# Patient Record
Sex: MALE | Race: WHITE
[De-identification: names, ages, dates, MRNs, and addresses within clinical notes are randomized per-mention and may not be internally consistent; named-entity substitution may affect disease eponyms.]

---

## 2018-03-07 ENCOUNTER — HOSPITAL ENCOUNTER (OUTPATIENT)
Dept: HOSPITAL 17 - NEPE | Age: 58
Setting detail: OBSERVATION
LOS: 1 days | Discharge: HOME | End: 2018-03-08
Attending: INTERNAL MEDICINE | Admitting: INTERNAL MEDICINE
Payer: SELF-PAY

## 2018-03-07 VITALS
SYSTOLIC BLOOD PRESSURE: 110 MMHG | RESPIRATION RATE: 16 BRPM | DIASTOLIC BLOOD PRESSURE: 67 MMHG | HEART RATE: 69 BPM | OXYGEN SATURATION: 97 %

## 2018-03-07 VITALS
DIASTOLIC BLOOD PRESSURE: 70 MMHG | RESPIRATION RATE: 16 BRPM | OXYGEN SATURATION: 98 % | TEMPERATURE: 97.5 F | HEART RATE: 62 BPM | SYSTOLIC BLOOD PRESSURE: 120 MMHG

## 2018-03-07 VITALS
DIASTOLIC BLOOD PRESSURE: 64 MMHG | HEART RATE: 72 BPM | RESPIRATION RATE: 22 BRPM | TEMPERATURE: 98.3 F | OXYGEN SATURATION: 97 % | SYSTOLIC BLOOD PRESSURE: 130 MMHG

## 2018-03-07 DIAGNOSIS — Z79.84: ICD-10-CM

## 2018-03-07 DIAGNOSIS — I50.9: ICD-10-CM

## 2018-03-07 DIAGNOSIS — I08.0: ICD-10-CM

## 2018-03-07 DIAGNOSIS — I11.0: ICD-10-CM

## 2018-03-07 DIAGNOSIS — R06.00: ICD-10-CM

## 2018-03-07 DIAGNOSIS — R00.1: ICD-10-CM

## 2018-03-07 DIAGNOSIS — R07.89: Primary | ICD-10-CM

## 2018-03-07 DIAGNOSIS — I25.10: ICD-10-CM

## 2018-03-07 DIAGNOSIS — Z95.1: ICD-10-CM

## 2018-03-07 DIAGNOSIS — Z87.891: ICD-10-CM

## 2018-03-07 DIAGNOSIS — E78.00: ICD-10-CM

## 2018-03-07 DIAGNOSIS — I25.2: ICD-10-CM

## 2018-03-07 DIAGNOSIS — E11.9: ICD-10-CM

## 2018-03-07 DIAGNOSIS — Z79.899: ICD-10-CM

## 2018-03-07 DIAGNOSIS — R94.31: ICD-10-CM

## 2018-03-07 LAB
ALBUMIN SERPL-MCNC: 3.4 GM/DL (ref 3.4–5)
ALP SERPL-CCNC: 146 U/L (ref 45–117)
ALT SERPL-CCNC: 45 U/L (ref 12–78)
AST SERPL-CCNC: 24 U/L (ref 15–37)
BASOPHILS # BLD AUTO: 0.1 TH/MM3 (ref 0–0.2)
BASOPHILS NFR BLD: 1.3 % (ref 0–2)
BILIRUB SERPL-MCNC: 0.4 MG/DL (ref 0.2–1)
BUN SERPL-MCNC: 19 MG/DL (ref 7–18)
CALCIUM SERPL-MCNC: 8.9 MG/DL (ref 8.5–10.1)
CHLORIDE SERPL-SCNC: 106 MEQ/L (ref 98–107)
CREAT SERPL-MCNC: 1.03 MG/DL (ref 0.6–1.3)
EOSINOPHIL # BLD: 0.1 TH/MM3 (ref 0–0.4)
EOSINOPHIL NFR BLD: 2.2 % (ref 0–4)
ERYTHROCYTE [DISTWIDTH] IN BLOOD BY AUTOMATED COUNT: 14.6 % (ref 11.6–17.2)
GFR SERPLBLD BASED ON 1.73 SQ M-ARVRAT: 74 ML/MIN (ref 89–?)
GLUCOSE SERPL-MCNC: 275 MG/DL (ref 74–106)
HCO3 BLD-SCNC: 26.5 MEQ/L (ref 21–32)
HCT VFR BLD CALC: 39.6 % (ref 39–51)
HGB BLD-MCNC: 13.1 GM/DL (ref 13–17)
INR PPP: 1.1 RATIO
LYMPHOCYTES # BLD AUTO: 1.9 TH/MM3 (ref 1–4.8)
LYMPHOCYTES NFR BLD AUTO: 36.1 % (ref 9–44)
MAGNESIUM SERPL-MCNC: 1.7 MG/DL (ref 1.5–2.5)
MCH RBC QN AUTO: 26.2 PG (ref 27–34)
MCHC RBC AUTO-ENTMCNC: 33 % (ref 32–36)
MCV RBC AUTO: 79.3 FL (ref 80–100)
MONOCYTE #: 0.7 TH/MM3 (ref 0–0.9)
MONOCYTES NFR BLD: 12.7 % (ref 0–8)
NEUTROPHILS # BLD AUTO: 2.6 TH/MM3 (ref 1.8–7.7)
NEUTROPHILS NFR BLD AUTO: 47.7 % (ref 16–70)
PLATELET # BLD: 178 TH/MM3 (ref 150–450)
PMV BLD AUTO: 8.5 FL (ref 7–11)
PROT SERPL-MCNC: 6.5 GM/DL (ref 6.4–8.2)
PROTHROMBIN TIME: 11.6 SEC (ref 9.8–11.6)
RBC # BLD AUTO: 4.99 MIL/MM3 (ref 4.5–5.9)
SODIUM SERPL-SCNC: 140 MEQ/L (ref 136–145)
TROPONIN I SERPL-MCNC: (no result) NG/ML (ref 0.02–0.05)
TROPONIN I SERPL-MCNC: (no result) NG/ML (ref 0.02–0.05)
WBC # BLD AUTO: 5.4 TH/MM3 (ref 4–11)

## 2018-03-07 PROCEDURE — 99285 EMERGENCY DEPT VISIT HI MDM: CPT

## 2018-03-07 PROCEDURE — 96374 THER/PROPH/DIAG INJ IV PUSH: CPT

## 2018-03-07 PROCEDURE — 85610 PROTHROMBIN TIME: CPT

## 2018-03-07 PROCEDURE — 85025 COMPLETE CBC W/AUTO DIFF WBC: CPT

## 2018-03-07 PROCEDURE — G0378 HOSPITAL OBSERVATION PER HR: HCPCS

## 2018-03-07 PROCEDURE — 84484 ASSAY OF TROPONIN QUANT: CPT

## 2018-03-07 PROCEDURE — 82550 ASSAY OF CK (CPK): CPT

## 2018-03-07 PROCEDURE — 71045 X-RAY EXAM CHEST 1 VIEW: CPT

## 2018-03-07 PROCEDURE — 83880 ASSAY OF NATRIURETIC PEPTIDE: CPT

## 2018-03-07 PROCEDURE — 80053 COMPREHEN METABOLIC PANEL: CPT

## 2018-03-07 PROCEDURE — 85730 THROMBOPLASTIN TIME PARTIAL: CPT

## 2018-03-07 PROCEDURE — 83735 ASSAY OF MAGNESIUM: CPT

## 2018-03-07 PROCEDURE — 82948 REAGENT STRIP/BLOOD GLUCOSE: CPT

## 2018-03-07 PROCEDURE — 93005 ELECTROCARDIOGRAM TRACING: CPT

## 2018-03-07 RX ADMIN — Medication SCH ML: at 21:00

## 2018-03-07 NOTE — RADRPT
EXAM DATE/TIME:  03/07/2018 18:05 

 

[facility] COMPARISON:     

CHEST SINGLE AP, February 24, 2013, 23:43.

 

                     

INDICATIONS :     

Chest pain.

                     

 

MEDICAL HISTORY :     

None.          

 

SURGICAL HISTORY :     

CABG.   

 

ENCOUNTER:     

Initial                                        

 

ACUITY:     

1 day      

 

PAIN SCORE:     

4/10

 

LOCATION:     

Bilateral chest 

 

FINDINGS:     

Mild patient rotation towards the right.  A single view of the chest demonstrates the lungs to be sym
metrically aerated without evidence of mass, infiltrate or effusion.  The cardiomediastinal contours 
are unremarkable.  Osseous structures are intact.  Median sternotomy with intact sternal wire sutures
.

 

CONCLUSION:     The lungs are clear.

 

 

 

 Dedrick Briggs MD on March 07, 2018 at 18:33           

Board Certified Radiologist.

 This report was verified electronically.

## 2018-03-07 NOTE — PD
HPI


Chief Complaint:  Chest Pain


Time Seen by Provider:  17:18


Travel History


International Travel<30 days:  No


Contact w/Intl Traveler<30days:  No


Traveled to known affect area:  No





History of Present Illness


HPI


Patient is a 57-year-old male presents emergency department for evaluation of 

shortness of breath progressive over the past few weeks.  Patient has a history 

of CABG, last heart attack was a year ago, he had an angiogram which showed his 

ejection fraction was about 40% at that time according to him.  The patient 

states that he had just established with Dr. Purvis and recently had a 

nuclear stress test, he has a results with him which are difficult to interpret 

by me because her hand written but appears the patient has global hypokinesis, 

severe aortic insufficiency and mitral insufficiency with an EF of about 19%.  

Apparently he has a fixed defect as well.  Patient was told by Dr. Purvis to 

come to the emergency department for further evaluation but he states he 

thought he could do it at home but he became somewhat worried when they were 

thinking about his recommendations from Dr. Purvis and decided to come in 

today.  He states his shortness of breath can be severe and onsets only after a 

few steps.  He does not wear any home oxygen, quit smoking 3 years ago.  He 

denies any chest pain abdominal pain extremity pain to me.





PFSH


Past Medical History


Cancer:  No


Cardiac Catheterization:  Yes (03/2013)


Cardiovascular Problems:  Yes


High Cholesterol:  Yes


Chest Pain:  Yes


Coronary Artery Disease:  Yes


Diabetes:  Yes


Patient Takes Glucophage:  Yes


Diminished Hearing:  No


Endocrine:  Yes


Gastrointestinal Disorders:  Yes


Genitourinary:  No


Hypertension:  Yes


Immune Disorder:  No


Musculoskeletal:  Yes


Neurologic:  No


Psychiatric:  No


Reproductive:  No


Respiratory:  Yes


Immunizations Current:  Yes


Myocardial Infarction:  Yes (3/2013)


Ulcer:  Yes


Tetanus Vaccination:  > 5 Years


Influenza Vaccination:  No


Pregnant?:  Not Pregnant





Past Surgical History


Appendectomy:  Yes


Cardiac Surgery:  Yes (CABG X3)


Other Surgery:  Yes (CABG, APPENDECTOMY, SKIN GRAFTS TO RIGHT FOOT AS A CHILD, 

BACK SURGERY X 3)





Social History


Alcohol Use:  Yes (SOCIALLY)


Tobacco Use:  No


Substance Use:  No





Allergies-Medications


(Allergen,Severity, Reaction):  


Coded Allergies:  


     iodine (Unverified  Allergy, Severe, Swelling, 3/7/18)


     potassium iodide (Unverified  Allergy, Severe, Swelling, 3/7/18)


     povidone-iodine (Unverified  Allergy, Severe, Swelling, 3/7/18)


     shellfish derived (Unverified  Allergy, Severe, 3/7/18)


     sodium iodide (Unverified  Allergy, Severe, Swelling, 3/7/18)


     sodium iodide (Unverified  Allergy, Severe, Swelling, 3/7/18)


Reported Meds & Prescriptions





Reported Meds & Active Scripts


Active


Reported


Glucophage (Metformin HCl) 850 Mg Tab 850 Mg PO TIDPC


Enalapril (Enalapril Maleate) 2.5 Mg Tab 2.5 Mg PO DAILY


Pravastatin 40 Mg Tab 40 Mg PO DAILY


Glyburide 2.5 Mg Tab 2.5 Mg PO BID


     Take with meals at the same time each day


Clopidogrel (Clopidogrel Bisulfate) 75 Mg Tab 75 Mg PO DAILY








Review of Systems


Except as stated in HPI:  all other systems reviewed are Neg





Physical Exam


Narrative


GENERAL: Well-developed, well-nourished, no obvious distress peer


SKIN: Focused skin assessment warm/dry.


HEAD: Atraumatic. Normocephalic. 


EYES: Pupils equal and round. No scleral icterus. No injection or drainage. 


ENT: No nasal bleeding or discharge.  Mucous membranes pink and moist.


NECK: Trachea midline. No JVD. 


CARDIOVASCULAR: Regular rate and rhythm.  No murmur appreciated.  2+ bilateral 

equal pulses in all 4 extremities.


RESPIRATORY: No accessory muscle use. Clear to auscultation. Breath sounds 

equal bilaterally.  Patient speaks in full sentences but then has to gasp 

between them.  He has good air entry and is clear to auscultation bilaterally.


GASTROINTESTINAL: Abdomen soft, non-tender, nondistended. Hepatic and splenic 

margins not palpable. 


MUSCULOSKELETAL: No obvious deformities. No clubbing.  No cyanosis.  There is 

pitting edema bilateral lower extremities from the mid tibia distally.


NEUROLOGICAL: Awake and alert. No obvious cranial nerve deficits.  Motor 

grossly within normal limits. Normal speech.


PSYCHIATRIC: Appropriate mood and affect; insight and judgment normal.





Data


Data


Last Documented VS





Orders





 Orders


Electrocardiogram (3/7/18 17:29)


B-Type Natriuretic Peptide (3/7/18 17:29)


Ckmb (Isoenzyme) Profile (3/7/18 17:29)


Complete Blood Count With Diff (3/7/18 17:29)


Comprehensive Metabolic Panel (3/7/18 17:29)


Magnesium (Mg) (3/7/18 17:29)


Prothrombin Time / Inr (Pt) (3/7/18 17:29)


Act Partial Throm Time (Ptt) (3/7/18 17:29)


Troponin I (3/7/18 17:29)


Chest, Single Ap (3/7/18 17:29)


Ecg Monitoring (3/7/18 17:29)


Iv Access Insert/Monitor (3/7/18 17:29)


Oximetry (3/7/18 17:29)


Oxygen Administration (3/7/18 17:29)


Sodium Chloride 0.9% Flush (Ns Flush) (3/7/18 17:30)


Admit Order (Ed Use Only) (3/7/18 20:57)


Place In Observation (3/7/18 20:59)


Activity Bed Rest With Brp (3/7/18 20:59)


Vital Signs (Adult) Q4H (3/7/18 20:59)


Cardiac Rhythm .As Directed (3/7/18 20:59)


Notify Dr: Other .PRN (3/7/18 20:59)


Notify DrVj Parameters (3/7/18 20:59)


Resp Oxygen Nasal Cannula (3/7/18 )


Ckmb (Isoenzyme) Profile (3/7/18 20:59)


Ckmb (Isoenzyme) Profile (3/7/18 23:59)


Troponin I (3/7/18 20:59)


Troponin I (3/7/18 23:59)


Electrocardiogram (3/7/18 20:59)


Electrocardiogram (3/7/18 23:59)


^ Obtain (3/7/18 20:59)


Sodium Chloride 0.9% Flush (Ns Flush) (3/7/18 21:00)


Sodium Chloride 0.9% Flush (Ns Flush) (3/7/18 21:00)


Cardiac Monitor / Telemetry AIDAN.Q8H (3/7/18 20:59)





Labs





Laboratory Tests








Test


  3/7/18


17:44


 


White Blood Count 5.4 TH/MM3 


 


Red Blood Count 4.99 MIL/MM3 


 


Hemoglobin 13.1 GM/DL 


 


Hematocrit 39.6 % 


 


Mean Corpuscular Volume 79.3 FL 


 


Mean Corpuscular Hemoglobin 26.2 PG 


 


Mean Corpuscular Hemoglobin


Concent 33.0 % 


 


 


Red Cell Distribution Width 14.6 % 


 


Platelet Count 178 TH/MM3 


 


Mean Platelet Volume 8.5 FL 


 


Neutrophils (%) (Auto) 47.7 % 


 


Lymphocytes (%) (Auto) 36.1 % 


 


Monocytes (%) (Auto) 12.7 % 


 


Eosinophils (%) (Auto) 2.2 % 


 


Basophils (%) (Auto) 1.3 % 


 


Neutrophils # (Auto) 2.6 TH/MM3 


 


Lymphocytes # (Auto) 1.9 TH/MM3 


 


Monocytes # (Auto) 0.7 TH/MM3 


 


Eosinophils # (Auto) 0.1 TH/MM3 


 


Basophils # (Auto) 0.1 TH/MM3 


 


CBC Comment DIFF FINAL 


 


Differential Comment  


 


Prothrombin Time 11.6 SEC 


 


Prothromb Time International


Ratio 1.1 RATIO 


 


 


Activated Partial


Thromboplast Time 22.6 SEC 


 


 


Blood Urea Nitrogen 19 MG/DL 


 


Creatinine 1.03 MG/DL 


 


Random Glucose 275 MG/DL 


 


Total Protein 6.5 GM/DL 


 


Albumin 3.4 GM/DL 


 


Calcium Level 8.9 MG/DL 


 


Magnesium Level 1.7 MG/DL 


 


Alkaline Phosphatase 146 U/L 


 


Aspartate Amino Transf


(AST/SGOT) 24 U/L 


 


 


Alanine Aminotransferase


(ALT/SGPT) 45 U/L 


 


 


Total Bilirubin 0.4 MG/DL 


 


Sodium Level 140 MEQ/L 


 


Potassium Level 4.3 MEQ/L 


 


Chloride Level 106 MEQ/L 


 


Carbon Dioxide Level 26.5 MEQ/L 


 


Anion Gap 8 MEQ/L 


 


Estimat Glomerular Filtration


Rate 74 ML/MIN 


 


 


Total Creatine Kinase 96 U/L 


 


Troponin I


  LESS THAN 0.02


NG/ML


 


B-Type Natriuretic Peptide 472 PG/ML 











MDM


Medical Decision Making


Medical Screen Exam Complete:  Yes


Emergency Medical Condition:  Yes


Differential Diagnosis


ACS, CAD, MI.


Narrative Course


Patient initial workup negative, he does have a stress test report with him 

which is hand written and appears to show an EF of 19%.  If attempted to page 

his cardiologist has not yet returned my page, I have asked Dr. Ford at 

shift change to speak with the cardiologist for additional direction on 

inpatient management, otherwise the patient can be admitted to medicine can 

follow these recommendations.


Scripts


Carvedilol (Carvedilol) 6.25 Mg Tab


6.25 MG PO BID, #60 TAB 0 Refills


   Prov: Frankwillaky,Samantha RONDA         3/8/18











Mayank Howell MD Mar 7, 2018 17:33

## 2018-03-08 VITALS
SYSTOLIC BLOOD PRESSURE: 112 MMHG | OXYGEN SATURATION: 99 % | DIASTOLIC BLOOD PRESSURE: 55 MMHG | HEART RATE: 64 BPM | TEMPERATURE: 97.3 F | RESPIRATION RATE: 22 BRPM

## 2018-03-08 VITALS — HEART RATE: 71 BPM

## 2018-03-08 VITALS
OXYGEN SATURATION: 98 % | SYSTOLIC BLOOD PRESSURE: 124 MMHG | RESPIRATION RATE: 18 BRPM | HEART RATE: 61 BPM | DIASTOLIC BLOOD PRESSURE: 71 MMHG

## 2018-03-08 VITALS
OXYGEN SATURATION: 98 % | SYSTOLIC BLOOD PRESSURE: 119 MMHG | DIASTOLIC BLOOD PRESSURE: 67 MMHG | RESPIRATION RATE: 16 BRPM | TEMPERATURE: 97.5 F | HEART RATE: 69 BPM

## 2018-03-08 VITALS
OXYGEN SATURATION: 98 % | HEART RATE: 73 BPM | RESPIRATION RATE: 18 BRPM | DIASTOLIC BLOOD PRESSURE: 57 MMHG | TEMPERATURE: 97.6 F | SYSTOLIC BLOOD PRESSURE: 115 MMHG

## 2018-03-08 VITALS
TEMPERATURE: 97.7 F | DIASTOLIC BLOOD PRESSURE: 63 MMHG | OXYGEN SATURATION: 98 % | HEART RATE: 55 BPM | SYSTOLIC BLOOD PRESSURE: 116 MMHG | RESPIRATION RATE: 20 BRPM

## 2018-03-08 VITALS — HEART RATE: 68 BPM

## 2018-03-08 LAB — TROPONIN I SERPL-MCNC: (no result) NG/ML (ref 0.02–0.05)

## 2018-03-08 RX ADMIN — Medication SCH ML: at 10:43

## 2018-03-08 NOTE — HHI.DCPOC
Discharge Care Plan


Diagnosis:  


(1) Decreased cardiac ejection fraction


(2) Atypical chest pain


(3) Sleeps in sitting position due to orthopnea


Goals to Promote Your Health


* To prevent worsening of your condition and complications


* To maintain your health at the optimal level


Directions to Meet Your Goals


*** Take your medications as prescribed


*** Follow your dietary instruction


*** Follow activity as directed








*** Keep your appointments as scheduled


*** Take your immunizations and boosters as scheduled


*** If your symptoms worsen call your PCP, if no PCP go to Urgent Care Center 

or Emergency Room***


*** Smoking is Dangerous to Your Health. Avoid second hand smoke***


***Call the 24-hour hour crisis hotline for domestic abuse at [phone]***











Samantha Morgan Mar 8, 2018 13:05

## 2018-03-08 NOTE — HHI.HP
[facility]


Primary Care Physician


Dhaval Malloy DO


Chief Complaint


Dyspnea


History of Present Illness


57 year old male with history of CABG, CHF, HTN, HLD, Type II DM, and former 

smoker presents to ER for further evaluation of dyspnea. Onset 3 weeks ago with 

accompanying bilateral lower extremity edema. Edema and dyspnea improved after 

one week "on it's own" before returning.  Reported orthopnea during varies 

times during the day and exertional dsypnea, therefore he contacted his 

cardiologist Dr. Purvis. Reports multiple testing completed during this time, 

including echocardiogram and nuclear stress testing early this week. Dr. Purvis reviewed results of testing with him on Tuesday and MD recommended him 

coming to ER due to decreased EF of 19%, stating EF 1.5 year ago was 40%. 

Waited to come to ER due to errands wanting to complete as he was certain he 

would be admitted to hospital. No current chest pain or discomfort. Yesterday 

during an episode of dyspnea had central chest pressure briefly.





Review of Systems


General: No fatigue,weakness, fever, chills, recent illness, or change in 

appetite. 


HEENT: No HA, no vision changes, no nasal congestion or drainage


CV: As stated above.  No current chest pain or pressure.  No palpitations, 

intermittent leg pain, dizziness


RESP: Exertional dyspnea, orthopnea, COPD, and former smoker. Does not require 

home oxygen. No cough, wheeze, hemoptysis. 


GI: No nausea, vomiting, bowel changes.  No unintentional weight gain or weight 

loss


: No dysuria, urgency, frequency


EXT: No lower leg edema, no paraesthesias


MS: No discomfort or change in ROM


NEURO: No change in memory, dizziness, difficulty with balance, LOC, motor/

sensory deficits


PSYCH: No anxiety, depression


SKIN: No rashes, no concerning lesions





Past Family Social History


Allergies:  


Coded Allergies:  


     iodine (Unverified  Allergy, Severe, Swelling, 3/7/18)


     potassium iodide (Unverified  Allergy, Severe, Swelling, 3/7/18)


     povidone-iodine (Unverified  Allergy, Severe, Swelling, 3/7/18)


     shellfish derived (Unverified  Allergy, Severe, 3/7/18)


     sodium iodide (Unverified  Allergy, Severe, Swelling, 3/7/18)


     sodium iodide (Unverified  Allergy, Severe, Swelling, 3/7/18)


Past Medical History


Coronary artery disease, MI (15 year ago), hypertension, hyperlipidemia, type 2 

diabetes, former smoker, congestive heart failure, COPD


Past Surgical History


CABG, appendectomy, lumbar surgeries 3


Reported Medications





Reported Meds & Active Scripts


Active


Reported


Glucophage (Metformin HCl) 850 Mg Tab 850 Mg PO TIDPC


Enalapril (Enalapril Maleate) 2.5 Mg Tab 2.5 Mg PO DAILY


Pravastatin 40 Mg Tab 40 Mg PO DAILY


Carvedilol 3.125 Mg Tab 3.125 Mg PO BID


Glyburide 2.5 Mg Tab 2.5 Mg PO BID


     Take with meals at the same time each day


Clopidogrel (Clopidogrel Bisulfate) 75 Mg Tab 75 Mg PO DAILY


Active Ordered Medications





Current Medications








 Medications


  (Trade)  Dose


 Ordered  Sig/Ilia


 Route  Start Time


 Stop Time Status Last Admin


 


  (NS Flush)  2 ml  UNSCH  PRN


 IVF  3/7/18 17:30


     


 


 


  (NS Flush)  2 ml  UNSCH  PRN


 IV FLUSH  3/7/18 21:00


     


 


 


  (NS Flush)  2 ml  BID


 IV FLUSH  3/7/18 21:00


     


 


 


  (Tylenol)  500 mg  Q4H  PRN


 PO  3/8/18 08:00


     


 


 


  (Zofran Inj)  4 mg  Q6H  PRN


 IV PUSH  3/8/18 08:00


     


 


 


  (Nitrostat Sl)  0.4 mg  Q5M  PRN


 SL  3/8/18 08:00


     


 


 


  (Aspirin)  325 mg  DAILY


 PO  3/8/18 09:00


     


 








Social History


Known coronary artery disease, hypertension, hyperlipidemia, and type 2 

diabetes.


Former smoker quitting 3 years ago. 





Past cardiac testing


3/5/18- Chemical stress test completed Dr. Purvis's office, reports EF 19% 

and directed to ER.


13 cardiac catheterization (Dr. Liane Kerr) 1.  Critical three-vessel 

coronary artery disease.  2.  Patent LIMA to LAD.  3. Ischemic cardiomyopathy 

EF 35-40%


CABG





Physical Exam


Vital Signs





Vital Signs








  Date Time  Temp Pulse Resp B/P (MAP) Pulse Ox O2 Delivery O2 Flow Rate FiO2


 


3/8/18 04:53 97.5 69 16 119/67 (84) 98   


 


3/8/18 01:21      Nasal Cannula 2.00 


 


3/8/18 01:05 97.6 73 18 115/57 (76) 98   


 


3/8/18 00:19  71      


 


3/7/18 22:42 97.5 62 16 120/70 (87) 98   


 


3/7/18 22:02        


 


3/7/18 20:19  69 16 110/67 (81) 97 Nasal Cannula 2.00 


 


3/7/18 18:15     100 Nasal Cannula 2.00 


 


3/7/18 17:03  62 22  97 Nasal Cannula 2.00 


 


3/7/18 16:45 98.3 72 22 130/64 (86) 97   








Physical Exam


GENERAL: Alert WN, WD, NAD, pleasant, mildly dyspneic with conversation, 

 male who appears older than stated age


HEAD: NC, AT


EYES: Sclera clear, conjunctiva without injection


ENT: Mucous membranes pink and moist


NECK: Supple, no masses, trachea midline


CV: RRR, without murmur, rub, gallop, no JVD, S1-S2 no S3-S4. 


RESP: Diminished lungs throughout bilateral, no crackles, wheeze, rhonchi, 

symmetrical chest rise, nonlabored, mildly dyspneic with conversion however 

able to speak in full sentences.


ABD: Soft, NT, ND, no masses, positive bowel tones


EXT: Pulses +24


MS: Normal tone 4 extremities, no obvious deformities, full range of motion


NEURO: CN II through CN XII grossly intact, motor strength 5/5


PSYCH: A+O 3, pleasant affect, appropriate speech, mood, insight and judgment


SKIN: Normal turgor, normal texture, no lesions, no rashes, even hair 

distribution, multiple tattoos


Laboratory





Laboratory Tests








Test


  3/7/18


17:44 3/7/18


21:20 3/8/18


01:15


 


White Blood Count 5.4   


 


Red Blood Count 4.99   


 


Hemoglobin 13.1   


 


Hematocrit 39.6   


 


Mean Corpuscular Volume 79.3   


 


Mean Corpuscular Hemoglobin 26.2   


 


Mean Corpuscular Hemoglobin


Concent 33.0 


  


  


 


 


Red Cell Distribution Width 14.6   


 


Platelet Count 178   


 


Mean Platelet Volume 8.5   


 


Neutrophils (%) (Auto) 47.7   


 


Lymphocytes (%) (Auto) 36.1   


 


Monocytes (%) (Auto) 12.7   


 


Eosinophils (%) (Auto) 2.2   


 


Basophils (%) (Auto) 1.3   


 


Neutrophils # (Auto) 2.6   


 


Lymphocytes # (Auto) 1.9   


 


Monocytes # (Auto) 0.7   


 


Eosinophils # (Auto) 0.1   


 


Basophils # (Auto) 0.1   


 


CBC Comment DIFF FINAL   


 


Differential Comment    


 


Prothrombin Time 11.6   


 


Prothromb Time International


Ratio 1.1 


  


  


 


 


Activated Partial


Thromboplast Time 22.6 


  


  


 


 


Blood Urea Nitrogen 19   


 


Creatinine 1.03   


 


Random Glucose 275   


 


Total Protein 6.5   


 


Albumin 3.4   


 


Calcium Level 8.9   


 


Magnesium Level 1.7   


 


Alkaline Phosphatase 146   


 


Aspartate Amino Transf


(AST/SGOT) 24 


  


  


 


 


Alanine Aminotransferase


(ALT/SGPT) 45 


  


  


 


 


Total Bilirubin 0.4   


 


Sodium Level 140   


 


Potassium Level 4.3   


 


Chloride Level 106   


 


Carbon Dioxide Level 26.5   


 


Anion Gap 8   


 


Estimat Glomerular Filtration


Rate 74 


  


  


 


 


Total Creatine Kinase 96  73  59 


 


Troponin I LESS THAN 0.02  LESS THAN 0.02  LESS THAN 0.02 


 


B-Type Natriuretic Peptide 472   








Result Diagram:  


3/7/18 1744                                                                    

            3/7/18 1744





Imaging





Last 48 hours Impressions








Chest X-Ray 3/7/18 1729 Signed





Impressions: 





 Service Date/Time:  2018 18:05 - CONCLUSION: The lungs are 





 clear.     Dedrick Briggs MD 








Course


EKG


NSB, nonspecific t waves changes V4-V6





Caprini VTE Risk Assessment


Caprini VTE Risk Assessment:  No/Low Risk (score <= 1)


Caprini Risk Assessment Model











 Point Value = 1          Point Value = 2  Point Value = 3  Point Value = 5


 


Age 41-60


Minor surgery


BMI > 25 kg/m2


Swollen legs


Varicose veins


Pregnancy or postpartum


History of unexplained or recurrent


   spontaneous 


Oral contraceptives or hormone


   replacement


Sepsis (< 1 month)


Serious lung disease, including


   pneumonia (< 1 month)


Abnormal pulmonary function


Acute myocardial infarction


Congestive heart failure (< 1 month)


History of inflammatory bowel disease


Medical patient at bed rest Age 61-74


Arthroscopic surgery


Major open surgery (> 45 min)


Laparoscopic surgery (> 45 min)


Malignancy


Confined to bed (> 72 hours)


Immobilizing plaster cast


Central venous access Age >= 75


History of VTE


Family history of VTE


Factor V Leiden


Prothrombin 90883C


Lupus anticoagulant


Anticardiolipin antibodies


Elevated serum homocysteine


Heparin-induced thrombocytopenia


Other congenital or acquired


   thrombophilia Stroke (< 1 month)


Elective arthroplasty


Hip, pelvis, or leg fracture


Acute spinal cord injury (< 1 month)








Prophylaxis Regimen











   Total Risk


Factor Score Risk Level Prophylaxis Regimen


 


0-1      Low Early ambulation


 


2 Moderate Order ONE of the following:


*Sequential Compression Device (SCD)


*Heparin 5000 units SQ BID


 


3-4 Higher Order ONE of the following medications:


*Heparin 5000 units SQ TID


*Enoxaparin/Lovenox 40 mg SQ daily (WT < 150 kg, CrCl > 30 mL/min)


*Enoxaparin/Lovenox 30 mg SQ daily (WT < 150 kg, CrCl > 10-29 mL/min)


*Enoxaparin/Lovenox 30 mg SQ BID (WT < 150 kg, CrCl > 30 mL/min)


AND/OR


*Sequential Compression Device (SCD)


 


5 or more Highest Order ONE of the following medications:


*Heparin 5000 units SQ TID (Preferred with Epidurals)


*Enoxaparin/Lovenox 40 mg SQ daily (WT < 150 kg, CrCl > 30 mL/min)


*Enoxaparin/Lovenox 30 mg SQ daily (WT < 150 kg, CrCl > 10-29 mL/min)


*Enoxaparin/Lovenox 30 mg SQ BID (WT < 150 kg, CrCl > 30 mL/min)


AND


*Sequential Compression Device (SCD)











Assessment and Plan


Assessment and Plan


#1 Atypical chest pain-admitted to chest pain center. Ruled out with 3 sets of 

EKGs, cardiac enzymes, and monitored on telemetry overnight. Reports recent 

cardiac stress test early this week with EF 19%. His cardiologist recommended 

him coming to ER on Tuesday, unfortunately was unable to come to ER until last 

evening. Call will be placed to Dr. Purvis office, however patient reports 

Dr. Purvis is now out of town. If Dr. Purvis is out of town, will contact on

-call physician for Dr. Purvis to discuss plan of care. Patient is agreeable 

to plan of care.


#2 Dyspnea-continue o2 supplementation to maintain spo2 greater than 92%, no 

acute finding of congestive heart failure although due to reported orthopnea 

will administer x1 dose of Lasix 20 mg IV 


#3 History of CAD-continue Plavix, increase Coreg to 6.25 BID, continue 

enalapril


 





1130 Spoke with Dr. Amin, covering for Dr. Purvis.











Samantha Morgan Mar 8, 2018 08:07

## 2018-03-09 NOTE — EKG
Date Performed: 03/07/2018       Time Performed: 21:23:09

 

PTAGE:      57 years

 

EKG:      Sinus rhythm 

 

 POSSIBLE LEFT ATRIAL ENLARGEMENT POSSIBLE LEFT VENTRICULAR HYPERTROPHY ST DEVIATION AND MODERATE T-W
AVE ABNORMALITY, CONSIDER LATERAL ISCHEMIA ABNORMAL ECG WARNING: DATA QUALITY MAY AFFECT INTERPRETATI
ON 

 

 PREVIOUS TRACING            : 03/07/2018 17.03 Since previous tracing, no significant change noted

 

DOCTOR:   Nazario Mcguire  Interpretating Date/Time  03/09/2018 08:46:02

## 2018-03-09 NOTE — EKG
Date Performed: 03/07/2018       Time Performed: 17:03:14

 

PTAGE:      57 years

 

EKG:      Sinus rhythm 

 

 POSSIBLE LEFT ATRIAL ENLARGEMENT POSSIBLE LEFT VENTRICULAR HYPERTROPHY ST DEVIATION AND MODERATE T-W
AVE ABNORMALITY, CONSIDER ANTEROLATERAL ISCHEMIA ABNORMAL ECG INTERPRETATION BASED ON A DEFAULT AGE O
F 38 YEARS 

 

 PREVIOUS TRACING            : 02/25/2013 14.28 Since previous tracing, no significant change noted

 

DOCTOR:   Nazario Mcguire  Interpretating Date/Time  03/09/2018 08:48:38

## 2018-03-09 NOTE — EKG
Date Performed: 03/07/2018       Time Performed: 22:52:15

 

PTAGE:      57 years

 

EKG:      SINUS BRADYCARDIA POSSIBLE LEFT ATRIAL ENLARGEMENT POSSIBLE LEFT VENTRICULAR HYPERTROPHY NO
NSPECIFIC T-WAVE ABNORMALITY ABNORMAL ECG

 

PREVIOUS TRACING       : 03/07/2018 21.23 Since previous tracing, no significant change noted

 

DOCTOR:   Nazario Mcguire  Interpretating Date/Time  03/09/2018 08:45:24

## 2018-03-09 NOTE — EKG
Date Performed: 03/08/2018       Time Performed: 01:23:56

 

PTAGE:      57 years

 

EKG:      Sinus rhythm 

 

 POSSIBLE LEFT ATRIAL ENLARGEMENT POSSIBLE LEFT VENTRICULAR HYPERTROPHY NONSPECIFIC T-WAVE ABNORMALIT
Y ABNORMAL ECG

 

PREVIOUS TRACING       : 03/07/2018 22.52 Since previous tracing, no significant change noted

 

DOCTOR:   Nazario Mcguire  Interpretating Date/Time  03/09/2018 08:45:04

## 2018-03-21 ENCOUNTER — HOSPITAL ENCOUNTER (INPATIENT)
Dept: HOSPITAL 17 - NEDAMB | Age: 58
LOS: 9 days | Discharge: HOME | DRG: 225 | End: 2018-03-30
Attending: HOSPITALIST | Admitting: HOSPITALIST
Payer: COMMERCIAL

## 2018-03-21 VITALS
RESPIRATION RATE: 22 BRPM | DIASTOLIC BLOOD PRESSURE: 63 MMHG | SYSTOLIC BLOOD PRESSURE: 116 MMHG | HEART RATE: 58 BPM | OXYGEN SATURATION: 100 %

## 2018-03-21 VITALS
SYSTOLIC BLOOD PRESSURE: 124 MMHG | TEMPERATURE: 98 F | RESPIRATION RATE: 22 BRPM | DIASTOLIC BLOOD PRESSURE: 62 MMHG | OXYGEN SATURATION: 99 % | HEART RATE: 62 BPM

## 2018-03-21 VITALS — RESPIRATION RATE: 16 BRPM | OXYGEN SATURATION: 100 %

## 2018-03-21 VITALS
DIASTOLIC BLOOD PRESSURE: 78 MMHG | OXYGEN SATURATION: 99 % | RESPIRATION RATE: 18 BRPM | SYSTOLIC BLOOD PRESSURE: 131 MMHG | HEART RATE: 59 BPM

## 2018-03-21 VITALS — WEIGHT: 187.61 LBS | BODY MASS INDEX: 26.27 KG/M2 | HEIGHT: 71 IN

## 2018-03-21 DIAGNOSIS — E78.5: ICD-10-CM

## 2018-03-21 DIAGNOSIS — I11.0: ICD-10-CM

## 2018-03-21 DIAGNOSIS — Z79.82: ICD-10-CM

## 2018-03-21 DIAGNOSIS — I34.0: ICD-10-CM

## 2018-03-21 DIAGNOSIS — Z91.041: ICD-10-CM

## 2018-03-21 DIAGNOSIS — I25.5: ICD-10-CM

## 2018-03-21 DIAGNOSIS — R11.0: ICD-10-CM

## 2018-03-21 DIAGNOSIS — E66.9: ICD-10-CM

## 2018-03-21 DIAGNOSIS — Z79.84: ICD-10-CM

## 2018-03-21 DIAGNOSIS — M54.9: ICD-10-CM

## 2018-03-21 DIAGNOSIS — I27.20: ICD-10-CM

## 2018-03-21 DIAGNOSIS — J44.9: ICD-10-CM

## 2018-03-21 DIAGNOSIS — Z95.1: ICD-10-CM

## 2018-03-21 DIAGNOSIS — I25.110: Primary | ICD-10-CM

## 2018-03-21 DIAGNOSIS — I25.2: ICD-10-CM

## 2018-03-21 DIAGNOSIS — I50.9: ICD-10-CM

## 2018-03-21 DIAGNOSIS — Z87.891: ICD-10-CM

## 2018-03-21 DIAGNOSIS — Z79.02: ICD-10-CM

## 2018-03-21 DIAGNOSIS — E11.65: ICD-10-CM

## 2018-03-21 DIAGNOSIS — K21.9: ICD-10-CM

## 2018-03-21 DIAGNOSIS — G89.29: ICD-10-CM

## 2018-03-21 LAB
ALBUMIN SERPL-MCNC: 3.7 GM/DL (ref 3.4–5)
ALP SERPL-CCNC: 132 U/L (ref 45–117)
ALT SERPL-CCNC: 43 U/L (ref 12–78)
AST SERPL-CCNC: 26 U/L (ref 15–37)
BASOPHILS # BLD AUTO: 0.1 TH/MM3 (ref 0–0.2)
BASOPHILS NFR BLD: 1.1 % (ref 0–2)
BILIRUB SERPL-MCNC: 0.6 MG/DL (ref 0.2–1)
BUN SERPL-MCNC: 14 MG/DL (ref 7–18)
CALCIUM SERPL-MCNC: 9.2 MG/DL (ref 8.5–10.1)
CHLORIDE SERPL-SCNC: 106 MEQ/L (ref 98–107)
CREAT SERPL-MCNC: 1.07 MG/DL (ref 0.6–1.3)
EOSINOPHIL # BLD: 0.1 TH/MM3 (ref 0–0.4)
EOSINOPHIL NFR BLD: 2.4 % (ref 0–4)
ERYTHROCYTE [DISTWIDTH] IN BLOOD BY AUTOMATED COUNT: 15 % (ref 11.6–17.2)
ERYTHROCYTE [DISTWIDTH] IN BLOOD BY AUTOMATED COUNT: 15.3 % (ref 11.6–17.2)
GFR SERPLBLD BASED ON 1.73 SQ M-ARVRAT: 71 ML/MIN (ref 89–?)
GLUCOSE SERPL-MCNC: 248 MG/DL (ref 74–106)
HCO3 BLD-SCNC: 24.7 MEQ/L (ref 21–32)
HCT VFR BLD CALC: 41.1 % (ref 39–51)
HCT VFR BLD CALC: 42.3 % (ref 39–51)
HGB BLD-MCNC: 13.8 GM/DL (ref 13–17)
HGB BLD-MCNC: 14 GM/DL (ref 13–17)
INR PPP: 1.1 RATIO
INR PPP: 1.1 RATIO
LYMPHOCYTES # BLD AUTO: 2 TH/MM3 (ref 1–4.8)
LYMPHOCYTES NFR BLD AUTO: 35.6 % (ref 9–44)
MAGNESIUM SERPL-MCNC: 1.9 MG/DL (ref 1.5–2.5)
MCH RBC QN AUTO: 26.3 PG (ref 27–34)
MCH RBC QN AUTO: 26.4 PG (ref 27–34)
MCHC RBC AUTO-ENTMCNC: 33 % (ref 32–36)
MCHC RBC AUTO-ENTMCNC: 33.5 % (ref 32–36)
MCV RBC AUTO: 78.9 FL (ref 80–100)
MCV RBC AUTO: 79.5 FL (ref 80–100)
MONOCYTE #: 0.6 TH/MM3 (ref 0–0.9)
MONOCYTES NFR BLD: 11.1 % (ref 0–8)
NEUTROPHILS # BLD AUTO: 2.8 TH/MM3 (ref 1.8–7.7)
NEUTROPHILS NFR BLD AUTO: 49.8 % (ref 16–70)
PLATELET # BLD: 170 TH/MM3 (ref 150–450)
PLATELET # BLD: 172 TH/MM3 (ref 150–450)
PMV BLD AUTO: 8.5 FL (ref 7–11)
PMV BLD AUTO: 8.5 FL (ref 7–11)
PROT SERPL-MCNC: 7.4 GM/DL (ref 6.4–8.2)
PROTHROMBIN TIME: 10.9 SEC (ref 9.8–11.6)
PROTHROMBIN TIME: 11.1 SEC (ref 9.8–11.6)
RBC # BLD AUTO: 5.21 MIL/MM3 (ref 4.5–5.9)
RBC # BLD AUTO: 5.32 MIL/MM3 (ref 4.5–5.9)
SODIUM SERPL-SCNC: 140 MEQ/L (ref 136–145)
TROPONIN I SERPL-MCNC: (no result) NG/ML (ref 0.02–0.05)
WBC # BLD AUTO: 5.5 TH/MM3 (ref 4–11)
WBC # BLD AUTO: 5.6 TH/MM3 (ref 4–11)

## 2018-03-21 PROCEDURE — 83520 IMMUNOASSAY QUANT NOS NONAB: CPT

## 2018-03-21 PROCEDURE — C1722 AICD, SINGLE CHAMBER: HCPCS

## 2018-03-21 PROCEDURE — C1777 LEAD, AICD, ENDO SINGLE COIL: HCPCS

## 2018-03-21 PROCEDURE — 85002 BLEEDING TIME TEST: CPT

## 2018-03-21 PROCEDURE — 33249 INSJ/RPLCMT DEFIB W/LEAD(S): CPT

## 2018-03-21 PROCEDURE — C1769 GUIDE WIRE: HCPCS

## 2018-03-21 PROCEDURE — 84484 ASSAY OF TROPONIN QUANT: CPT

## 2018-03-21 PROCEDURE — 93460 R&L HRT ART/VENTRICLE ANGIO: CPT

## 2018-03-21 PROCEDURE — 80053 COMPREHEN METABOLIC PANEL: CPT

## 2018-03-21 PROCEDURE — 84439 ASSAY OF FREE THYROXINE: CPT

## 2018-03-21 PROCEDURE — 71045 X-RAY EXAM CHEST 1 VIEW: CPT

## 2018-03-21 PROCEDURE — 83735 ASSAY OF MAGNESIUM: CPT

## 2018-03-21 PROCEDURE — 82948 REAGENT STRIP/BLOOD GLUCOSE: CPT

## 2018-03-21 PROCEDURE — 82810 BLOOD GASES O2 SAT ONLY: CPT

## 2018-03-21 PROCEDURE — 84100 ASSAY OF PHOSPHORUS: CPT

## 2018-03-21 PROCEDURE — 93306 TTE W/DOPPLER COMPLETE: CPT

## 2018-03-21 PROCEDURE — 82552 ASSAY OF CPK IN BLOOD: CPT

## 2018-03-21 PROCEDURE — 80048 BASIC METABOLIC PNL TOTAL CA: CPT

## 2018-03-21 PROCEDURE — 85027 COMPLETE CBC AUTOMATED: CPT

## 2018-03-21 PROCEDURE — 83880 ASSAY OF NATRIURETIC PEPTIDE: CPT

## 2018-03-21 PROCEDURE — 93005 ELECTROCARDIOGRAM TRACING: CPT

## 2018-03-21 PROCEDURE — 84443 ASSAY THYROID STIM HORMONE: CPT

## 2018-03-21 PROCEDURE — 85610 PROTHROMBIN TIME: CPT

## 2018-03-21 PROCEDURE — C1893 INTRO/SHEATH, FIXED,NON-PEEL: HCPCS

## 2018-03-21 PROCEDURE — 85730 THROMBOPLASTIN TIME PARTIAL: CPT

## 2018-03-21 PROCEDURE — 82550 ASSAY OF CK (CPK): CPT

## 2018-03-21 PROCEDURE — 94060 EVALUATION OF WHEEZING: CPT

## 2018-03-21 PROCEDURE — 71046 X-RAY EXAM CHEST 2 VIEWS: CPT

## 2018-03-21 PROCEDURE — 83036 HEMOGLOBIN GLYCOSYLATED A1C: CPT

## 2018-03-21 PROCEDURE — 80061 LIPID PANEL: CPT

## 2018-03-21 PROCEDURE — 84181 WESTERN BLOT TEST: CPT

## 2018-03-21 PROCEDURE — 83690 ASSAY OF LIPASE: CPT

## 2018-03-21 PROCEDURE — 85025 COMPLETE CBC W/AUTO DIFF WBC: CPT

## 2018-03-21 RX ADMIN — INSULIN ASPART SCH: 100 INJECTION, SOLUTION INTRAVENOUS; SUBCUTANEOUS at 16:55

## 2018-03-21 RX ADMIN — Medication SCH ML: at 21:21

## 2018-03-21 RX ADMIN — FAMOTIDINE SCH MG: 20 TABLET, FILM COATED ORAL at 21:29

## 2018-03-21 RX ADMIN — CARVEDILOL SCH MG: 6.25 TABLET, FILM COATED ORAL at 21:29

## 2018-03-21 RX ADMIN — INSULIN ASPART SCH: 100 INJECTION, SOLUTION INTRAVENOUS; SUBCUTANEOUS at 21:34

## 2018-03-21 RX ADMIN — STANDARDIZED SENNA CONCENTRATE AND DOCUSATE SODIUM SCH TAB: 8.6; 5 TABLET, FILM COATED ORAL at 21:29

## 2018-03-21 NOTE — HHI.HP
__________________________________________________





[facility]


Service


[facility]


Primary Care Physician


Dhaval Malloy DO


Admission Diagnosis





unstable angina, EF of 19%, chest pain


Diagnoses:  


(1) Obese


Diagnosis:  Secondary





(2) Diabetes mellitus


Diagnosis:  Secondary





(3) Hypertension


Diagnosis:  Secondary





(4) Hyperlipidemia


Diagnosis:  Secondary





(5) CAD (coronary artery disease)


Diagnosis:  Principal





(6) Hx of CABG


Diagnosis:  Principal





(7) Unstable angina pectoris


Diagnosis:  Principal





(8) Decreased cardiac ejection fraction


Diagnosis:  Secondary





Chief Complaint:  


Chest pain


Travel History


International Travel<30 Days:  No


Contact w/Intl Traveler <30 Da:  No


Traveled to Known Affected Are:  No


History of Present Illness


Patient is a 57-year-old  male presenting to the emergency department 

for evaluation of left-sided chest pain worse when trying to raise his left 

arm.  As well as shortness of breath.  Patient has had this on and off for the 

past 2-3 weeks.  Patient was followed with Dr. Purvis of cardiology that test 

that showed a significant ejection fraction reduction from 40% down to 19%.  

Dr. Purvis wants the patient to be placed on heparin and to be followed for 

cardiac catheterization tomorrow.  Symptoms have not improved been feeling very 

weak and tired as well as short of breath with any movement has no numbness and 

tingling in left arm is improved but still some chest discomfort and pressure-

like something sitting on his chest chest pain is 6 out of 10 taken aspirin 

today has history of high blood pressure and cholesterol does take Plavix


Will be placed in observation will put on heparin drip and Dr. Purvis will 

take for cardiac catheterization tomorrow





Review of Systems


Constitutional:  COMPLAINS OF: Fatigue, DENIES: Diaphoretic episodes, Fever, 

Weight gain, Weight loss, Chills, Dizziness, Change in appetite, Night Sweats


Endocrine:  DENIES: Heat/cold intolerance, Polydipsia, Polyuria, Polyphagia


Eyes:  DENIES: Blurred vision, Diplopia, Eye inflammation, Eye pain, Vision loss

, Photosensitivity, Double Vision


Ears, nose, mouth, throat:  DENIES: Tinnitus, Hearing loss, Vertigo, Nasal 

discharge, Oral lesions, Throat pain, Hoarseness, Ear Pain, Running Nose, 

Epistaxis, Sinus Pain, Toothache, Odynophagia


Respiratory:  COMPLAINS OF: Shortness of breath, DENIES: Apneas, Cough, Snoring

, Wheezing, Hemoptysis, Sputum production


Cardiovascular:  COMPLAINS OF: Chest pain, Dyspnea on Exertion, DENIES: 

Palpitations, Syncope, PND, Lower Extremity Edema, Orthopnea, Claudication


Gastrointestinal:  DENIES: Abdominal pain, Black stools, Bloody stools, 

Constipation, Diarrhea, Nausea, Vomiting, Difficulty Swallowing, Anorexia


Musculoskeletal:  COMPLAINS OF: Back pain, DENIES: Joint pain, Muscle aches, 

Stiffness, Joint Swelling, Neck pain


Integumentary:  DENIES: Abnormal pigmentation, Nail changes, Pruritus, Rash


Hematologic/lymphatic:  DENIES: Bruising, Lymphadenopathy


Immunologic/allergic:  DENIES: Eczema, Urticaria


Neurologic:  DENIES: Abnormal gait, Headache, Localized weakness, Paresthesias, 

Seizures, Speech Problems, Tremor, Poor Balance


Psychiatric:  DENIES: Anxiety, Confusion, Mood changes, Depression, 

Hallucinations, Agitation, Suicidal Ideation, Homicidal Ideation, Delusions


Except as stated in HPI:  all other systems reviewed are Neg





Past Family Social History


Past Medical History


Chest pain


History of MI


History of coronary artery bypass graft


Hypertension


Hyperlipidemia


Obesity


COPD tobacco abuse


Diabetes mellitus


Known coronary artery disease


Chronic back pain with history of lumbar surgeries


History of ulcers


GERD


Past Surgical History


Coronary artery bypass graft 3


Appendectomy .


skin grafts to right foot as a child


Multiple surgeries on right foot


Back surgeries 3 in the lumbar region


Reported Medications





Reported Meds & Active Scripts


Active


Carvedilol 6.25 Mg Tab 6.25 Mg PO BID


Reported


Glucophage (Metformin HCl) 850 Mg Tab 850 Mg PO TIDPC


Enalapril (Enalapril Maleate) 2.5 Mg Tab 2.5 Mg PO DAILY


Pravastatin 40 Mg Tab 40 Mg PO DAILY


Glyburide 2.5 Mg Tab 2.5 Mg PO BID


     Take with meals at the same time each day


Clopidogrel (Clopidogrel Bisulfate) 75 Mg Tab 75 Mg PO DAILY


Allergies:  


Coded Allergies:  


     iodine (Unverified  Allergy, Severe, Swelling, 3/21/18)


     potassium iodide (Unverified  Allergy, Severe, Swelling, 3/21/18)


     povidone-iodine (Unverified  Allergy, Severe, Swelling, 3/21/18)


     shellfish derived (Unverified  Allergy, Severe, 3/21/18)


     sodium iodide (Unverified  Allergy, Severe, Swelling, 3/21/18)


     sodium iodide (Unverified  Allergy, Severe, Swelling, 3/21/18)


Active Ordered Medications





Current Medications


Aspirin (Aspirin) 325 mg ONCE  ONCE PO  Last administered on 3/21/18at 15:00;  

Start 3/21/18 at 14:30;  Stop 3/21/18 at 14:37;  Status DC


Nitroglycerin (Nitroglycerin 2% Oint) 1 inch ONCE  ONCE TOPICAL  Last 

administered on 3/21/18at 15:00;  Start 3/21/18 at 14:30;  Stop 3/21/18 at 14:37

;  Status DC


Heparin Sodium/ Dextrose 250 ml @ 0 mls/hr TITRATE  PRN IV Coagulation 

Management;  Start 3/21/18 at 15:45;  Status UNV


Insulin Aspart (NovoLOG SUPPLEMENTAL SCALE) 1 ACHS SLIDING  SCALE SQ ;  Start 3/

21/18 at 17:00


Dextrose (D50w (Vial) Inj) 50 ml UNSCH  PRN IV PUSH HYPOGLYCEMIA-SEE COMMENTS;  

Start 3/21/18 at 16:00


Glucagon (Glucagon Inj) 1 mg UNSCH  PRN OTHER HYPOGLYCEMIA-SEE COMMENTS;  Start 

3/21/18 at 16:00


Miscellaneous Information PHARMACY NEEDS HT/ WT... Q15M .XX ;  Start 3/21/18 at 

15:45


Carvedilol (Coreg) 6.25 mg BID PO ;  Start 3/21/18 at 21:00


Clopidogrel Bisulfate (Plavix) 75 mg DAILY PO ;  Start 3/22/18 at 09:00


Enalapril Maleate (Vasotec) 2.5 mg DAILY PO ;  Start 3/22/18 at 09:00


Pravastatin Sodium (Pravachol) 40 mg DAILY PO ;  Start 3/22/18 at 09:00


Family History


History of family myocardial infarction


Hypertension


Social History


History of tobacco abuse quit 3 years ago


Occasional alcoholic beverage


Denies any illicits


Drives a truck





Physical Exam


Vital Signs





Vital Signs








  Date Time  Temp Pulse Resp B/P (MAP) Pulse Ox O2 Delivery O2 Flow Rate FiO2


 


3/21/18 14:16   16  100 Room Air  


 


3/21/18 12:22 98.0 62 22 124/62 (82) 99   








Physical Exam


GENERAL: This is a well-nourished, well-developed patient, in mild to moderate 

distress.


SKIN: No rashes, ecchymoses or lesions. Cool and dry.


HEAD: Atraumatic. Normocephalic. No temporal or scalp tenderness.


EYES: Pupils equal round and reactive. Extraocular motions intact. No scleral 

icterus. No injection or drainage. 


ENT: Nose without bleeding, purulent drainage or septal hematoma. Throat 

without erythema, tonsillar hypertrophy or exudate. Uvula midline. Airway 

patent.


NECK: Trachea midline. No JVD or lymphadenopathy. Supple, nontender, no 

meningeal signs.


CARDIOVASCULAR: Regular rate and rhythm without murmurs, gallops, or rubs.  S1-

S2 no S3 or S4


RESPIRATORY: Clear to auscultation. Breath sounds equal bilaterally. No wheezes

, rales, or rhonchi.  


GASTROINTESTINAL: Abdomen soft, non-tender, nondistended. No hepato-splenomegaly

, or palpable masses. No guarding.  Obese


MUSCULOSKELETAL: Extremities without clubbing, cyanosis, or edema. No joint 

tenderness, effusion, or edema noted. No calf tenderness. Negative Homans sign 

bilaterally.


NEUROLOGICAL: Awake and alert. Cranial nerves II through XII intact.  Motor and 

sensory grossly within normal limits. Five out of 5 muscle strength in all 

muscle groups.  Normal speech.


Insight and judgment is good


Mood behaviors appropriate


Laboratory





Laboratory Tests








Test


  3/21/18


12:37


 


White Blood Count 5.6 


 


Red Blood Count 5.32 


 


Hemoglobin 14.0 


 


Hematocrit 42.3 


 


Mean Corpuscular Volume 79.5 


 


Mean Corpuscular Hemoglobin 26.3 


 


Mean Corpuscular Hemoglobin


Concent 33.0 


 


 


Red Cell Distribution Width 15.0 


 


Platelet Count 172 


 


Mean Platelet Volume 8.5 


 


Neutrophils (%) (Auto) 49.8 


 


Lymphocytes (%) (Auto) 35.6 


 


Monocytes (%) (Auto) 11.1 


 


Eosinophils (%) (Auto) 2.4 


 


Basophils (%) (Auto) 1.1 


 


Neutrophils # (Auto) 2.8 


 


Lymphocytes # (Auto) 2.0 


 


Monocytes # (Auto) 0.6 


 


Eosinophils # (Auto) 0.1 


 


Basophils # (Auto) 0.1 


 


CBC Comment DIFF FINAL 


 


Differential Comment  


 


Prothrombin Time 10.9 


 


Prothromb Time International


Ratio 1.1 


 


 


Activated Partial


Thromboplast Time 23.1 


 


 


Blood Urea Nitrogen 14 


 


Creatinine 1.07 


 


Random Glucose 248 


 


Total Protein 7.4 


 


Albumin 3.7 


 


Calcium Level 9.2 


 


Magnesium Level 1.9 


 


Alkaline Phosphatase 132 


 


Aspartate Amino Transf


(AST/SGOT) 26 


 


 


Alanine Aminotransferase


(ALT/SGPT) 43 


 


 


Total Bilirubin 0.6 


 


Sodium Level 140 


 


Potassium Level 4.3 


 


Chloride Level 106 


 


Carbon Dioxide Level 24.7 


 


Anion Gap 9 


 


Estimat Glomerular Filtration


Rate 71 


 


 


Total Creatine Kinase 128 


 


Creatine Kinase MB 3.3 


 


Troponin I LESS THAN 0.02 


 


Lipase 189 








Result Diagram:  


3/21/18 1237                                                                   

             3/21/18 1237





Imaging





Last Impressions








Chest X-Ray 3/21/18 1225 Signed





Impressions: 





 Service Date/Time:  2018 13:08 - CONCLUSION:  No acute 





 disease.  No significant change has occurred.       Roy J. Siragusa, MD Caprini VTE Risk Assessment


[place]ha VTE Risk Assessment:  Mod/High Risk (score >= 2)


Caprini Risk Assessment Model











 Point Value = 1          Point Value = 2  Point Value = 3  Point Value = 5


 


Age 41-60


Minor surgery


BMI > 25 kg/m2


Swollen legs


Varicose veins


Pregnancy or postpartum


History of unexplained or recurrent


   spontaneous 


Oral contraceptives or hormone


   replacement


Sepsis (< 1 month)


Serious lung disease, including


   pneumonia (< 1 month)


Abnormal pulmonary function


Acute myocardial infarction


Congestive heart failure (< 1 month)


History of inflammatory bowel disease


Medical patient at bed rest Age 61-74


Arthroscopic surgery


Major open surgery (> 45 min)


Laparoscopic surgery (> 45 min)


Malignancy


Confined to bed (> 72 hours)


Immobilizing plaster cast


Central venous access Age >= 75


History of VTE


Family history of VTE


Factor V Leiden


Prothrombin [id number]


Lupus anticoagulant


Anticardiolipin antibodies


Elevated serum homocysteine


Heparin-induced thrombocytopenia


Other congenital or acquired


   thrombophilia Stroke (< 1 month)


Elective arthroplasty


Hip, pelvis, or leg fracture


Acute spinal cord injury (< 1 month)








Prophylaxis Regimen











   Total Risk


Factor Score Risk Level Prophylaxis Regimen


 


0-1      Low Early ambulation


 


2 Moderate Order ONE of the following:


*Sequential Compression Device (SCD)


*Heparin 5000 units SQ BID


 


3-4 Higher Order ONE of the following medications:


*Heparin 5000 units SQ TID


*Enoxaparin/Lovenox 40 mg SQ daily (WT < 150 kg, CrCl > 30 mL/min)


*Enoxaparin/Lovenox 30 mg SQ daily (WT < 150 kg, CrCl > 10-29 mL/min)


*Enoxaparin/Lovenox 30 mg SQ BID (WT < 150 kg, CrCl > 30 mL/min)


AND/OR


*Sequential Compression Device (SCD)


 


5 or more Highest Order ONE of the following medications:


*Heparin 5000 units SQ TID (Preferred with Epidurals)


*Enoxaparin/Lovenox 40 mg SQ daily (WT < 150 kg, CrCl > 30 mL/min)


*Enoxaparin/Lovenox 30 mg SQ daily (WT < 150 kg, CrCl > 10-29 mL/min)


*Enoxaparin/Lovenox 30 mg SQ BID (WT < 150 kg, CrCl > 30 mL/min)


AND


*Sequential Compression Device (SCD)











Assessment and Plan


Problem List:  


(1) Hx of CABG


ICD Code:  Z95.1 - Presence of aortocoronary bypass graft


(2) Decreased cardiac ejection fraction


ICD Code:  R93.1 - Abnormal findings on diagnostic imaging of heart and 

coronary circulation


(3) Hypertension


ICD Code:  I10 - Essential (primary) hypertension


(4) Obese


ICD Code:  E66.9 - Obesity, unspecified


(5) Hyperlipidemia


ICD Code:  E78.5 - Hyperlipidemia, unspecified


(6) CAD (coronary artery disease)


ICD Code:  I25.10 - Atherosclerotic heart disease of native coronary artery 

without angina pectoris


(7) Diabetes mellitus


ICD Code:  E11.9 - Type 2 diabetes mellitus without complications


(8) Unstable angina pectoris


ICD Code:  I20.0 - Unstable angina


Status:  Acute


Assessment and Plan


Unstable angina versus atypical chest pain will place on heparin drip and 

consult Dr. Purvis of cardiology morphine and nitroglycerin as needed as well 

as Percocet and Tylenol





Decreased cardiac ejection fracture to undergo cardiac catheterization tomorrow 

with Dr. Purvis continue on heparin drip





Diabetes mellitus continue on sliding scale coverage with Accu-Cheks before 

meals and at bedtime hold his Glucophage and metformin in light of cardiac 

catheterization tomorrow


Keep n.p.o. after midnight





Hypertension resume home medications





Allergy to iodine





Coronary artery disease with history of CABG continue on heparin drip and 

consult Dr. Purvis





COPD tobacco oxygen as needed





GERD continue on Pepcid





Chronic back pain continue on pain meds as needed





DVT and GI prophylaxis





Await cardiac catheterization results


Code Status


Full code


Discussed Condition With


Emergency room and RN and patient


Scheduled to undergo cardiac catheterization with Dr. Purvis tomorrow 

continue on heparin drip











Reece Yousif DO Mar 21, 2018 16:09

## 2018-03-21 NOTE — PD
HPI


Chief Complaint:  Chest Pain


Time Seen by Provider:  13:52


Travel History


International Travel<30 days:  No


Contact w/Intl Traveler<30days:  No


Traveled to known affect area:  No





History of Present Illness


HPI


57-year-old male that presents to the ED for evaluation of left-sided chest 

pain to raise to the left arm.  As well as shortness of breath.  Patient has 

had this on and off for the past 2-3 weeks.  Per patient he is actually follow 

with Dr. Purvis who did multiple tests including a stress test that shows 

significant EF reduction per our records to 19% from 40% last year per patient 

and records from patient's evaluation 2 weeks ago.  Patient was actually seen 

here 2 weeks ago for same. Dr. Purvis has been on medication in his thinking 

about doing a heart Per family on the patient to see what else is going on.  It 

appears the patient from what I can tell might have a valve issue.  Patient 

himself states that the symptoms have not improved and per patient's his 

medications were increased but he still feels very weak and tired as well as 

very short of breath with any movement.  Per patient the numbness and tingling 

to the left arm have improved since being here but he continues to have the 

chest discomfort and pressure like something sitting on his chest.  Per family 

and patient Dr. Purvis his back and apparently they were told by Dr. Purvis 

that when patient gets here to the ER that we contact him.  Patient does have 

allergies to iodine.  Per patient his discomfort 6 out of 10.  He has taken 

aspirin today.  Has a history of high blood pressure and high cholesterol as 

well.  He does take Plavix.





PFSH


Past Medical History


Heart Rhythm Problems:  Yes


Cancer:  No


Cardiac Catheterization:  Yes


Cardiovascular Problems:  Yes


High Cholesterol:  Yes


Chest Pain:  Yes


Congestive Heart Failure:  Yes


Coronary Artery Disease:  Yes


Diabetes:  Yes


Patient Takes Glucophage:  No


Diminished Hearing:  No


Endocrine:  Yes


Gastrointestinal Disorders:  Yes


Genitourinary:  No


Hypertension:  Yes


Immune Disorder:  No


Musculoskeletal:  Yes


Neurologic:  No


Psychiatric:  No


Reproductive:  No


Respiratory:  Yes


Immunizations Current:  Yes


Myocardial Infarction:  Yes (3/2013)


Ulcer:  Yes


Tetanus Vaccination:  Unknown





Past Surgical History


Appendectomy:  Yes


Cardiac Surgery:  Yes (CABG X3)


Coronary Artery Bypass Graft:  Yes


Other Surgery:  Yes (CABG, APPENDECTOMY, SKIN GRAFTS TO RIGHT FOOT AS A CHILD, 

BACK SURGERY X 3)





Family History


Family Myocardial Infarction:  Yes





Social History


Alcohol Use:  Yes (SOCIALLY)


Tobacco Use:  No


Substance Use:  No





Allergies-Medications


(Allergen,Severity, Reaction):  


Coded Allergies:  


     iodine (Unverified  Allergy, Severe, Swelling, 3/21/18)


     potassium iodide (Unverified  Allergy, Severe, Swelling, 3/21/18)


     povidone-iodine (Unverified  Allergy, Severe, Swelling, 3/21/18)


     shellfish derived (Unverified  Allergy, Severe, 3/21/18)


     sodium iodide (Unverified  Allergy, Severe, Swelling, 3/21/18)


     sodium iodide (Unverified  Allergy, Severe, Swelling, 3/21/18)


Reported Meds & Prescriptions





Reported Meds & Active Scripts


Active


Carvedilol 6.25 Mg Tab 6.25 Mg PO BID


Reported


Glucophage (Metformin HCl) 850 Mg Tab 850 Mg PO TIDPC


Enalapril (Enalapril Maleate) 2.5 Mg Tab 2.5 Mg PO DAILY


Pravastatin 40 Mg Tab 40 Mg PO DAILY


Glyburide 2.5 Mg Tab 2.5 Mg PO BID


     Take with meals at the same time each day


Clopidogrel (Clopidogrel Bisulfate) 75 Mg Tab 75 Mg PO DAILY








Review of Systems


Except as stated in HPI:  all other systems reviewed are Neg





Physical Exam


Narrative


GENERAL: 


SKIN: Warm and dry.


HEAD: Atraumatic. Normocephalic. 


EYES: Pupils equal and round. No scleral icterus. No injection or drainage. 


ENT: No nasal bleeding or discharge.  Mucous membranes pink and moist.  Tongue 

is midline.  No uvula deviation.


NECK: Trachea midline. No JVD. 


CARDIOVASCULAR: Regular rate and rhythm.  Murmur heard, S3, S4.


RESPIRATORY: No accessory muscle use. Clear to auscultation. Breath sounds 

equal bilaterally. 


GASTROINTESTINAL: Abdomen soft, non-tender, nondistended. Hepatic and splenic 

margins not palpable. 


MUSCULOSKELETAL: Extremities without clubbing, cyanosis, or edema. No obvious 

deformities.  Full range of motion of the upper and lower extremities 

bilaterally.  2+ pulses bilaterally.


NEUROLOGICAL: Awake and alert. No obvious cranial nerve deficits.  Motor 

grossly within normal limits. Five out of 5 muscle strength in the arms and 

legs.  Normal speech.


PSYCHIATRIC: Appropriate mood and affect; insight and judgment normal.





Data


Data


Last Documented VS





Vital Signs








  Date Time  Temp Pulse Resp B/P (MAP) Pulse Ox O2 Delivery O2 Flow Rate FiO2


 


3/21/18 14:16   16  100 Room Air  


 


3/21/18 12:22 98.0 62  124/62 (82)    








Orders





 Orders


Electrocardiogram (3/21/18 12:25)


B-Type Natriuretic Peptide (3/21/18 12:25)


Ckmb (Isoenzyme) Profile (3/21/18 12:25)


Complete Blood Count With Diff (3/21/18 12:25)


Comprehensive Metabolic Panel (3/21/18 12:25)


Magnesium (Mg) (3/21/18 12:25)


Prothrombin Time / Inr (Pt) (3/21/18 12:25)


Act Partial Throm Time (Ptt) (3/21/18 12:25)


Troponin I (3/21/18 12:25)


Lipase (3/21/18 12:25)


Chest, Pa & Lat (3/21/18 12:25)


CKMB (3/21/18 12:37)


CKMB% (3/21/18 12:37)


Iv Access Insert/Monitor (3/21/18 14:00)


Oximetry (3/21/18 14:00)


Ecg Monitoring (3/21/18 14:00)


Aspirin (Aspirin) (3/21/18 14:30)


Nitroglycerin 2% Oint (Nitroglycerin 2% (3/21/18 14:30)


Admit Order (Ed Use Only) (3/21/18 15:40)


Act Partial Throm Time (Ptt) (3/21/18 15:40)


Prothrombin Time / Inr (Pt) (3/21/18 15:40)


Cbc No Diff, Includes Plts (3/21/18 15:40)


Cbc No Diff, Includes Plts (3/24/18 06:00)


Act Partial Throm Time (Ptt) (3/21/18 22:40)


Consult Cardiology (3/21/18 )





Labs





Laboratory Tests








Test


  3/21/18


12:37


 


White Blood Count 5.6 TH/MM3 


 


Red Blood Count 5.32 MIL/MM3 


 


Hemoglobin 14.0 GM/DL 


 


Hematocrit 42.3 % 


 


Mean Corpuscular Volume 79.5 FL 


 


Mean Corpuscular Hemoglobin 26.3 PG 


 


Mean Corpuscular Hemoglobin


Concent 33.0 % 


 


 


Red Cell Distribution Width 15.0 % 


 


Platelet Count 172 TH/MM3 


 


Mean Platelet Volume 8.5 FL 


 


Neutrophils (%) (Auto) 49.8 % 


 


Lymphocytes (%) (Auto) 35.6 % 


 


Monocytes (%) (Auto) 11.1 % 


 


Eosinophils (%) (Auto) 2.4 % 


 


Basophils (%) (Auto) 1.1 % 


 


Neutrophils # (Auto) 2.8 TH/MM3 


 


Lymphocytes # (Auto) 2.0 TH/MM3 


 


Monocytes # (Auto) 0.6 TH/MM3 


 


Eosinophils # (Auto) 0.1 TH/MM3 


 


Basophils # (Auto) 0.1 TH/MM3 


 


CBC Comment DIFF FINAL 


 


Differential Comment  


 


Prothrombin Time 10.9 SEC 


 


Prothromb Time International


Ratio 1.1 RATIO 


 


 


Activated Partial


Thromboplast Time 23.1 SEC 


 


 


Blood Urea Nitrogen 14 MG/DL 


 


Creatinine 1.07 MG/DL 


 


Random Glucose 248 MG/DL 


 


Total Protein 7.4 GM/DL 


 


Albumin 3.7 GM/DL 


 


Calcium Level 9.2 MG/DL 


 


Magnesium Level 1.9 MG/DL 


 


Alkaline Phosphatase 132 U/L 


 


Aspartate Amino Transf


(AST/SGOT) 26 U/L 


 


 


Alanine Aminotransferase


(ALT/SGPT) 43 U/L 


 


 


Total Bilirubin 0.6 MG/DL 


 


Sodium Level 140 MEQ/L 


 


Potassium Level 4.3 MEQ/L 


 


Chloride Level 106 MEQ/L 


 


Carbon Dioxide Level 24.7 MEQ/L 


 


Anion Gap 9 MEQ/L 


 


Estimat Glomerular Filtration


Rate 71 ML/MIN 


 


 


Total Creatine Kinase 128 U/L 


 


Creatine Kinase MB 3.3 NG/ML 


 


Troponin I


  LESS THAN 0.02


NG/ML


 


Lipase 189 U/L 











MDM


Medical Decision Making


Medical Screen Exam Complete:  Yes


Emergency Medical Condition:  Yes


Medical Record Reviewed:  Yes


Interpretation(s)


CBC & BMP Diagram


3/21/18 12:37








Total Protein 7.4, Albumin 3.7, Calcium Level 9.2, Magnesium Level 1.9, 

Alkaline Phosphatase 132 H, Aspartate Amino Transf (AST/SGOT) 26, Alanine 

Aminotransferase (ALT/SGPT) 43, Total Bilirubin 0.6





troponin and CKMB negative


EKG shows sinus rhythm with no sign of acute ischemia read negative by me and 

attending.





Last Impressions








Chest X-Ray 3/21/18 1225 Signed





Impressions: 





 Service Date/Time:  Wednesday, March 21, 2018 13:08 - CONCLUSION:  No acute 





 disease.  No significant change has occurred.       Manohar Nazario MD 








Differential Diagnosis


angina versus unstable angina versus atypical chest pain versus ejection 

fracture issue versus ACS versus CHF versus dyspnea


Narrative Course


57-year-old male that presents to the ED for evaluation of chest pain.  Patient 

was properly examined and was found to have signs and symptoms concerning for 

cardiac in nature.  I did review patient's medical records.  He was here about 

a week ago for similar.  The time Dr. Purvis had been on occasion and there 

was discussion whether patient may need a heart.  At the time he was thought 

the patient could follow-up outpatient.  Patient still has a lot of symptoms 

and during my evaluation patient having a lot of difficulty just speaking with 

me without feeling short of breath.  He states complaints of the chest 

discomfort.  Patient was given Nitropaste, started on aspirin.  Labs and 

imaging were done in triage and were essentially unremarkable.  No sign of ST 

elevation or sign of ischemia.  BNP was ordered by me as he was not ordered in 

triage.  A call has been placed to Dr. Purvis to see what he would recommend 

which I suspect will likely be heart cath and admission.  I was able to get in 

contact with Dr. Purvis who agrees the patient is to be admitted.  He wants 

the patient to be nothing by mouth after midnight, admit to medicine, start 

heparin and heart Tomorrow.  Patient was told this and agrees with plan.  Case 

was discussed with Dr. Yousif who agrees to admission





Diagnosis





 Primary Impression:  


 Unstable angina pectoris





Admitting Information


Admitting Physician Requests:  Blayne House Mar 21, 2018 15:02

## 2018-03-21 NOTE — EKG
Date Performed: 03/21/2018       Time Performed: 16:51:10

 

PTAGE:      57 years

 

EKG:      SINUS BRADYCARDIA WITH OCCASIONAL SUPRAVENTRICULAR PREMATURE COMPLEXES POSSIBLE LEFT ATRIAL
 ENLARGEMENT Nonspecific T wave changes ABNORMAL ECG No significant change from prior electrocardiogr
am. 

 

 PREVIOUS TRACING            : 03/08/2018 01.23

 

DOCTOR:   Padilla Salinas  Interpretating Date/Time  03/21/2018 21:46:21

## 2018-03-21 NOTE — RADRPT
EXAM DATE/TIME:  03/21/2018 13:08 

 

[facility] COMPARISON:     

CHEST SINGLE AP, March 07, 2018, 18:05.

 

                     

INDICATIONS :     

Chest pains with pressure mid sternal radiating into back.

                     

 

MEDICAL HISTORY :     

Myocardial infarction.          

 

SURGICAL HISTORY :     

CABG.   

 

ENCOUNTER:     

Initial                                        

 

ACUITY:     

4 - 6 days      

 

PAIN SCORE:     

9/10

 

LOCATION:     

Bilateral chest 

 

FINDINGS:     

PA and lateral views of the chest demonstrate the lungs to be symmetrically aerated without evidence 
of mass, infiltrate or effusion.  The cardiomediastinal contours are stable. There is evidence of pre
vious cardiothoracic surgery..  Osseous structures are intact. No significant changes compared to the
 prior study.

 

CONCLUSION:     

No acute disease.  No significant change has occurred.  

 

 

 

 Manohar Nazario MD on March 21, 2018 at 13:13           

Board Certified Radiologist.

 This report was verified electronically.

## 2018-03-21 NOTE — EKG
Date Performed: 03/21/2018       Time Performed: 12:29:25

 

PTAGE:      57 years

 

EKG:      Sinus rhythm 

 

 WITH OCCASIONAL SUPRAVENTRICULAR PREMATURE COMPLEXES POSSIBLE LEFT ATRIAL ENLARGEMENT Nonspecific T 
wave changes Compared to prior electrocardiogram, PACs are now present.

 

DOCTOR:   Padilla Salinas  Interpretating Date/Time  03/21/2018 19:29:53

## 2018-03-22 VITALS
HEART RATE: 60 BPM | TEMPERATURE: 97 F | DIASTOLIC BLOOD PRESSURE: 88 MMHG | OXYGEN SATURATION: 99 % | RESPIRATION RATE: 20 BRPM | SYSTOLIC BLOOD PRESSURE: 125 MMHG

## 2018-03-22 VITALS
SYSTOLIC BLOOD PRESSURE: 121 MMHG | DIASTOLIC BLOOD PRESSURE: 59 MMHG | OXYGEN SATURATION: 97 % | TEMPERATURE: 98.3 F | HEART RATE: 63 BPM | RESPIRATION RATE: 18 BRPM

## 2018-03-22 VITALS
RESPIRATION RATE: 20 BRPM | DIASTOLIC BLOOD PRESSURE: 66 MMHG | HEART RATE: 67 BPM | SYSTOLIC BLOOD PRESSURE: 115 MMHG | TEMPERATURE: 97.6 F | OXYGEN SATURATION: 98 %

## 2018-03-22 VITALS — OXYGEN SATURATION: 99 %

## 2018-03-22 VITALS
OXYGEN SATURATION: 98 % | RESPIRATION RATE: 16 BRPM | DIASTOLIC BLOOD PRESSURE: 68 MMHG | SYSTOLIC BLOOD PRESSURE: 115 MMHG | HEART RATE: 64 BPM

## 2018-03-22 VITALS — HEART RATE: 60 BPM

## 2018-03-22 VITALS
OXYGEN SATURATION: 99 % | SYSTOLIC BLOOD PRESSURE: 131 MMHG | RESPIRATION RATE: 20 BRPM | DIASTOLIC BLOOD PRESSURE: 86 MMHG | HEART RATE: 68 BPM | TEMPERATURE: 97.6 F

## 2018-03-22 VITALS
HEART RATE: 64 BPM | SYSTOLIC BLOOD PRESSURE: 103 MMHG | DIASTOLIC BLOOD PRESSURE: 54 MMHG | RESPIRATION RATE: 16 BRPM | OXYGEN SATURATION: 99 %

## 2018-03-22 VITALS — HEART RATE: 70 BPM

## 2018-03-22 VITALS — HEART RATE: 72 BPM

## 2018-03-22 VITALS — HEART RATE: 66 BPM

## 2018-03-22 VITALS — HEART RATE: 62 BPM

## 2018-03-22 VITALS — HEART RATE: 64 BPM

## 2018-03-22 LAB
ALBUMIN SERPL-MCNC: 3.3 GM/DL (ref 3.4–5)
ALP SERPL-CCNC: 104 U/L (ref 45–117)
ALT SERPL-CCNC: 40 U/L (ref 12–78)
AST SERPL-CCNC: 26 U/L (ref 15–37)
BASOPHILS # BLD AUTO: 0.1 TH/MM3 (ref 0–0.2)
BASOPHILS NFR BLD: 1.4 % (ref 0–2)
BILIRUB SERPL-MCNC: 0.4 MG/DL (ref 0.2–1)
BUN SERPL-MCNC: 15 MG/DL (ref 7–18)
CALCIUM SERPL-MCNC: 8.5 MG/DL (ref 8.5–10.1)
CHLORIDE SERPL-SCNC: 110 MEQ/L (ref 98–107)
CHOLEST SERPL-MCNC: 136 MG/DL (ref 120–200)
CHOLESTEROL/ HDL RATIO: 4.02 RATIO
CREAT SERPL-MCNC: 0.89 MG/DL (ref 0.6–1.3)
EOSINOPHIL # BLD: 0.1 TH/MM3 (ref 0–0.4)
EOSINOPHIL NFR BLD: 2.4 % (ref 0–4)
ERYTHROCYTE [DISTWIDTH] IN BLOOD BY AUTOMATED COUNT: 14.9 % (ref 11.6–17.2)
GFR SERPLBLD BASED ON 1.73 SQ M-ARVRAT: 87 ML/MIN (ref 89–?)
GLUCOSE SERPL-MCNC: 150 MG/DL (ref 74–106)
HBA1C MFR BLD: 9.8 % (ref 4.3–6)
HCO3 BLD-SCNC: 26.3 MEQ/L (ref 21–32)
HCT VFR BLD CALC: 39.3 % (ref 39–51)
HDLC SERPL-MCNC: 33.8 MG/DL (ref 40–60)
HGB BLD-MCNC: 13.2 GM/DL (ref 13–17)
LDLC SERPL-MCNC: 77 MG/DL (ref 0–99)
LYMPHOCYTES # BLD AUTO: 2.3 TH/MM3 (ref 1–4.8)
LYMPHOCYTES NFR BLD AUTO: 38.9 % (ref 9–44)
MAGNESIUM SERPL-MCNC: 1.8 MG/DL (ref 1.5–2.5)
MCH RBC QN AUTO: 26.5 PG (ref 27–34)
MCHC RBC AUTO-ENTMCNC: 33.6 % (ref 32–36)
MCV RBC AUTO: 78.8 FL (ref 80–100)
MONOCYTE #: 0.7 TH/MM3 (ref 0–0.9)
MONOCYTES NFR BLD: 11.2 % (ref 0–8)
NEUTROPHILS # BLD AUTO: 2.7 TH/MM3 (ref 1.8–7.7)
NEUTROPHILS NFR BLD AUTO: 46.1 % (ref 16–70)
PHOSPHATE SERPL-MCNC: 3.8 MG/DL (ref 2.5–4.9)
PLATELET # BLD: 153 TH/MM3 (ref 150–450)
PMV BLD AUTO: 8.4 FL (ref 7–11)
PROT SERPL-MCNC: 6.5 GM/DL (ref 6.4–8.2)
RBC # BLD AUTO: 4.99 MIL/MM3 (ref 4.5–5.9)
SODIUM SERPL-SCNC: 144 MEQ/L (ref 136–145)
T4 FREE SERPL-MCNC: 1.04 NG/DL (ref 0.76–1.46)
TRIGL SERPL-MCNC: 128 MG/DL (ref 42–150)
TROPONIN I SERPL-MCNC: (no result) NG/ML (ref 0.02–0.05)
WBC # BLD AUTO: 5.9 TH/MM3 (ref 4–11)

## 2018-03-22 PROCEDURE — 4A023N8 MEASUREMENT OF CARDIAC SAMPLING AND PRESSURE, BILATERAL, PERCUTANEOUS APPROACH: ICD-10-PCS | Performed by: INTERNAL MEDICINE

## 2018-03-22 PROCEDURE — B2151ZZ FLUOROSCOPY OF LEFT HEART USING LOW OSMOLAR CONTRAST: ICD-10-PCS | Performed by: INTERNAL MEDICINE

## 2018-03-22 PROCEDURE — B2181ZZ FLUOROSCOPY OF LEFT INTERNAL MAMMARY BYPASS GRAFT USING LOW OSMOLAR CONTRAST: ICD-10-PCS | Performed by: INTERNAL MEDICINE

## 2018-03-22 PROCEDURE — B2111ZZ FLUOROSCOPY OF MULTIPLE CORONARY ARTERIES USING LOW OSMOLAR CONTRAST: ICD-10-PCS | Performed by: INTERNAL MEDICINE

## 2018-03-22 RX ADMIN — ASPIRIN SCH MG: 325 TABLET, DELAYED RELEASE ORAL at 09:00

## 2018-03-22 RX ADMIN — FAMOTIDINE SCH MG: 20 TABLET, FILM COATED ORAL at 09:34

## 2018-03-22 RX ADMIN — NITROGLYCERIN PRN MG: 0.4 TABLET SUBLINGUAL at 20:29

## 2018-03-22 RX ADMIN — PHENYTOIN SODIUM SCH MLS/HR: 50 INJECTION INTRAMUSCULAR; INTRAVENOUS at 13:10

## 2018-03-22 RX ADMIN — ASPIRIN SCH MG: 325 TABLET, DELAYED RELEASE ORAL at 10:08

## 2018-03-22 RX ADMIN — INSULIN ASPART SCH: 100 INJECTION, SOLUTION INTRAVENOUS; SUBCUTANEOUS at 17:00

## 2018-03-22 RX ADMIN — CARVEDILOL SCH MG: 6.25 TABLET, FILM COATED ORAL at 09:34

## 2018-03-22 RX ADMIN — HEPARIN SODIUM PRN MLS/HR: 10000 INJECTION, SOLUTION INTRAVENOUS at 20:48

## 2018-03-22 RX ADMIN — STANDARDIZED SENNA CONCENTRATE AND DOCUSATE SODIUM SCH TAB: 8.6; 5 TABLET, FILM COATED ORAL at 09:00

## 2018-03-22 RX ADMIN — CLOPIDOGREL BISULFATE SCH MG: 75 TABLET, FILM COATED ORAL at 10:09

## 2018-03-22 RX ADMIN — INSULIN ASPART SCH: 100 INJECTION, SOLUTION INTRAVENOUS; SUBCUTANEOUS at 08:00

## 2018-03-22 RX ADMIN — INSULIN ASPART SCH: 100 INJECTION, SOLUTION INTRAVENOUS; SUBCUTANEOUS at 20:31

## 2018-03-22 RX ADMIN — INSULIN ASPART SCH: 100 INJECTION, SOLUTION INTRAVENOUS; SUBCUTANEOUS at 12:00

## 2018-03-22 RX ADMIN — FAMOTIDINE SCH MG: 20 TABLET, FILM COATED ORAL at 20:31

## 2018-03-22 RX ADMIN — ENALAPRIL MALEATE SCH MG: 2.5 TABLET ORAL at 09:00

## 2018-03-22 RX ADMIN — CARVEDILOL SCH MG: 6.25 TABLET, FILM COATED ORAL at 20:44

## 2018-03-22 RX ADMIN — CLOPIDOGREL BISULFATE SCH MG: 75 TABLET, FILM COATED ORAL at 09:00

## 2018-03-22 RX ADMIN — STANDARDIZED SENNA CONCENTRATE AND DOCUSATE SODIUM SCH TAB: 8.6; 5 TABLET, FILM COATED ORAL at 20:31

## 2018-03-22 RX ADMIN — Medication SCH ML: at 09:00

## 2018-03-22 RX ADMIN — NITROGLYCERIN PRN MG: 0.4 TABLET SUBLINGUAL at 20:35

## 2018-03-22 RX ADMIN — Medication SCH ML: at 20:31

## 2018-03-22 NOTE — MA
cc:

Abdiel Purvis MD

****

 

 

DATE:

03/22/2018

 

PROCEDURE PERFORMED:

Right heart catheterization, left heart catheterization, left 

ventriculography, coronary angiography, LIMA angiography.

 

INDICATIONS:

Coronary artery disease, status post CABG, unstable angina, [place] 

Cardiovascular Society Class IV angina, cardiomyopathy, congestive 

heart failure, New York Heart Association Class IV congestive heart 

failure, severe MR and diabetes.

 

PROCEDURE:

The patient was brought to the cardiac catheterization laboratory, 

prepped and draped in the usual sterile fashion.  A 10 mL of 1% 

lidocaine was used to locally anesthetize the right common femoral 

artery.  4-[place] sheath was placed in the right common femoral 

artery, a 5-[place] sheath placed in the right common femoral vein.  

Right heart catheterization was performed first with the following 

findings:

 

I was not able to wedge the balloon-tipped catheter into the wedge 

position with a fully inflated balloon and a partially inflated 

balloon.

PA pressure was 60/4-26 which was actually probably the distal RV 

outflow tract.

The RV pressure was 59/7-13.

RA pressure 15/12-10.

On room air the FA sat 97.6%, PA sat 62.7%, RA sat 65.7%.

Cardiac output by Javier is 4.3 liters per minute.

Cardiac index by Javier is 2.0 liters per meter2 per minute.

SVR 1618.6 Dynes.

 

Left heart catheterization was then performed with 4-[place] JR4 and 

JL4 catheters with the following findings:

 

The LV pressure is 132/16-22.

Ejection fraction 25 percent.

 

CORONARY ARTERIES:

Right coronary artery is dominant, has a proximal 60 percent stenosis.

 It is occluded in the midsegment.

 

The LIMA to LAD is widely patent.

 

The LAD approaches the apex, appears to be a relatively small 

reference vessel diameter, probably 1 mm in diameter.  There is 

sequential 70-80% stenoses just beyond the graft insertion site and 

then in the mid to distal LAD and in the distal LAD.

 

The left main coronary artery has an ostial 50-60% stenosis.

 

LAD is occluded at the first diagonal artery.  The first diagonal is 

subtotally occluded in the ostial proximal segment with NATALIYA 2-3 flow 

into the vessel.  This vessel reference vessel diameter is probably 1 

mm in diameter.  In the proximal midsegment there is a 95 percent 

stenosis.  The vessel then bifurcates.  The more medial branch is 

subtotally occluded and it is a 0.5 mm reference vessel diameter:  The

more lateral of the branches is again a small 0.25-0.5 mm vessel, 

again subtotally occluded in the midsegment.

 

Left circumflex vessel was subtotally occluded after the second obtuse

marginal vessel.  There is sequential subtotal occlusions in the mid 

to distal AV groove, left circumflex supplying a very small 0.5 mm 

distal posterolateral artery.  First obtuse marginal vessel again is a

small vessel lumen reference vessel lumen diameter 1.0 mm, at least 

moderate disease in the proximal midsegment up to 50-60% 

angiographically.  Second obtuse marginal vessel is a 1.5-2 mm 

reference vessel diameter, which appears to be occluded in the 

midsegment.  Also note, there are left to right collaterals to a very 

small mid to distal right PDA which was estimated at 0.5 mm in 

diameter at most and it appears to be a relatively short segment of 

the PDA, maybe 25-30 mm with the point of occlusion in the more 

midsegment of the PDA.

 

CONCLUSION:

1.  Severe three-vessel coronary artery disease in a right dominant 

system.

2.  Widely patent left internal mammary artery to the left anterior 

descending.

3.  Cardiomyopathy of 25% to 30%.

4.  Moderate to severely elevated right heart pressures as detailed 

above.

5.  LVEDP equal to 22.

 

DISCUSSION:

We will get a CT surgery consult to determine risks and benefits of 

considering re-do bypass.  Also, I do think intervention of the LAD 

would be very high risk given the small reference vessel diameter and 

the diffuseness of the disease and given the length of the lesions and

the small diameter of the stents that would be placed, I would expect 

this to be very high risk for stent thrombosis which would be very 

high risk for sudden death or mortality for the patient.  We will also

get a case management consult to determine the patient's eligibility 

for heart transplant.

 

In the meantime, we will continue optimal medical therapy with aspirin

and Plavix.  We will restart his heparin drip without a bolus 3 hours 

after the sheath is out.  Continue his statin.  The patient was given 

40 of IV Lasix in the cath lab.  We will followup daily BMP and BNP.

 

 

__________________________________

MD JAZMÍN Rdz/MK

D: 03/22/2018, 02:11 PM

T: 03/22/2018, 03:08 PM

Visit #: [id number]

Job #: [id number]

## 2018-03-22 NOTE — HHI.PR
Subjective


Remarks


Patient is a 57-year-old  male presenting to the emergency department 

for evaluation of left-sided chest pain worse when trying to raise his left 

arm.  As well as shortness of breath.  Patient has had this on and off for the 

past 2-3 weeks.  Patient was followed with Dr. Purvis of cardiology that test 

that showed a significant ejection fraction reduction from 40% down to 19%.  

Dr. Purvis wants the patient to be placed on heparin and to be followed for 

cardiac catheterization tomorrow.  Symptoms have not improved been feeling very 

weak and tired as well as short of breath with any movement has no numbness and 

tingling in left arm is improved but still some chest discomfort and pressure-

like something sitting on his chest chest pain is 6 out of 10 taken aspirin 

today has history of high blood pressure and cholesterol does take Plavix


Will be placed in observation will put on heparin drip and Dr. Purvis will 

take for cardiac catheterization tomorrow





3-22 patient to go for cardiac catheterization later today


Patient states pain is 3-4 out of 10 currently


States oxygen helps his chest pain


Discussed with patient and RN


A.m. labs





Objective


Vitals





Vital Signs








  Date Time  Temp Pulse Resp B/P (MAP) Pulse Ox O2 Delivery O2 Flow Rate FiO2


 


3/22/18 04:56  64 16 103/54 (70) 99 Nasal Cannula 2.00 


 


3/22/18 00:51  64 16 115/68 (84) 98 Nasal Cannula 2.00 


 


3/21/18 21:15        21


 


3/21/18 19:37  59 18 131/78 (95) 99 Nasal Cannula 2.00 


 


3/21/18 16:21  58 22 116/63 (80) 100 Room Air  


 


3/21/18 14:16   16  100 Room Air  


 


3/21/18 12:22 98.0 62 22 124/62 (82) 99   








Result Diagram:  


3/22/18 0250                                                                   

             3/22/18 0250





Other Results





 Laboratory Tests








Test


  3/21/18


12:37 3/21/18


16:25 3/21/18


21:30 3/21/18


23:00


 


White Blood Count 5.6 TH/MM3  5.5 TH/MM3   


 


Red Blood Count 5.32 MIL/MM3  5.21 MIL/MM3   


 


Hemoglobin 14.0 GM/DL  13.8 GM/DL   


 


Hematocrit 42.3 %  41.1 %   


 


Mean Corpuscular Volume 79.5 FL  78.9 FL   


 


Mean Corpuscular Hemoglobin 26.3 PG  26.4 PG   


 


Mean Corpuscular Hemoglobin


Concent 33.0 % 


  33.5 % 


  


  


 


 


Red Cell Distribution Width 15.0 %  15.3 %   


 


Platelet Count 172 TH/MM3  170 TH/MM3   


 


Mean Platelet Volume 8.5 FL  8.5 FL   


 


Neutrophils (%) (Auto) 49.8 %    


 


Lymphocytes (%) (Auto) 35.6 %    


 


Monocytes (%) (Auto) 11.1 %    


 


Eosinophils (%) (Auto) 2.4 %    


 


Basophils (%) (Auto) 1.1 %    


 


Neutrophils # (Auto) 2.8 TH/MM3    


 


Lymphocytes # (Auto) 2.0 TH/MM3    


 


Monocytes # (Auto) 0.6 TH/MM3    


 


Eosinophils # (Auto) 0.1 TH/MM3    


 


Basophils # (Auto) 0.1 TH/MM3    


 


CBC Comment DIFF FINAL    


 


Differential Comment     


 


Prothrombin Time 10.9 SEC  11.1 SEC   


 


Prothromb Time International


Ratio 1.1 RATIO 


  1.1 RATIO 


  


  


 


 


Activated Partial


Thromboplast Time 23.1 SEC 


  20.3 SEC 


  


  31.4 SEC 


 


 


Blood Urea Nitrogen 14 MG/DL    


 


Creatinine 1.07 MG/DL    


 


Random Glucose 248 MG/DL    


 


Total Protein 7.4 GM/DL    


 


Albumin 3.7 GM/DL    


 


Calcium Level 9.2 MG/DL    


 


Magnesium Level 1.9 MG/DL    


 


Alkaline Phosphatase 132 U/L    


 


Aspartate Amino Transf


(AST/SGOT) 26 U/L 


  


  


  


 


 


Alanine Aminotransferase


(ALT/SGPT) 43 U/L 


  


  


  


 


 


Total Bilirubin 0.6 MG/DL    


 


Sodium Level 140 MEQ/L    


 


Potassium Level 4.3 MEQ/L    


 


Chloride Level 106 MEQ/L    


 


Carbon Dioxide Level 24.7 MEQ/L    


 


Anion Gap 9 MEQ/L    


 


Estimat Glomerular Filtration


Rate 71 ML/MIN 


  


  


  


 


 


Total Creatine Kinase 128 U/L  121 U/L  85 U/L  


 


Creatine Kinase MB 3.3 NG/ML  2.7 NG/ML   


 


Troponin I


  LESS THAN 0.02


NG/ML LESS THAN 0.02


NG/ML LESS THAN 0.02


NG/ML 


 


 


Lipase 189 U/L    


 


B-Type Natriuretic Peptide  336 PG/ML   


 


Test


  3/22/18


02:50 3/22/18


06:45 


  


 


 


White Blood Count 5.9 TH/MM3    


 


Red Blood Count 4.99 MIL/MM3    


 


Hemoglobin 13.2 GM/DL    


 


Hematocrit 39.3 %    


 


Mean Corpuscular Volume 78.8 FL    


 


Mean Corpuscular Hemoglobin 26.5 PG    


 


Mean Corpuscular Hemoglobin


Concent 33.6 % 


  


  


  


 


 


Red Cell Distribution Width 14.9 %    


 


Platelet Count 153 TH/MM3    


 


Mean Platelet Volume 8.4 FL    


 


Neutrophils (%) (Auto) 46.1 %    


 


Lymphocytes (%) (Auto) 38.9 %    


 


Monocytes (%) (Auto) 11.2 %    


 


Eosinophils (%) (Auto) 2.4 %    


 


Basophils (%) (Auto) 1.4 %    


 


Neutrophils # (Auto) 2.7 TH/MM3    


 


Lymphocytes # (Auto) 2.3 TH/MM3    


 


Monocytes # (Auto) 0.7 TH/MM3    


 


Eosinophils # (Auto) 0.1 TH/MM3    


 


Basophils # (Auto) 0.1 TH/MM3    


 


CBC Comment DIFF FINAL    


 


Differential Comment     


 


Activated Partial


Thromboplast Time 31.0 SEC 


  32.5 SEC 


  


  


 


 


Blood Urea Nitrogen 15 MG/DL    


 


Creatinine 0.89 MG/DL    


 


Random Glucose 150 MG/DL    


 


Total Protein 6.5 GM/DL    


 


Albumin 3.3 GM/DL    


 


Calcium Level 8.5 MG/DL    


 


Phosphorus Level 3.8 MG/DL    


 


Magnesium Level 1.8 MG/DL    


 


Alkaline Phosphatase 104 U/L    


 


Aspartate Amino Transf


(AST/SGOT) 26 U/L 


  


  


  


 


 


Alanine Aminotransferase


(ALT/SGPT) 40 U/L 


  


  


  


 


 


Total Bilirubin 0.4 MG/DL    


 


Sodium Level 144 MEQ/L    


 


Potassium Level 3.8 MEQ/L    


 


Chloride Level 110 MEQ/L    


 


Carbon Dioxide Level 26.3 MEQ/L    


 


Anion Gap 8 MEQ/L    


 


Estimat Glomerular Filtration


Rate 87 ML/MIN 


  


  


  


 


 


Total Creatine Kinase 71 U/L    


 


Troponin I


  LESS THAN 0.02


NG/ML 


  


  


 


 


Triglycerides Level 128 MG/DL    


 


Cholesterol Level 136 MG/DL    


 


LDL Cholesterol 77 MG/DL    


 


HDL Cholesterol 33.8 MG/DL    


 


Cholesterol/HDL Ratio 4.02 RATIO    


 


Free Thyroxine 1.04 NG/DL    


 


Thyroid Stimulating Hormone


3rd Gen 1.360 uIU/ML 


  


  


  


 








Imaging





Last Impressions








Chest X-Ray 3/21/18 1225 Signed





Impressions: 





 Service Date/Time:  Wednesday, March 21, 2018 13:08 - CONCLUSION:  No acute 





 disease.  No significant change has occurred.       Manohar Nazario MD 








Objective Remarks


GENERAL: This is a well-nourished, well-developed patient, in mild to moderate 

distress.


SKIN: No rashes, ecchymoses or lesions. Cool and dry.


HEAD: Atraumatic. Normocephalic. No temporal or scalp tenderness.


EYES: Pupils equal round and reactive. Extraocular motions intact. No scleral 

icterus. No injection or drainage. 


ENT: Nose without bleeding, purulent drainage or septal hematoma. Throat 

without erythema, tonsillar hypertrophy or exudate. Uvula midline. Airway 

patent.


NECK: Trachea midline. No JVD or lymphadenopathy. Supple, nontender, no 

meningeal signs.


CARDIOVASCULAR: Regular rate and rhythm without murmurs, gallops, or rubs.  S1-

S2 no S3 or S4


RESPIRATORY: Clear to auscultation. Breath sounds equal bilaterally. No wheezes

, rales, or rhonchi.  


GASTROINTESTINAL: Abdomen soft, non-tender, nondistended. No hepato-splenomegaly

, or palpable masses. No guarding.  Obese


MUSCULOSKELETAL: Extremities without clubbing, cyanosis, or edema. No joint 

tenderness, effusion, or edema noted. No calf tenderness. Negative Homans sign 

bilaterally.


NEUROLOGICAL: Awake and alert. Cranial nerves II through XII intact.  Motor and 

sensory grossly within normal limits. Five out of 5 muscle strength in all 

muscle groups.  Normal speech.


Insight and judgment is good


Mood behaviors appropriate


Medications and IVs





Current Medications


Aspirin (Aspirin) 325 mg ONCE  ONCE PO  Last administered on 3/21/18at 15:00;  

Start 3/21/18 at 14:30;  Stop 3/21/18 at 14:37;  Status DC


Nitroglycerin (Nitroglycerin 2% Oint) 1 inch ONCE  ONCE TOPICAL  Last 

administered on 3/21/18at 15:00;  Start 3/21/18 at 14:30;  Stop 3/21/18 at 14:37

;  Status DC


Heparin Sodium/ Dextrose 250 ml @ 0 mls/hr TITRATE  PRN IV Coagulation 

Management;  Start 3/21/18 at 15:45;  Status UNV


Insulin Aspart (NovoLOG SUPPLEMENTAL SCALE) 1 ACHS SLIDING  SCALE SQ ;  Start 3/

21/18 at 17:00


Dextrose (D50w (Vial) Inj) 50 ml UNSCH  PRN IV PUSH HYPOGLYCEMIA-SEE COMMENTS;  

Start 3/21/18 at 16:00


Glucagon (Glucagon Inj) 1 mg UNSCH  PRN OTHER HYPOGLYCEMIA-SEE COMMENTS;  Start 

3/21/18 at 16:00


Miscellaneous Information PHARMACY NEEDS HT/ WT... Q15M .XX ;  Start 3/21/18 at 

15:45;  Stop 3/21/18 at 16:36;  Status DC


Carvedilol (Coreg) 6.25 mg BID PO  Last administered on 3/22/18at 09:34;  Start 

3/21/18 at 21:00


Clopidogrel Bisulfate (Plavix) 75 mg DAILY PO  Last administered on 3/22/18at 10

:09;  Start 3/22/18 at 09:00


Enalapril Maleate (Vasotec) 2.5 mg DAILY PO ;  Start 3/22/18 at 09:00


Pravastatin Sodium (Pravachol) 40 mg DAILY PO  Last administered on 3/22/18at 09

:34;  Start 3/22/18 at 09:00


Sodium Chloride (NS Flush) 2 ml BID IV FLUSH ;  Start 3/21/18 at 21:00;  Stop 3/

21/18 at 21:00;  Status DC


Sodium Chloride (NS Flush) 2 ml UNSCH  PRN IV FLUSH FLUSH AFTER USING IV ACCESS

;  Start 3/21/18 at 16:00;  Stop 3/21/18 at 16:35;  Status DC


Aspirin (Ecotrin Ec) 325 mg DAILY PO  Last administered on 3/22/18at 10:08;  

Start 3/22/18 at 09:00


Nitroglycerin (Nitrostat Sl) 0.4 mg Q5M  PRN SL CHEST PAIN;  Start 3/21/18 at 16

:00


Acetaminophen (Tylenol) 650 mg Q6H  PRN PO HEADACHE OR TEMP > 101 F;  Start 3/21

/18 at 16:00


Docusate Sodium (Colace) 100 mg BID  PRN PO CONSTIPATION;  Start 3/21/18 at 16:

00;  Status UNV


Alprazolam (Xanax) 0.25 mg Q8H  PRN PO ANXIETY;  Start 3/21/18 at 16:00


Ondansetron HCl (Zofran Inj) 4 mg Q6H  PRN IV PUSH NAUSEA OR VOMITING;  Start 3/

21/18 at 16:00


Heparin Sodium/ Dextrose 250 ml @ 0 mls/hr TITRATE  PRN IV Coagulation 

Management;  Start 3/21/18 at 16:00;  Stop 3/21/18 at 16:18;  Status DC


Famotidine (Pepcid) 20 mg BID PO  Last administered on 3/22/18at 09:34;  Start 3

/21/18 at 21:00


Sodium Chloride (NS Flush) 2 ml UNSCH  PRN IV FLUSH FLUSH AFTER USING IV ACCESS

;  Start 3/21/18 at 16:00


Sodium Chloride (NS Flush) 2 ml BID IV FLUSH ;  Start 3/21/18 at 21:00


Acetaminophen (Tylenol) 650 mg Q4H  PRN PO TEMP > 100.4;  Start 3/21/18 at 16:00


Metoclopramide HCl (Reglan Inj) 5 mg Q6H  PRN IV PUSH NAUSEA OR VOMITING;  

Start 3/21/18 at 16:00


Temazepam (Restoril) 15 mg HS  PRN PO INSOMNIA;  Start 3/21/18 at 16:00


Acetaminophen (Tylenol) 650 mg Q6H  PRN PO PAIN SCALE 1 TO 2;  Start 3/21/18 at 

16:00


Oxycodone/ Acetaminophen (Percocet  5-325 Mg) 1 tab Q6H  PRN PO PAIN SCALE 3 TO 

5;  Start 3/21/18 at 16:00


Oxycodone/ Acetaminophen (Percocet  Mg) 1 tab Q6H  PRN PO PAIN SCALE 6 TO 

10;  Start 3/21/18 at 16:00


Morphine Sulfate (Morphine Inj) 2 mg Q3H  PRN IV PUSH Pain 3-5; if unable to 

take PO;  Start 3/21/18 at 16:00


Morphine Sulfate (Morphine Inj) 4 mg Q3H  PRN IV PUSH Pain 6-10;if unable to 

take PO;  Start 3/21/18 at 16:30


Morphine Sulfate (Morphine Inj) 4 mg Q3H  PRN IV PUSH BREAKTHROUGH PAIN;  Start 

3/21/18 at 16:30


Naloxone HCl (Narcan Inj) 0.4 mg UNSCH  PRN IV PUSH SEE LABEL COMMENTS;  Start 3

/21/18 at 16:00


Senna/Docusate Sodium (Ursula-Colace) 1 tab BID PO  Last administered on 3/21/

18at 21:29;  Start 3/21/18 at 21:00


Magnesium Hydroxide (Milk Of Magnesia Liq) 30 ml Q12H  PRN PO Mild constipation

;  Start 3/21/18 at 16:00


Sennosides (Senokot) 17.2 mg Q12H  PRN PO Moderate constipation;  Start 3/21/18 

at 16:00


Bisacodyl (Dulcolax Supp) 10 mg DAILY  PRN RECTAL SEVERE CONSITIPATION;  Start 3

/21/18 at 16:00


Lactulose (Lactulose Liq) 30 ml DAILY  PRN PO SEVERE CONSITIPATION;  Start 3/21/

18 at 16:00


Heparin Sodium/ Dextrose 250 ml @  10 mls/hr TITRATE  PRN IV Coagulation 

Management Last administered on 3/21/18at 16:54;  Start 3/21/18 at 16:30





A/P


Problem List:  


(1) Hx of CABG


ICD Code:  Z95.1 - Presence of aortocoronary bypass graft


(2) Decreased cardiac ejection fraction


ICD Code:  R93.1 - Abnormal findings on diagnostic imaging of heart and 

coronary circulation


(3) Hypertension


ICD Code:  I10 - Essential (primary) hypertension


(4) Obese


ICD Code:  E66.9 - Obesity, unspecified


(5) Hyperlipidemia


ICD Code:  E78.5 - Hyperlipidemia, unspecified


(6) CAD (coronary artery disease)


ICD Code:  I25.10 - Atherosclerotic heart disease of native coronary artery 

without angina pectoris


(7) Diabetes mellitus


ICD Code:  E11.9 - Type 2 diabetes mellitus without complications


(8) Unstable angina pectoris


ICD Code:  I20.0 - Unstable angina


Status:  Acute


Assessment and Plan


Unstable angina versus atypical chest pain will place on heparin drip and 

consult Dr. Purvis of cardiology --morphine and nitroglycerin as needed as 

well as Percocet and Tylenol





Decreased cardiac ejection fracture to undergo cardiac catheterization tomorrow 

with Dr. Purvis continue on heparin drip





Diabetes mellitus continue on sliding scale coverage with Accu-Cheks before 

meals and at bedtime hold his Glucophage and metformin in light of cardiac 

catheterization tomorrow


Keep n.p.o. after midnight





Hypertension resume home medications





Allergy to iodine





Coronary artery disease with history of CABG continue on heparin drip and 

consult Dr. Purvis





COPD tobacco oxygen as needed





GERD continue on Pepcid





Chronic back pain continue on pain meds as needed





DVT and GI prophylaxis





Await cardiac catheterization results


Discharge Planning


Pending cardiac clearance











Reece Yousif DO Mar 22, 2018 10:36

## 2018-03-22 NOTE — MB
cc:

Abdiel Purvis MD

****

 

 

DATE:

03/22/2018

 

HISTORY OF PRESENT ILLNESS:

Eddie is a very pleasant 57-year-old gentleman with history of 

cardiomyopathy, gated SPECT ejection fraction 19%, myocardial 

perfusion study in my office last month, severe MR, [place] Heart 

Association class IV, congestive heart failure, status post CABG, 

presents to the ER with severe shortness of breath at rest, admitted 

for further evaluation and management.  Otherwise, denies any fevers, 

chills, cough, GI or  bleeding, PND, orthopnea, syncope or 

dizziness.

 

PAST MEDICAL HISTORY:

As per history of present illness. The patient was also noted to have 

left-sided chest pain radiating to the left upper extremity in the 

emergency room, intermittently for 2 to 3 weeks prior to presentation 

to the ER, the patient's pain is noted to be 6/10.

 

Includes arrhythmia, CHF, diabetes, hypertension, myocardial 

infarction, March 2013, appendectomy, skin grafts right foot as a 

child, back surgery x 3.

 

SOCIAL HISTORY:

Drinks alcohol socially.  Denies tobacco use.

 

ALLERGIES:

IODINE, POTASSIUM IODIDE, POVIDONE IODINE, SHELLFISH DERIVED, SODIUM 

IODIDE.

 

MEDICATIONS:

Prior to admission: Carvedilol 6.25 b.i.d., Glucophage 850 three times

a day, enalapril 2.5 daily, pravastatin 40 daily, glyburide 2.5 

b.i.d., clopidogrel 75 daily.

 

Medications in the hospital: IV heparin, Clopidogrel 75 mg daily, 

Vasotec 2.5 daily, Pravachol 40 daily, aspirin 325 daily, Coreg 6.25 

b.i.d., Pepcid 20 b.i.d., sliding scale insulin, heparin drip per 

protocol.

 

PHYSICAL EXAMINATION:

VITAL SIGNS:  Blood pressure 103/54, pulse 64, respiratory rate 16, 

sat 99% on 2 liters nasal cannula, temperature 98.0.

GENERAL:  He is alert and oriented x 3, in no acute distress.

NECK:  Supple.  No JVD.  No bruit.

CARDIOVASCULAR:  S1, S2.  No murmurs, rubs or gallops.

LUNGS:  Clear to auscultation bilaterally.

ABDOMEN:  Soft, nontender, nondistended with positive bowel sounds.

EXTREMITIES:  No lower extremity edema.

 

IMAGING STUDIES:

Chest x-ray, no acute disease, no significant changes occurred.

 

EKG:  Normal sinus rhythm at 61 beats per minute, nonspecific ST-T 

wave changes.  Repeat EKG:  Sinus bradycardia at 54 beats per minute. 

There is deep asymmetric T-wave inversion in lead V2, negative 

amplitude 2-3 mm, also a biphasic T-wave V3, V4.

 

LABORATORY DATA:

White count 5.9, hemoglobin 13.2, hematocrit 39.3, platelet count 153.

 Sodium 144, potassium 3.8, chloride 110, bicarbonate 26.3, BUN 15, 

creatinine 0.89.  LFTs normal.  Troponin less than 0.02 x 4.  BNP is 

336.  LDL is 77.  INR is 1.1, PTT at 12:46 today is 33.1.

 

DIAGNOSES:

 

1.   Unstable angina.

2.   [place] [employer] Society class IV angina.

3.   Congestive heart failure.

4.   Decompensated congestive heart failure.

5.   Cardiomyopathy.

6.   Severe mitral regurgitation.

7.   New York Heart Association class IV congestive heart failure.

8.   Diabetes mellitus.

9.   Chronic obstructive pulmonary disease.

10.  Hyperglycemia.

 

DISCUSSION:

At this point in time, I think right and left heart catheterization 

are medically necessary.  Further recommendations based on details of 

coronary anatomy, LV function and right heart pressures.  He will need

to not be given metformin or Glucophage for 48 hours post-procedure, 

which is not being given on his in hospital medication list.

 

 

__________________________________

Abdiel Purvis MD

 

 

AWC/TL

D: 03/22/2018, 01:38 PM

T: 03/22/2018, 02:25 PM

Visit #: [id number]

Job #: [id number]

## 2018-03-23 VITALS
TEMPERATURE: 97.9 F | OXYGEN SATURATION: 97 % | SYSTOLIC BLOOD PRESSURE: 111 MMHG | RESPIRATION RATE: 20 BRPM | DIASTOLIC BLOOD PRESSURE: 74 MMHG | HEART RATE: 60 BPM

## 2018-03-23 VITALS
RESPIRATION RATE: 18 BRPM | DIASTOLIC BLOOD PRESSURE: 55 MMHG | SYSTOLIC BLOOD PRESSURE: 123 MMHG | OXYGEN SATURATION: 98 % | HEART RATE: 52 BPM | TEMPERATURE: 97.9 F

## 2018-03-23 VITALS — HEART RATE: 56 BPM

## 2018-03-23 VITALS
HEART RATE: 59 BPM | RESPIRATION RATE: 18 BRPM | SYSTOLIC BLOOD PRESSURE: 105 MMHG | DIASTOLIC BLOOD PRESSURE: 60 MMHG | OXYGEN SATURATION: 100 % | TEMPERATURE: 97.8 F

## 2018-03-23 VITALS
SYSTOLIC BLOOD PRESSURE: 111 MMHG | OXYGEN SATURATION: 99 % | HEART RATE: 56 BPM | TEMPERATURE: 97.9 F | DIASTOLIC BLOOD PRESSURE: 53 MMHG | RESPIRATION RATE: 18 BRPM

## 2018-03-23 VITALS
TEMPERATURE: 98 F | DIASTOLIC BLOOD PRESSURE: 65 MMHG | RESPIRATION RATE: 18 BRPM | SYSTOLIC BLOOD PRESSURE: 111 MMHG | HEART RATE: 59 BPM | OXYGEN SATURATION: 99 %

## 2018-03-23 VITALS
DIASTOLIC BLOOD PRESSURE: 71 MMHG | RESPIRATION RATE: 18 BRPM | TEMPERATURE: 98 F | HEART RATE: 66 BPM | OXYGEN SATURATION: 99 % | SYSTOLIC BLOOD PRESSURE: 121 MMHG

## 2018-03-23 VITALS — OXYGEN SATURATION: 96 %

## 2018-03-23 VITALS — HEART RATE: 66 BPM

## 2018-03-23 VITALS — HEART RATE: 59 BPM

## 2018-03-23 VITALS — HEART RATE: 51 BPM

## 2018-03-23 LAB
ALBUMIN SERPL-MCNC: 3.3 GM/DL (ref 3.4–5)
ALP SERPL-CCNC: 89 U/L (ref 45–117)
ALT SERPL-CCNC: 38 U/L (ref 12–78)
AST SERPL-CCNC: 17 U/L (ref 15–37)
BASOPHILS # BLD AUTO: 0 TH/MM3 (ref 0–0.2)
BASOPHILS NFR BLD: 0.2 % (ref 0–2)
BILIRUB SERPL-MCNC: 0.5 MG/DL (ref 0.2–1)
BUN SERPL-MCNC: 17 MG/DL (ref 7–18)
CALCIUM SERPL-MCNC: 8.6 MG/DL (ref 8.5–10.1)
CHLORIDE SERPL-SCNC: 105 MEQ/L (ref 98–107)
CREAT SERPL-MCNC: 0.95 MG/DL (ref 0.6–1.3)
EOSINOPHIL # BLD: 0 TH/MM3 (ref 0–0.4)
EOSINOPHIL NFR BLD: 0 % (ref 0–4)
ERYTHROCYTE [DISTWIDTH] IN BLOOD BY AUTOMATED COUNT: 14.8 % (ref 11.6–17.2)
GFR SERPLBLD BASED ON 1.73 SQ M-ARVRAT: 81 ML/MIN (ref 89–?)
GLUCOSE SERPL-MCNC: 205 MG/DL (ref 74–106)
HCO3 BLD-SCNC: 24.6 MEQ/L (ref 21–32)
HCT VFR BLD CALC: 41.7 % (ref 39–51)
HGB BLD-MCNC: 13.9 GM/DL (ref 13–17)
LYMPHOCYTES # BLD AUTO: 1.1 TH/MM3 (ref 1–4.8)
LYMPHOCYTES NFR BLD AUTO: 15.5 % (ref 9–44)
MAGNESIUM SERPL-MCNC: 1.9 MG/DL (ref 1.5–2.5)
MCH RBC QN AUTO: 26.2 PG (ref 27–34)
MCHC RBC AUTO-ENTMCNC: 33.4 % (ref 32–36)
MCV RBC AUTO: 78.5 FL (ref 80–100)
MONOCYTE #: 0.5 TH/MM3 (ref 0–0.9)
MONOCYTES NFR BLD: 7.3 % (ref 0–8)
NEUTROPHILS # BLD AUTO: 5.6 TH/MM3 (ref 1.8–7.7)
NEUTROPHILS NFR BLD AUTO: 77 % (ref 16–70)
PHOSPHATE SERPL-MCNC: 4.1 MG/DL (ref 2.5–4.9)
PLATELET # BLD: 172 TH/MM3 (ref 150–450)
PMV BLD AUTO: 8.9 FL (ref 7–11)
PROT SERPL-MCNC: 7.1 GM/DL (ref 6.4–8.2)
RBC # BLD AUTO: 5.31 MIL/MM3 (ref 4.5–5.9)
SODIUM SERPL-SCNC: 140 MEQ/L (ref 136–145)
WBC # BLD AUTO: 7.3 TH/MM3 (ref 4–11)

## 2018-03-23 RX ADMIN — FAMOTIDINE SCH MG: 20 TABLET, FILM COATED ORAL at 20:15

## 2018-03-23 RX ADMIN — PHENYTOIN SODIUM SCH MLS/HR: 50 INJECTION INTRAMUSCULAR; INTRAVENOUS at 13:10

## 2018-03-23 RX ADMIN — ASPIRIN SCH MG: 325 TABLET, DELAYED RELEASE ORAL at 08:45

## 2018-03-23 RX ADMIN — HEPARIN SODIUM PRN MLS/HR: 10000 INJECTION, SOLUTION INTRAVENOUS at 16:38

## 2018-03-23 RX ADMIN — INSULIN ASPART SCH: 100 INJECTION, SOLUTION INTRAVENOUS; SUBCUTANEOUS at 12:41

## 2018-03-23 RX ADMIN — INSULIN ASPART SCH: 100 INJECTION, SOLUTION INTRAVENOUS; SUBCUTANEOUS at 16:49

## 2018-03-23 RX ADMIN — FAMOTIDINE SCH MG: 20 TABLET, FILM COATED ORAL at 08:45

## 2018-03-23 RX ADMIN — FUROSEMIDE SCH MG: 10 INJECTION, SOLUTION INTRAMUSCULAR; INTRAVENOUS at 16:46

## 2018-03-23 RX ADMIN — ENALAPRIL MALEATE SCH MG: 2.5 TABLET ORAL at 08:45

## 2018-03-23 RX ADMIN — CLOPIDOGREL BISULFATE SCH MG: 75 TABLET, FILM COATED ORAL at 08:45

## 2018-03-23 RX ADMIN — CARVEDILOL SCH MG: 6.25 TABLET, FILM COATED ORAL at 08:45

## 2018-03-23 RX ADMIN — STANDARDIZED SENNA CONCENTRATE AND DOCUSATE SODIUM SCH TAB: 8.6; 5 TABLET, FILM COATED ORAL at 20:15

## 2018-03-23 RX ADMIN — CARVEDILOL SCH MG: 6.25 TABLET, FILM COATED ORAL at 20:15

## 2018-03-23 RX ADMIN — Medication SCH ML: at 20:22

## 2018-03-23 RX ADMIN — Medication SCH ML: at 08:46

## 2018-03-23 RX ADMIN — ACYCLOVIR SCH UNITS: 800 TABLET ORAL at 20:22

## 2018-03-23 RX ADMIN — INSULIN ASPART SCH: 100 INJECTION, SOLUTION INTRAVENOUS; SUBCUTANEOUS at 08:56

## 2018-03-23 RX ADMIN — STANDARDIZED SENNA CONCENTRATE AND DOCUSATE SODIUM SCH TAB: 8.6; 5 TABLET, FILM COATED ORAL at 08:46

## 2018-03-23 RX ADMIN — ATORVASTATIN CALCIUM SCH MG: 40 TABLET, FILM COATED ORAL at 20:15

## 2018-03-23 RX ADMIN — INSULIN ASPART SCH: 100 INJECTION, SOLUTION INTRAVENOUS; SUBCUTANEOUS at 20:22

## 2018-03-23 NOTE — EKG
Date Performed: 03/23/2018       Time Performed: 07:05:26

 

PTAGE:      57 years

 

EKG:      Sinus rhythm 

 

 Poor R wave progression Septal and lateral ST-T changes are nonspecific Prolonged corrected QT inter
naomi Borderline ECG 

 

NO PREVIOUS TRACING            

 

DOCTOR:   Abdiel Purvis  Interpretating Date/Time  03/23/2018 20:41:38

## 2018-03-23 NOTE — PD.CARD.PN
Subjective


Subjective Remarks


alert in nad, some chest pain last night





Objective


Medications





Current Medications








 Medications


  (Trade)  Dose


 Ordered  Sig/Ilia


 Route  Start Time


 Stop Time Status Last Admin


 


  (NovoLOG


 SUPPLEMENTAL


 SCALE)  1  ACHS SLIDING  SCALE


 SQ  3/21/18 17:00


    3/23/18 12:41


 


 


  (D50w (Vial) Inj)  50 ml  UNSCH  PRN


 IV PUSH  3/21/18 16:00


     


 


 


  (Glucagon Inj)  1 mg  UNSCH  PRN


 OTHER  3/21/18 16:00


     


 


 


  (Coreg)  6.25 mg  BID


 PO  3/21/18 21:00


    3/23/18 08:45


 


 


  (Plavix)  75 mg  DAILY


 PO  3/22/18 09:00


    3/23/18 08:45


 


 


  (Vasotec)  2.5 mg  DAILY


 PO  3/22/18 09:00


    3/23/18 08:45


 


 


  (Ecotrin Ec)  325 mg  DAILY


 PO  3/22/18 09:00


    3/23/18 08:45


 


 


  (Nitrostat Sl)  0.4 mg  Q5M  PRN


 SL  3/21/18 16:00


    3/22/18 20:35


 


 


  (Tylenol)  650 mg  Q6H  PRN


 PO  3/21/18 16:00


     


 


 


  (Xanax)  0.25 mg  Q8H  PRN


 PO  3/21/18 16:00


     


 


 


  (Zofran Inj)  4 mg  Q6H  PRN


 IV PUSH  3/21/18 16:00


     


 


 


  (Pepcid)  20 mg  BID


 PO  3/21/18 21:00


    3/23/18 08:45


 


 


  (NS Flush)  2 ml  UNSCH  PRN


 IV FLUSH  3/21/18 16:00


     


 


 


  (NS Flush)  2 ml  BID


 IV FLUSH  3/21/18 21:00


    3/23/18 08:46


 


 


  (Tylenol)  650 mg  Q4H  PRN


 PO  3/21/18 16:00


     


 


 


  (Reglan Inj)  5 mg  Q6H  PRN


 IV PUSH  3/21/18 16:00


     


 


 


  (Restoril)  15 mg  HS  PRN


 PO  3/21/18 16:00


     


 


 


  (Tylenol)  650 mg  Q6H  PRN


 PO  3/21/18 16:00


     


 


 


  (Percocet  5-325


 Mg)  1 tab  Q6H  PRN


 PO  3/21/18 16:00


    3/22/18 12:03


 


 


  (Percocet 


 Mg)  1 tab  Q6H  PRN


 PO  3/21/18 16:00


     


 


 


  (Morphine Inj)  2 mg  Q3H  PRN


 IV PUSH  3/21/18 16:00


     


 


 


  (Morphine Inj)  4 mg  Q3H  PRN


 IV PUSH  3/21/18 16:30


     


 


 


  (Morphine Inj)  4 mg  Q3H  PRN


 IV PUSH  3/21/18 16:30


     


 


 


  (Narcan Inj)  0.4 mg  UNSCH  PRN


 IV PUSH  3/21/18 16:00


     


 


 


  (Ursula-Colace)  1 tab  BID


 PO  3/21/18 21:00


    3/23/18 08:46


 


 


  (Milk Of


 Magnesia Liq)  30 ml  Q12H  PRN


 PO  3/21/18 16:00


     


 


 


  (Senokot)  17.2 mg  Q12H  PRN


 PO  3/21/18 16:00


     


 


 


  (Dulcolax Supp)  10 mg  DAILY  PRN


 RECTAL  3/21/18 16:00


     


 


 


  (Lactulose Liq)  30 ml  DAILY  PRN


 PO  3/21/18 16:00


     


 


 


 Heparin Sodium/


 Dextrose  250 ml @ 


 10 mls/hr  TITRATE  PRN


 IV  3/22/18 17:30


    3/23/18 16:38


 


 


 Sodium Chloride  1,000 ml @ 


 30 mls/hr  Q24H


 IV  3/22/18 13:10


     


 


 


  (Lasix Inj)  20 mg  BID@09,18


 IV PUSH  3/23/18 18:00


    3/23/18 16:46


 


 


  (Aldactone)  25 mg  BID@09,18


 PO  3/23/18 18:00


    3/23/18 16:46


 


 


  (Lipitor)  40 mg  HS


 PO  3/23/18 21:00


     


 


 


  (Levemir Inj)  10 units  HS


 SQ  3/23/18 21:00


     


 








Vital Signs / I&O





Vital Signs








  Date Time  Temp Pulse Resp B/P (MAP) Pulse Ox O2 Delivery O2 Flow Rate FiO2


 


3/23/18 16:00 98.0 59 18 111/65 (80) 99   


 


3/23/18 12:00 98.0 66 18 121/71 (88) 99   


 


3/23/18 08:00 97.8 59 18 105/60 (75) 100   


 


3/23/18 07:00  59      


 


3/23/18 04:00  52      


 


3/23/18 04:00 97.9 58 18 123/55 (77) 98   


 


3/23/18 00:00  56      


 


3/23/18 00:00 97.9 56 18 111/53 (72) 99   


 


3/22/18 20:35     99  2.00 


 


3/22/18 20:00  65      


 


3/22/18 20:00 98.3 63 18 121/59 (79) 97   


 


3/22/18 18:00  66      














I/O      


 


 3/22/18 3/22/18 3/22/18 3/23/18 3/23/18 3/23/18





 07:00 15:00 23:00 07:00 15:00 23:00


 


Intake Total   400 ml   


 


Balance   400 ml   


 


      


 


Intake Oral   400 ml   


 


# Voids   3   








Physical Exam


GENERAL: 


SKIN: Warm and dry.


HEAD: Normocephalic.


EYES: No scleral icterus. No injection or drainage. 


NECK: Supple, trachea midline. No JVD or lymphadenopathy.


CARDIOVASCULAR: Regular rate and rhythm without murmurs, gallops, or rubs. 


RESPIRATORY: Breath sounds equal bilaterally. No accessory muscle use.


GASTROINTESTINAL: Abdomen soft, non-tender, nondistended. 


MUSCULOSKELETAL: No cyanosis, or edema. 


BACK: Nontender without obvious deformity. No CVA tenderness.





Laboratory





Laboratory Tests








Test


  3/23/18


01:12 3/23/18


05:23 3/23/18


13:12


 


Activated Partial


Thromboplast Time 27.1 SEC 


  


  33.0 SEC 


 


 


White Blood Count  7.3 TH/MM3  


 


Red Blood Count  5.31 MIL/MM3  


 


Hemoglobin  13.9 GM/DL  


 


Hematocrit  41.7 %  


 


Mean Corpuscular Volume  78.5 FL  


 


Mean Corpuscular Hemoglobin  26.2 PG  


 


Mean Corpuscular Hemoglobin


Concent 


  33.4 % 


  


 


 


Red Cell Distribution Width  14.8 %  


 


Platelet Count  172 TH/MM3  


 


Mean Platelet Volume  8.9 FL  


 


Neutrophils (%) (Auto)  77.0 %  


 


Lymphocytes (%) (Auto)  15.5 %  


 


Monocytes (%) (Auto)  7.3 %  


 


Eosinophils (%) (Auto)  0.0 %  


 


Basophils (%) (Auto)  0.2 %  


 


Neutrophils # (Auto)  5.6 TH/MM3  


 


Lymphocytes # (Auto)  1.1 TH/MM3  


 


Monocytes # (Auto)  0.5 TH/MM3  


 


Eosinophils # (Auto)  0.0 TH/MM3  


 


Basophils # (Auto)  0.0 TH/MM3  


 


CBC Comment  DIFF FINAL  


 


Differential Comment    


 


Blood Urea Nitrogen  17 MG/DL  


 


Creatinine  0.95 MG/DL  


 


Random Glucose  205 MG/DL  


 


Total Protein  7.1 GM/DL  


 


Albumin  3.3 GM/DL  


 


Calcium Level  8.6 MG/DL  


 


Phosphorus Level  4.1 MG/DL  


 


Magnesium Level  1.9 MG/DL  


 


Alkaline Phosphatase  89 U/L  


 


Aspartate Amino Transf


(AST/SGOT) 


  17 U/L 


  


 


 


Alanine Aminotransferase


(ALT/SGPT) 


  38 U/L 


  


 


 


Total Bilirubin  0.5 MG/DL  


 


Sodium Level  140 MEQ/L  


 


Potassium Level  3.8 MEQ/L  


 


Chloride Level  105 MEQ/L  


 


Carbon Dioxide Level  24.6 MEQ/L  


 


Anion Gap  10 MEQ/L  


 


Estimat Glomerular Filtration


Rate 


  81 ML/MIN 


  


 


 


B-Type Natriuretic Peptide  743 PG/ML  











Assessment and Plan


Problem List:  


(1) Cardiomyopathy


ICD Codes:  I42.9 - Cardiomyopathy, unspecified


(2) Mitral regurgitation


ICD Codes:  I34.0 - Nonrheumatic mitral (valve) insufficiency


(3) Pulmonary HTN


ICD Codes:  I27.20 - Pulmonary hypertension, unspecified


(4) CAD (coronary artery disease)


ICD Codes:  I25.10 - Atherosclerotic heart disease of native coronary artery 

without angina pectoris


(5) Diabetes mellitus


ICD Codes:  E11.9 - Type 2 diabetes mellitus without complications


(6) Multi-vessel coronary artery stenosis


ICD Codes:  I25.10 - Atherosclerotic heart disease of native coronary artery 

without angina pectoris


(7) Unstable angina pectoris


ICD Codes:  I20.0 - Unstable angina


Status:  Acute


(8) Decreased cardiac ejection fraction


ICD Codes:  R93.1 - Abnormal findings on diagnostic imaging of heart and 

coronary circulation


Assessment and Plan


1.) Cardiomyopathy - continue coreg, enalapril, start lasix 20 mg bid, 

aldactone 25 mg bid, f/u bmp/bnp in am, may be accepted for heart transplant 

eval at [facility] if can get medicare/medicaid, d/w Dr Angel @  and case 

management, patient and his family extensively


2.) CAD - continue iv hep, aspirin, plavix due to ongoing resting chest pain











Abdiel Purvis MD Mar 23, 2018 17:37

## 2018-03-23 NOTE — EKG
Date Performed: 03/22/2018       Time Performed: 22:03:50

 

PTAGE:      57 years

 

EKG:      Sinus rhythm 

 

 Prolonged QT interval Poor R wave progression Nonspecific ST-T wave changes Prolonged corrected QT i
nterval Lateral T wave changes may be due to myocardial ischemia Abnormal ECG 

 

NO PREVIOUS TRACING            

 

DOCTOR:   Abdiel Purvis  Interpretating Date/Time  03/23/2018 20:41:16

## 2018-03-23 NOTE — ECHRPT
Indication:   Cardiomyopathy

 

 CONCLUSIONS

 The left ventricular systolic function is severely reduced with an estimated ejection fraction in th
e range of 

 20-25%. 

 There is global left ventricular dysfunction. 

 Moderate mitral valve regurgitation. 

 The mitral valve regurgitation jet is directed centrally due to poor leaflet coaptation. 

 There is mild tricuspid valve regurgitation. 

 

 BP:  121   / 59      HR: 99                       Rhythm:           Sinus

 

 MEASUREMENTS  (Male / Female) Normal Values       Technical Quality:Fair

 2D ECHO

 LV Diastolic Diameter PLAX        6.9 cm                4.2 - 5.9 / 3.9 - 5.3 cm

 LV Systolic Diameter PLAX         6.3 cm                

 IVS Diastolic Thickness           0.9 cm                0.6 - 1.0 / 0.6 - 0.9 cm

 LVPW Diastolic Thickness          0.9 cm                0.6 - 1.0 / 0.6 - 0.9 cm

 LV Relative Wall Thickness        0.3                   

 RV Internal Dim ED PLAX           3.6 cm                

 LVOT Diameter                     2.3 cm                

 LA Systolic Diameter LX           5.6 cm                3.0 - 4.0 / 2.7 - 3.8 cm

 LA Volume Index                   42.9 cm/m           16 - 28 cm/m

 

 M-MODE

 Aortic Root Diameter MM           2.7 cm                

 LA Systolic Diameter MM           5.0 cm                

 LA Ao Ratio MM                    1.9                   

 MV E Point Septal Separation      2.0 cm                

 AV Cusp Separation MM             1.8 cm                

 

 DOPPLER

 AV Peak Velocity                  133.0 cm/s            

 AV Peak Gradient                  7.1 mmHg              

 LVOT Peak Velocity                53.1 cm/s             

 LVOT Peak Gradient                1.1 mmHg              

 AV Area Cont Eq pk                1.7 cm               

 MV Area PHT                       3.0 cm               

 Mitral E Point Velocity           85.9 cm/s             

 Mitral A Point Velocity           38.0 cm/s             

 Mitral E to A Ratio               2.3                   

 LV E' Lateral Velocity            7.9 cm/s              

 Mitral E to LV E' Lateral Ratio   10.9                  

 LV E' Septal Velocity             4.0 cm/s              

 Mitral E to LV E' Septal Ratio    21.3                  

 TR Peak Velocity                  281.0 cm/s            

 TR Peak Gradient                  31.6 mmHg             

 Right Atrial Pressure             10.0 mmHg             

 Pulmonary Artery Systolic Pressu  41.6 mmHg             

 Right Ventricular Systolic Press  41.6 mmHg             

 

 

 FINDINGS

 

 LEFT VENTRICLE

 The left ventricular systolic function is severely reduced with an estimated ejection fraction in th
e range of 

 20-25%. 

 Moderately dilated left ventricle. 

 Wall thickness is normal. 

 There is global left ventricular dysfunction. 

 

 RIGHT VENTRICLE

 Normal right ventricular size and systolic function.  

 

 LEFT ATRIUM

 The left atrial size is moderately dilated. 

 

 RIGHT ATRIUM

 The right atrial size is normal.  

 

 ATRIAL SEPTUM

 Normal atrial septal thickness without atrial level shunting by limited color doppler interrogation.
  

 

 AORTA

 The aortic root and proximal ascending aorta are normal in size on limited imaging.  

 

 MITRAL VALVE

 Moderate mitral valve regurgitation. 

 The mitral valve regurgitation jet is directed centrally due to poor leaflet coaptation. 

 No mitral valve stenosis. 

 Mitral annular calcification is present. 

 

 

 AORTIC VALVE

 Trileaflet aortic valve. No aortic valve stenosis or regurgitation.  

 

 TRICUSPID VALVE

 Structurally normal tricuspid valve. 

 There is mild tricuspid valve regurgitation. 

 The estimated pulmonary arterial pressure is 41.6 mmHg. 

 

 

 PULMONARY VALVE

 Trivial pulmonary valve regurgitation. 

 

 VESSELS

 The inferior vena cava is normal in size.  

 

 PERICARDIUM

 No pericardial effusion.  

 

 

 

 

  Mc Cevallos DO

  (Electronically Signed)

  Final Date:23 March 2018 13:41

## 2018-03-23 NOTE — MB
cc:

Sally Pérez MD

****

 

 

DATE:

03/23/2018

 

HISTORY OF PRESENT ILLNESS:

This is a 57-year-old patient with known cardiomyopathy, ischemic in 

nature with recent SPECT ejection fraction of 19%, myocardial 

perfusion study at Dr. Purvis's office, severe mitral regurgitation,

also [place] Heart class IV, CHF, who presented with left-sided chest

pain radiating to his left upper extremity in the emergency room, off 

and on for the last 2-3 weeks, also with significant shortness of 

breath.  The patient has been followed by Dr. Purvis.  He had a 

repeat cardiac catheterization, which showed severe 3-vessel disease 

in the right dominant system, a widely patent LIMA to the LAD, 

cardiomyopathy with ejection fraction of 25-30%, left ventricular 

end-diastolic pressure of 22.  The patient has had history of coronary

artery bypass grafting in the past 10 years ago in [place], [place].  He has also had a 2 or 3 MIs since.  We were asked to 

evaluate for redo coronary artery bypass grafting.

 

PAST MEDICAL HISTORY:

Ischemic cardiomyopathy, CHF [place] class IV, diabetes, 

hypertension.

 

PAST SURGICAL HISTORY:

Include coronary artery bypass grafting 10 years ago, appendectomy, 

skin graft to his right foot, as a child back surgery x 3.

 

ALLERGIES:

INCLUDE IODINE, POTASSIUM IODIDE, BETADINE, SHELLFISH.

 

HOME MEDICATIONS:

1.  Plavix.

2.  Pravachol.

3.  Coreg.

4.  Enalapril.

5.  Metformin.

6.  Glyburide.

 

FAMILY HISTORY:

Noncontributory.

 

SOCIAL HISTORY:

Drinks socially.  No tobacco.

 

REVIEW OF SYSTEMS:

As above in the HPI, other 12-system unremarkable.

 

PHYSICAL EXAMINATION:

VITAL SIGNS:  Blood pressure 110/60, heart rate is 60, afebrile.

GENERAL:  The patient is awake, alert, in no acute distress.

HEENT:  Head is normocephalic, atraumatic.  Pupils equal and reactive.

 Oral mucosa pink, moist.

NECK:  Supple.  No JVD.

HEART:  Sounds S1, S2.  Regular rate and rhythm.  Soft systolic 

murmur.  A well-healed mid sternotomy scar.

LUNGS:  Clear to auscultation.  No wheezes, rales or rhonchi.

ABDOMEN:  Soft, nontender.  No masses or organomegaly.

EXTREMITIES:  No cyanosis, clubbing, or edema.

 

LABORATORY DATA:

Shows hemoglobin 13, hematocrit of 41, white cell count is 7.3, 

platelet count of 172.  Sodium 140, potassium 3.8, BUN is 17, 

creatinine 0.95, glucose 205.  Troponin was negative.  BNP was 743.

 

IMAGING:

Chest x-ray showed no acute disease.

 

IMPRESSION AND PLAN:

The patient's cardiac films have been evaluated by Dr. Sally Pérez.  

At this time, the patient has very poor targets for redo sternotomy.  

He would need to be evaluated at a tertiary center to see if anything 

further could be done at this time.  In the meantime, I did discuss 

this with Dr. Purvis.  He will be attempting to maximize his 

medications and may need again followup tertiary center if no 

improvement.  We will also consult case management to assist with 

Medicaid reapplication.

 

Dictated by Jackie Terwilliger, ARNP

 

 

__________________________________

MD RUEL Landry/GUSTAVO

D: 03/23/2018, 05:34 PM

T: 03/23/2018, 07:30 PM

Visit #: [id number]

Job #: [id number]

## 2018-03-23 NOTE — HHI.PR
Subjective


Remarks


Follow-up multivessel disease


03/23/18-patient seen and examined, he status post cardiac heart 

catheterization with finding of multivessel disease in patient with prior 

history of CABG. he currently denies any chest pain or shortness of breath





Objective


Vitals





Vital Signs








  Date Time  Temp Pulse Resp B/P (MAP) Pulse Ox O2 Delivery O2 Flow Rate FiO2


 


3/23/18 12:00 98.0 66 18 121/71 (88) 99   


 


3/23/18 08:00 97.8 59 18 105/60 (75) 100   


 


3/23/18 07:00  59      


 


3/23/18 04:00  52      


 


3/23/18 04:00 97.9 58 18 123/55 (77) 98   


 


3/23/18 00:00  56      


 


3/23/18 00:00 97.9 56 18 111/53 (72) 99   


 


3/22/18 20:35     99  2.00 


 


3/22/18 20:00  65      


 


3/22/18 20:00 98.3 63 18 121/59 (79) 97   


 


3/22/18 18:00  66      


 


3/22/18 17:08     99 Nasal Cannula 2.00 


 


3/22/18 17:00  64      


 


3/22/18 16:00  60      


 


3/22/18 16:00 97.0 65 20 125/88 (100) 99   














I/O      


 


 3/22/18 3/22/18 3/22/18 3/23/18 3/23/18 3/23/18





 07:00 15:00 23:00 07:00 15:00 23:00


 


Intake Total   400 ml   


 


Balance   400 ml   


 


      


 


Intake Oral   400 ml   


 


# Voids   3   








Result Diagram:  


3/23/18 0523                                                                   

             3/23/18 0523





Imaging





Last Impressions








Chest X-Ray 3/21/18 1225 Signed





Impressions: 





 Service Date/Time:  Wednesday, March 21, 2018 13:08 - CONCLUSION:  No acute 





 disease.  No significant change has occurred.       Manohar Nazario MD 








Objective Remarks


GENERAL: NAD


SKIN: Warm and dry.


HEAD: Normocephalic.


EYES: No scleral icterus. No injection or drainage. 


NECK: Supple, trachea midline. No JVD or lymphadenopathy.


CARDIOVASCULAR: Regular rate and rhythm without murmurs, gallops, or rubs. 


RESPIRATORY: Breath sounds equal bilaterally. No accessory muscle use.


GASTROINTESTINAL: Abdomen soft, non-tender, nondistended. 


MUSCULOSKELETAL: No cyanosis, or edema. 


BACK: Nontender without obvious deformity. No CVA tenderness.








A/P


Problem List:  


(1) Hx of CABG


ICD Code:  Z95.1 - Presence of aortocoronary bypass graft


(2) Decreased cardiac ejection fraction


ICD Code:  R93.1 - Abnormal findings on diagnostic imaging of heart and 

coronary circulation


(3) Hypertension


ICD Code:  I10 - Essential (primary) hypertension


(4) Obese


ICD Code:  E66.9 - Obesity, unspecified


(5) Hyperlipidemia


ICD Code:  E78.5 - Hyperlipidemia, unspecified


(6) CAD (coronary artery disease)


ICD Code:  I25.10 - Atherosclerotic heart disease of native coronary artery 

without angina pectoris


(7) Diabetes mellitus


ICD Code:  E11.9 - Type 2 diabetes mellitus without complications


(8) Unstable angina pectoris


ICD Code:  I20.0 - Unstable angina


Status:  Acute


(9) Multi-vessel coronary artery stenosis


ICD Code:  I25.10 - Atherosclerotic heart disease of native coronary artery 

without angina pectoris


Assessment and Plan


57 year-old man with





Multivessel coronary stenosis in patient with prior history of CABG


   Status post left heart catheterization 03/22/18


   Appreciate input from cardiology pending possible transfer to [facility] for 

evaluation for heart transplant versus others


   However cardiothoracic surgery consultation pending for evaluation for redo 

of CABG


   Continue with heparin drip, Plavix, aspirin, beta blocker, statin, ACE 

inhibitor, Aldactone





Diabetes mellitus 


   continue on sliding scale coverage with Accu-Cheks before meals and at 

bedtime ;hold his Glucophage and metformin 


   Check hemoglobin A1c


   Start Levemir 10 units at bedtime





Hypertension


   Continue home medications





COPD tobacco oxygen as needed





GERD 


   Continue  Pepcid





Chronic back pain 


   Continue  pain meds as needed





DVT and GI prophylaxis











Leonardo Newby MD Mar 23, 2018 12:48

## 2018-03-23 NOTE — MB
cc:

Juan Lindsay MD,Abdiel SERRANO MD

****

 

 

DATE:

03/23/2018

 

ELECTROPHYSIOLOGY CONSULT

 

REASON FOR CONSULTATION:

Congestive heart failure, cardiomyopathy.

 

HISTORY OF PRESENT ILLNESS:

Mr. Murphy is a 57-year-old  gentleman with history of 

coronary artery disease.  He has previous coronary artery bypass 

grafting.  He had recent hospitalization around a year and a half ago 

due to myocardial infarction. That was in [place].  The patient never

followed with his cardiologist until recently he was seen by Dr. Purvis.  Ejection fraction currently is around 20%.  I was consulted

for evaluation for device insertion.  The chart was reviewed.  The 

patient was evaluated.

 

ALLERGIES:

IODINE, POTASSIUM, SHELLFISH.

 

SOCIAL HISTORY:

The gentleman used to smoke until 3 years ago.

 

FAMILY HISTORY:

Noncontributory to his current medical condition.

 

MEDICATIONS:

He is on Glucophage, enalapril,  pravastatin, glyburide, Plavix,  

Lipitor,  Coreg and Lasix.

 

REVIEW OF SYSTEMS:

Currently, the patient referred no chest pain, no chest discomfort.  

No shortness of breath.  No fever.

 

PHYSICAL EXAMINATION:

GENERAL:  Alert, fully oriented.

VITAL SIGNS:  Blood pressure is 111/65, pulse 60, respiratory rate 18.

LUNGS:  Ventilated.

CARDIOVASCULAR:  S1, S2.  No gallop.  No murmur.

ABDOMEN:  Obese.  No mass or bruit.

EXTREMITIES:  No edema.

 

Electrocardiogram indicates sinus rhythm, some lateral ST changes.

 

LABORATORY DATA:

Hemoglobin is 13.9, white blood cell 7.3.  Potassium 3.8, creatinine 

0.95.  Total cholesterol 136, LDL 77, HDL 33.8.  INR is 1.1.

 

ASSESSMENT AND RECOMMENDATIONS:

Mr. Murphy' last echo from Dr. Purvis was around 19%.  That was 

around 3 or 4 weeks ago.  Current ejection fraction is around 20%. The

gentleman had a previous myocardial infarction around a year and a 

half ago.  The records are not available.  At this point, he is on 

optimal medical treatment.  At this point, my recommendation is obtain

records from [place] to evaluate previous ejection fraction.  If it 

is 40% or less, then the gentleman is going to need electrophysiology 

study and defibrillator insertion for sudden death prevention.  The 

case extensively discussed with him.  Awaiting for the report from the

hospital during the weekend.  He will be followed by Dr. Purvis this

weekend.  I will see him on Monday if necessary.

 

 

__________________________________

MD NIIKTA Stewart//

D: 03/23/2018, 05:10 PM

T: 03/23/2018, 06:10 PM

Visit #: [id number]

Job #: [id number]

## 2018-03-24 VITALS
SYSTOLIC BLOOD PRESSURE: 100 MMHG | TEMPERATURE: 98.3 F | OXYGEN SATURATION: 96 % | RESPIRATION RATE: 18 BRPM | DIASTOLIC BLOOD PRESSURE: 55 MMHG | HEART RATE: 53 BPM

## 2018-03-24 VITALS — HEART RATE: 64 BPM

## 2018-03-24 VITALS
OXYGEN SATURATION: 97 % | RESPIRATION RATE: 19 BRPM | TEMPERATURE: 98 F | DIASTOLIC BLOOD PRESSURE: 54 MMHG | SYSTOLIC BLOOD PRESSURE: 99 MMHG | HEART RATE: 52 BPM

## 2018-03-24 VITALS
DIASTOLIC BLOOD PRESSURE: 77 MMHG | RESPIRATION RATE: 18 BRPM | TEMPERATURE: 97.7 F | SYSTOLIC BLOOD PRESSURE: 126 MMHG | OXYGEN SATURATION: 99 % | HEART RATE: 62 BPM

## 2018-03-24 VITALS — HEART RATE: 54 BPM

## 2018-03-24 VITALS — HEART RATE: 52 BPM

## 2018-03-24 VITALS
TEMPERATURE: 98 F | HEART RATE: 54 BPM | OXYGEN SATURATION: 98 % | RESPIRATION RATE: 18 BRPM | DIASTOLIC BLOOD PRESSURE: 72 MMHG | SYSTOLIC BLOOD PRESSURE: 129 MMHG

## 2018-03-24 VITALS
SYSTOLIC BLOOD PRESSURE: 132 MMHG | TEMPERATURE: 98.3 F | HEART RATE: 66 BPM | OXYGEN SATURATION: 97 % | DIASTOLIC BLOOD PRESSURE: 72 MMHG | RESPIRATION RATE: 20 BRPM

## 2018-03-24 VITALS — HEART RATE: 56 BPM

## 2018-03-24 VITALS
OXYGEN SATURATION: 99 % | DIASTOLIC BLOOD PRESSURE: 70 MMHG | SYSTOLIC BLOOD PRESSURE: 125 MMHG | RESPIRATION RATE: 18 BRPM | TEMPERATURE: 97.7 F | HEART RATE: 125 BPM

## 2018-03-24 VITALS
OXYGEN SATURATION: 98 % | RESPIRATION RATE: 20 BRPM | DIASTOLIC BLOOD PRESSURE: 60 MMHG | SYSTOLIC BLOOD PRESSURE: 123 MMHG | TEMPERATURE: 98 F | HEART RATE: 56 BPM

## 2018-03-24 VITALS — HEART RATE: 58 BPM

## 2018-03-24 VITALS — HEART RATE: 60 BPM

## 2018-03-24 VITALS — HEART RATE: 55 BPM

## 2018-03-24 VITALS — OXYGEN SATURATION: 99 %

## 2018-03-24 VITALS — HEART RATE: 53 BPM

## 2018-03-24 VITALS — HEART RATE: 59 BPM

## 2018-03-24 VITALS — HEART RATE: 57 BPM

## 2018-03-24 VITALS — HEART RATE: 51 BPM

## 2018-03-24 LAB
BUN SERPL-MCNC: 22 MG/DL (ref 7–18)
CALCIUM SERPL-MCNC: 9.1 MG/DL (ref 8.5–10.1)
CHLORIDE SERPL-SCNC: 102 MEQ/L (ref 98–107)
CHOLEST SERPL-MCNC: 165 MG/DL (ref 120–200)
CHOLESTEROL/ HDL RATIO: 4.48 RATIO
CREAT SERPL-MCNC: 0.98 MG/DL (ref 0.6–1.3)
ERYTHROCYTE [DISTWIDTH] IN BLOOD BY AUTOMATED COUNT: 15.1 % (ref 11.6–17.2)
GFR SERPLBLD BASED ON 1.73 SQ M-ARVRAT: 78 ML/MIN (ref 89–?)
GLUCOSE SERPL-MCNC: 159 MG/DL (ref 74–106)
HCO3 BLD-SCNC: 26.8 MEQ/L (ref 21–32)
HCT VFR BLD CALC: 42.2 % (ref 39–51)
HDLC SERPL-MCNC: 36.8 MG/DL (ref 40–60)
HGB BLD-MCNC: 14.1 GM/DL (ref 13–17)
LDLC SERPL-MCNC: 103 MG/DL (ref 0–99)
MAGNESIUM SERPL-MCNC: 2 MG/DL (ref 1.5–2.5)
MCH RBC QN AUTO: 26.5 PG (ref 27–34)
MCHC RBC AUTO-ENTMCNC: 33.5 % (ref 32–36)
MCV RBC AUTO: 79.2 FL (ref 80–100)
PLATELET # BLD: 175 TH/MM3 (ref 150–450)
PMV BLD AUTO: 8.7 FL (ref 7–11)
RBC # BLD AUTO: 5.33 MIL/MM3 (ref 4.5–5.9)
SODIUM SERPL-SCNC: 138 MEQ/L (ref 136–145)
TRIGL SERPL-MCNC: 125 MG/DL (ref 42–150)
WBC # BLD AUTO: 8.2 TH/MM3 (ref 4–11)

## 2018-03-24 RX ADMIN — Medication SCH ML: at 19:41

## 2018-03-24 RX ADMIN — Medication SCH ML: at 09:04

## 2018-03-24 RX ADMIN — CLOPIDOGREL BISULFATE SCH MG: 75 TABLET, FILM COATED ORAL at 09:03

## 2018-03-24 RX ADMIN — ONDANSETRON PRN MG: 2 INJECTION, SOLUTION INTRAMUSCULAR; INTRAVENOUS at 12:52

## 2018-03-24 RX ADMIN — INSULIN ASPART SCH: 100 INJECTION, SOLUTION INTRAVENOUS; SUBCUTANEOUS at 17:40

## 2018-03-24 RX ADMIN — STANDARDIZED SENNA CONCENTRATE AND DOCUSATE SODIUM SCH TAB: 8.6; 5 TABLET, FILM COATED ORAL at 09:03

## 2018-03-24 RX ADMIN — ACYCLOVIR SCH UNITS: 800 TABLET ORAL at 19:41

## 2018-03-24 RX ADMIN — SPIRONOLACTONE SCH MG: 50 TABLET, FILM COATED ORAL at 09:03

## 2018-03-24 RX ADMIN — ATORVASTATIN CALCIUM SCH MG: 40 TABLET, FILM COATED ORAL at 19:41

## 2018-03-24 RX ADMIN — ENALAPRIL MALEATE SCH MG: 2.5 TABLET ORAL at 09:02

## 2018-03-24 RX ADMIN — INSULIN ASPART SCH: 100 INJECTION, SOLUTION INTRAVENOUS; SUBCUTANEOUS at 19:46

## 2018-03-24 RX ADMIN — FAMOTIDINE SCH MG: 20 TABLET, FILM COATED ORAL at 19:37

## 2018-03-24 RX ADMIN — ASPIRIN SCH MG: 325 TABLET, DELAYED RELEASE ORAL at 09:01

## 2018-03-24 RX ADMIN — FUROSEMIDE SCH MG: 10 INJECTION, SOLUTION INTRAMUSCULAR; INTRAVENOUS at 09:04

## 2018-03-24 RX ADMIN — STANDARDIZED SENNA CONCENTRATE AND DOCUSATE SODIUM SCH TAB: 8.6; 5 TABLET, FILM COATED ORAL at 19:41

## 2018-03-24 RX ADMIN — SPIRONOLACTONE SCH MG: 50 TABLET, FILM COATED ORAL at 17:47

## 2018-03-24 RX ADMIN — FUROSEMIDE SCH MG: 10 INJECTION, SOLUTION INTRAMUSCULAR; INTRAVENOUS at 17:47

## 2018-03-24 RX ADMIN — FAMOTIDINE SCH MG: 20 TABLET, FILM COATED ORAL at 09:02

## 2018-03-24 RX ADMIN — INSULIN ASPART SCH: 100 INJECTION, SOLUTION INTRAVENOUS; SUBCUTANEOUS at 12:49

## 2018-03-24 RX ADMIN — CARVEDILOL SCH MG: 6.25 TABLET, FILM COATED ORAL at 09:02

## 2018-03-24 RX ADMIN — INSULIN ASPART SCH: 100 INJECTION, SOLUTION INTRAVENOUS; SUBCUTANEOUS at 08:57

## 2018-03-24 RX ADMIN — CARVEDILOL SCH MG: 6.25 TABLET, FILM COATED ORAL at 19:39

## 2018-03-24 RX ADMIN — HEPARIN SODIUM PRN MLS/HR: 10000 INJECTION, SOLUTION INTRAVENOUS at 11:33

## 2018-03-24 RX ADMIN — PHENYTOIN SODIUM SCH MLS/HR: 50 INJECTION INTRAMUSCULAR; INTRAVENOUS at 13:10

## 2018-03-24 NOTE — PD.CARD.PN
Subjective


Subjective Remarks


alert in nad, some chest pain this am, but less frequent and severe





Objective


Medications





Current Medications








 Medications


  (Trade)  Dose


 Ordered  Sig/Ilia


 Route  Start Time


 Stop Time Status Last Admin


 


  (NovoLOG


 SUPPLEMENTAL


 SCALE)  1  ACHS SLIDING  SCALE


 SQ  3/21/18 17:00


    3/24/18 08:57


 


 


  (D50w (Vial) Inj)  50 ml  UNSCH  PRN


 IV PUSH  3/21/18 16:00


     


 


 


  (Glucagon Inj)  1 mg  UNSCH  PRN


 OTHER  3/21/18 16:00


     


 


 


  (Coreg)  6.25 mg  BID


 PO  3/21/18 21:00


    3/24/18 09:02


 


 


  (Plavix)  75 mg  DAILY


 PO  3/22/18 09:00


    3/24/18 09:03


 


 


  (Vasotec)  2.5 mg  DAILY


 PO  3/22/18 09:00


    3/24/18 09:02


 


 


  (Ecotrin Ec)  325 mg  DAILY


 PO  3/22/18 09:00


    3/24/18 09:01


 


 


  (Nitrostat Sl)  0.4 mg  Q5M  PRN


 SL  3/21/18 16:00


    3/22/18 20:35


 


 


  (Tylenol)  650 mg  Q6H  PRN


 PO  3/21/18 16:00


     


 


 


  (Xanax)  0.25 mg  Q8H  PRN


 PO  3/21/18 16:00


     


 


 


  (Zofran Inj)  4 mg  Q6H  PRN


 IV PUSH  3/21/18 16:00


     


 


 


  (Pepcid)  20 mg  BID


 PO  3/21/18 21:00


    3/24/18 09:02


 


 


  (NS Flush)  2 ml  UNSCH  PRN


 IV FLUSH  3/21/18 16:00


     


 


 


  (NS Flush)  2 ml  BID


 IV FLUSH  3/21/18 21:00


    3/24/18 09:04


 


 


  (Tylenol)  650 mg  Q4H  PRN


 PO  3/21/18 16:00


     


 


 


  (Reglan Inj)  5 mg  Q6H  PRN


 IV PUSH  3/21/18 16:00


     


 


 


  (Restoril)  15 mg  HS  PRN


 PO  3/21/18 16:00


     


 


 


  (Tylenol)  650 mg  Q6H  PRN


 PO  3/21/18 16:00


     


 


 


  (Percocet  5-325


 Mg)  1 tab  Q6H  PRN


 PO  3/21/18 16:00


    3/22/18 12:03


 


 


  (Percocet 


 Mg)  1 tab  Q6H  PRN


 PO  3/21/18 16:00


     


 


 


  (Morphine Inj)  2 mg  Q3H  PRN


 IV PUSH  3/21/18 16:00


     


 


 


  (Morphine Inj)  4 mg  Q3H  PRN


 IV PUSH  3/21/18 16:30


     


 


 


  (Morphine Inj)  4 mg  Q3H  PRN


 IV PUSH  3/21/18 16:30


     


 


 


  (Narcan Inj)  0.4 mg  UNSCH  PRN


 IV PUSH  3/21/18 16:00


     


 


 


  (Ursula-Colace)  1 tab  BID


 PO  3/21/18 21:00


    3/24/18 09:03


 


 


  (Milk Of


 Magnesia Liq)  30 ml  Q12H  PRN


 PO  3/21/18 16:00


     


 


 


  (Senokot)  17.2 mg  Q12H  PRN


 PO  3/21/18 16:00


     


 


 


  (Dulcolax Supp)  10 mg  DAILY  PRN


 RECTAL  3/21/18 16:00


     


 


 


  (Lactulose Liq)  30 ml  DAILY  PRN


 PO  3/21/18 16:00


     


 


 


 Heparin Sodium/


 Dextrose  250 ml @ 


 10 mls/hr  TITRATE  PRN


 IV  3/22/18 17:30


    3/24/18 11:33


 


 


 Sodium Chloride  1,000 ml @ 


 30 mls/hr  Q24H


 IV  3/22/18 13:10


     


 


 


  (Lasix Inj)  20 mg  BID@09,18


 IV PUSH  3/23/18 18:00


    3/24/18 09:04


 


 


  (Lipitor)  40 mg  HS


 PO  3/23/18 21:00


    3/23/18 20:15


 


 


  (Levemir Inj)  10 units  HS


 SQ  3/23/18 21:00


     


 


 


  (Aldactone)  50 mg  BID@09,18


 PO  3/24/18 09:00


    3/24/18 09:03


 








Vital Signs / I&O





Vital Signs








  Date Time  Temp Pulse Resp B/P (MAP) Pulse Ox O2 Delivery O2 Flow Rate FiO2


 


3/24/18 08:57     99 Nasal Cannula 2.00 


 


3/24/18 07:00  59      


 


3/24/18 06:00  54      


 


3/24/18 05:00  57      


 


3/24/18 04:00 98.3 53 18 100/55 (70) 96   


 


3/24/18 04:00      Room Air  


 


3/24/18 04:00  53      


 


3/24/18 03:00  51      


 


3/24/18 02:00  54      


 


3/24/18 01:00  55      


 


3/24/18 00:00 98.0 52 19 99/54 (69) 97   


 


3/24/18 00:00      Room Air  


 


3/24/18 00:00  52      


 


3/23/18 23:00  51      


 


3/23/18 22:12     96   21


 


3/23/18 22:00  56      


 


3/23/18 21:00  66      


 


3/23/18 20:00  60      


 


3/23/18 20:00 97.9 60 20 111/74 (86) 97   


 


3/23/18 16:00 98.0 59 18 111/65 (80) 99   














I/O      


 


 3/23/18 3/23/18 3/23/18 3/24/18 3/24/18 3/24/18





 06:59 14:59 22:59 06:59 14:59 22:59


 


Intake Total    240 ml  


 


Output Total    1000 ml  


 


Balance    -760 ml  


 


      


 


Intake Oral    240 ml  


 


Output Urine Total    1000 ml  


 


# Bowel Movements    0  








Physical Exam


GENERAL: 


SKIN: Warm and dry.


HEAD: Normocephalic.


EYES: No scleral icterus. No injection or drainage. 


NECK: Supple, trachea midline. No JVD or lymphadenopathy.


CARDIOVASCULAR: Regular rate and rhythm without murmurs, gallops, or rubs. 


RESPIRATORY: Breath sounds equal bilaterally. No accessory muscle use.


GASTROINTESTINAL: Abdomen soft, non-tender, nondistended. 


MUSCULOSKELETAL: No cyanosis, or edema. 


BACK: Nontender without obvious deformity. No CVA tenderness.





Laboratory





Laboratory Tests








Test


  3/23/18


13:12 3/23/18


20:46 3/24/18


03:40


 


Activated Partial


Thromboplast Time 33.0 SEC 


  36.4 SEC 


  44.6 SEC 


 


 


White Blood Count   8.2 TH/MM3 


 


Red Blood Count   5.33 MIL/MM3 


 


Hemoglobin   14.1 GM/DL 


 


Hematocrit   42.2 % 


 


Mean Corpuscular Volume   79.2 FL 


 


Mean Corpuscular Hemoglobin   26.5 PG 


 


Mean Corpuscular Hemoglobin


Concent 


  


  33.5 % 


 


 


Red Cell Distribution Width   15.1 % 


 


Platelet Count   175 TH/MM3 


 


Mean Platelet Volume   8.7 FL 


 


Blood Urea Nitrogen   22 MG/DL 


 


Creatinine   0.98 MG/DL 


 


Random Glucose   159 MG/DL 


 


Calcium Level   9.1 MG/DL 


 


Magnesium Level   2.0 MG/DL 


 


Sodium Level   138 MEQ/L 


 


Potassium Level   3.5 MEQ/L 


 


Chloride Level   102 MEQ/L 


 


Carbon Dioxide Level   26.8 MEQ/L 


 


Anion Gap   9 MEQ/L 


 


Estimat Glomerular Filtration


Rate 


  


  78 ML/MIN 


 


 


B-Type Natriuretic Peptide   292 PG/ML 


 


Triglycerides Level   125 MG/DL 


 


Cholesterol Level   165 MG/DL 


 


LDL Cholesterol   103 MG/DL 


 


HDL Cholesterol   36.8 MG/DL 


 


Cholesterol/HDL Ratio   4.48 RATIO 











Assessment and Plan


Problem List:  


(1) Cardiomyopathy


ICD Codes:  I42.9 - Cardiomyopathy, unspecified


(2) Mitral regurgitation


ICD Codes:  I34.0 - Nonrheumatic mitral (valve) insufficiency


(3) Pulmonary HTN


ICD Codes:  I27.20 - Pulmonary hypertension, unspecified


(4) CAD (coronary artery disease)


ICD Codes:  I25.10 - Atherosclerotic heart disease of native coronary artery 

without angina pectoris


(5) Diabetes mellitus


ICD Codes:  E11.9 - Type 2 diabetes mellitus without complications


(6) Multi-vessel coronary artery stenosis


ICD Codes:  I25.10 - Atherosclerotic heart disease of native coronary artery 

without angina pectoris


(7) Unstable angina pectoris


ICD Codes:  I20.0 - Unstable angina


Status:  Acute


(8) Decreased cardiac ejection fraction


ICD Codes:  R93.1 - Abnormal findings on diagnostic imaging of heart and 

coronary circulation


Assessment and Plan


1.) Cardiomyopathy - bnp down to @ 230, becoming euvolemic, dyspnea 

disproportionately worse than degree of decompensated chf, rec pulmonary consult

, continue coreg, enalapril, continue lasix 20 mg bid, increase aldactone 50mg 

bid, f/u bmp/bnp in am, may be accepted for heart transplant eval at [facility] if can get medicare/medicaid, d/w Dr Angel @  and case management

, patient and his family extensively


2.) CAD - continue iv hep, aspirin, plavix due to ongoing resting chest pain. 

Angina improving with diuresis presumably due decrease wall tension from lower 

filling pressures











Abdiel Purvis MD Mar 24, 2018 12:40

## 2018-03-24 NOTE — MB
cc:

Cem Kendall MD, Arjun D MD

****

 

 

DATE:

03/24/2018

 

REQUESTING PHYSICIAN:

Dr. Bethea.

 

REASON FOR CONSULTATION:

Evaluation of shortness of breath.

 

HISTORY OF PRESENT ILLNESS:

Mr. Murphy is a pleasant 57-year-old white male with a history of 

coronary artery disease, status post CABG done about 10 years ago.  

Then, he had 2 other heart attacks 3 years ago and  1-1/2 years ago. 

The patient had surgery done in the [place] area.  Now he is being 

followed by Dr. Purvis.

 

He came to the hospital with a 1-month history of worsening of his 

shortness of breath and off and on chest pain.  He had a cardiac 

catheterization and was found to have an ejection fraction of 20%.

 

He has shortness of breath on exertion any activity makes him short of breath.

Recently he said that he had swelling in his legs and was eating a lot

of hot dogs.

 

He has orthopnea and PND.  No wheezing.  No fever or chills.  No night

sweats.  He does have a history of smoking since age 12, which he 

recently quit.

 

PAST MEDICAL HISTORY:

Significant for coronary artery disease status post CABG, 

hypertension, cardiomyopathy with ejection fraction 20%, likely 

underlying COPD, history of back surgery and appendix surgery.

 

MEDICATIONS:

He is currently taking spironolactone 50 mg a day, Lipitor 40 mg a 

day, insulin 10 units at nighttime, Lasix 20 mg twice a day, heparin 

drip, enalapril 2.5 mg,  Plavix 75 mg, aspirin 325 mg a day, Coreg 

6.25 mg twice a day, famotidine 20 mg a day,  nitroglycerin p.r.n., 

Xanax p.r.n.

 

ALLERGIES:

HE IS ALLERGIC TO IODINE, POTASSIUM, SHELLFISH.

 

SOCIAL HISTORY:

He is .  Works as a  until recently.  History of 

smoking since age 12 until recently.  He drinks socially.

 

FAMILY HISTORY:

He has no children.

 

REVIEW OF SYSTEMS:

He gets short of breath, orthopnea and had swelling of the legs, which

has improved.  No seizures, stroke or epilepsy.  No DVT or pulmonary 

embolism.

 

PHYSICAL EXAMINATION:

GENERAL:  Well built, well-nourished male not in any acute distress.

VITAL SIGNS:  Blood pressure 100/55, heart rate 53, respirations 16, 

temperature 98.3, saturation 96% on room air.  He is comfortable on 

room air.

HEENT:  Unremarkable.

NECK:  Supple.  JVD not raised.

CHEST:  Equal bilaterally.  No rhonchi.

CARDIOVASCULAR:  S1, S2 normal.

ABDOMEN:  Benign.

EXTREMITIES:  No edema.

 

IMPRESSION:

1.  Shortness of breath, likely from his cardiomyopathy; however, need

to rule out underlying chronic obstructive pulmonary disease.  He has 

a long history of smoking.

2.  Coronary artery disease, status post coronary artery bypass 

grafting.

3.  Hypertension.

4.  History of nicotine use.

 

PLAN:

The patient is being diuresed and being evaluated for possible AICD 

and EP study.  I will check his pulmonary function study to evaluate 

his lung function.  He is stable on room air.  Further treatment will 

depend on the course in the hospital.  Thank you, Dr. Bethea, for this

consultation.

 

 

__________________________________

MD JANINE Cramer//nataly

D: 03/24/2018, 01:16 PM

T: 03/24/2018, 04:50 PM

Visit #: [id number]

Job #: [id number]

JV

## 2018-03-25 VITALS — HEART RATE: 55 BPM

## 2018-03-25 VITALS
RESPIRATION RATE: 18 BRPM | SYSTOLIC BLOOD PRESSURE: 101 MMHG | HEART RATE: 49 BPM | OXYGEN SATURATION: 97 % | DIASTOLIC BLOOD PRESSURE: 53 MMHG | TEMPERATURE: 98.2 F

## 2018-03-25 VITALS
TEMPERATURE: 97.9 F | HEART RATE: 61 BPM | DIASTOLIC BLOOD PRESSURE: 70 MMHG | SYSTOLIC BLOOD PRESSURE: 117 MMHG | RESPIRATION RATE: 19 BRPM | OXYGEN SATURATION: 97 %

## 2018-03-25 VITALS
DIASTOLIC BLOOD PRESSURE: 75 MMHG | SYSTOLIC BLOOD PRESSURE: 118 MMHG | TEMPERATURE: 97.7 F | OXYGEN SATURATION: 97 % | RESPIRATION RATE: 19 BRPM | HEART RATE: 69 BPM

## 2018-03-25 VITALS — HEART RATE: 52 BPM

## 2018-03-25 VITALS — HEART RATE: 64 BPM

## 2018-03-25 VITALS
DIASTOLIC BLOOD PRESSURE: 66 MMHG | RESPIRATION RATE: 22 BRPM | HEART RATE: 62 BPM | SYSTOLIC BLOOD PRESSURE: 106 MMHG | OXYGEN SATURATION: 98 % | TEMPERATURE: 98 F

## 2018-03-25 VITALS
RESPIRATION RATE: 20 BRPM | HEART RATE: 67 BPM | TEMPERATURE: 98.2 F | SYSTOLIC BLOOD PRESSURE: 95 MMHG | DIASTOLIC BLOOD PRESSURE: 53 MMHG | OXYGEN SATURATION: 95 %

## 2018-03-25 VITALS — HEART RATE: 62 BPM

## 2018-03-25 VITALS — HEART RATE: 63 BPM

## 2018-03-25 VITALS — HEART RATE: 54 BPM

## 2018-03-25 VITALS — HEART RATE: 59 BPM

## 2018-03-25 VITALS — HEART RATE: 58 BPM

## 2018-03-25 VITALS — HEART RATE: 66 BPM

## 2018-03-25 VITALS — HEART RATE: 60 BPM

## 2018-03-25 VITALS — OXYGEN SATURATION: 98 %

## 2018-03-25 VITALS — HEART RATE: 50 BPM

## 2018-03-25 VITALS — HEART RATE: 47 BPM

## 2018-03-25 VITALS — HEART RATE: 74 BPM

## 2018-03-25 LAB
BUN SERPL-MCNC: 22 MG/DL (ref 7–18)
CALCIUM SERPL-MCNC: 9.1 MG/DL (ref 8.5–10.1)
CHLORIDE SERPL-SCNC: 101 MEQ/L (ref 98–107)
CREAT SERPL-MCNC: 1.09 MG/DL (ref 0.6–1.3)
ERYTHROCYTE [DISTWIDTH] IN BLOOD BY AUTOMATED COUNT: 15 % (ref 11.6–17.2)
GFR SERPLBLD BASED ON 1.73 SQ M-ARVRAT: 69 ML/MIN (ref 89–?)
GLUCOSE SERPL-MCNC: 229 MG/DL (ref 74–106)
HBA1C MFR BLD: 9.7 % (ref 4.3–6)
HCO3 BLD-SCNC: 29 MEQ/L (ref 21–32)
HCT VFR BLD CALC: 44 % (ref 39–51)
HGB BLD-MCNC: 14.7 GM/DL (ref 13–17)
MAGNESIUM SERPL-MCNC: 2.1 MG/DL (ref 1.5–2.5)
MCH RBC QN AUTO: 26.3 PG (ref 27–34)
MCHC RBC AUTO-ENTMCNC: 33.4 % (ref 32–36)
MCV RBC AUTO: 78.7 FL (ref 80–100)
PLATELET # BLD: 188 TH/MM3 (ref 150–450)
PMV BLD AUTO: 8.5 FL (ref 7–11)
RBC # BLD AUTO: 5.59 MIL/MM3 (ref 4.5–5.9)
SODIUM SERPL-SCNC: 138 MEQ/L (ref 136–145)
WBC # BLD AUTO: 7 TH/MM3 (ref 4–11)

## 2018-03-25 RX ADMIN — ACETAMINOPHEN PRN MG: 325 TABLET ORAL at 15:29

## 2018-03-25 RX ADMIN — CARVEDILOL SCH MG: 6.25 TABLET, FILM COATED ORAL at 20:19

## 2018-03-25 RX ADMIN — ATORVASTATIN CALCIUM SCH MG: 40 TABLET, FILM COATED ORAL at 20:19

## 2018-03-25 RX ADMIN — ACYCLOVIR SCH UNITS: 800 TABLET ORAL at 20:19

## 2018-03-25 RX ADMIN — SPIRONOLACTONE SCH MG: 50 TABLET, FILM COATED ORAL at 08:58

## 2018-03-25 RX ADMIN — FUROSEMIDE SCH MG: 10 INJECTION, SOLUTION INTRAMUSCULAR; INTRAVENOUS at 08:56

## 2018-03-25 RX ADMIN — INSULIN ASPART SCH: 100 INJECTION, SOLUTION INTRAVENOUS; SUBCUTANEOUS at 08:59

## 2018-03-25 RX ADMIN — SPIRONOLACTONE SCH MG: 50 TABLET, FILM COATED ORAL at 17:22

## 2018-03-25 RX ADMIN — HEPARIN SODIUM PRN MLS/HR: 10000 INJECTION, SOLUTION INTRAVENOUS at 00:09

## 2018-03-25 RX ADMIN — FUROSEMIDE SCH MG: 10 INJECTION, SOLUTION INTRAMUSCULAR; INTRAVENOUS at 17:22

## 2018-03-25 RX ADMIN — ONDANSETRON PRN MG: 2 INJECTION, SOLUTION INTRAMUSCULAR; INTRAVENOUS at 12:35

## 2018-03-25 RX ADMIN — INSULIN ASPART SCH: 100 INJECTION, SOLUTION INTRAVENOUS; SUBCUTANEOUS at 17:22

## 2018-03-25 RX ADMIN — STANDARDIZED SENNA CONCENTRATE AND DOCUSATE SODIUM SCH TAB: 8.6; 5 TABLET, FILM COATED ORAL at 20:19

## 2018-03-25 RX ADMIN — FAMOTIDINE SCH MG: 20 TABLET, FILM COATED ORAL at 08:58

## 2018-03-25 RX ADMIN — Medication SCH ML: at 08:59

## 2018-03-25 RX ADMIN — ASPIRIN SCH MG: 81 TABLET ORAL at 09:04

## 2018-03-25 RX ADMIN — PHENYTOIN SODIUM SCH MLS/HR: 50 INJECTION INTRAMUSCULAR; INTRAVENOUS at 13:10

## 2018-03-25 RX ADMIN — INSULIN ASPART SCH: 100 INJECTION, SOLUTION INTRAVENOUS; SUBCUTANEOUS at 20:29

## 2018-03-25 RX ADMIN — INSULIN ASPART SCH: 100 INJECTION, SOLUTION INTRAVENOUS; SUBCUTANEOUS at 12:35

## 2018-03-25 RX ADMIN — FAMOTIDINE SCH MG: 20 TABLET, FILM COATED ORAL at 20:19

## 2018-03-25 RX ADMIN — STANDARDIZED SENNA CONCENTRATE AND DOCUSATE SODIUM SCH TAB: 8.6; 5 TABLET, FILM COATED ORAL at 08:58

## 2018-03-25 RX ADMIN — CLOPIDOGREL BISULFATE SCH MG: 75 TABLET, FILM COATED ORAL at 08:58

## 2018-03-25 RX ADMIN — LISINOPRIL SCH MG: 5 TABLET ORAL at 09:03

## 2018-03-25 RX ADMIN — Medication SCH ML: at 20:19

## 2018-03-25 RX ADMIN — CARVEDILOL SCH MG: 6.25 TABLET, FILM COATED ORAL at 08:59

## 2018-03-25 NOTE — HHI.PR
Subjective


Remarks


Patient reports he is feeling okay today.  No chest pain.  Shortness of breath 

has improved.





Objective


Vitals





Vital Signs








  Date Time  Temp Pulse Resp B/P (MAP) Pulse Ox O2 Delivery O2 Flow Rate FiO2


 


3/25/18 13:29     98 Nasal Cannula 2.00 


 


3/25/18 11:32 98.0 62 22 106/66 (79) 98   


 


3/25/18 08:39  54      


 


3/25/18 07:58     97 Room Air  


 


3/25/18 07:58 97.9 61 19 117/70 (86) 97   


 


3/25/18 07:00  64      


 


3/25/18 06:00  59      


 


3/25/18 05:00  55      


 


3/25/18 04:00 98.2 49 18 101/53 (69) 97   


 


3/25/18 04:00  49      


 


3/25/18 04:00      Nasal Cannula 2.00 


 


3/25/18 03:00  50      


 


3/25/18 02:00  47      


 


3/25/18 01:00  52      


 


3/25/18 00:00  54      


 


3/24/18 23:44      Nasal Cannula 2.00 


 


3/24/18 23:44 98.0 54 18 129/72 (91) 98   


 


3/24/18 23:00  52      


 


3/24/18 22:00  53      


 


3/24/18 21:00  56      


 


3/24/18 20:00      Nasal Cannula 2.00 


 


3/24/18 20:00  66      


 


3/24/18 20:00 98.3 66 20 132/72 (92) 97   


 


3/24/18 18:00  64      


 


3/24/18 17:00  56      


 


3/24/18 16:46 98.0 56 20 123/60 (81) 98   


 


3/24/18 16:00  56      


 


3/24/18 15:00  55      














I/O      


 


 3/24/18 3/24/18 3/24/18 3/25/18 3/25/18 3/25/18





 07:00 15:00 23:00 07:00 15:00 23:00


 


Intake Total 390 ml  720 ml 640 ml  


 


Output Total 1000 ml  600 ml 1500 ml  


 


Balance -610 ml  120 ml -860 ml  


 


      


 


Intake Oral 240 ml  720 ml 480 ml  


 


IV Total 150 ml   160 ml  


 


Output Urine Total 1000 ml  600 ml 1500 ml  


 


# Bowel Movements 0   0  








Result Diagram:  


3/25/18 0350                                                                   

             3/25/18 0350





Objective Remarks


GENERAL: This is a well-nourished, well-developed patient, in no apparent 

distress.


CARDIOVASCULAR: Normal rate and regular rhythm without murmurs, gallops, or 

rubs. 


RESPIRATORY: Good respiratory efforts.  Diminished breath sounds at the bases, 

otherwise clear to auscultation bilaterally. 


GASTROINTESTINAL: Abdomen soft, non-tender, non-distended. Normal active bowel 

sounds


MUSCULOSKELETAL: Extremities without cyanosis, or edema.


NEURO:  Alert & Oriented x4 to person, place, time, situation.  Moves all ext x4


PSYCH: Appropriate mood and affect.





A/P


Problem List:  


(1) Hx of CABG


ICD Code:  Z95.1 - Presence of aortocoronary bypass graft


(2) Decreased cardiac ejection fraction


ICD Code:  R93.1 - Abnormal findings on diagnostic imaging of heart and 

coronary circulation


(3) Hypertension


ICD Code:  I10 - Essential (primary) hypertension


(4) Obese


ICD Code:  E66.9 - Obesity, unspecified


(5) Hyperlipidemia


ICD Code:  E78.5 - Hyperlipidemia, unspecified


(6) CAD (coronary artery disease)


ICD Code:  I25.10 - Atherosclerotic heart disease of native coronary artery 

without angina pectoris


(7) Diabetes mellitus


ICD Code:  E11.9 - Type 2 diabetes mellitus without complications


(8) Unstable angina pectoris


ICD Code:  I20.0 - Unstable angina


Status:  Acute


(9) Multi-vessel coronary artery stenosis


ICD Code:  I25.10 - Atherosclerotic heart disease of native coronary artery 

without angina pectoris


Assessment and Plan


57 year-old male with





Multivessel coronary stenosis in patient with prior history of CABG


   Status post left heart catheterization 03/22/18


   Appreciate input from cardiology who recommended transfer to [facility] for 

evaluation for heart transplant versus others


   CT surgery also recommend evaluation at a tertiary care center.


   Continue with heparin drip, Plavix, aspirin, beta blocker, statin, ACE 

inhibitor, Aldactone





Diabetes mellitus 


   continue on sliding scale coverage with Accu-Cheks before meals and at 

bedtime ;hold his Glucophage and metformin 


   Hemoglobin A1c of 9.8


   Continue Levemir 10 units at bedtime





Hypertension


   Continue home medications





COPD tobacco oxygen as needed


-Appreciate pulmonology following.





GERD 


   Continue  Pepcid





Chronic back pain 


   Continue  pain meds as needed





DVT and GI prophylaxis


On heparin, stool softeners as needed.


Discharge Planning


Need evaluation at a tertiary care center.  Case management aware.  Appreciate 

input from specialist.











Hitesh Bethea MD Mar 25, 2018 14:41

## 2018-03-25 NOTE — PD.CARD.PN
Subjective


Subjective Remarks


denies chest pain, off oxygen, off heparin, in nad, appears comfortable





Objective


Medications





Current Medications








 Medications


  (Trade)  Dose


 Ordered  Sig/Ilia


 Route  Start Time


 Stop Time Status Last Admin


 


  (NovoLOG


 SUPPLEMENTAL


 SCALE)  1  ACHS SLIDING  SCALE


 SQ  3/21/18 17:00


    3/25/18 12:35


 


 


  (D50w (Vial) Inj)  50 ml  UNSCH  PRN


 IV PUSH  3/21/18 16:00


     


 


 


  (Glucagon Inj)  1 mg  UNSCH  PRN


 OTHER  3/21/18 16:00


     


 


 


  (Coreg)  6.25 mg  BID


 PO  3/21/18 21:00


    3/25/18 08:59


 


 


  (Plavix)  75 mg  DAILY


 PO  3/22/18 09:00


    3/25/18 08:58


 


 


  (Nitrostat Sl)  0.4 mg  Q5M  PRN


 SL  3/21/18 16:00


    3/22/18 20:35


 


 


  (Tylenol)  650 mg  Q6H  PRN


 PO  3/21/18 16:00


    3/25/18 15:29


 


 


  (Xanax)  0.25 mg  Q8H  PRN


 PO  3/21/18 16:00


    3/24/18 19:39


 


 


  (Zofran Inj)  4 mg  Q6H  PRN


 IV PUSH  3/21/18 16:00


    3/25/18 12:35


 


 


  (Pepcid)  20 mg  BID


 PO  3/21/18 21:00


    3/25/18 08:58


 


 


  (NS Flush)  2 ml  UNSCH  PRN


 IV FLUSH  3/21/18 16:00


     


 


 


  (NS Flush)  2 ml  BID


 IV FLUSH  3/21/18 21:00


    3/25/18 08:59


 


 


  (Tylenol)  650 mg  Q4H  PRN


 PO  3/21/18 16:00


     


 


 


  (Reglan Inj)  5 mg  Q6H  PRN


 IV PUSH  3/21/18 16:00


     


 


 


  (Restoril)  15 mg  HS  PRN


 PO  3/21/18 16:00


     


 


 


  (Tylenol)  650 mg  Q6H  PRN


 PO  3/21/18 16:00


     


 


 


  (Percocet  5-325


 Mg)  1 tab  Q6H  PRN


 PO  3/21/18 16:00


    3/22/18 12:03


 


 


  (Percocet 


 Mg)  1 tab  Q6H  PRN


 PO  3/21/18 16:00


     


 


 


  (Morphine Inj)  2 mg  Q3H  PRN


 IV PUSH  3/21/18 16:00


     


 


 


  (Morphine Inj)  4 mg  Q3H  PRN


 IV PUSH  3/21/18 16:30


     


 


 


  (Morphine Inj)  4 mg  Q3H  PRN


 IV PUSH  3/21/18 16:30


     


 


 


  (Narcan Inj)  0.4 mg  UNSCH  PRN


 IV PUSH  3/21/18 16:00


     


 


 


  (Ursula-Colace)  1 tab  BID


 PO  3/21/18 21:00


    3/25/18 08:58


 


 


  (Milk Of


 Magnesia Liq)  30 ml  Q12H  PRN


 PO  3/21/18 16:00


     


 


 


  (Senokot)  17.2 mg  Q12H  PRN


 PO  3/21/18 16:00


     


 


 


  (Dulcolax Supp)  10 mg  DAILY  PRN


 RECTAL  3/21/18 16:00


     


 


 


  (Lactulose Liq)  30 ml  DAILY  PRN


 PO  3/21/18 16:00


     


 


 


 Sodium Chloride  1,000 ml @ 


 30 mls/hr  Q24H


 IV  3/22/18 13:10


     


 


 


  (Lasix Inj)  20 mg  BID@09,18


 IV PUSH  3/23/18 18:00


    3/25/18 08:56


 


 


  (Lipitor)  40 mg  HS


 PO  3/23/18 21:00


    3/24/18 19:41


 


 


  (Levemir Inj)  10 units  HS


 SQ  3/23/18 21:00


     


 


 


  (Aldactone)  50 mg  BID@09,18


 PO  3/24/18 09:00


    3/25/18 08:58


 


 


  (Prinivil)  5 mg  DAILY


 PO  3/25/18 09:00


    3/25/18 09:03


 


 


  (Ecotrin Ec)  162 mg  DAILY


 PO  3/25/18 09:00


    3/25/18 09:04


 








Vital Signs / I&O





Vital Signs








  Date Time  Temp Pulse Resp B/P (MAP) Pulse Ox O2 Delivery O2 Flow Rate FiO2


 


3/25/18 15:25 97.7 69 19 118/75 (89) 97   


 


3/25/18 13:29     98 Nasal Cannula 2.00 


 


3/25/18 11:32 98.0 62 22 106/66 (79) 98   


 


3/25/18 08:39  54      


 


3/25/18 07:58     97 Room Air  


 


3/25/18 07:58 97.9 61 19 117/70 (86) 97   


 


3/25/18 07:00  64      


 


3/25/18 06:00  59      


 


3/25/18 05:00  55      


 


3/25/18 04:00 98.2 49 18 101/53 (69) 97   


 


3/25/18 04:00  49      


 


3/25/18 04:00      Nasal Cannula 2.00 


 


3/25/18 03:00  50      


 


3/25/18 02:00  47      


 


3/25/18 01:00  52      


 


3/25/18 00:00  54      


 


3/24/18 23:44      Nasal Cannula 2.00 


 


3/24/18 23:44 98.0 54 18 129/72 (91) 98   


 


3/24/18 23:00  52      


 


3/24/18 22:00  53      


 


3/24/18 21:00  56      


 


3/24/18 20:00      Nasal Cannula 2.00 


 


3/24/18 20:00  66      


 


3/24/18 20:00 98.3 66 20 132/72 (92) 97   


 


3/24/18 18:00  64      


 


3/24/18 17:00  56      


 


3/24/18 16:46 98.0 56 20 123/60 (81) 98   


 


3/24/18 16:00  56      














I/O      


 


 3/24/18 3/24/18 3/24/18 3/25/18 3/25/18 3/25/18





 07:00 15:00 23:00 07:00 15:00 23:00


 


Intake Total 390 ml  720 ml 640 ml  


 


Output Total 1000 ml  600 ml 1500 ml  


 


Balance -610 ml  120 ml -860 ml  


 


      


 


Intake Oral 240 ml  720 ml 480 ml  


 


IV Total 150 ml   160 ml  


 


Output Urine Total 1000 ml  600 ml 1500 ml  


 


# Bowel Movements 0   0  








Physical Exam


GENERAL: 


SKIN: Warm and dry.


HEAD: Normocephalic.


EYES: No scleral icterus. No injection or drainage. 


NECK: Supple, trachea midline. No JVD or lymphadenopathy.


CARDIOVASCULAR: Regular rate and rhythm without murmurs, gallops, or rubs. 


RESPIRATORY: Breath sounds equal bilaterally. No accessory muscle use.


GASTROINTESTINAL: Abdomen soft, non-tender, nondistended. 


MUSCULOSKELETAL: No cyanosis, or edema. 


BACK: Nontender without obvious deformity. No CVA tenderness.





Laboratory





Laboratory Tests








Test


  3/24/18


21:10 3/25/18


03:50


 


Activated Partial


Thromboplast Time 49.8 SEC 


  64.7 SEC 


 


 


White Blood Count  7.0 TH/MM3 


 


Red Blood Count  5.59 MIL/MM3 


 


Hemoglobin  14.7 GM/DL 


 


Hematocrit  44.0 % 


 


Mean Corpuscular Volume  78.7 FL 


 


Mean Corpuscular Hemoglobin  26.3 PG 


 


Mean Corpuscular Hemoglobin


Concent 


  33.4 % 


 


 


Red Cell Distribution Width  15.0 % 


 


Platelet Count  188 TH/MM3 


 


Mean Platelet Volume  8.5 FL 


 


Blood Urea Nitrogen  22 MG/DL 


 


Creatinine  1.09 MG/DL 


 


Random Glucose  229 MG/DL 


 


Calcium Level  9.1 MG/DL 


 


Magnesium Level  2.1 MG/DL 


 


Sodium Level  138 MEQ/L 


 


Potassium Level  3.8 MEQ/L 


 


Chloride Level  101 MEQ/L 


 


Carbon Dioxide Level  29.0 MEQ/L 


 


Anion Gap  8 MEQ/L 


 


Estimat Glomerular Filtration


Rate 


  69 ML/MIN 


 


 


B-Type Natriuretic Peptide  198 PG/ML 











Assessment and Plan


Problem List:  


(1) Cardiomyopathy


ICD Codes:  I42.9 - Cardiomyopathy, unspecified


(2) Mitral regurgitation


ICD Codes:  I34.0 - Nonrheumatic mitral (valve) insufficiency


(3) Pulmonary HTN


ICD Codes:  I27.20 - Pulmonary hypertension, unspecified


(4) CAD (coronary artery disease)


ICD Codes:  I25.10 - Atherosclerotic heart disease of native coronary artery 

without angina pectoris


(5) Diabetes mellitus


ICD Codes:  E11.9 - Type 2 diabetes mellitus without complications


(6) Multi-vessel coronary artery stenosis


ICD Codes:  I25.10 - Atherosclerotic heart disease of native coronary artery 

without angina pectoris


(7) Unstable angina pectoris


ICD Codes:  I20.0 - Unstable angina


Status:  Acute


(8) Decreased cardiac ejection fraction


ICD Codes:  R93.1 - Abnormal findings on diagnostic imaging of heart and 

coronary circulation


Assessment and Plan


1.) Cardiomyopathy - bnp down to <200, becoming euvolemic, dyspnea improved, f/

u pulmonary consult, continue coreg, continue lasix 20 mg bid, aldactone 50mg 

bid, change enalapril to lisinopril 5 mg qd, decrease aspirin 162 mg qd, f/u bmp

/bnp in am, may be accepted for heart transplant eval at [facility] if can 

get medicare/medicaid, d/w Dr Angel @  and case management, patient and his 

family extensively


2.) CAD - assymptomatic off iv hep, continue aspirin, plavix, Angina resolved 

with diuresis presumably due decrease wall tension from lower filling pressures











Abdiel Purvis MD Mar 25, 2018 15:52

## 2018-03-26 VITALS
RESPIRATION RATE: 18 BRPM | OXYGEN SATURATION: 97 % | HEART RATE: 57 BPM | SYSTOLIC BLOOD PRESSURE: 111 MMHG | TEMPERATURE: 97.5 F | DIASTOLIC BLOOD PRESSURE: 59 MMHG

## 2018-03-26 VITALS
HEART RATE: 69 BPM | RESPIRATION RATE: 16 BRPM | SYSTOLIC BLOOD PRESSURE: 128 MMHG | TEMPERATURE: 98.1 F | OXYGEN SATURATION: 98 % | DIASTOLIC BLOOD PRESSURE: 80 MMHG

## 2018-03-26 VITALS
OXYGEN SATURATION: 99 % | DIASTOLIC BLOOD PRESSURE: 82 MMHG | RESPIRATION RATE: 18 BRPM | HEART RATE: 61 BPM | TEMPERATURE: 98 F | SYSTOLIC BLOOD PRESSURE: 135 MMHG

## 2018-03-26 VITALS
SYSTOLIC BLOOD PRESSURE: 124 MMHG | HEART RATE: 64 BPM | DIASTOLIC BLOOD PRESSURE: 72 MMHG | RESPIRATION RATE: 16 BRPM | TEMPERATURE: 98.6 F | OXYGEN SATURATION: 96 %

## 2018-03-26 VITALS — HEART RATE: 62 BPM

## 2018-03-26 VITALS — HEART RATE: 98 BPM

## 2018-03-26 VITALS
SYSTOLIC BLOOD PRESSURE: 135 MMHG | DIASTOLIC BLOOD PRESSURE: 80 MMHG | TEMPERATURE: 98.5 F | HEART RATE: 64 BPM | RESPIRATION RATE: 18 BRPM | OXYGEN SATURATION: 97 %

## 2018-03-26 VITALS
OXYGEN SATURATION: 97 % | TEMPERATURE: 98.5 F | DIASTOLIC BLOOD PRESSURE: 57 MMHG | RESPIRATION RATE: 18 BRPM | HEART RATE: 59 BPM | SYSTOLIC BLOOD PRESSURE: 94 MMHG

## 2018-03-26 VITALS — HEART RATE: 64 BPM

## 2018-03-26 VITALS
RESPIRATION RATE: 18 BRPM | OXYGEN SATURATION: 96 % | TEMPERATURE: 98.4 F | HEART RATE: 62 BPM | DIASTOLIC BLOOD PRESSURE: 65 MMHG | SYSTOLIC BLOOD PRESSURE: 111 MMHG

## 2018-03-26 VITALS — HEART RATE: 68 BPM

## 2018-03-26 VITALS — HEART RATE: 72 BPM

## 2018-03-26 VITALS — HEART RATE: 74 BPM

## 2018-03-26 VITALS — HEART RATE: 58 BPM

## 2018-03-26 VITALS — HEART RATE: 57 BPM

## 2018-03-26 VITALS — HEART RATE: 80 BPM

## 2018-03-26 VITALS — HEART RATE: 82 BPM

## 2018-03-26 VITALS — HEART RATE: 55 BPM

## 2018-03-26 VITALS — HEART RATE: 59 BPM

## 2018-03-26 VITALS — HEART RATE: 86 BPM

## 2018-03-26 VITALS — HEART RATE: 73 BPM

## 2018-03-26 VITALS — HEART RATE: 56 BPM

## 2018-03-26 LAB
BUN SERPL-MCNC: 23 MG/DL (ref 7–18)
CALCIUM SERPL-MCNC: 9.2 MG/DL (ref 8.5–10.1)
CHLORIDE SERPL-SCNC: 101 MEQ/L (ref 98–107)
CREAT SERPL-MCNC: 1.06 MG/DL (ref 0.6–1.3)
GFR SERPLBLD BASED ON 1.73 SQ M-ARVRAT: 72 ML/MIN (ref 89–?)
GLUCOSE SERPL-MCNC: 208 MG/DL (ref 74–106)
HCO3 BLD-SCNC: 26.8 MEQ/L (ref 21–32)
MAGNESIUM SERPL-MCNC: 2.1 MG/DL (ref 1.5–2.5)
SODIUM SERPL-SCNC: 136 MEQ/L (ref 136–145)

## 2018-03-26 RX ADMIN — Medication SCH ML: at 21:34

## 2018-03-26 RX ADMIN — CARVEDILOL SCH MG: 6.25 TABLET, FILM COATED ORAL at 21:34

## 2018-03-26 RX ADMIN — ACETAMINOPHEN PRN MG: 325 TABLET ORAL at 08:12

## 2018-03-26 RX ADMIN — ACYCLOVIR SCH UNITS: 800 TABLET ORAL at 21:00

## 2018-03-26 RX ADMIN — INSULIN ASPART SCH: 100 INJECTION, SOLUTION INTRAVENOUS; SUBCUTANEOUS at 21:00

## 2018-03-26 RX ADMIN — LISINOPRIL SCH MG: 5 TABLET ORAL at 08:13

## 2018-03-26 RX ADMIN — FUROSEMIDE SCH MG: 10 INJECTION, SOLUTION INTRAMUSCULAR; INTRAVENOUS at 08:12

## 2018-03-26 RX ADMIN — CARVEDILOL SCH MG: 6.25 TABLET, FILM COATED ORAL at 08:13

## 2018-03-26 RX ADMIN — ASPIRIN SCH MG: 81 TABLET ORAL at 08:12

## 2018-03-26 RX ADMIN — SPIRONOLACTONE SCH MG: 50 TABLET, FILM COATED ORAL at 08:12

## 2018-03-26 RX ADMIN — CLOPIDOGREL BISULFATE SCH MG: 75 TABLET, FILM COATED ORAL at 08:13

## 2018-03-26 RX ADMIN — Medication SCH ML: at 08:13

## 2018-03-26 RX ADMIN — INSULIN ASPART SCH: 100 INJECTION, SOLUTION INTRAVENOUS; SUBCUTANEOUS at 12:00

## 2018-03-26 RX ADMIN — FUROSEMIDE SCH MG: 10 INJECTION, SOLUTION INTRAMUSCULAR; INTRAVENOUS at 17:29

## 2018-03-26 RX ADMIN — ATORVASTATIN CALCIUM SCH MG: 40 TABLET, FILM COATED ORAL at 21:35

## 2018-03-26 RX ADMIN — ONDANSETRON PRN MG: 2 INJECTION, SOLUTION INTRAMUSCULAR; INTRAVENOUS at 13:15

## 2018-03-26 RX ADMIN — STANDARDIZED SENNA CONCENTRATE AND DOCUSATE SODIUM SCH TAB: 8.6; 5 TABLET, FILM COATED ORAL at 08:13

## 2018-03-26 RX ADMIN — FAMOTIDINE SCH MG: 20 TABLET, FILM COATED ORAL at 08:12

## 2018-03-26 RX ADMIN — STANDARDIZED SENNA CONCENTRATE AND DOCUSATE SODIUM SCH TAB: 8.6; 5 TABLET, FILM COATED ORAL at 21:00

## 2018-03-26 RX ADMIN — INSULIN ASPART SCH: 100 INJECTION, SOLUTION INTRAVENOUS; SUBCUTANEOUS at 08:00

## 2018-03-26 RX ADMIN — INSULIN ASPART SCH: 100 INJECTION, SOLUTION INTRAVENOUS; SUBCUTANEOUS at 17:00

## 2018-03-26 RX ADMIN — FAMOTIDINE SCH MG: 20 TABLET, FILM COATED ORAL at 21:34

## 2018-03-26 NOTE — PD.CARD.PN
Subjective


Subjective Remarks


denies chest pain, on oxygen, off heparin, in nad, appears comfortable





Objective


Medications





Current Medications








 Medications


  (Trade)  Dose


 Ordered  Sig/Ilia


 Route  Start Time


 Stop Time Status Last Admin


 


  (NovoLOG


 SUPPLEMENTAL


 SCALE)  1  ACHS SLIDING  SCALE


 SQ  3/21/18 17:00


    3/26/18 12:00


 


 


  (D50w (Vial) Inj)  50 ml  UNSCH  PRN


 IV PUSH  3/21/18 16:00


     


 


 


  (Glucagon Inj)  1 mg  UNSCH  PRN


 OTHER  3/21/18 16:00


     


 


 


  (Coreg)  6.25 mg  BID


 PO  3/21/18 21:00


    3/26/18 08:13


 


 


  (Plavix)  75 mg  DAILY


 PO  3/22/18 09:00


    3/26/18 08:13


 


 


  (Nitrostat Sl)  0.4 mg  Q5M  PRN


 SL  3/21/18 16:00


    3/22/18 20:35


 


 


  (Tylenol)  650 mg  Q6H  PRN


 PO  3/21/18 16:00


    3/26/18 08:12


 


 


  (Xanax)  0.25 mg  Q8H  PRN


 PO  3/21/18 16:00


    3/25/18 20:19


 


 


  (Zofran Inj)  4 mg  Q6H  PRN


 IV PUSH  3/21/18 16:00


    3/26/18 13:15


 


 


  (Pepcid)  20 mg  BID


 PO  3/21/18 21:00


    3/26/18 08:12


 


 


  (NS Flush)  2 ml  UNSCH  PRN


 IV FLUSH  3/21/18 16:00


     


 


 


  (NS Flush)  2 ml  BID


 IV FLUSH  3/21/18 21:00


    3/26/18 08:13


 


 


  (Tylenol)  650 mg  Q4H  PRN


 PO  3/21/18 16:00


     


 


 


  (Reglan Inj)  5 mg  Q6H  PRN


 IV PUSH  3/21/18 16:00


     


 


 


  (Restoril)  15 mg  HS  PRN


 PO  3/21/18 16:00


     


 


 


  (Tylenol)  650 mg  Q6H  PRN


 PO  3/21/18 16:00


     


 


 


  (Percocet  5-325


 Mg)  1 tab  Q6H  PRN


 PO  3/21/18 16:00


    3/22/18 12:03


 


 


  (Percocet 


 Mg)  1 tab  Q6H  PRN


 PO  3/21/18 16:00


     


 


 


  (Morphine Inj)  2 mg  Q3H  PRN


 IV PUSH  3/21/18 16:00


     


 


 


  (Morphine Inj)  4 mg  Q3H  PRN


 IV PUSH  3/21/18 16:30


     


 


 


  (Morphine Inj)  4 mg  Q3H  PRN


 IV PUSH  3/21/18 16:30


     


 


 


  (Narcan Inj)  0.4 mg  UNSCH  PRN


 IV PUSH  3/21/18 16:00


     


 


 


  (Ursula-Colace)  1 tab  BID


 PO  3/21/18 21:00


    3/26/18 08:13


 


 


  (Milk Of


 Magnesia Liq)  30 ml  Q12H  PRN


 PO  3/21/18 16:00


     


 


 


  (Senokot)  17.2 mg  Q12H  PRN


 PO  3/21/18 16:00


     


 


 


  (Dulcolax Supp)  10 mg  DAILY  PRN


 RECTAL  3/21/18 16:00


     


 


 


  (Lactulose Liq)  30 ml  DAILY  PRN


 PO  3/21/18 16:00


     


 


 


 Sodium Chloride  1,000 ml @ 


 30 mls/hr  Q24H


 IV  3/22/18 13:10


     


 


 


  (Lasix Inj)  20 mg  BID@09,18


 IV PUSH  3/23/18 18:00


    3/26/18 08:12


 


 


  (Lipitor)  40 mg  HS


 PO  3/23/18 21:00


    3/25/18 20:19


 


 


  (Levemir Inj)  10 units  HS


 SQ  3/23/18 21:00


    3/25/18 20:19


 


 


  (Prinivil)  5 mg  DAILY


 PO  3/25/18 09:00


    3/26/18 08:13


 


 


  (Ecotrin Ec)  162 mg  DAILY


 PO  3/25/18 09:00


    3/26/18 08:12


 


 


  (Aldactone)  50 mg  DAILY


 PO  3/27/18 09:00


     


 








Vital Signs / I&O





Vital Signs








  Date Time  Temp Pulse Resp B/P (MAP) Pulse Ox O2 Delivery O2 Flow Rate FiO2


 


3/26/18 14:01  56      


 


3/26/18 13:00  72      


 


3/26/18 12:00  58      


 


3/26/18 11:15 98.0 61 18 135/82 (99) 99   


 


3/26/18 11:00  64      


 


3/26/18 10:00  62      


 


3/26/18 09:00  62      


 


3/26/18 08:15     97 Nasal Cannula 2.00 


 


3/26/18 08:15 97.5 57 18 111/59 (76) 97   


 


3/26/18 08:00  58      


 


3/26/18 07:00  73      


 


3/26/18 06:00  55      


 


3/26/18 05:00  58      


 


3/26/18 04:00 98.5 59 18 94/57 (69) 97   


 


3/26/18 04:00      Nasal Cannula 2.00 


 


3/26/18 04:00  59      


 


3/26/18 03:00  59      


 


3/26/18 02:00  57      


 


3/26/18 01:00  56      


 


3/26/18 00:00  62      


 


3/26/18 00:00 98.4 62 18 111/65 (80) 96   


 


3/26/18 00:00      Nasal Cannula 2.00 


 


3/25/18 23:00  59      


 


3/25/18 22:00  55      


 


3/25/18 21:00  58      


 


3/25/18 20:00  67      


 


3/25/18 20:00 98.2 67 20 95/53 (67) 95   


 


3/25/18 20:00      Nasal Cannula 2.00 


 


3/25/18 19:18      Nasal Cannula 2.00 


 


3/25/18 18:06  55      


 


3/25/18 16:57   19     


 


3/25/18 16:00  58      


 


3/25/18 15:25 97.7 69 19 118/75 (89) 97   














I/O      


 


 3/25/18 3/25/18 3/25/18 3/26/18 3/26/18 3/26/18





 07:00 15:00 23:00 07:00 15:00 23:00


 


Intake Total 640 ml  800 ml 240 ml  


 


Output Total 1500 ml  800 ml 700 ml  


 


Balance -860 ml  0 ml -460 ml  


 


      


 


Intake Oral 480 ml  800 ml 240 ml  


 


IV Total 160 ml     


 


Output Urine Total 1500 ml  800 ml 700 ml  


 


# Bowel Movements 0   0  








Physical Exam


GENERAL: 


SKIN: Warm and dry.


HEAD: Normocephalic.


EYES: No scleral icterus. No injection or drainage. 


NECK: Supple, trachea midline. No JVD or lymphadenopathy.


CARDIOVASCULAR: Regular rate and rhythm without murmurs, gallops, or rubs. 


RESPIRATORY: Breath sounds equal bilaterally. No accessory muscle use.


GASTROINTESTINAL: Abdomen soft, non-tender, nondistended. 


MUSCULOSKELETAL: No cyanosis, or edema. 


BACK: Nontender without obvious deformity. No CVA tenderness.





Laboratory





Laboratory Tests








Test


  3/26/18


07:33


 


Blood Urea Nitrogen 23 MG/DL 


 


Creatinine 1.06 MG/DL 


 


Random Glucose 208 MG/DL 


 


Calcium Level 9.2 MG/DL 


 


Magnesium Level 2.1 MG/DL 


 


Sodium Level 136 MEQ/L 


 


Potassium Level 4.2 MEQ/L 


 


Chloride Level 101 MEQ/L 


 


Carbon Dioxide Level 26.8 MEQ/L 


 


Anion Gap 8 MEQ/L 


 


Estimat Glomerular Filtration


Rate 72 ML/MIN 


 


 


B-Type Natriuretic Peptide 124 PG/ML 











Assessment and Plan


Problem List:  


(1) Cardiomyopathy


ICD Codes:  I42.9 - Cardiomyopathy, unspecified


(2) Mitral regurgitation


ICD Codes:  I34.0 - Nonrheumatic mitral (valve) insufficiency


(3) Pulmonary HTN


ICD Codes:  I27.20 - Pulmonary hypertension, unspecified


(4) CAD (coronary artery disease)


ICD Codes:  I25.10 - Atherosclerotic heart disease of native coronary artery 

without angina pectoris


(5) Diabetes mellitus


ICD Codes:  E11.9 - Type 2 diabetes mellitus without complications


(6) Multi-vessel coronary artery stenosis


ICD Codes:  I25.10 - Atherosclerotic heart disease of native coronary artery 

without angina pectoris


(7) Unstable angina pectoris


ICD Codes:  I20.0 - Unstable angina


Status:  Acute


(8) Decreased cardiac ejection fraction


ICD Codes:  R93.1 - Abnormal findings on diagnostic imaging of heart and 

coronary circulation


Assessment and Plan


1.) Cardiomyopathy - bnp down to @100, euvolemic, dyspnea improved, f/u 

pulmonary consult, continue coreg, continue lasix 20 mg bid, aldactone 50mg bid

, change enalapril to lisinopril 5 mg qd, decrease aspirin 162 mg qd, f/u bmp/

bnp in am, may be accepted for heart transplant eval at [facility] if can 

get medicare/medicaid, d/w Dr Angel @  and case management, patient and his 

family extensively


2.) CAD - assymptomatic off iv hep, continue aspirin, plavix, Angina resolved 

with diuresis presumably due decrease wall tension from lower filling pressures











Abdiel Purvis MD Mar 26, 2018 15:14

## 2018-03-26 NOTE — HHI.PR
Subjective


Remarks


Patient reports he is feeling okay.  No chest pain today.





Objective


Vitals





Vital Signs








  Date Time  Temp Pulse Resp B/P (MAP) Pulse Ox O2 Delivery O2 Flow Rate FiO2


 


3/26/18 08:15     97 Nasal Cannula 2.00 


 


3/26/18 08:15 97.5 57 18 111/59 (76) 97   


 


3/26/18 06:00  55      


 


3/26/18 05:00  58      


 


3/26/18 04:00 98.5 59 18 94/57 (69) 97   


 


3/26/18 04:00      Nasal Cannula 2.00 


 


3/26/18 04:00  59      


 


3/26/18 03:00  59      


 


3/26/18 02:00  57      


 


3/26/18 01:00  56      


 


3/26/18 00:00  62      


 


3/26/18 00:00 98.4 62 18 111/65 (80) 96   


 


3/26/18 00:00      Nasal Cannula 2.00 


 


3/25/18 23:00  59      


 


3/25/18 22:00  55      


 


3/25/18 21:00  58      


 


3/25/18 20:00  67      


 


3/25/18 20:00 98.2 67 20 95/53 (67) 95   


 


3/25/18 20:00      Nasal Cannula 2.00 


 


3/25/18 19:18      Nasal Cannula 2.00 


 


3/25/18 18:06  55      


 


3/25/18 16:57   19     


 


3/25/18 16:00  58      


 


3/25/18 15:25 97.7 69 19 118/75 (89) 97   


 


3/25/18 15:00  62      


 


3/25/18 14:00  62      


 


3/25/18 13:29     98 Nasal Cannula 2.00 


 


3/25/18 13:00  66      














I/O      


 


 3/25/18 3/25/18 3/25/18 3/26/18 3/26/18 3/26/18





 07:00 15:00 23:00 07:00 15:00 23:00


 


Intake Total 640 ml  800 ml 240 ml  


 


Output Total 1500 ml  800 ml 700 ml  


 


Balance -860 ml  0 ml -460 ml  


 


      


 


Intake Oral 480 ml  800 ml 240 ml  


 


IV Total 160 ml     


 


Output Urine Total 1500 ml  800 ml 700 ml  


 


# Bowel Movements 0   0  








Result Diagram:  


3/25/18 0350                                                                   

             3/26/18 0733





Objective Remarks


GENERAL: This is a well-nourished, well-developed patient, in no apparent 

distress.


CARDIOVASCULAR: Normal rate and regular rhythm without murmurs, gallops, or 

rubs. 


RESPIRATORY: Good respiratory efforts.  Diminished breath sounds at the bases, 

otherwise clear to auscultation bilaterally. 


GASTROINTESTINAL: Abdomen soft, non-tender, non-distended. Normal active bowel 

sounds


MUSCULOSKELETAL: Extremities without cyanosis, or edema.


NEURO:  Alert & Oriented x4 to person, place, time, situation.  Moves all ext x4


PSYCH: Appropriate mood and affect.





A/P


Problem List:  


(1) Hx of CABG


ICD Code:  Z95.1 - Presence of aortocoronary bypass graft


(2) Decreased cardiac ejection fraction


ICD Code:  R93.1 - Abnormal findings on diagnostic imaging of heart and 

coronary circulation


(3) Hypertension


ICD Code:  I10 - Essential (primary) hypertension


(4) Obese


ICD Code:  E66.9 - Obesity, unspecified


(5) Hyperlipidemia


ICD Code:  E78.5 - Hyperlipidemia, unspecified


(6) CAD (coronary artery disease)


ICD Code:  I25.10 - Atherosclerotic heart disease of native coronary artery 

without angina pectoris


(7) Diabetes mellitus


ICD Code:  E11.9 - Type 2 diabetes mellitus without complications


(8) Unstable angina pectoris


ICD Code:  I20.0 - Unstable angina


Status:  Acute


(9) Multi-vessel coronary artery stenosis


ICD Code:  I25.10 - Atherosclerotic heart disease of native coronary artery 

without angina pectoris


Assessment and Plan


57 year-old male with





Multivessel coronary stenosis in patient with prior history of CABG


   Status post left heart catheterization 03/22/18


   Appreciate input from cardiology. Optimize medical management. EP to review 

records from [place] when available to decide on ICD Vs Defib vest.  


   Continue with heparin drip, Plavix, aspirin, beta blocker, statin, ACE 

inhibitor, Aldactone





Diabetes mellitus 


   continue on sliding scale coverage with Accu-Cheks before meals and at 

bedtime ;hold his Glucophage and metformin 


   Hemoglobin A1c of 9.8


   Continue Levemir 10 units at bedtime





Hypertension


   Continue home medications





COPD tobacco oxygen as needed


-Appreciate pulmonology following.





GERD 


   Continue  Pepcid





Chronic back pain 


   Continue  pain meds as needed





DVT and GI prophylaxis


On heparin, stool softeners as needed.











Hitesh Bethea MD Mar 26, 2018 12:19

## 2018-03-26 NOTE — HHI.PR
Subjective


Remarks


57 YOWM Wth CAD,CABG,CMP


Breathing better


On RA


No SOB





Objective


Vital Signs





Vital Signs








  Date Time  Temp Pulse Resp B/P (MAP) Pulse Ox O2 Delivery O2 Flow Rate FiO2


 


3/26/18 18:00  86      


 


3/26/18 17:00  98      


 


3/26/18 16:00  98      


 


3/26/18 15:15 98.5 64 18 135/80 (98) 97   


 


3/26/18 15:00  80      


 


3/26/18 14:01  56      


 


3/26/18 13:00  72      


 


3/26/18 12:00  58      


 


3/26/18 11:15 98.0 61 18 135/82 (99) 99   


 


3/26/18 11:00  64      


 


3/26/18 10:00  62      


 


3/26/18 09:00  62      


 


3/26/18 08:15     97 Nasal Cannula 2.00 


 


3/26/18 08:15 97.5 57 18 111/59 (76) 97   


 


3/26/18 08:00  58      


 


3/26/18 07:00  73      


 


3/26/18 06:00  55      


 


3/26/18 05:00  58      


 


3/26/18 04:00 98.5 59 18 94/57 (69) 97   


 


3/26/18 04:00      Nasal Cannula 2.00 


 


3/26/18 04:00  59      


 


3/26/18 03:00  59      


 


3/26/18 02:00  57      


 


3/26/18 01:00  56      


 


3/26/18 00:00  62      


 


3/26/18 00:00 98.4 62 18 111/65 (80) 96   


 


3/26/18 00:00      Nasal Cannula 2.00 


 


3/25/18 23:00  59      


 


3/25/18 22:00  55      


 


3/25/18 21:00  58      














I/O      


 


 3/25/18 3/25/18 3/25/18 3/26/18 3/26/18 3/26/18





 07:00 15:00 23:00 07:00 15:00 23:00


 


Intake Total 640 ml  800 ml 240 ml  480 ml


 


Output Total 1500 ml  800 ml 700 ml  1225 ml


 


Balance -860 ml  0 ml -460 ml  -745 ml


 


      


 


Intake Oral 480 ml  800 ml 240 ml  480 ml


 


IV Total 160 ml     


 


Output Urine Total 1500 ml  800 ml 700 ml  1225 ml


 


# Voids      4


 


# Bowel Movements 0   0  0








Result Diagram:  


3/25/18 0350                                                                   

             3/26/18 0733





Objective Remarks


GENERAL: WBWN WM, NAD


SKIN: Warm and dry.


HEAD: Normocephalic.


EYES: No scleral icterus. No injection or drainage. 


NECK: Supple, trachea midline. No JVD or lymphadenopathy.


CARDIOVASCULAR: Regular rate and rhythm without murmurs, gallops, or rubs. 


RESPIRATORY: Breath sounds equal bilaterally. No accessory muscle use.


GASTROINTESTINAL: Abdomen soft, non-tender, nondistended. 


MUSCULOSKELETAL: No cyanosis, or edema. 


BACK: Nontender without obvious deformity. No CVA tenderness.








A/P


Assessment and Plan


IMPRESSION:





1.  Shortness of breath, likely from his cardiomyopathy; however, need


to rule out underlying chronic obstructive pulmonary disease.  He has 


a long history of smoking.


2.  Coronary artery disease, status post coronary artery bypass 


grafting.


3.  Hypertension.


4.  History of nicotine use





PLAN:





Check PFT


 planning to send to [facility] for heart transplant eval


Stable on Cem Bustillos MD Mar 26, 2018 21:01

## 2018-03-26 NOTE — HHI.PR
Subjective


Remarks


Headach





Objective





Vital Signs








  Date Time  Temp Pulse Resp B/P (MAP) Pulse Ox O2 Delivery O2 Flow Rate FiO2


 


3/26/18 06:00  55      


 


3/26/18 05:00  58      


 


3/26/18 04:00 98.5 59 18 94/57 (69) 97   


 


3/26/18 04:00      Nasal Cannula 2.00 


 


3/26/18 04:00  59      


 


3/26/18 03:00  59      


 


3/26/18 02:00  57      


 


3/26/18 01:00  56      


 


3/26/18 00:00  62      


 


3/26/18 00:00 98.4 62 18 111/65 (80) 96   


 


3/26/18 00:00      Nasal Cannula 2.00 


 


3/25/18 23:00  59      


 


3/25/18 22:00  55      


 


3/25/18 21:00  58      


 


3/25/18 20:00  67      


 


3/25/18 20:00 98.2 67 20 95/53 (67) 95   


 


3/25/18 20:00      Nasal Cannula 2.00 


 


3/25/18 19:18      Nasal Cannula 2.00 


 


3/25/18 18:06  55      


 


3/25/18 16:57   19     


 


3/25/18 16:00  58      


 


3/25/18 15:25 97.7 69 19 118/75 (89) 97   


 


3/25/18 15:00  62      


 


3/25/18 14:00  62      


 


3/25/18 13:29     98 Nasal Cannula 2.00 


 


3/25/18 13:00  66      


 


3/25/18 12:00  60      


 


3/25/18 11:32 98.0 62 22 106/66 (79) 98   


 


3/25/18 11:00  63      


 


3/25/18 10:00  74      














I/O      


 


 3/25/18 3/25/18 3/25/18 3/26/18 3/26/18 3/26/18





 07:00 15:00 23:00 07:00 15:00 23:00


 


Intake Total 640 ml  800 ml 240 ml  


 


Output Total 1500 ml  800 ml 700 ml  


 


Balance -860 ml  0 ml -460 ml  


 


      


 


Intake Oral 480 ml  800 ml 240 ml  


 


IV Total 160 ml     


 


Output Urine Total 1500 ml  800 ml 700 ml  


 


# Bowel Movements 0   0  








Result Diagram:  


3/25/18 0350                                                                   

             3/26/18 0733





Imaging


Alert, fully oriented





Lungs: ventilated





Heart: S1, S2 regular, no gallop





Abdomen: soft, no mass





Ext: No edema








Last Impressions








Chest X-Ray 3/21/18 1225 Signed





Impressions: 





 Service Date/Time:  Wednesday, March 21, 2018 13:08 - CONCLUSION:  No acute 





 disease.  No significant change has occurred.       Manohar Nazario MD 








Current Medications








 Medications


  (Trade)  Dose


 Ordered  Sig/Ilia


 Route  Start Time


 Stop Time Status Last Admin


 


  (NovoLOG


 SUPPLEMENTAL


 SCALE)  1  ACHS SLIDING  SCALE


 SQ  3/21/18 17:00


    3/26/18 08:00


 


 


  (D50w (Vial) Inj)  50 ml  UNSCH  PRN


 IV PUSH  3/21/18 16:00


     


 


 


  (Glucagon Inj)  1 mg  UNSCH  PRN


 OTHER  3/21/18 16:00


     


 


 


  (Coreg)  6.25 mg  BID


 PO  3/21/18 21:00


    3/26/18 08:13


 


 


  (Plavix)  75 mg  DAILY


 PO  3/22/18 09:00


    3/26/18 08:13


 


 


  (Nitrostat Sl)  0.4 mg  Q5M  PRN


 SL  3/21/18 16:00


    3/22/18 20:35


 


 


  (Tylenol)  650 mg  Q6H  PRN


 PO  3/21/18 16:00


    3/26/18 08:12


 


 


  (Xanax)  0.25 mg  Q8H  PRN


 PO  3/21/18 16:00


    3/25/18 20:19


 


 


  (Zofran Inj)  4 mg  Q6H  PRN


 IV PUSH  3/21/18 16:00


    3/25/18 12:35


 


 


  (Pepcid)  20 mg  BID


 PO  3/21/18 21:00


    3/26/18 08:12


 


 


  (NS Flush)  2 ml  UNSCH  PRN


 IV FLUSH  3/21/18 16:00


     


 


 


  (NS Flush)  2 ml  BID


 IV FLUSH  3/21/18 21:00


    3/26/18 08:13


 


 


  (Tylenol)  650 mg  Q4H  PRN


 PO  3/21/18 16:00


     


 


 


  (Reglan Inj)  5 mg  Q6H  PRN


 IV PUSH  3/21/18 16:00


     


 


 


  (Restoril)  15 mg  HS  PRN


 PO  3/21/18 16:00


     


 


 


  (Tylenol)  650 mg  Q6H  PRN


 PO  3/21/18 16:00


     


 


 


  (Percocet  5-325


 Mg)  1 tab  Q6H  PRN


 PO  3/21/18 16:00


    3/22/18 12:03


 


 


  (Percocet 


 Mg)  1 tab  Q6H  PRN


 PO  3/21/18 16:00


     


 


 


  (Morphine Inj)  2 mg  Q3H  PRN


 IV PUSH  3/21/18 16:00


     


 


 


  (Morphine Inj)  4 mg  Q3H  PRN


 IV PUSH  3/21/18 16:30


     


 


 


  (Morphine Inj)  4 mg  Q3H  PRN


 IV PUSH  3/21/18 16:30


     


 


 


  (Narcan Inj)  0.4 mg  UNSCH  PRN


 IV PUSH  3/21/18 16:00


     


 


 


  (Ursula-Colace)  1 tab  BID


 PO  3/21/18 21:00


    3/26/18 08:13


 


 


  (Milk Of


 Magnesia Liq)  30 ml  Q12H  PRN


 PO  3/21/18 16:00


     


 


 


  (Senokot)  17.2 mg  Q12H  PRN


 PO  3/21/18 16:00


     


 


 


  (Dulcolax Supp)  10 mg  DAILY  PRN


 RECTAL  3/21/18 16:00


     


 


 


  (Lactulose Liq)  30 ml  DAILY  PRN


 PO  3/21/18 16:00


     


 


 


 Sodium Chloride  1,000 ml @ 


 30 mls/hr  Q24H


 IV  3/22/18 13:10


     


 


 


  (Lasix Inj)  20 mg  BID@09,18


 IV PUSH  3/23/18 18:00


    3/26/18 08:12


 


 


  (Lipitor)  40 mg  HS


 PO  3/23/18 21:00


    3/25/18 20:19


 


 


  (Levemir Inj)  10 units  HS


 SQ  3/23/18 21:00


    3/25/18 20:19


 


 


  (Aldactone)  50 mg  BID@09,18


 PO  3/24/18 09:00


    3/26/18 08:12


 


 


  (Prinivil)  5 mg  DAILY


 PO  3/25/18 09:00


    3/26/18 08:13


 


 


  (Ecotrin Ec)  162 mg  DAILY


 PO  3/25/18 09:00


    3/26/18 08:12


 











Assessment and Plan


Problem List:  


(1) Cardiomyopathy


ICD Codes:  I42.9 - Cardiomyopathy, unspecified


Plan:  Ischemic cardiomyopathy


CHF II


On optimal medical management


I will decrease aldactone. If BP ok then entresto ill be considered


I am waiting for the [facility] before making a decision about ICD vs 

Defib vest.


No need for transplant evaluation yet. 





(2) CAD (coronary artery disease)


ICD Codes:  I25.10 - Atherosclerotic heart disease of native coronary artery 

without angina pectoris


Plan:  Hx of CABG


No chest pain





(3) Hypertension


ICD Codes:  I10 - Essential (primary) hypertension


Plan:  SBP 94














Juan Lindsay MD Mar 26, 2018 09:45

## 2018-03-27 VITALS — HEART RATE: 58 BPM

## 2018-03-27 VITALS — HEART RATE: 68 BPM

## 2018-03-27 VITALS
RESPIRATION RATE: 18 BRPM | TEMPERATURE: 97.9 F | DIASTOLIC BLOOD PRESSURE: 83 MMHG | HEART RATE: 66 BPM | SYSTOLIC BLOOD PRESSURE: 130 MMHG | OXYGEN SATURATION: 100 %

## 2018-03-27 VITALS
DIASTOLIC BLOOD PRESSURE: 80 MMHG | TEMPERATURE: 98 F | OXYGEN SATURATION: 99 % | RESPIRATION RATE: 16 BRPM | SYSTOLIC BLOOD PRESSURE: 117 MMHG | HEART RATE: 72 BPM

## 2018-03-27 VITALS
DIASTOLIC BLOOD PRESSURE: 80 MMHG | OXYGEN SATURATION: 98 % | SYSTOLIC BLOOD PRESSURE: 117 MMHG | HEART RATE: 68 BPM | RESPIRATION RATE: 18 BRPM | TEMPERATURE: 97 F

## 2018-03-27 VITALS
OXYGEN SATURATION: 100 % | DIASTOLIC BLOOD PRESSURE: 77 MMHG | SYSTOLIC BLOOD PRESSURE: 127 MMHG | HEART RATE: 62 BPM | TEMPERATURE: 97.6 F | RESPIRATION RATE: 18 BRPM

## 2018-03-27 VITALS — HEART RATE: 60 BPM

## 2018-03-27 VITALS — HEART RATE: 63 BPM

## 2018-03-27 VITALS — HEART RATE: 77 BPM

## 2018-03-27 VITALS — OXYGEN SATURATION: 99 %

## 2018-03-27 VITALS — HEART RATE: 76 BPM

## 2018-03-27 VITALS — HEART RATE: 84 BPM

## 2018-03-27 VITALS — HEART RATE: 70 BPM

## 2018-03-27 VITALS — HEART RATE: 69 BPM

## 2018-03-27 VITALS
RESPIRATION RATE: 16 BRPM | HEART RATE: 64 BPM | DIASTOLIC BLOOD PRESSURE: 73 MMHG | TEMPERATURE: 98.4 F | SYSTOLIC BLOOD PRESSURE: 123 MMHG | OXYGEN SATURATION: 99 %

## 2018-03-27 VITALS — HEART RATE: 71 BPM

## 2018-03-27 VITALS — HEART RATE: 65 BPM

## 2018-03-27 VITALS — HEART RATE: 72 BPM

## 2018-03-27 VITALS — HEART RATE: 79 BPM

## 2018-03-27 VITALS — HEART RATE: 74 BPM

## 2018-03-27 VITALS — HEART RATE: 87 BPM

## 2018-03-27 LAB
BUN SERPL-MCNC: 24 MG/DL (ref 7–18)
CALCIUM SERPL-MCNC: 9.5 MG/DL (ref 8.5–10.1)
CHLORIDE SERPL-SCNC: 101 MEQ/L (ref 98–107)
CREAT SERPL-MCNC: 1.09 MG/DL (ref 0.6–1.3)
ERYTHROCYTE [DISTWIDTH] IN BLOOD BY AUTOMATED COUNT: 15.2 % (ref 11.6–17.2)
GFR SERPLBLD BASED ON 1.73 SQ M-ARVRAT: 69 ML/MIN (ref 89–?)
GLUCOSE SERPL-MCNC: 245 MG/DL (ref 74–106)
HCO3 BLD-SCNC: 27 MEQ/L (ref 21–32)
HCT VFR BLD CALC: 48.3 % (ref 39–51)
HGB BLD-MCNC: 15.9 GM/DL (ref 13–17)
MCH RBC QN AUTO: 25.8 PG (ref 27–34)
MCHC RBC AUTO-ENTMCNC: 32.9 % (ref 32–36)
MCV RBC AUTO: 78.5 FL (ref 80–100)
PLATELET # BLD: 205 TH/MM3 (ref 150–450)
PMV BLD AUTO: 8.8 FL (ref 7–11)
RBC # BLD AUTO: 6.16 MIL/MM3 (ref 4.5–5.9)
SODIUM SERPL-SCNC: 135 MEQ/L (ref 136–145)
WBC # BLD AUTO: 7.3 TH/MM3 (ref 4–11)

## 2018-03-27 RX ADMIN — ACETAMINOPHEN PRN MG: 325 TABLET ORAL at 09:17

## 2018-03-27 RX ADMIN — PHENYTOIN SODIUM SCH MLS/HR: 50 INJECTION INTRAMUSCULAR; INTRAVENOUS at 12:39

## 2018-03-27 RX ADMIN — INSULIN ASPART SCH: 100 INJECTION, SOLUTION INTRAVENOUS; SUBCUTANEOUS at 16:05

## 2018-03-27 RX ADMIN — Medication SCH ML: at 09:11

## 2018-03-27 RX ADMIN — CLOPIDOGREL BISULFATE SCH MG: 75 TABLET, FILM COATED ORAL at 09:10

## 2018-03-27 RX ADMIN — SPIRONOLACTONE SCH MG: 50 TABLET, FILM COATED ORAL at 09:10

## 2018-03-27 RX ADMIN — CARVEDILOL SCH MG: 6.25 TABLET, FILM COATED ORAL at 09:10

## 2018-03-27 RX ADMIN — FUROSEMIDE SCH MG: 10 INJECTION, SOLUTION INTRAMUSCULAR; INTRAVENOUS at 17:01

## 2018-03-27 RX ADMIN — INSULIN ASPART SCH: 100 INJECTION, SOLUTION INTRAVENOUS; SUBCUTANEOUS at 12:38

## 2018-03-27 RX ADMIN — ASPIRIN SCH MG: 81 TABLET ORAL at 09:11

## 2018-03-27 RX ADMIN — CARVEDILOL SCH MG: 6.25 TABLET, FILM COATED ORAL at 20:49

## 2018-03-27 RX ADMIN — STANDARDIZED SENNA CONCENTRATE AND DOCUSATE SODIUM SCH TAB: 8.6; 5 TABLET, FILM COATED ORAL at 09:10

## 2018-03-27 RX ADMIN — Medication SCH ML: at 20:49

## 2018-03-27 RX ADMIN — PHENYTOIN SODIUM SCH MLS/HR: 50 INJECTION INTRAMUSCULAR; INTRAVENOUS at 09:17

## 2018-03-27 RX ADMIN — FUROSEMIDE SCH MG: 10 INJECTION, SOLUTION INTRAMUSCULAR; INTRAVENOUS at 09:11

## 2018-03-27 RX ADMIN — ACYCLOVIR SCH UNITS: 800 TABLET ORAL at 21:00

## 2018-03-27 RX ADMIN — INSULIN ASPART SCH: 100 INJECTION, SOLUTION INTRAVENOUS; SUBCUTANEOUS at 09:15

## 2018-03-27 RX ADMIN — FAMOTIDINE SCH MG: 20 TABLET, FILM COATED ORAL at 09:10

## 2018-03-27 RX ADMIN — STANDARDIZED SENNA CONCENTRATE AND DOCUSATE SODIUM SCH TAB: 8.6; 5 TABLET, FILM COATED ORAL at 20:49

## 2018-03-27 RX ADMIN — ATORVASTATIN CALCIUM SCH MG: 40 TABLET, FILM COATED ORAL at 20:50

## 2018-03-27 RX ADMIN — ACETAMINOPHEN PRN MG: 325 TABLET ORAL at 20:50

## 2018-03-27 RX ADMIN — FAMOTIDINE SCH MG: 20 TABLET, FILM COATED ORAL at 20:49

## 2018-03-27 RX ADMIN — LISINOPRIL SCH MG: 5 TABLET ORAL at 09:12

## 2018-03-27 RX ADMIN — INSULIN ASPART SCH: 100 INJECTION, SOLUTION INTRAVENOUS; SUBCUTANEOUS at 21:00

## 2018-03-27 NOTE — HHI.PR
Subjective


Remarks


57 YOWM Wth CAD,CABG,CMP


Breathing better


On RA


No SOB





Objective


Vital Signs





Vital Signs








  Date Time  Temp Pulse Resp B/P (MAP) Pulse Ox O2 Delivery O2 Flow Rate FiO2


 


3/27/18 18:02  69      


 


3/27/18 17:16     99   21


 


3/27/18 17:06  70      


 


3/27/18 16:26  76      


 


3/27/18 15:57   18     


 


3/27/18 15:21     100 Room Air  


 


3/27/18 15:21 97.9 66 18 130/83 (99) 100   


 


3/27/18 15:21  67      


 


3/27/18 14:15  77      


 


3/27/18 13:02  79      


 


3/27/18 12:22  76      


 


3/27/18 11:24 97.6 62 18 127/77 (94) 100   


 


3/27/18 11:24  68      


 


3/27/18 11:24     100 Room Air  


 


3/27/18 10:03  72      


 


3/27/18 09:55   16     


 


3/27/18 09:54  87      


 


3/27/18 09:53     99   21


 


3/27/18 08:15 97.0 68 18 117/80 (92) 98   


 


3/27/18 08:15  68      


 


3/27/18 08:15     98 Room Air  


 


3/27/18 06:00  65      


 


3/27/18 05:00  74      


 


3/27/18 04:00  68      


 


3/27/18 03:30 98.0 72 16 117/80 (92) 99   


 


3/27/18 03:00  68      


 


3/27/18 02:00  70      


 


3/27/18 01:00  71      


 


3/27/18 00:00  63      


 


3/26/18 23:30 98.1 69 16 128/80 (96) 98   


 


3/26/18 23:00  74      


 


3/26/18 22:00  64      


 


3/26/18 21:00  68      


 


3/26/18 20:00 98.6 64 16 124/72 (89) 96   


 


3/26/18 20:00  66      


 


3/26/18 20:00     96 Room Air  














I/O      


 


 3/26/18 3/26/18 3/26/18 3/27/18 3/27/18 3/27/18





 06:59 14:59 22:59 06:59 14:59 22:59


 


Intake Total 240 ml  480 ml 240 ml  480 ml


 


Output Total 700 ml  1225 ml 1075 ml  1000 ml


 


Balance -460 ml  -745 ml -835 ml  -520 ml


 


      


 


Intake Oral 240 ml  480 ml 240 ml  480 ml


 


Output Urine Total 700 ml  1225 ml 1075 ml  1000 ml


 


# Voids   4   


 


# Bowel Movements 0  0 0  0








Result Diagram:  


3/27/18 0625                                                                   

             3/27/18 0623





Objective Remarks


GENERAL: WBWN WM, NAD


SKIN: Warm and dry.


HEAD: Normocephalic.


EYES: No scleral icterus. No injection or drainage. 


NECK: Supple, trachea midline. No JVD or lymphadenopathy.


CARDIOVASCULAR: Regular rate and rhythm without murmurs, gallops, or rubs. 


RESPIRATORY: Breath sounds equal bilaterally. No accessory muscle use.


GASTROINTESTINAL: Abdomen soft, non-tender, nondistended. 


MUSCULOSKELETAL: No cyanosis, or edema. 


BACK: Nontender without obvious deformity. No CVA tenderness.








A/P


Assessment and Plan


IMPRESSION:





1.  Shortness of breath, likely from his cardiomyopathy; however, need


to rule out underlying chronic obstructive pulmonary disease.  He has 


a long history of smoking.


2.  Coronary artery disease, status post coronary artery bypass 


grafting.


3.  Hypertension.


4.  History of nicotine use





PLAN:


PFT not interpretable


Will rpt PFT


 planning to send to [facility] for heart transplant eval


Stable on RA











Cem Kendall MD Mar 27, 2018 19:50

## 2018-03-27 NOTE — HHI.PR
Subjective


Remarks


Late entry. Patient seen around 10 AM.


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 He denies chest pain 

currently. Reports fatigue.





Objective


Vitals





Vital Signs








  Date Time  Temp Pulse Resp B/P (MAP) Pulse Ox O2 Delivery O2 Flow Rate FiO2


 


3/27/18 18:02  69      


 


3/27/18 17:16     99   21


 


3/27/18 17:06  70      


 


3/27/18 16:26  76      


 


3/27/18 15:57   18     


 


3/27/18 15:21     100 Room Air  


 


3/27/18 15:21 97.9 66 18 130/83 (99) 100   


 


3/27/18 15:21  67      


 


3/27/18 14:15  77      


 


3/27/18 13:02  79      


 


3/27/18 12:22  76      


 


3/27/18 11:24 97.6 62 18 127/77 (94) 100   


 


3/27/18 11:24  68      


 


3/27/18 11:24     100 Room Air  


 


3/27/18 10:03  72      


 


3/27/18 09:55   16     


 


3/27/18 09:54  87      


 


3/27/18 09:53     99   21


 


3/27/18 08:15 97.0 68 18 117/80 (92) 98   


 


3/27/18 08:15  68      


 


3/27/18 08:15     98 Room Air  


 


3/27/18 06:00  65      


 


3/27/18 05:00  74      


 


3/27/18 04:00  68      


 


3/27/18 03:30 98.0 72 16 117/80 (92) 99   


 


3/27/18 03:00  68      


 


3/27/18 02:00  70      


 


3/27/18 01:00  71      


 


3/27/18 00:00  63      


 


3/26/18 23:30 98.1 69 16 128/80 (96) 98   


 


3/26/18 23:00  74      


 


3/26/18 22:00  64      














I/O      


 


 3/26/18 3/26/18 3/26/18 3/27/18 3/27/18 3/27/18





 07:00 15:00 23:00 07:00 15:00 23:00


 


Intake Total 240 ml  480 ml 240 ml  480 ml


 


Output Total 700 ml  1225 ml 1075 ml  1000 ml


 


Balance -460 ml  -745 ml -835 ml  -520 ml


 


      


 


Intake Oral 240 ml  480 ml 240 ml  480 ml


 


Output Urine Total 700 ml  1225 ml 1075 ml  1000 ml


 


# Voids   4   


 


# Bowel Movements 0  0 0  0








Result Diagram:  


3/27/18 0625                                                                   

             3/27/18 0623





Objective Remarks


GENERAL: This is a well-nourished, well-developed patient, in no apparent 

distress.


CARDIOVASCULAR: Normal rate and regular rhythm without murmurs, gallops, or 

rubs. 


RESPIRATORY: Good respiratory efforts.  Diminished breath sounds at the bases, 

otherwise clear to auscultation bilaterally. 


GASTROINTESTINAL: Abdomen soft, non-tender, non-distended. Normal active bowel 

sounds


MUSCULOSKELETAL: Extremities without cyanosis, or edema.


NEURO:  Alert & Oriented x4 to person, place, time, situation.  Moves all ext x4


PSYCH: Appropriate mood and affect.





A/P


Problem List:  


(1) Hx of CABG


ICD Code:  Z95.1 - Presence of aortocoronary bypass graft


(2) Decreased cardiac ejection fraction


ICD Code:  R93.1 - Abnormal findings on diagnostic imaging of heart and 

coronary circulation


(3) Hypertension


ICD Code:  I10 - Essential (primary) hypertension


(4) Obese


ICD Code:  E66.9 - Obesity, unspecified


(5) Hyperlipidemia


ICD Code:  E78.5 - Hyperlipidemia, unspecified


(6) CAD (coronary artery disease)


ICD Code:  I25.10 - Atherosclerotic heart disease of native coronary artery 

without angina pectoris


(7) Diabetes mellitus


ICD Code:  E11.9 - Type 2 diabetes mellitus without complications


(8) Unstable angina pectoris


ICD Code:  I20.0 - Unstable angina


Status:  Acute


(9) Multi-vessel coronary artery stenosis


ICD Code:  I25.10 - Atherosclerotic heart disease of native coronary artery 

without angina pectoris


Assessment and Plan


57 year-old male with





Multivessel coronary stenosis in patient with prior history of CABG


   Status post left heart catheterization 03/22/18


   Appreciate input from cardiology. Optimize medical management. EP to review 

records from [place] when available to decide on ICD Vs Defib vest.  


   Continue with heparin drip, Plavix, aspirin, beta blocker, statin, ACE 

inhibitor, Aldactone


            Transition him to oral Lasix today





Diabetes mellitus 


   continue on sliding scale coverage with Accu-Cheks before meals and at 

bedtime ;hold his Glucophage and metformin 


   Hemoglobin A1c of 9.8


   Continue Levemir 10 units at bedtime





Hypertension


   Continue home medications





COPD tobacco oxygen as needed


-Appreciate pulmonology following.





GERD 


   Continue  Pepcid





Chronic back pain 


   Continue  pain meds as needed





DVT and GI prophylaxis


On heparin, stool softeners as needed.


Discharge Planning


May need ICD per Cardiology.











Hitesh Bethea MD Mar 27, 2018 21:56

## 2018-03-27 NOTE — PD.CARD.PN
Subjective


Subjective Remarks


denies chest pain, on oxygen, off heparin, in nad, appears comfortable





Objective


Medications





Current Medications








 Medications


  (Trade)  Dose


 Ordered  Sig/Ilia


 Route  Start Time


 Stop Time Status Last Admin


 


  (NovoLOG


 SUPPLEMENTAL


 SCALE)  1  ACHS SLIDING  SCALE


 SQ  3/21/18 17:00


    3/27/18 21:00


 


 


  (D50w (Vial) Inj)  50 ml  UNSCH  PRN


 IV PUSH  3/21/18 16:00


     


 


 


  (Glucagon Inj)  1 mg  UNSCH  PRN


 OTHER  3/21/18 16:00


     


 


 


  (Coreg)  6.25 mg  BID


 PO  3/21/18 21:00


    3/27/18 20:49


 


 


  (Plavix)  75 mg  DAILY


 PO  3/22/18 09:00


    3/27/18 09:10


 


 


  (Nitrostat Sl)  0.4 mg  Q5M  PRN


 SL  3/21/18 16:00


    3/22/18 20:35


 


 


  (Tylenol)  650 mg  Q6H  PRN


 PO  3/21/18 16:00


    3/27/18 20:50


 


 


  (Xanax)  0.25 mg  Q8H  PRN


 PO  3/21/18 16:00


    3/26/18 21:37


 


 


  (Zofran Inj)  4 mg  Q6H  PRN


 IV PUSH  3/21/18 16:00


    3/26/18 13:15


 


 


  (Pepcid)  20 mg  BID


 PO  3/21/18 21:00


    3/27/18 20:49


 


 


  (NS Flush)  2 ml  UNSCH  PRN


 IV FLUSH  3/21/18 16:00


     


 


 


  (NS Flush)  2 ml  BID


 IV FLUSH  3/21/18 21:00


    3/27/18 20:49


 


 


  (Tylenol)  650 mg  Q4H  PRN


 PO  3/21/18 16:00


     


 


 


  (Reglan Inj)  5 mg  Q6H  PRN


 IV PUSH  3/21/18 16:00


     


 


 


  (Restoril)  15 mg  HS  PRN


 PO  3/21/18 16:00


     


 


 


  (Tylenol)  650 mg  Q6H  PRN


 PO  3/21/18 16:00


    3/27/18 14:50


 


 


  (Percocet  5-325


 Mg)  1 tab  Q6H  PRN


 PO  3/21/18 16:00


    3/22/18 12:03


 


 


  (Percocet 


 Mg)  1 tab  Q6H  PRN


 PO  3/21/18 16:00


    3/27/18 09:10


 


 


  (Morphine Inj)  2 mg  Q3H  PRN


 IV PUSH  3/21/18 16:00


     


 


 


  (Morphine Inj)  4 mg  Q3H  PRN


 IV PUSH  3/21/18 16:30


     


 


 


  (Morphine Inj)  4 mg  Q3H  PRN


 IV PUSH  3/21/18 16:30


     


 


 


  (Narcan Inj)  0.4 mg  UNSCH  PRN


 IV PUSH  3/21/18 16:00


     


 


 


  (Ursula-Colace)  1 tab  BID


 PO  3/21/18 21:00


    3/27/18 09:10


 


 


  (Milk Of


 Magnesia Liq)  30 ml  Q12H  PRN


 PO  3/21/18 16:00


     


 


 


  (Senokot)  17.2 mg  Q12H  PRN


 PO  3/21/18 16:00


     


 


 


  (Dulcolax Supp)  10 mg  DAILY  PRN


 RECTAL  3/21/18 16:00


     


 


 


  (Lactulose Liq)  30 ml  DAILY  PRN


 PO  3/21/18 16:00


     


 


 


 Sodium Chloride  1,000 ml @ 


 30 mls/hr  Q24H


 IV  3/22/18 13:10


     


 


 


  (Lasix Inj)  20 mg  BID@09,18


 IV PUSH  3/23/18 18:00


    3/27/18 17:01


 


 


  (Lipitor)  40 mg  HS


 PO  3/23/18 21:00


    3/27/18 20:50


 


 


  (Levemir Inj)  10 units  HS


 SQ  3/23/18 21:00


    3/27/18 21:00


 


 


  (Prinivil)  5 mg  DAILY


 PO  3/25/18 09:00


    3/27/18 09:12


 


 


  (Ecotrin Ec)  162 mg  DAILY


 PO  3/25/18 09:00


    3/27/18 09:11


 


 


  (Aldactone)  50 mg  DAILY


 PO  3/27/18 09:00


    3/27/18 09:10


 








Vital Signs / I&O





Vital Signs








  Date Time  Temp Pulse Resp B/P (MAP) Pulse Ox O2 Delivery O2 Flow Rate FiO2


 


3/27/18 18:02  69      


 


3/27/18 17:16     99   21


 


3/27/18 17:06  70      


 


3/27/18 16:26  76      


 


3/27/18 15:57   18     


 


3/27/18 15:21     100 Room Air  


 


3/27/18 15:21 97.9 66 18 130/83 (99) 100   


 


3/27/18 15:21  67      


 


3/27/18 14:15  77      


 


3/27/18 13:02  79      


 


3/27/18 12:22  76      


 


3/27/18 11:24 97.6 62 18 127/77 (94) 100   


 


3/27/18 11:24  68      


 


3/27/18 11:24     100 Room Air  


 


3/27/18 10:03  72      


 


3/27/18 09:55   16     


 


3/27/18 09:54  87      


 


3/27/18 09:53     99   21


 


3/27/18 08:15 97.0 68 18 117/80 (92) 98   


 


3/27/18 08:15  68      


 


3/27/18 08:15     98 Room Air  


 


3/27/18 06:00  65      


 


3/27/18 05:00  74      


 


3/27/18 04:00  68      


 


3/27/18 03:30 98.0 72 16 117/80 (92) 99   


 


3/27/18 03:00  68      


 


3/27/18 02:00  70      


 


3/27/18 01:00  71      


 


3/27/18 00:00  63      














I/O      


 


 3/27/18 3/27/18 3/27/18 3/28/18 3/28/18 3/28/18





 07:00 15:00 23:00 07:00 15:00 23:00


 


Intake Total 240 ml  480 ml   


 


Output Total 1075 ml  1000 ml   


 


Balance -835 ml  -520 ml   


 


      


 


Intake Oral 240 ml  480 ml   


 


Output Urine Total 1075 ml  1000 ml   


 


# Bowel Movements 0  0   








Physical Exam


GENERAL: 


SKIN: Warm and dry.


HEAD: Normocephalic.


EYES: No scleral icterus. No injection or drainage. 


NECK: Supple, trachea midline. No JVD or lymphadenopathy.


CARDIOVASCULAR: Regular rate and rhythm without murmurs, gallops, or rubs. 


RESPIRATORY: Breath sounds equal bilaterally. No accessory muscle use.


GASTROINTESTINAL: Abdomen soft, non-tender, nondistended. 


MUSCULOSKELETAL: No cyanosis, or edema. 


BACK: Nontender without obvious deformity. No CVA tenderness.





Laboratory





Laboratory Tests








Test


  3/27/18


06:23 3/27/18


06:25


 


Blood Urea Nitrogen 24 MG/DL  


 


Creatinine 1.09 MG/DL  


 


Random Glucose 245 MG/DL  


 


Calcium Level 9.5 MG/DL  


 


Sodium Level 135 MEQ/L  


 


Potassium Level 4.3 MEQ/L  


 


Chloride Level 101 MEQ/L  


 


Carbon Dioxide Level 27.0 MEQ/L  


 


Anion Gap 7 MEQ/L  


 


Estimat Glomerular Filtration


Rate 69 ML/MIN 


  


 


 


White Blood Count  7.3 TH/MM3 


 


Red Blood Count  6.16 MIL/MM3 


 


Hemoglobin  15.9 GM/DL 


 


Hematocrit  48.3 % 


 


Mean Corpuscular Volume  78.5 FL 


 


Mean Corpuscular Hemoglobin  25.8 PG 


 


Mean Corpuscular Hemoglobin


Concent 


  32.9 % 


 


 


Red Cell Distribution Width  15.2 % 


 


Platelet Count  205 TH/MM3 


 


Mean Platelet Volume  8.8 FL 


 


B-Type Natriuretic Peptide  179 PG/ML 











Assessment and Plan


Problem List:  


(1) Cardiomyopathy


ICD Codes:  I42.9 - Cardiomyopathy, unspecified


(2) Mitral regurgitation


ICD Codes:  I34.0 - Nonrheumatic mitral (valve) insufficiency


(3) Pulmonary HTN


ICD Codes:  I27.20 - Pulmonary hypertension, unspecified


(4) CAD (coronary artery disease)


ICD Codes:  I25.10 - Atherosclerotic heart disease of native coronary artery 

without angina pectoris


(5) Diabetes mellitus


ICD Codes:  E11.9 - Type 2 diabetes mellitus without complications


(6) Multi-vessel coronary artery stenosis


ICD Codes:  I25.10 - Atherosclerotic heart disease of native coronary artery 

without angina pectoris


(7) Unstable angina pectoris


ICD Codes:  I20.0 - Unstable angina


Status:  Acute


(8) Decreased cardiac ejection fraction


ICD Codes:  R93.1 - Abnormal findings on diagnostic imaging of heart and 

coronary circulation


Assessment and Plan


1.) Cardiomyopathy - bnp down to @100, euvolemic, dyspnea improved, f/u 

pulmonary consult, continue coreg, continue lasix 20 mg bid, aldactone 50mg bid

, change enalapril to lisinopril 5 mg qd, decrease aspirin 162 mg qd, f/u bmp/

bnp in am, may be accepted for heart transplant eval at [facility] if can 

get medicare/medicaid, d/w Dr Angel @  and case management, patient and his 

family extensively


2.) CAD - assymptomatic off iv hep, continue aspirin, plavix, Angina resolved 

with diuresis presumably due decrease wall tension from lower filling pressures











Abdiel Purvis MD Mar 27, 2018 23:49

## 2018-03-28 VITALS
HEART RATE: 74 BPM | SYSTOLIC BLOOD PRESSURE: 123 MMHG | TEMPERATURE: 97.6 F | OXYGEN SATURATION: 96 % | DIASTOLIC BLOOD PRESSURE: 90 MMHG | RESPIRATION RATE: 16 BRPM

## 2018-03-28 VITALS
OXYGEN SATURATION: 98 % | SYSTOLIC BLOOD PRESSURE: 102 MMHG | RESPIRATION RATE: 16 BRPM | DIASTOLIC BLOOD PRESSURE: 68 MMHG | HEART RATE: 67 BPM | TEMPERATURE: 98.1 F

## 2018-03-28 VITALS
OXYGEN SATURATION: 98 % | RESPIRATION RATE: 16 BRPM | DIASTOLIC BLOOD PRESSURE: 71 MMHG | HEART RATE: 69 BPM | SYSTOLIC BLOOD PRESSURE: 137 MMHG | TEMPERATURE: 97.8 F

## 2018-03-28 VITALS — HEART RATE: 67 BPM

## 2018-03-28 VITALS — HEART RATE: 76 BPM

## 2018-03-28 VITALS — HEART RATE: 75 BPM

## 2018-03-28 VITALS — HEART RATE: 68 BPM

## 2018-03-28 VITALS
SYSTOLIC BLOOD PRESSURE: 126 MMHG | OXYGEN SATURATION: 99 % | DIASTOLIC BLOOD PRESSURE: 79 MMHG | RESPIRATION RATE: 16 BRPM | HEART RATE: 70 BPM | TEMPERATURE: 97.8 F

## 2018-03-28 VITALS — HEART RATE: 78 BPM

## 2018-03-28 VITALS
OXYGEN SATURATION: 100 % | RESPIRATION RATE: 18 BRPM | HEART RATE: 72 BPM | SYSTOLIC BLOOD PRESSURE: 124 MMHG | TEMPERATURE: 98 F | DIASTOLIC BLOOD PRESSURE: 79 MMHG

## 2018-03-28 VITALS
TEMPERATURE: 98 F | OXYGEN SATURATION: 97 % | RESPIRATION RATE: 16 BRPM | DIASTOLIC BLOOD PRESSURE: 78 MMHG | SYSTOLIC BLOOD PRESSURE: 127 MMHG | HEART RATE: 77 BPM

## 2018-03-28 VITALS — OXYGEN SATURATION: 98 %

## 2018-03-28 VITALS
HEART RATE: 81 BPM | DIASTOLIC BLOOD PRESSURE: 83 MMHG | OXYGEN SATURATION: 97 % | TEMPERATURE: 97.6 F | SYSTOLIC BLOOD PRESSURE: 133 MMHG | RESPIRATION RATE: 14 BRPM

## 2018-03-28 VITALS — HEART RATE: 65 BPM

## 2018-03-28 VITALS — HEART RATE: 74 BPM

## 2018-03-28 VITALS — HEART RATE: 71 BPM

## 2018-03-28 VITALS — HEART RATE: 62 BPM

## 2018-03-28 VITALS — HEART RATE: 61 BPM

## 2018-03-28 VITALS — HEART RATE: 79 BPM

## 2018-03-28 VITALS — HEART RATE: 82 BPM

## 2018-03-28 VITALS — HEART RATE: 60 BPM

## 2018-03-28 VITALS — HEART RATE: 64 BPM

## 2018-03-28 VITALS — HEART RATE: 66 BPM

## 2018-03-28 VITALS — HEART RATE: 84 BPM

## 2018-03-28 RX ADMIN — ACETAMINOPHEN PRN MG: 325 TABLET ORAL at 09:38

## 2018-03-28 RX ADMIN — INSULIN ASPART SCH: 100 INJECTION, SOLUTION INTRAVENOUS; SUBCUTANEOUS at 12:00

## 2018-03-28 RX ADMIN — ATORVASTATIN CALCIUM SCH MG: 40 TABLET, FILM COATED ORAL at 22:57

## 2018-03-28 RX ADMIN — FUROSEMIDE SCH MG: 10 INJECTION, SOLUTION INTRAMUSCULAR; INTRAVENOUS at 17:31

## 2018-03-28 RX ADMIN — CLOPIDOGREL BISULFATE SCH MG: 75 TABLET, FILM COATED ORAL at 09:37

## 2018-03-28 RX ADMIN — STANDARDIZED SENNA CONCENTRATE AND DOCUSATE SODIUM SCH TAB: 8.6; 5 TABLET, FILM COATED ORAL at 22:57

## 2018-03-28 RX ADMIN — INSULIN ASPART SCH: 100 INJECTION, SOLUTION INTRAVENOUS; SUBCUTANEOUS at 08:00

## 2018-03-28 RX ADMIN — SPIRONOLACTONE SCH MG: 50 TABLET, FILM COATED ORAL at 09:36

## 2018-03-28 RX ADMIN — Medication SCH ML: at 09:00

## 2018-03-28 RX ADMIN — PHENYTOIN SODIUM SCH MLS/HR: 50 INJECTION INTRAMUSCULAR; INTRAVENOUS at 13:10

## 2018-03-28 RX ADMIN — Medication SCH ML: at 22:57

## 2018-03-28 RX ADMIN — ASPIRIN SCH MG: 81 TABLET ORAL at 09:36

## 2018-03-28 RX ADMIN — FAMOTIDINE SCH MG: 20 TABLET, FILM COATED ORAL at 22:57

## 2018-03-28 RX ADMIN — FAMOTIDINE SCH MG: 20 TABLET, FILM COATED ORAL at 09:37

## 2018-03-28 RX ADMIN — FUROSEMIDE SCH MG: 10 INJECTION, SOLUTION INTRAMUSCULAR; INTRAVENOUS at 09:36

## 2018-03-28 RX ADMIN — LISINOPRIL SCH MG: 5 TABLET ORAL at 09:35

## 2018-03-28 RX ADMIN — INSULIN ASPART SCH: 100 INJECTION, SOLUTION INTRAVENOUS; SUBCUTANEOUS at 18:07

## 2018-03-28 RX ADMIN — ACYCLOVIR SCH UNITS: 800 TABLET ORAL at 22:58

## 2018-03-28 RX ADMIN — STANDARDIZED SENNA CONCENTRATE AND DOCUSATE SODIUM SCH TAB: 8.6; 5 TABLET, FILM COATED ORAL at 09:35

## 2018-03-28 RX ADMIN — CARVEDILOL SCH MG: 6.25 TABLET, FILM COATED ORAL at 22:57

## 2018-03-28 RX ADMIN — CARVEDILOL SCH MG: 6.25 TABLET, FILM COATED ORAL at 09:37

## 2018-03-28 RX ADMIN — INSULIN ASPART SCH: 100 INJECTION, SOLUTION INTRAVENOUS; SUBCUTANEOUS at 22:58

## 2018-03-28 NOTE — PD.CARD.PN
Subjective


Subjective Remarks


denies chest pain, on oxygen, off heparin, in nad, appears comfortable





Objective


Medications





Current Medications








 Medications


  (Trade)  Dose


 Ordered  Sig/Ilia


 Route  Start Time


 Stop Time Status Last Admin


 


  (NovoLOG


 SUPPLEMENTAL


 SCALE)  1  ACHS SLIDING  SCALE


 SQ  3/21/18 17:00


    3/28/18 12:00


 


 


  (D50w (Vial) Inj)  50 ml  UNSCH  PRN


 IV PUSH  3/21/18 16:00


     


 


 


  (Glucagon Inj)  1 mg  UNSCH  PRN


 OTHER  3/21/18 16:00


     


 


 


  (Coreg)  6.25 mg  BID


 PO  3/21/18 21:00


    3/28/18 09:37


 


 


  (Plavix)  75 mg  DAILY


 PO  3/22/18 09:00


    3/28/18 09:37


 


 


  (Nitrostat Sl)  0.4 mg  Q5M  PRN


 SL  3/21/18 16:00


    3/22/18 20:35


 


 


  (Tylenol)  650 mg  Q6H  PRN


 PO  3/21/18 16:00


    3/28/18 09:38


 


 


  (Xanax)  0.25 mg  Q8H  PRN


 PO  3/21/18 16:00


    3/26/18 21:37


 


 


  (Zofran Inj)  4 mg  Q6H  PRN


 IV PUSH  3/21/18 16:00


    3/26/18 13:15


 


 


  (Pepcid)  20 mg  BID


 PO  3/21/18 21:00


    3/28/18 09:37


 


 


  (NS Flush)  2 ml  UNSCH  PRN


 IV FLUSH  3/21/18 16:00


     


 


 


  (NS Flush)  2 ml  BID


 IV FLUSH  3/21/18 21:00


    3/28/18 09:00


 


 


  (Tylenol)  650 mg  Q4H  PRN


 PO  3/21/18 16:00


     


 


 


  (Reglan Inj)  5 mg  Q6H  PRN


 IV PUSH  3/21/18 16:00


     


 


 


  (Restoril)  15 mg  HS  PRN


 PO  3/21/18 16:00


     


 


 


  (Tylenol)  650 mg  Q6H  PRN


 PO  3/21/18 16:00


    3/27/18 14:50


 


 


  (Percocet  5-325


 Mg)  1 tab  Q6H  PRN


 PO  3/21/18 16:00


    3/22/18 12:03


 


 


  (Percocet 


 Mg)  1 tab  Q6H  PRN


 PO  3/21/18 16:00


    3/27/18 09:10


 


 


  (Morphine Inj)  2 mg  Q3H  PRN


 IV PUSH  3/21/18 16:00


     


 


 


  (Morphine Inj)  4 mg  Q3H  PRN


 IV PUSH  3/21/18 16:30


     


 


 


  (Morphine Inj)  4 mg  Q3H  PRN


 IV PUSH  3/21/18 16:30


     


 


 


  (Narcan Inj)  0.4 mg  UNSCH  PRN


 IV PUSH  3/21/18 16:00


     


 


 


  (Ursula-Colace)  1 tab  BID


 PO  3/21/18 21:00


    3/28/18 09:35


 


 


  (Milk Of


 Magnesia Liq)  30 ml  Q12H  PRN


 PO  3/21/18 16:00


     


 


 


  (Senokot)  17.2 mg  Q12H  PRN


 PO  3/21/18 16:00


     


 


 


  (Dulcolax Supp)  10 mg  DAILY  PRN


 RECTAL  3/21/18 16:00


     


 


 


  (Lactulose Liq)  30 ml  DAILY  PRN


 PO  3/21/18 16:00


     


 


 


 Sodium Chloride  1,000 ml @ 


 30 mls/hr  Q24H


 IV  3/22/18 13:10


     


 


 


  (Lasix Inj)  20 mg  BID@09,18


 IV PUSH  3/23/18 18:00


    3/28/18 09:36


 


 


  (Lipitor)  40 mg  HS


 PO  3/23/18 21:00


    3/27/18 20:50


 


 


  (Levemir Inj)  10 units  HS


 SQ  3/23/18 21:00


    3/27/18 21:00


 


 


  (Prinivil)  5 mg  DAILY


 PO  3/25/18 09:00


    3/28/18 09:35


 


 


  (Ecotrin Ec)  162 mg  DAILY


 PO  3/25/18 09:00


    3/28/18 09:36


 


 


  (Aldactone)  50 mg  DAILY


 PO  3/27/18 09:00


    3/28/18 09:36


 








Vital Signs / I&O





Vital Signs








  Date Time  Temp Pulse Resp B/P (MAP) Pulse Ox O2 Delivery O2 Flow Rate FiO2


 


3/28/18 12:00  64      


 


3/28/18 11:00  84      


 


3/28/18 10:00  76      


 


3/28/18 09:00  74      


 


3/28/18 08:20 98.0 72 18 124/79 (94) 100   


 


3/28/18 08:00  60      


 


3/28/18 07:32     100 Room Air  


 


3/28/18 07:00  76      


 


3/28/18 06:00  66      


 


3/28/18 05:00  67      


 


3/28/18 04:00  71      


 


3/28/18 03:00 97.8 70 16 126/79 (95) 99   


 


3/28/18 03:00     99 Room Air  


 


3/28/18 03:00  70      


 


3/28/18 02:00  61      


 


3/28/18 01:00  68      


 


3/28/18 00:00 98.1 67 16 102/68 (79) 98   


 


3/28/18 00:00  60      


 


3/28/18 00:00     98 Room Air  


 


3/27/18 23:00  58      


 


3/27/18 22:00  60      


 


3/27/18 21:00  68      


 


3/27/18 20:00  64      


 


3/27/18 20:00 98.4 60 16 123/73 (90) 99   


 


3/27/18 20:00     99 Room Air  


 


3/27/18 19:00  84      


 


3/27/18 18:02  69      


 


3/27/18 17:16     99   21


 


3/27/18 17:06  70      


 


3/27/18 16:26  76      


 


3/27/18 15:57   18     


 


3/27/18 15:21     100 Room Air  


 


3/27/18 15:21 97.9 66 18 130/83 (99) 100   


 


3/27/18 15:21  67      














I/O      


 


 3/27/18 3/27/18 3/27/18 3/28/18 3/28/18 3/28/18





 07:00 15:00 23:00 07:00 15:00 23:00


 


Intake Total 240 ml  480 ml 480 ml  


 


Output Total 1075 ml  1000 ml 775 ml  


 


Balance -835 ml  -520 ml -295 ml  


 


      


 


Intake Oral 240 ml  480 ml 480 ml  


 


Output Urine Total 1075 ml  1000 ml 775 ml  


 


# Bowel Movements 0  0 0  








Physical Exam


GENERAL: 


SKIN: Warm and dry.


HEAD: Normocephalic.


EYES: No scleral icterus. No injection or drainage. 


NECK: Supple, trachea midline. No JVD or lymphadenopathy.


CARDIOVASCULAR: Regular rate and rhythm without murmurs, gallops, or rubs. 


RESPIRATORY: Breath sounds equal bilaterally. No accessory muscle use.


GASTROINTESTINAL: Abdomen soft, non-tender, nondistended. 


MUSCULOSKELETAL: No cyanosis, or edema. 


BACK: Nontender without obvious deformity. No CVA tenderness.








Assessment and Plan


Problem List:  


(1) Cardiomyopathy


ICD Codes:  I42.9 - Cardiomyopathy, unspecified


(2) Mitral regurgitation


ICD Codes:  I34.0 - Nonrheumatic mitral (valve) insufficiency


(3) Pulmonary HTN


ICD Codes:  I27.20 - Pulmonary hypertension, unspecified


(4) CAD (coronary artery disease)


ICD Codes:  I25.10 - Atherosclerotic heart disease of native coronary artery 

without angina pectoris


(5) Diabetes mellitus


ICD Codes:  E11.9 - Type 2 diabetes mellitus without complications


(6) Multi-vessel coronary artery stenosis


ICD Codes:  I25.10 - Atherosclerotic heart disease of native coronary artery 

without angina pectoris


(7) Unstable angina pectoris


ICD Codes:  I20.0 - Unstable angina


Status:  Acute


(8) Decreased cardiac ejection fraction


ICD Codes:  R93.1 - Abnormal findings on diagnostic imaging of heart and 

coronary circulation


Assessment and Plan


1.) Cardiomyopathy - bnp down to @100, euvolemic, dyspnea improved, f/u 

pulmonary consult, continue coreg, continue lasix 20 mg bid, aldactone 50mg bid

, change enalapril to lisinopril 5 mg qd, decrease aspirin 162 mg qd, f/u bmp/

bnp in am, may be accepted for heart transplant eval at [facility] if can 

get medicare/medicaid, d/w Dr Angel @  and case management, patient and his 

family extensively; f/u bnp/bmp in am


2.) CAD - assymptomatic off iv hep, continue aspirin, plavix, Angina resolved 

with diuresis presumably due decrease wall tension from lower filling pressures











Abdiel Purvis MD Mar 28, 2018 14:45

## 2018-03-28 NOTE — HHI.PR
Subjective


Remarks


Patient reports that he is feeling okay today.  Mild shortness of breath but no 

chest pain.





Objective


Vitals





Vital Signs








  Date Time  Temp Pulse Resp B/P (MAP) Pulse Ox O2 Delivery O2 Flow Rate FiO2


 


3/28/18 12:00  64      


 


3/28/18 11:00  84      


 


3/28/18 10:00  76      


 


3/28/18 09:00  74      


 


3/28/18 08:20 98.0 72 18 124/79 (94) 100   


 


3/28/18 08:00  60      


 


3/28/18 07:32     100 Room Air  


 


3/28/18 07:00  76      


 


3/28/18 06:00  66      


 


3/28/18 05:00  67      


 


3/28/18 04:00  71      


 


3/28/18 03:00 97.8 70 16 126/79 (95) 99   


 


3/28/18 03:00     99 Room Air  


 


3/28/18 03:00  70      


 


3/28/18 02:00  61      


 


3/28/18 01:00  68      


 


3/28/18 00:00 98.1 67 16 102/68 (79) 98   


 


3/28/18 00:00  60      


 


3/28/18 00:00     98 Room Air  


 


3/27/18 23:00  58      


 


3/27/18 22:00  60      


 


3/27/18 21:00  68      


 


3/27/18 20:00  64      


 


3/27/18 20:00 98.4 60 16 123/73 (90) 99   


 


3/27/18 20:00     99 Room Air  


 


3/27/18 19:00  84      


 


3/27/18 18:02  69      


 


3/27/18 17:16     99   21


 


3/27/18 17:06  70      


 


3/27/18 16:26  76      


 


3/27/18 15:57   18     


 


3/27/18 15:21     100 Room Air  


 


3/27/18 15:21 97.9 66 18 130/83 (99) 100   


 


3/27/18 15:21  67      


 


3/27/18 14:15  77      














I/O      


 


 3/27/18 3/27/18 3/27/18 3/28/18 3/28/18 3/28/18





 07:00 15:00 23:00 07:00 15:00 23:00


 


Intake Total 240 ml  480 ml 480 ml  


 


Output Total 1075 ml  1000 ml 775 ml  


 


Balance -835 ml  -520 ml -295 ml  


 


      


 


Intake Oral 240 ml  480 ml 480 ml  


 


Output Urine Total 1075 ml  1000 ml 775 ml  


 


# Bowel Movements 0  0 0  








Result Diagram:  


3/27/18 0625                                                                   

             3/27/18 0623





Objective Remarks


GENERAL: This is a well-nourished, well-developed patient, in no apparent 

distress.


CARDIOVASCULAR: Normal rate and regular rhythm without murmurs, gallops, or 

rubs. 


RESPIRATORY: Good respiratory efforts.  Diminished breath sounds at the bases, 

otherwise clear to auscultation bilaterally. 


GASTROINTESTINAL: Abdomen soft, non-tender, non-distended. Normal active bowel 

sounds


MUSCULOSKELETAL: Extremities without cyanosis, or edema.


NEURO:  Alert & Oriented x4 to person, place, time, situation.  Moves all ext x4


PSYCH: Appropriate mood and affect.





A/P


Problem List:  


(1) Hx of CABG


ICD Code:  Z95.1 - Presence of aortocoronary bypass graft


(2) Decreased cardiac ejection fraction


ICD Code:  R93.1 - Abnormal findings on diagnostic imaging of heart and 

coronary circulation


(3) Hypertension


ICD Code:  I10 - Essential (primary) hypertension


(4) Obese


ICD Code:  E66.9 - Obesity, unspecified


(5) Hyperlipidemia


ICD Code:  E78.5 - Hyperlipidemia, unspecified


(6) CAD (coronary artery disease)


ICD Code:  I25.10 - Atherosclerotic heart disease of native coronary artery 

without angina pectoris


(7) Diabetes mellitus


ICD Code:  E11.9 - Type 2 diabetes mellitus without complications


(8) Unstable angina pectoris


ICD Code:  I20.0 - Unstable angina


Status:  Acute


(9) Multi-vessel coronary artery stenosis


ICD Code:  I25.10 - Atherosclerotic heart disease of native coronary artery 

without angina pectoris


Assessment and Plan


57 year-old male with





Multivessel coronary stenosis in patient with prior history of CABG/ 

Cardiomyopathy with EF of 20%


   Status post left heart catheterization 03/22/18


   Appreciate input from cardiology. Optimize medical management.  Discussed 

with Dr. Lindsay today.  Records reviewed.  ICD indicated for the patient.  Plan 

for defibrillator placement tomorrow


   Continue with heparin drip, Plavix, aspirin, beta blocker, statin, Aldactone

, Lasix


          


Diabetes mellitus 


   continue on sliding scale coverage with Accu-Cheks before meals and at 

bedtime ;hold his Glucophage and metformin 


   Hemoglobin A1c of 9.8


   Continue Levemir 10 units at bedtime





Hypertension


   Continue home medications





COPD tobacco oxygen as needed


-Appreciate pulmonology following.





GERD 


   Continue  Pepcid





Chronic back pain 


   Continue  pain meds as needed





DVT and GI prophylaxis


On heparin, stool softeners as needed.


Discharge Planning














Hitesh Bethea MD Mar 28, 2018 13:22

## 2018-03-28 NOTE — HHI.PR
Subjective


Remarks


Feeling better





Objective





Vital Signs








  Date Time  Temp Pulse Resp B/P (MAP) Pulse Ox O2 Delivery O2 Flow Rate FiO2


 


3/28/18 15:00 97.8 69 16 137/71 (93) 98   


 


3/28/18 15:00     98 Room Air  


 


3/28/18 12:00  64      


 


3/28/18 11:15      Room Air  


 


3/28/18 11:15 97.6 74 16 123/90 (101) 96   


 


3/28/18 11:00  84      


 


3/28/18 10:00  76      


 


3/28/18 09:00  74      


 


3/28/18 08:20 98.0 72 18 124/79 (94) 100   


 


3/28/18 08:00  60      


 


3/28/18 07:32     100 Room Air  


 


3/28/18 07:00  76      


 


3/28/18 06:00  66      


 


3/28/18 05:00  67      


 


3/28/18 04:00  71      


 


3/28/18 03:00 97.8 70 16 126/79 (95) 99   


 


3/28/18 03:00     99 Room Air  


 


3/28/18 03:00  70      


 


3/28/18 02:00  61      


 


3/28/18 01:00  68      


 


3/28/18 00:00 98.1 67 16 102/68 (79) 98   


 


3/28/18 00:00  60      


 


3/28/18 00:00     98 Room Air  


 


3/27/18 23:00  58      


 


3/27/18 22:00  60      


 


3/27/18 21:00  68      


 


3/27/18 20:00  64      


 


3/27/18 20:00 98.4 60 16 123/73 (90) 99   


 


3/27/18 20:00     99 Room Air  


 


3/27/18 19:00  84      


 


3/27/18 18:02  69      














I/O      


 


 3/27/18 3/27/18 3/27/18 3/28/18 3/28/18 3/28/18





 07:00 15:00 23:00 07:00 15:00 23:00


 


Intake Total 240 ml  480 ml 480 ml  


 


Output Total 1075 ml  1000 ml 775 ml  


 


Balance -835 ml  -520 ml -295 ml  


 


      


 


Intake Oral 240 ml  480 ml 480 ml  


 


Output Urine Total 1075 ml  1000 ml 775 ml  


 


# Bowel Movements 0  0 0  








Result Diagram:  


3/27/18 0625                                                                   

             3/27/18 0623





Imaging


Alert, fully oriented, in bed, talking on the phone





lungs: ventilated





Heart: s1, S2 regular, no gallop





Abdomen: soft, no mass





Ext: no edema








Last Impressions








Chest X-Ray 3/21/18 1225 Signed





Impressions: 





 Service Date/Time:  Wednesday, March 21, 2018 13:08 - CONCLUSION:  No acute 





 disease.  No significant change has occurred.       Manohar Nazario MD 








Current Medications








 Medications


  (Trade)  Dose


 Ordered  Sig/Ilia


 Route  Start Time


 Stop Time Status Last Admin


 


  (NovoLOG


 SUPPLEMENTAL


 SCALE)  1  ACHS SLIDING  SCALE


 SQ  3/21/18 17:00


    3/28/18 12:00


 


 


  (D50w (Vial) Inj)  50 ml  UNSCH  PRN


 IV PUSH  3/21/18 16:00


     


 


 


  (Glucagon Inj)  1 mg  UNSCH  PRN


 OTHER  3/21/18 16:00


     


 


 


  (Coreg)  6.25 mg  BID


 PO  3/21/18 21:00


    3/28/18 09:37


 


 


  (Plavix)  75 mg  DAILY


 PO  3/22/18 09:00


    3/28/18 09:37


 


 


  (Nitrostat Sl)  0.4 mg  Q5M  PRN


 SL  3/21/18 16:00


    3/22/18 20:35


 


 


  (Tylenol)  650 mg  Q6H  PRN


 PO  3/21/18 16:00


    3/28/18 09:38


 


 


  (Xanax)  0.25 mg  Q8H  PRN


 PO  3/21/18 16:00


    3/26/18 21:37


 


 


  (Zofran Inj)  4 mg  Q6H  PRN


 IV PUSH  3/21/18 16:00


    3/26/18 13:15


 


 


  (Pepcid)  20 mg  BID


 PO  3/21/18 21:00


    3/28/18 09:37


 


 


  (NS Flush)  2 ml  UNSCH  PRN


 IV FLUSH  3/21/18 16:00


     


 


 


  (NS Flush)  2 ml  BID


 IV FLUSH  3/21/18 21:00


    3/28/18 09:00


 


 


  (Tylenol)  650 mg  Q4H  PRN


 PO  3/21/18 16:00


     


 


 


  (Reglan Inj)  5 mg  Q6H  PRN


 IV PUSH  3/21/18 16:00


     


 


 


  (Restoril)  15 mg  HS  PRN


 PO  3/21/18 16:00


     


 


 


  (Tylenol)  650 mg  Q6H  PRN


 PO  3/21/18 16:00


    3/27/18 14:50


 


 


  (Percocet  5-325


 Mg)  1 tab  Q6H  PRN


 PO  3/21/18 16:00


    3/22/18 12:03


 


 


  (Percocet 


 Mg)  1 tab  Q6H  PRN


 PO  3/21/18 16:00


    3/27/18 09:10


 


 


  (Morphine Inj)  2 mg  Q3H  PRN


 IV PUSH  3/21/18 16:00


     


 


 


  (Morphine Inj)  4 mg  Q3H  PRN


 IV PUSH  3/21/18 16:30


     


 


 


  (Morphine Inj)  4 mg  Q3H  PRN


 IV PUSH  3/21/18 16:30


     


 


 


  (Narcan Inj)  0.4 mg  UNSCH  PRN


 IV PUSH  3/21/18 16:00


     


 


 


  (Ursula-Colace)  1 tab  BID


 PO  3/21/18 21:00


    3/28/18 09:35


 


 


  (Milk Of


 Magnesia Liq)  30 ml  Q12H  PRN


 PO  3/21/18 16:00


     


 


 


  (Senokot)  17.2 mg  Q12H  PRN


 PO  3/21/18 16:00


     


 


 


  (Dulcolax Supp)  10 mg  DAILY  PRN


 RECTAL  3/21/18 16:00


     


 


 


  (Lactulose Liq)  30 ml  DAILY  PRN


 PO  3/21/18 16:00


     


 


 


 Sodium Chloride  1,000 ml @ 


 30 mls/hr  Q24H


 IV  3/22/18 13:10


     


 


 


  (Lasix Inj)  20 mg  BID@09,18


 IV PUSH  3/23/18 18:00


    3/28/18 17:31


 


 


  (Lipitor)  40 mg  HS


 PO  3/23/18 21:00


    3/27/18 20:50


 


 


  (Levemir Inj)  10 units  HS


 SQ  3/23/18 21:00


    3/27/18 21:00


 


 


  (Prinivil)  5 mg  DAILY


 PO  3/25/18 09:00


    3/28/18 09:35


 


 


  (Ecotrin Ec)  162 mg  DAILY


 PO  3/25/18 09:00


    3/28/18 09:36


 


 


  (Aldactone)  50 mg  DAILY


 PO  3/27/18 09:00


    3/28/18 09:36


 











Assessment and Plan


Problem List:  


(1) Cardiomyopathy


ICD Codes:  I42.9 - Cardiomyopathy, unspecified


Plan:  CHF II-III


EF 20%


last report around 2-3 years ago fro [place] EF was 40%


Subsequent <MI


Severe ischemic cardiomyopathy 


This is a patient at high risks for sudden cardiac death


Hospital will not support defib vest. 


recent evidence supporting the use of defib vest is very thin


Patient will need a defibrillator insertion for sudden death primary prevention


The risk, the nature and benefits discussed.


patient understand and agree to proceed


Procedure will be performed tomorrow  AM





(2) CAD (coronary artery disease)


ICD Codes:  I25.10 - Atherosclerotic heart disease of native coronary artery 

without angina pectoris


Plan:  Hx of CABG


No chest pain





(3) Hypertension


ICD Codes:  I10 - Essential (primary) hypertension


Plan:  .


Lisinopril will be DC


Entresto will be initiated tomorrow night 














Juan Lindsay MD Mar 28, 2018 17:58

## 2018-03-28 NOTE — HHI.PR
Subjective


Remarks


57 YOWM Wth CAD,CABG,CMP


Breathing better


On RA


No SOB





Objective


Vital Signs





Vital Signs








  Date Time  Temp Pulse Resp B/P (MAP) Pulse Ox O2 Delivery O2 Flow Rate FiO2


 


3/28/18 20:38     98   


 


3/28/18 18:00  68      


 


3/28/18 16:00  78      


 


3/28/18 15:00 97.8 69 16 137/71 (93) 98   


 


3/28/18 15:00  88      


 


3/28/18 15:00     98 Room Air  


 


3/28/18 14:00  75      


 


3/28/18 13:00  62      


 


3/28/18 12:00  64      


 


3/28/18 11:15      Room Air  


 


3/28/18 11:15 97.6 74 16 123/90 (101) 96   


 


3/28/18 11:00  84      


 


3/28/18 10:00  76      


 


3/28/18 09:00  74      


 


3/28/18 08:20 98.0 72 18 124/79 (94) 100   


 


3/28/18 08:00  60      


 


3/28/18 07:32     100 Room Air  


 


3/28/18 07:00  76      


 


3/28/18 06:00  66      


 


3/28/18 05:00  67      


 


3/28/18 04:00  71      


 


3/28/18 03:00 97.8 70 16 126/79 (95) 99   


 


3/28/18 03:00     99 Room Air  


 


3/28/18 03:00  70      


 


3/28/18 02:00  61      


 


3/28/18 01:00  68      


 


3/28/18 00:00 98.1 67 16 102/68 (79) 98   


 


3/28/18 00:00  60      


 


3/28/18 00:00     98 Room Air  


 


3/27/18 23:00  58      


 


3/27/18 22:00  60      














I/O      


 


 3/27/18 3/27/18 3/27/18 3/28/18 3/28/18 3/28/18





 07:00 15:00 23:00 07:00 15:00 23:00


 


Intake Total 240 ml  480 ml 480 ml  580 ml


 


Output Total 1075 ml  1000 ml 775 ml  900 ml


 


Balance -835 ml  -520 ml -295 ml  -320 ml


 


      


 


Intake Oral 240 ml  480 ml 480 ml  580 ml


 


Output Urine Total 1075 ml  1000 ml 775 ml  900 ml


 


# Bowel Movements 0  0 0  








Result Diagram:  


3/27/18 0625                                                                   

             3/27/18 0623





Objective Remarks


GENERAL: WBWN WM, NAD


SKIN: Warm and dry.


HEAD: Normocephalic.


EYES: No scleral icterus. No injection or drainage. 


NECK: Supple, trachea midline. No JVD or lymphadenopathy.


CARDIOVASCULAR: Regular rate and rhythm without murmurs, gallops, or rubs. 


RESPIRATORY: Breath sounds equal bilaterally. No accessory muscle use.


GASTROINTESTINAL: Abdomen soft, non-tender, nondistended. 


MUSCULOSKELETAL: No cyanosis, or edema. 


BACK: Nontender without obvious deformity. No CVA tenderness.








A/P


Assessment and Plan


IMPRESSION:





1.  Shortness of breath, likely from his cardiomyopathy; however, need


to rule out underlying chronic obstructive pulmonary disease.  He has 


a long history of smoking.


2.  Coronary artery disease, status post coronary artery bypass 


grafting.


3.  Hypertension.


4.  History of nicotine use





PLAN:


PFT not interpretable


Will rpt PFTBeing planned for AICD placement


Stable on RA











Cem Kendall MD Mar 28, 2018 21:35

## 2018-03-29 VITALS
OXYGEN SATURATION: 99 % | SYSTOLIC BLOOD PRESSURE: 118 MMHG | HEART RATE: 81 BPM | DIASTOLIC BLOOD PRESSURE: 76 MMHG | RESPIRATION RATE: 18 BRPM | TEMPERATURE: 97.9 F

## 2018-03-29 VITALS
SYSTOLIC BLOOD PRESSURE: 131 MMHG | HEART RATE: 70 BPM | TEMPERATURE: 98.4 F | RESPIRATION RATE: 16 BRPM | OXYGEN SATURATION: 100 % | DIASTOLIC BLOOD PRESSURE: 88 MMHG

## 2018-03-29 VITALS
TEMPERATURE: 97.8 F | RESPIRATION RATE: 20 BRPM | OXYGEN SATURATION: 96 % | DIASTOLIC BLOOD PRESSURE: 81 MMHG | HEART RATE: 81 BPM | SYSTOLIC BLOOD PRESSURE: 142 MMHG

## 2018-03-29 VITALS — HEART RATE: 75 BPM

## 2018-03-29 VITALS — HEART RATE: 74 BPM

## 2018-03-29 VITALS — HEART RATE: 78 BPM

## 2018-03-29 VITALS — HEART RATE: 84 BPM

## 2018-03-29 VITALS — HEART RATE: 68 BPM

## 2018-03-29 VITALS — HEART RATE: 79 BPM

## 2018-03-29 VITALS — HEART RATE: 70 BPM

## 2018-03-29 VITALS — HEART RATE: 72 BPM

## 2018-03-29 VITALS — OXYGEN SATURATION: 95 %

## 2018-03-29 VITALS — HEART RATE: 65 BPM

## 2018-03-29 VITALS — HEART RATE: 71 BPM

## 2018-03-29 LAB
BUN SERPL-MCNC: 28 MG/DL (ref 7–18)
CALCIUM SERPL-MCNC: 9.2 MG/DL (ref 8.5–10.1)
CHLORIDE SERPL-SCNC: 100 MEQ/L (ref 98–107)
CREAT SERPL-MCNC: 1.12 MG/DL (ref 0.6–1.3)
GFR SERPLBLD BASED ON 1.73 SQ M-ARVRAT: 67 ML/MIN (ref 89–?)
GLUCOSE SERPL-MCNC: 234 MG/DL (ref 74–106)
HCO3 BLD-SCNC: 26.9 MEQ/L (ref 21–32)
SODIUM SERPL-SCNC: 135 MEQ/L (ref 136–145)

## 2018-03-29 PROCEDURE — 0JH608Z INSERTION OF DEFIBRILLATOR GENERATOR INTO CHEST SUBCUTANEOUS TISSUE AND FASCIA, OPEN APPROACH: ICD-10-PCS | Performed by: INTERNAL MEDICINE

## 2018-03-29 PROCEDURE — 02HK3KZ INSERTION OF DEFIBRILLATOR LEAD INTO RIGHT VENTRICLE, PERCUTANEOUS APPROACH: ICD-10-PCS | Performed by: INTERNAL MEDICINE

## 2018-03-29 RX ADMIN — PHENYTOIN SODIUM SCH MLS/HR: 50 INJECTION INTRAMUSCULAR; INTRAVENOUS at 13:10

## 2018-03-29 RX ADMIN — ATORVASTATIN CALCIUM SCH MG: 40 TABLET, FILM COATED ORAL at 20:39

## 2018-03-29 RX ADMIN — SACUBITRIL AND VALSARTAN SCH TAB: 49; 51 TABLET, FILM COATED ORAL at 20:39

## 2018-03-29 RX ADMIN — INSULIN ASPART SCH: 100 INJECTION, SOLUTION INTRAVENOUS; SUBCUTANEOUS at 08:00

## 2018-03-29 RX ADMIN — CARVEDILOL SCH MG: 6.25 TABLET, FILM COATED ORAL at 20:39

## 2018-03-29 RX ADMIN — INSULIN ASPART SCH: 100 INJECTION, SOLUTION INTRAVENOUS; SUBCUTANEOUS at 17:00

## 2018-03-29 RX ADMIN — FAMOTIDINE SCH MG: 20 TABLET, FILM COATED ORAL at 20:39

## 2018-03-29 RX ADMIN — ACETAMINOPHEN AND CODEINE PHOSPHATE PRN TAB: 300; 30 TABLET ORAL at 14:38

## 2018-03-29 RX ADMIN — CARVEDILOL SCH MG: 6.25 TABLET, FILM COATED ORAL at 10:18

## 2018-03-29 RX ADMIN — CLOPIDOGREL BISULFATE SCH MG: 75 TABLET, FILM COATED ORAL at 10:20

## 2018-03-29 RX ADMIN — ACYCLOVIR SCH UNITS: 800 TABLET ORAL at 20:40

## 2018-03-29 RX ADMIN — Medication SCH ML: at 09:00

## 2018-03-29 RX ADMIN — INSULIN ASPART SCH: 100 INJECTION, SOLUTION INTRAVENOUS; SUBCUTANEOUS at 20:39

## 2018-03-29 RX ADMIN — FAMOTIDINE SCH MG: 20 TABLET, FILM COATED ORAL at 10:19

## 2018-03-29 RX ADMIN — Medication SCH ML: at 20:40

## 2018-03-29 RX ADMIN — INSULIN ASPART SCH: 100 INJECTION, SOLUTION INTRAVENOUS; SUBCUTANEOUS at 12:00

## 2018-03-29 RX ADMIN — STANDARDIZED SENNA CONCENTRATE AND DOCUSATE SODIUM SCH TAB: 8.6; 5 TABLET, FILM COATED ORAL at 10:19

## 2018-03-29 RX ADMIN — ASPIRIN SCH MG: 81 TABLET ORAL at 10:19

## 2018-03-29 RX ADMIN — SPIRONOLACTONE SCH MG: 50 TABLET, FILM COATED ORAL at 10:19

## 2018-03-29 RX ADMIN — Medication SCH ML: at 20:39

## 2018-03-29 RX ADMIN — ACETAMINOPHEN AND CODEINE PHOSPHATE PRN TAB: 300; 30 TABLET ORAL at 19:35

## 2018-03-29 RX ADMIN — STANDARDIZED SENNA CONCENTRATE AND DOCUSATE SODIUM SCH TAB: 8.6; 5 TABLET, FILM COATED ORAL at 20:39

## 2018-03-29 NOTE — HHI.PR
Subjective


Remarks


57 YOWM Wth CAD,CABG,CMP


Breathing better


On RA


No SOB


Had defib placed


mild shoulder pain





Objective


Vital Signs





Vital Signs








  Date Time  Temp Pulse Resp B/P (MAP) Pulse Ox O2 Delivery O2 Flow Rate FiO2


 


3/29/18 18:00  70      


 


3/29/18 17:00  68      


 


3/29/18 16:00  71      


 


3/29/18 15:15      Room Air  


 


3/29/18 15:00  70      


 


3/29/18 15:00 98.4 70 16 131/88 (102) 100   


 


3/29/18 14:00  68      


 


3/29/18 13:00  72      


 


3/29/18 11:15      Room Air  


 


3/29/18 11:00  70      


 


3/29/18 10:00  84      


 


3/29/18 09:44     95   21


 


3/29/18 09:00  68      


 


3/29/18 08:30      Room Air  


 


3/29/18 07:00  68      


 


3/29/18 06:00  68      


 


3/29/18 05:00  65      


 


3/29/18 04:00  79      


 


3/29/18 03:00 97.9 81 18 118/76 (90) 99   


 


3/29/18 03:00     97 Room Air  


 


3/29/18 03:00  81      


 


3/29/18 02:00  74      


 


3/29/18 01:00  74      


 


3/29/18 00:00  78      


 


3/28/18 23:00     97 Room Air  


 


3/28/18 23:00 98.0 65 16 127/78 (94) 97   


 


3/28/18 23:00  77      


 


3/28/18 22:00  65      


 


3/28/18 21:00  79      


 


3/28/18 20:38     98   














I/O      


 


 3/28/18 3/28/18 3/28/18 3/29/18 3/29/18 3/29/18





 07:00 15:00 23:00 07:00 15:00 23:00


 


Intake Total 480 ml  580 ml 240 ml  750 ml


 


Output Total 775 ml  900 ml 500 ml  1600 ml


 


Balance -295 ml  -320 ml -260 ml  -850 ml


 


      


 


Intake Oral 480 ml  580 ml 240 ml  750 ml


 


Output Urine Total 775 ml  900 ml 500 ml  1600 ml


 


# Bowel Movements 0     








Result Diagram:  


3/27/18 0625                                                                   

             3/29/18 0558





Objective Remarks


GENERAL: WBWN WM, NAD


SKIN: Warm and dry.


HEAD: Normocephalic.


EYES: No scleral icterus. No injection or drainage. 


NECK: Supple, trachea midline. No JVD or lymphadenopathy.


CARDIOVASCULAR: Regular rate and rhythm without murmurs, gallops, or rubs. 


RESPIRATORY: Breath sounds equal bilaterally. No accessory muscle use.


GASTROINTESTINAL: Abdomen soft, non-tender, nondistended. 


MUSCULOSKELETAL: No cyanosis, or edema. 


BACK: Nontender without obvious deformity. No CVA tenderness.








A/P


Assessment and Plan


IMPRESSION:





1.  Shortness of breath, likely from his cardiomyopathy; however, need


to rule out underlying chronic obstructive pulmonary disease.  He has 


a long history of smoking.


2.  Coronary artery disease, status post coronary artery bypass 


grafting.


3.  Hypertension.


4.  History of nicotine use





PLAN:


PFT not interpretable


Stable on RA











Cem Kendall MD Mar 29, 2018 20:10

## 2018-03-29 NOTE — PD.CARD.PN
Subjective


Subjective Remarks


denies chest pain, on oxygen, off heparin, in nad, appears comfortable, s/p icd





Objective


Medications





Current Medications








 Medications


  (Trade)  Dose


 Ordered  Sig/Ilia


 Route  Start Time


 Stop Time Status Last Admin


 


  (NovoLOG


 SUPPLEMENTAL


 SCALE)  1  ACHS SLIDING  SCALE


 SQ  3/21/18 17:00


    3/29/18 12:00


 


 


  (D50w (Vial) Inj)  50 ml  UNSCH  PRN


 IV PUSH  3/21/18 16:00


     


 


 


  (Glucagon Inj)  1 mg  UNSCH  PRN


 OTHER  3/21/18 16:00


     


 


 


  (Coreg)  6.25 mg  BID


 PO  3/21/18 21:00


    3/29/18 10:18


 


 


  (Plavix)  75 mg  DAILY


 PO  3/22/18 09:00


    3/29/18 10:20


 


 


  (Nitrostat Sl)  0.4 mg  Q5M  PRN


 SL  3/21/18 16:00


    3/22/18 20:35


 


 


  (Tylenol)  650 mg  Q6H  PRN


 PO  3/21/18 16:00


    3/28/18 09:38


 


 


  (Xanax)  0.25 mg  Q8H  PRN


 PO  3/21/18 16:00


    3/26/18 21:37


 


 


  (Zofran Inj)  4 mg  Q6H  PRN


 IV PUSH  3/21/18 16:00


    3/26/18 13:15


 


 


  (Pepcid)  20 mg  BID


 PO  3/21/18 21:00


    3/29/18 10:19


 


 


  (NS Flush)  2 ml  UNSCH  PRN


 IV FLUSH  3/21/18 16:00


     


 


 


  (NS Flush)  2 ml  BID


 IV FLUSH  3/21/18 21:00


    3/29/18 09:00


 


 


  (Tylenol)  650 mg  Q4H  PRN


 PO  3/21/18 16:00


     


 


 


  (Reglan Inj)  5 mg  Q6H  PRN


 IV PUSH  3/21/18 16:00


     


 


 


  (Restoril)  15 mg  HS  PRN


 PO  3/21/18 16:00


     


 


 


  (Tylenol)  650 mg  Q6H  PRN


 PO  3/21/18 16:00


    3/27/18 14:50


 


 


  (Percocet  5-325


 Mg)  1 tab  Q6H  PRN


 PO  3/21/18 16:00


    3/22/18 12:03


 


 


  (Percocet 


 Mg)  1 tab  Q6H  PRN


 PO  3/21/18 16:00


    3/27/18 09:10


 


 


  (Morphine Inj)  2 mg  Q3H  PRN


 IV PUSH  3/21/18 16:00


     


 


 


  (Morphine Inj)  4 mg  Q3H  PRN


 IV PUSH  3/21/18 16:30


     


 


 


  (Morphine Inj)  4 mg  Q3H  PRN


 IV PUSH  3/21/18 16:30


     


 


 


  (Narcan Inj)  0.4 mg  UNSCH  PRN


 IV PUSH  3/21/18 16:00


     


 


 


  (Ursula-Colace)  1 tab  BID


 PO  3/21/18 21:00


    3/29/18 10:19


 


 


  (Milk Of


 Magnesia Liq)  30 ml  Q12H  PRN


 PO  3/21/18 16:00


     


 


 


  (Senokot)  17.2 mg  Q12H  PRN


 PO  3/21/18 16:00


     


 


 


  (Dulcolax Supp)  10 mg  DAILY  PRN


 RECTAL  3/21/18 16:00


     


 


 


  (Lactulose Liq)  30 ml  DAILY  PRN


 PO  3/21/18 16:00


     


 


 


 Sodium Chloride  1,000 ml @ 


 30 mls/hr  Q24H


 IV  3/22/18 13:10


     


 


 


  (Lipitor)  40 mg  HS


 PO  3/23/18 21:00


    3/28/18 22:57


 


 


  (Levemir Inj)  10 units  HS


 SQ  3/23/18 21:00


    3/28/18 22:58


 


 


  (Ecotrin Ec)  162 mg  DAILY


 PO  3/25/18 09:00


    3/29/18 10:19


 


 


  (Aldactone)  50 mg  DAILY


 PO  3/27/18 09:00


    3/29/18 10:19


 


 


  (Entresto 49-51


 Mg)  1 tab  BID


 PO  3/29/18 21:00


     


 


 


 Cefazolin Sodium


 2000 mg/Sodium


 Chloride  100 ml @ 


 100 mls/hr  Q8H


 IV  3/29/18 08:00


 3/30/18 00:59  3/29/18 16:00


 


 


  (Zofran Inj)  4 mg  Q4H  PRN


 IV PUSH  3/29/18 08:00


     


 


 


  (Tylenol-Codeine


 #3)  1 tab  Q4H  PRN


 PO  3/29/18 08:00


    3/29/18 14:38


 


 


  (Tylenol-Codeine


 #3)  2 tab  Q4H  PRN


 PO  3/29/18 08:00


    3/29/18 10:17


 


 


  (NS Flush)  2 ml  BID


 IV FLUSH  3/29/18 09:00


    3/29/18 09:00


 


 


  (NS Flush)  2 ml  UNSCH  PRN


 IV FLUSH  3/29/18 08:00


     


 








Vital Signs / I&O





Vital Signs








  Date Time  Temp Pulse Resp B/P (MAP) Pulse Ox O2 Delivery O2 Flow Rate FiO2


 


3/29/18 09:44     95   21


 


3/29/18 06:00  68      


 


3/29/18 05:00  65      


 


3/29/18 04:00  79      


 


3/29/18 03:00 97.9 81 18 118/76 (90) 99   


 


3/29/18 03:00     97 Room Air  


 


3/29/18 03:00  81      


 


3/29/18 02:00  74      


 


3/29/18 01:00  74      


 


3/29/18 00:00  78      


 


3/28/18 23:00     97 Room Air  


 


3/28/18 23:00 98.0 65 16 127/78 (94) 97   


 


3/28/18 23:00  77      


 


3/28/18 22:00  65      


 


3/28/18 21:00  79      


 


3/28/18 20:38     98   


 


3/28/18 20:00  81      


 


3/28/18 20:00 97.6 70 14 133/83 (100) 97   


 


3/28/18 20:00     97 Room Air  


 


3/28/18 19:00  82      














I/O      


 


 3/28/18 3/28/18 3/28/18 3/29/18 3/29/18 3/29/18





 07:00 15:00 23:00 07:00 15:00 23:00


 


Intake Total 480 ml  580 ml 240 ml  


 


Output Total 775 ml  900 ml 500 ml  


 


Balance -295 ml  -320 ml -260 ml  


 


      


 


Intake Oral 480 ml  580 ml 240 ml  


 


Output Urine Total 775 ml  900 ml 500 ml  


 


# Bowel Movements 0     








Physical Exam


GENERAL: 


SKIN: Warm and dry.


HEAD: Normocephalic.


EYES: No scleral icterus. No injection or drainage. 


NECK: Supple, trachea midline. No JVD or lymphadenopathy.


CARDIOVASCULAR: Regular rate and rhythm without murmurs, gallops, or rubs. 


RESPIRATORY: Breath sounds equal bilaterally. No accessory muscle use.


GASTROINTESTINAL: Abdomen soft, non-tender, nondistended. 


MUSCULOSKELETAL: No cyanosis, or edema. 


BACK: Nontender without obvious deformity. No CVA tenderness.





Laboratory





Laboratory Tests








Test


  3/29/18


05:58


 


Blood Urea Nitrogen 28 MG/DL 


 


Creatinine 1.12 MG/DL 


 


Random Glucose 234 MG/DL 


 


Calcium Level 9.2 MG/DL 


 


Sodium Level 135 MEQ/L 


 


Potassium Level 4.0 MEQ/L 


 


Chloride Level 100 MEQ/L 


 


Carbon Dioxide Level 26.9 MEQ/L 


 


Anion Gap 8 MEQ/L 


 


Estimat Glomerular Filtration


Rate 67 ML/MIN 


 


 


B-Type Natriuretic Peptide 160 PG/ML 








Imaging





Last 24 hours Impressions








Chest X-Ray 3/29/18 0000 Signed





Impressions: 





 Service Date/Time:  Thursday, March 29, 2018 08:37 - CONCLUSION:  1. Status 

post 





 placement of a left pacemaker. 2. No pneumothorax.     Manohar Nazario MD 











Assessment and Plan


Problem List:  


(1) Cardiomyopathy


ICD Codes:  I42.9 - Cardiomyopathy, unspecified


(2) Mitral regurgitation


ICD Codes:  I34.0 - Nonrheumatic mitral (valve) insufficiency


(3) Pulmonary HTN


ICD Codes:  I27.20 - Pulmonary hypertension, unspecified


(4) CAD (coronary artery disease)


ICD Codes:  I25.10 - Atherosclerotic heart disease of native coronary artery 

without angina pectoris


(5) Diabetes mellitus


ICD Codes:  E11.9 - Type 2 diabetes mellitus without complications


(6) Multi-vessel coronary artery stenosis


ICD Codes:  I25.10 - Atherosclerotic heart disease of native coronary artery 

without angina pectoris


(7) Unstable angina pectoris


ICD Codes:  I20.0 - Unstable angina


Status:  Acute


(8) Decreased cardiac ejection fraction


ICD Codes:  R93.1 - Abnormal findings on diagnostic imaging of heart and 

coronary circulation


Assessment and Plan


1.) Cardiomyopathy - bnp down to @100, euvolemic, dyspnea improved, f/u 

pulmonary consult, continue coreg, continue lasix 20 mg bid, aldactone 50mg bid

, change enalapril to lisinopril 5 mg qd, decrease aspirin 162 mg qd, f/u bmp/

bnp in am, may be accepted for heart transplant eval at [facility] if can 

get medicare/medicaid, d/w Dr Angel @  and case management, patient and his 

family extensively; s/p icd by Dr Lindsay today


2.) CAD - assymptomatic off iv hep, continue aspirin, plavix, Angina resolved 

with diuresis presumably due decrease wall tension from lower filling pressures











Abdiel Purvis MD Mar 29, 2018 19:00

## 2018-03-29 NOTE — HHI.PR
Subjective


Remarks


Patient underwent AICD placement today.  He reports the left arm is very 

painful.  He has not been out of bed yet.





Objective


Vitals





Vital Signs








  Date Time  Temp Pulse Resp B/P (MAP) Pulse Ox O2 Delivery O2 Flow Rate FiO2


 


3/29/18 09:44     95   21


 


3/29/18 06:00  68      


 


3/29/18 05:00  65      


 


3/29/18 04:00  79      


 


3/29/18 03:00 97.9 81 18 118/76 (90) 99   


 


3/29/18 03:00     97 Room Air  


 


3/29/18 03:00  81      


 


3/29/18 02:00  74      


 


3/29/18 01:00  74      


 


3/29/18 00:00  78      


 


3/28/18 23:00     97 Room Air  


 


3/28/18 23:00 98.0 65 16 127/78 (94) 97   


 


3/28/18 23:00  77      


 


3/28/18 22:00  65      


 


3/28/18 21:00  79      


 


3/28/18 20:38     98   


 


3/28/18 20:00  81      


 


3/28/18 20:00 97.6 70 14 133/83 (100) 97   


 


3/28/18 20:00     97 Room Air  


 


3/28/18 19:00  82      


 


3/28/18 18:00  68      


 


3/28/18 16:00  78      














I/O      


 


 3/28/18 3/28/18 3/28/18 3/29/18 3/29/18 3/29/18





 07:00 15:00 23:00 07:00 15:00 23:00


 


Intake Total 480 ml  580 ml 240 ml  


 


Output Total 775 ml  900 ml 500 ml  


 


Balance -295 ml  -320 ml -260 ml  


 


      


 


Intake Oral 480 ml  580 ml 240 ml  


 


Output Urine Total 775 ml  900 ml 500 ml  


 


# Bowel Movements 0     








Result Diagram:  


3/27/18 0625                                                                   

             3/29/18 0558





Objective Remarks


GENERAL: This is a well-nourished, well-developed patient, in no apparent 

distress.


CARDIOVASCULAR: Normal rate and regular rhythm without murmurs, gallops, or 

rubs. 


RESPIRATORY: Good respiratory efforts.  Diminished breath sounds at the bases, 

otherwise clear to auscultation bilaterally. 


GASTROINTESTINAL: Abdomen soft, non-tender, non-distended. Normal active bowel 

sounds


MUSCULOSKELETAL: Left arm in a sling.  Extremities without cyanosis, or edema.


NEURO:  Alert & Oriented x4 to person, place, time, situation.  Moves all ext x4


PSYCH: Appropriate mood and affect.





A/P


Problem List:  


(1) Hx of CABG


ICD Code:  Z95.1 - Presence of aortocoronary bypass graft


(2) Decreased cardiac ejection fraction


ICD Code:  R93.1 - Abnormal findings on diagnostic imaging of heart and 

coronary circulation


(3) Hypertension


ICD Code:  I10 - Essential (primary) hypertension


(4) Obese


ICD Code:  E66.9 - Obesity, unspecified


(5) Hyperlipidemia


ICD Code:  E78.5 - Hyperlipidemia, unspecified


(6) CAD (coronary artery disease)


ICD Code:  I25.10 - Atherosclerotic heart disease of native coronary artery 

without angina pectoris


(7) Diabetes mellitus


ICD Code:  E11.9 - Type 2 diabetes mellitus without complications


(8) Unstable angina pectoris


ICD Code:  I20.0 - Unstable angina


Status:  Acute


(9) Multi-vessel coronary artery stenosis


ICD Code:  I25.10 - Atherosclerotic heart disease of native coronary artery 

without angina pectoris


Assessment and Plan


57 year-old male with





Multivessel coronary stenosis in patient with prior history of CABG/ 

Cardiomyopathy with EF of 20%


   Status post left heart catheterization 03/22/18


   Appreciate input from cardiology. Optimize medical management. 


   Continue with heparin drip, Plavix, aspirin, beta blocker, statin, Aldactone

, Lasix


            Discussed with Dr. Lindsay today.  Status post AICD placement.  Plan 

for discharge tomorrow.


          


Diabetes mellitus 


   continue on sliding scale coverage with Accu-Cheks before meals and at 

bedtime ;hold his Glucophage and metformin 


   Hemoglobin A1c of 9.8


   Continue Levemir 10 units at bedtime





Hypertension


   Continue home medications





COPD tobacco oxygen as needed


-Appreciate pulmonology following.





GERD 


   Continue  Pepcid





Chronic back pain 


   Continue  pain meds as needed





DVT and GI prophylaxis


On heparin, stool softeners as needed.


Discharge Planning


Plan for discharge tomorrow.


Case management consult to assist the patient with medications.











Hitesh Bethea MD Mar 29, 2018 15:37

## 2018-03-29 NOTE — CATHPROC
eRALOS3 HIS Report

Study Information

Study Number    Admission           Scheduled Start               Study Start

 

[id number]      Mar 22 2018 2:16PM       03/29/2018                 Mar 29 2018 7:17AM

 

Universal Service

 

Cardiac Pacer/ICD

 

Admit Source               Facility Department

 

Other                   [facility] - Cath Lab

 

Physician and Clinical Staff

Initial Juan Jackson          Circulator        Minal Cota RN

 

                          Circulator        Bronwyn Johnson,GLEN

 

                          Other          Anesthesia, CRNA

 

                          Recorder         José MARTI, Pino Yung,RT(R)

 

Procedures Performed

Procedure

 

Lead Insertion

 

 

 

Equipment

Time           Description              Size         Mfg Part Number  Used/Scraped

 

07:55    [employer] PACER  SAFE SHEATH, FR7, 13CM        FR 7         [id number]     Used

 

07:51    Needle Sponge Count  10                             10        Used

 

07:51    Needle Sponge Count  2                             22        Used

 

07:52    Needle Sponge Count  20                             200        Used

 

07:51    Needle Sponge Count  3                             3         Used

                  DEFIBRILLATOR, FORTIFY

07:43    [employer]                       [id number]      [id number]    Used

                  ASSURA VR

                  LEAD, DURATA ACTIVE FIXATION

07:47    [employer]                  65CM             [id number]-65     Used

                  7122Q/65

 

Equipment Model, Serial, Lot Number and Expiration Data

Description             Model Number        Serial Number      Lot Number        Expiration Date

DEFIBRILLATOR, FORTIFY ASSURA

                  [id number]          [id number]                     03-

VR

LEAD, DURATA ACTIVE FIXATION

                  [id number]          [id number]                    01-

7122Q/65

 

 

 

Labs

Hgb (g/dl)       WBC (l/cumm)

 

11.60-17.00       4.00-11.00

 

15.9          7.3

 

 

 

 

Medication

Medication Total Dose (Bolus/Oral)

Medication              Total Dosage/Unit

 

2% XYLOCAINE                20 mL

 

Medications (Bolus/Oral)

Medication          Time Given         Dosage/Unit      Administered By      Reason

 

2% XYLOCAINE         3/29/2018 7:34:17 AM    20 mL        Juan Lindsay

20 mL 2% XYLOCAINE given by Juan Lindsay in Left shoulder via Subcutaneous.

 

Medication (Drip)

Medication          Time Given         Dosage/Unit      Concentration/Unit  Diluent (ml)  Solution

 

ANCEF             3/29/2018 7:13:00 AM     2 g

2 g ANCEF given by Anesthesia, CRNA via Peripheral IV.

 

VANCOMYCIN DRIP        3/29/2018 7:13:00 AM     1 g

1 g VANCOMYCIN DRIP given by Anesthesia, CRNA via Peripheral IV.

 

 

 

 

Chronological Log

Time     Study Chronological Log

 

6:55:19    Patient arrived via Bed.

 

6:56:28    Patient Name, D.O.B, / Armband Verified By R.N.

 

6:56:34    Anesthesia at bedside. Assumes care of patient.

 

6:57:30    Consent signed by the physician and the patient and verified by the Cath Lab staff.

 

6:57:32    Pre-op and post- op instructions given; patient acknowledges understanding of instructions
.

 

6:57:37    Patient has been NPO for More than 6Hrs.

 

7:04:49    Jr Prominences Protected

 

7:05:46    Disposable Defibrillator Pads Placed On Patient.

 

7:13:00    2 g ANCEF given by Anesthesia, CRNA via Peripheral IV.

 

7:13:00    1 g VANCOMYCIN DRIP given by Anesthesia, CRNA via Peripheral IV.

 

7:17:40    Skin Breakdown- open 1x1cm wound noted right middle back

 

7:17:42    A # 20 IV was noted in the Antecubital (right). Grade = 0

 

7:17:53    A # 20 IV was noted in the Antecubital (left). Grade = 0

 

7:17:57    History and physical on the chart or being dictated.

 

7:18:01    Table restraints applied according to hospital policy

 

7:18:03    Left Upper Chest Prepped Times Two.

 

7:19:36    MD paged



       First Sponge And Instrument Count Done by Rene Kumar RN.

7:29:35    Hypo's:3 hypo's, Sponges: 20sponges, Bovie/scratch:1 bovie/1 scratch

       Sutures: 10, Blades:1, Instruments: 26

7:31:28    MD arrived.

       Time Out. Correct patient, procedure, procedure equipment, site and side verified with physici
an present. Time

7:34:06

       concurred by MD, individual staff and CRNA.

       Time Out #2 - Consents verified, patient in correct position, all results are labled and displ
ayed, safety precautions

7:34:12

       taken, antibiotics administered. Time out concurred by MD, individual staff and CRNA in proced
ure

7:34:15    Case Start

 

7:34:17    20 mL 2% XYLOCAINE given by Juan Lindsay in Left shoulder via Subcutaneous.

 

7:35:15    Vascular access was obtained in the Subclav. Vein (Lft.

 

7:36:55    A pocket was created at the   L Upper Chest.

 

7:37:29    A SAFE SHEATH, FR7, 13CM FR 7 was advanced into the Subclav. Vein (Lft using the Percutane
ous technique.

 

7:42:25    A LEAD, DURATA ACTIVE FIXATION 7122Q/[id number] 65CM was inserted and positioned in the RV.

 

7:43:00    Lead placement verified under fluoroscopy

 

7:43:24    The RV lead impedance and threshold being tested.

 

7:44:32    The RV lead was sutured to the fascia.

 

7:44:54    A DEFIBRILLATOR, FORTIFY ASSURA VR [id number] was connected and placed in the pocket.

 

7:45:47    Pocket flushed with antibiotic solution

       Second Sponge And Instrument Count Done by Rene Kumar RN.

7:47:10    Hypo's: 3, Sponges: 20, Bovie/scratch: 2

       Sutures: 10, Blades: 1, Instruments: 26, Syveck Patches: ~SYVECK PATCH~ confirmed with D Michael

7:52:25    The pocket was closed.

       Final Sponge And Instrument Count Done by Rene Kumar RN.

7:59:32    Hypo's: 3, Sponges: 20, Bovie/scratch: 2

       Sutures: 10, Blades: 1, Instruments: 26, Syveck Patches: ~SYVECK PATCH~ confirmed with D Michael

7:59:48    Implant Procedure was performed.

 

8:00:03    A ICD Implant . (Single)

 

8:00:57    Bedside Report will be given.

 

8:01:00    Case End

 

8:03:00    For medications and vitals refer to anesthesia time sheet

 

 

 

 

End Study - Contrast Media Used In Study

Contrast        Total Opened (mL)    Total Used (mL)       Total Wasted (mL)

 

Unspecified      0            0              0

 

End Study - Radiation Exposure

Fluoro Time

(minutes)

0.1

End Study - Patient Disposition

Complications  Transferred To

 

No       Telemetry Bed

## 2018-03-29 NOTE — RADRPT
EXAM DATE/TIME:  03/29/2018 08:37 

 

[facility] COMPARISON:     

CHEST PA & LAT, March 21, 2018, 13:08.  CHEST SINGLE AP, March 07, 2018, 18:05.

 

                     

INDICATIONS :     

Evaluate for pneumothorax status post pacemaker placement.

                     

 

MEDICAL HISTORY :     

Diabetes mellitus type II.          

 

SURGICAL HISTORY :     

CABG.   

 

ENCOUNTER:     

Initial                                        

 

ACUITY:     

1 day      

 

PAIN SCORE:     

7/10

 

LOCATION:     

Left chest 

 

FINDINGS:     

A single view of the chest demonstrates the lungs to be symmetrically aerated without evidence of mas
s, infiltrate or effusion.  The cardiomediastinal contours are unremarkable and stable. There is evid
ence of previous cardiothoracic surgery. Osseous structures are intact. There is a left pacemaker in 
place. There is no pneumothorax.

 

CONCLUSION:     

1. Status post placement of a left pacemaker.

2. No pneumothorax.

 

 

 

 Manohar Nazario MD on March 29, 2018 at 9:22           

Board Certified Radiologist.

 This report was verified electronically.

## 2018-03-29 NOTE — PD.CARD
SINGLE CHAMBER DEFIB IMPLANT


PROCEDURE DATE:  Mar 29, 2018


NYHA Classification:  Class II (Mild)


Prevention:  Primary


Single Chamber Defib Implant


PROCEDURE:





Single chamber defibrillator implantation .





INDICATIONS:





Mr. Murphy  is a 57 -year-old  male with hx of CAD, CABG, previous EF 

around 2 years ago was 40%, subsequent MI, recent EF 20%, high risks for SCD, 

not a candidate for revascularization with congestive heart failure, ejection 

fraction [], coronary artery disease, to undergo defibrillator implantation for 

sudden death prevention.  The risks, the nature and the benefit of the 

procedure are clearly stated to him .  Risks include pneumothorax, cardiac 

perforation, stroke and even death.  He  understood and agreed to proceed.





PROCEDURE:





After written, informed consent was obtained, the patient was brought to the EP 

Lab where he  was prepped and draped in the sterile fashion. Conscious sedation 

was initiated and maintained throughout the procedure by anesthesiologist.  

Once sedation was verified, the left infraclavicular area was anesthetized with 

2% Xylocaine.  Using modified Seldinger technique, the left subclavian vein was 

cannulated on one occasion and one guide wire was advanced.  Then, using #11 

blade scalpel, a 3-cm incision was made two fingerbreadths below left clavicle.

  This incision was then taken down to the deep fascial layer using Bovie 

cautery and blunt


dissection.  Into the inferomedial direction, a device pocket was dissected, 

then the wire was dissected into the pocket.  A 2-0 Vicryl suture was placed 

around the wires to prevent bleeding.  At this point, over the wire, the 7-

Welsh dilator and introducer was advanced.  As dilator and wire were removed, 

an active fixation right ventricular pacing, sensing and defibrillatory lead 

was advanced.  After adequate pacing and


sensing thresholds were obtained, the lead was secured in the pocket with #2 

Ethibond suture.  At that point, the pocket was copiously irrigated with 

antibiotic solution.  The leads were connected to the generator and placed into 

the pocket.





I did proceed with wound closure.  The deep fascial layer was approximated with 

2-0 Vicryl suture in a continuous fashion.  The subcutaneous layer was 

approximated with 2-0 Vicryl suture in a continuous fashion.  The subcuticular 

layer was approximated with 2-0 Vicryl suture in a continuous fashion.  

Dermabond adhesive was applied to the wound, followed by a sterile pressure 

dressing.





There was no complication.  The patient tolerated procedure.  Blood loss 

minimal.





1. Implanted Hardware:  The implanted defibrillator generator is a [employer] Trav, 

model number [id number], serial number [id number].  The right ventricular pacing, 

sensing and defibrillatory lead is a [employer] model number 7122Q-65, serial 

number [id number].





2. Thresholds:  The right ventricular pacing threshold in the bipolar mode was 

0.5 volts at 0.4 milliseconds, lead impedance 840 ohms and R-wave at 12.0 mV.  





3. Settings:  The device set in VVI 40 defibrillatory portion for two zones, 

one zone for ventricular tachycardia between 170 and 250 beats per minute.  

Initial therapy consists of one burst of ATP, one ramp, 81%, 10 pulse, 10 

millisecond decremental, followed by 20, then 30 and all subsequent shocks at 

40 joules defibrillatory shock, the second zone for ventricular fibrillation 

above 250 beats per minute,


first therapy at 30 and all subsequent shocks at 40 joules defibrillatory shock.





CONCLUSIONS:





Successful defibrillator implantation.








COMMENT AND RECOMMENDATIONS:





The patient will be transferred to the telemetry unit, will be observed and


when stable can be discharged home.











Juan Lindsay MD Mar 29, 2018 14:53

## 2018-03-30 VITALS
DIASTOLIC BLOOD PRESSURE: 78 MMHG | RESPIRATION RATE: 18 BRPM | OXYGEN SATURATION: 95 % | TEMPERATURE: 97.7 F | SYSTOLIC BLOOD PRESSURE: 117 MMHG | HEART RATE: 76 BPM

## 2018-03-30 VITALS
SYSTOLIC BLOOD PRESSURE: 117 MMHG | HEART RATE: 74 BPM | RESPIRATION RATE: 18 BRPM | DIASTOLIC BLOOD PRESSURE: 74 MMHG | OXYGEN SATURATION: 96 % | TEMPERATURE: 98.6 F

## 2018-03-30 VITALS
TEMPERATURE: 97.9 F | SYSTOLIC BLOOD PRESSURE: 115 MMHG | OXYGEN SATURATION: 96 % | RESPIRATION RATE: 20 BRPM | HEART RATE: 74 BPM | DIASTOLIC BLOOD PRESSURE: 83 MMHG

## 2018-03-30 VITALS — HEART RATE: 68 BPM

## 2018-03-30 VITALS — HEART RATE: 74 BPM

## 2018-03-30 VITALS — HEART RATE: 62 BPM

## 2018-03-30 VITALS — HEART RATE: 75 BPM

## 2018-03-30 LAB
ERYTHROCYTE [DISTWIDTH] IN BLOOD BY AUTOMATED COUNT: 15.5 % (ref 11.6–17.2)
HCT VFR BLD CALC: 47.8 % (ref 39–51)
HGB BLD-MCNC: 16 GM/DL (ref 13–17)
MCH RBC QN AUTO: 26.1 PG (ref 27–34)
MCHC RBC AUTO-ENTMCNC: 33.4 % (ref 32–36)
MCV RBC AUTO: 78.1 FL (ref 80–100)
PLATELET # BLD: 211 TH/MM3 (ref 150–450)
PMV BLD AUTO: 8.9 FL (ref 7–11)
RBC # BLD AUTO: 6.12 MIL/MM3 (ref 4.5–5.9)
WBC # BLD AUTO: 9.1 TH/MM3 (ref 4–11)

## 2018-03-30 RX ADMIN — ACETAMINOPHEN AND CODEINE PHOSPHATE PRN TAB: 300; 30 TABLET ORAL at 08:25

## 2018-03-30 RX ADMIN — STANDARDIZED SENNA CONCENTRATE AND DOCUSATE SODIUM SCH TAB: 8.6; 5 TABLET, FILM COATED ORAL at 08:24

## 2018-03-30 RX ADMIN — SPIRONOLACTONE SCH MG: 50 TABLET, FILM COATED ORAL at 08:23

## 2018-03-30 RX ADMIN — INSULIN ASPART SCH: 100 INJECTION, SOLUTION INTRAVENOUS; SUBCUTANEOUS at 08:00

## 2018-03-30 RX ADMIN — CLOPIDOGREL BISULFATE SCH MG: 75 TABLET, FILM COATED ORAL at 08:24

## 2018-03-30 RX ADMIN — Medication SCH ML: at 08:23

## 2018-03-30 RX ADMIN — FAMOTIDINE SCH MG: 20 TABLET, FILM COATED ORAL at 08:24

## 2018-03-30 RX ADMIN — ASPIRIN SCH MG: 81 TABLET ORAL at 08:24

## 2018-03-30 RX ADMIN — SACUBITRIL AND VALSARTAN SCH TAB: 49; 51 TABLET, FILM COATED ORAL at 08:24

## 2018-03-30 RX ADMIN — ACETAMINOPHEN AND CODEINE PHOSPHATE PRN TAB: 300; 30 TABLET ORAL at 01:11

## 2018-03-30 RX ADMIN — CARVEDILOL SCH MG: 6.25 TABLET, FILM COATED ORAL at 08:24

## 2018-03-30 NOTE — PD.CARD.PN
Subjective


Subjective Remarks


denies chest pain, on oxygen, off heparin, in nad, appears comfortable, s/p icd





Objective


Medications





Current Medications








 Medications


  (Trade)  Dose


 Ordered  Sig/Ilia


 Route  Start Time


 Stop Time Status Last Admin


 


  (NovoLOG


 SUPPLEMENTAL


 SCALE)  1  ACHS SLIDING  SCALE


 SQ  3/21/18 17:00


    3/30/18 08:00


 


 


  (D50w (Vial) Inj)  50 ml  UNSCH  PRN


 IV PUSH  3/21/18 16:00


     


 


 


  (Glucagon Inj)  1 mg  UNSCH  PRN


 OTHER  3/21/18 16:00


     


 


 


  (Coreg)  6.25 mg  BID


 PO  3/21/18 21:00


    3/30/18 08:24


 


 


  (Plavix)  75 mg  DAILY


 PO  3/22/18 09:00


    3/30/18 08:24


 


 


  (Nitrostat Sl)  0.4 mg  Q5M  PRN


 SL  3/21/18 16:00


    3/22/18 20:35


 


 


  (Tylenol)  650 mg  Q6H  PRN


 PO  3/21/18 16:00


    3/28/18 09:38


 


 


  (Xanax)  0.25 mg  Q8H  PRN


 PO  3/21/18 16:00


    3/26/18 21:37


 


 


  (Zofran Inj)  4 mg  Q6H  PRN


 IV PUSH  3/21/18 16:00


    3/26/18 13:15


 


 


  (Pepcid)  20 mg  BID


 PO  3/21/18 21:00


    3/30/18 08:24


 


 


  (NS Flush)  2 ml  UNSCH  PRN


 IV FLUSH  3/21/18 16:00


     


 


 


  (NS Flush)  2 ml  BID


 IV FLUSH  3/21/18 21:00


    3/30/18 08:23


 


 


  (Tylenol)  650 mg  Q4H  PRN


 PO  3/21/18 16:00


     


 


 


  (Reglan Inj)  5 mg  Q6H  PRN


 IV PUSH  3/21/18 16:00


     


 


 


  (Restoril)  15 mg  HS  PRN


 PO  3/21/18 16:00


     


 


 


  (Tylenol)  650 mg  Q6H  PRN


 PO  3/21/18 16:00


    3/27/18 14:50


 


 


  (Percocet  5-325


 Mg)  1 tab  Q6H  PRN


 PO  3/21/18 16:00


    3/22/18 12:03


 


 


  (Percocet 


 Mg)  1 tab  Q6H  PRN


 PO  3/21/18 16:00


    3/27/18 09:10


 


 


  (Morphine Inj)  2 mg  Q3H  PRN


 IV PUSH  3/21/18 16:00


     


 


 


  (Morphine Inj)  4 mg  Q3H  PRN


 IV PUSH  3/21/18 16:30


     


 


 


  (Morphine Inj)  4 mg  Q3H  PRN


 IV PUSH  3/21/18 16:30


     


 


 


  (Narcan Inj)  0.4 mg  UNSCH  PRN


 IV PUSH  3/21/18 16:00


     


 


 


  (Ursula-Colace)  1 tab  BID


 PO  3/21/18 21:00


    3/30/18 08:24


 


 


  (Milk Of


 Magnesia Liq)  30 ml  Q12H  PRN


 PO  3/21/18 16:00


     


 


 


  (Senokot)  17.2 mg  Q12H  PRN


 PO  3/21/18 16:00


     


 


 


  (Dulcolax Supp)  10 mg  DAILY  PRN


 RECTAL  3/21/18 16:00


     


 


 


  (Lactulose Liq)  30 ml  DAILY  PRN


 PO  3/21/18 16:00


     


 


 


 Sodium Chloride  1,000 ml @ 


 30 mls/hr  Q24H


 IV  3/22/18 13:10


     


 


 


  (Lipitor)  40 mg  HS


 PO  3/23/18 21:00


    3/29/18 20:39


 


 


  (Levemir Inj)  10 units  HS


 SQ  3/23/18 21:00


    3/29/18 20:40


 


 


  (Ecotrin Ec)  162 mg  DAILY


 PO  3/25/18 09:00


    3/30/18 08:24


 


 


  (Aldactone)  50 mg  DAILY


 PO  3/27/18 09:00


    3/30/18 08:23


 


 


  (Entresto 49-51


 Mg)  1 tab  BID


 PO  3/29/18 21:00


    3/30/18 08:24


 


 


  (Zofran Inj)  4 mg  Q4H  PRN


 IV PUSH  3/29/18 08:00


     


 


 


  (Tylenol-Codeine


 #3)  1 tab  Q4H  PRN


 PO  3/29/18 08:00


    3/30/18 08:25


 


 


  (Tylenol-Codeine


 #3)  2 tab  Q4H  PRN


 PO  3/29/18 08:00


    3/29/18 10:17


 


 


  (NS Flush)  2 ml  BID


 IV FLUSH  3/29/18 09:00


    3/29/18 09:00


 


 


  (NS Flush)  2 ml  UNSCH  PRN


 IV FLUSH  3/29/18 08:00


     


 


 


  (Keflex)  500 mg  Q8H


 PO  3/30/18 09:00


 4/1/18 08:59   


 








Vital Signs / I&O





Vital Signs








  Date Time  Temp Pulse Resp B/P (MAP) Pulse Ox O2 Delivery O2 Flow Rate FiO2


 


3/30/18 08:00  70      


 


3/30/18 08:00 98.6 74 18 117/74 (88) 96   


 


3/30/18 08:00     96 Room Air  


 


3/30/18 04:08  75      


 


3/30/18 04:00 97.9 74 20 115/83 (94) 96   


 


3/30/18 00:07  74      


 


3/30/18 00:00 97.7 76 18 117/78 (91) 95   


 


3/29/18 20:00 97.8 81 20 142/81 (101) 96   


 


3/29/18 19:51  75      


 


3/29/18 18:00  70      


 


3/29/18 17:00  68      


 


3/29/18 16:00  71      


 


3/29/18 15:15      Room Air  


 


3/29/18 15:00  70      


 


3/29/18 15:00 98.4 70 16 131/88 (102) 100   


 


3/29/18 14:00  68      


 


3/29/18 13:00  72      














I/O      


 


 3/29/18 3/29/18 3/29/18 3/30/18 3/30/18 3/30/18





 07:00 15:00 23:00 07:00 15:00 23:00


 


Intake Total 240 ml  750 ml   


 


Output Total 500 ml  1600 ml 700 ml  


 


Balance -260 ml  -850 ml -700 ml  


 


      


 


Intake Oral 240 ml  750 ml   


 


Output Urine Total 500 ml  1600 ml 700 ml  








Physical Exam


GENERAL: 


SKIN: Warm and dry.


HEAD: Normocephalic.


EYES: No scleral icterus. No injection or drainage. 


NECK: Supple, trachea midline. No JVD or lymphadenopathy.


CARDIOVASCULAR: Regular rate and rhythm without murmurs, gallops, or rubs. 


RESPIRATORY: Breath sounds equal bilaterally. No accessory muscle use.


GASTROINTESTINAL: Abdomen soft, non-tender, nondistended. 


MUSCULOSKELETAL: No cyanosis, or edema. 


BACK: Nontender without obvious deformity. No CVA tenderness.





Laboratory





Laboratory Tests








Test


  3/30/18


06:01


 


White Blood Count 9.1 TH/MM3 


 


Red Blood Count 6.12 MIL/MM3 


 


Hemoglobin 16.0 GM/DL 


 


Hematocrit 47.8 % 


 


Mean Corpuscular Volume 78.1 FL 


 


Mean Corpuscular Hemoglobin 26.1 PG 


 


Mean Corpuscular Hemoglobin


Concent 33.4 % 


 


 


Red Cell Distribution Width 15.5 % 


 


Platelet Count 211 TH/MM3 


 


Mean Platelet Volume 8.9 FL 











Assessment and Plan


Problem List:  


(1) Cardiomyopathy


ICD Codes:  I42.9 - Cardiomyopathy, unspecified


Assessment and Plan


1.) Cardiomyopathy - bnp down to @100, euvolemic, dyspnea improved, f/u 

pulmonary consult, continue coreg, continue lasix 20 mg bid, aldactone 50mg bid

, change enalapril to lisinopril 5 mg qd, decrease aspirin 162 mg qd, f/u bmp/

bnp in am, may be accepted for heart transplant eval at [facility] if can 

get medicare/medicaid, d/w Dr Angel @  and case management, patient and his 

family extensively; s/p icd by Dr Lindsay 3/29/18


2.) CAD - assymptomatic off iv hep, continue aspirin, plavix, Angina resolved 

with diuresis presumably due decrease wall tension from lower filling pressures


3.) ok to dc from cv standpoint, f/u with me 4/2/18











Abdiel Purvis MD Mar 30, 2018 12:23

## 2018-03-30 NOTE — HHI.DS
__________________________________________________





Discharge Summary


Admission Date


Mar 22, 2018 at 14:16


Admitting Diagnosis





unstable angina, EF of 19%, chest pain





(1) Hx of CABG


ICD Code:  Z95.1 - Presence of aortocoronary bypass graft


(2) Decreased cardiac ejection fraction


ICD Code:  R93.1 - Abnormal findings on diagnostic imaging of heart and 

coronary circulation


(3) Hypertension


ICD Code:  I10 - Essential (primary) hypertension


(4) Obese


ICD Code:  E66.9 - Obesity, unspecified


(5) Hyperlipidemia


ICD Code:  E78.5 - Hyperlipidemia, unspecified


(6) CAD (coronary artery disease)


ICD Code:  I25.10 - Atherosclerotic heart disease of native coronary artery 

without angina pectoris


(7) Diabetes mellitus


ICD Code:  E11.9 - Type 2 diabetes mellitus without complications


(8) Unstable angina pectoris


ICD Code:  I20.0 - Unstable angina


Status:  Acute


(9) Multi-vessel coronary artery stenosis


ICD Code:  I25.10 - Atherosclerotic heart disease of native coronary artery 

without angina pectoris


Brief History - From Admission


Patient is a 57-year-old  male presenting to the emergency department 

for evaluation of left-sided chest pain worse when trying to raise his left 

arm.  As well as shortness of breath.  Patient has had this on and off for the 

past 2-3 weeks.  Patient was followed with Dr. Purvis of cardiology that test 

that showed a significant ejection fraction reduction from 40% down to 19%.  

Dr. Purvis wants the patient to be placed on heparin and to be followed for 

cardiac catheterization tomorrow.  Symptoms have not improved been feeling very 

weak and tired as well as short of breath with any movement has no numbness and 

tingling in left arm is improved but still some chest discomfort and pressure-

like something sitting on his chest chest pain is 6 out of 10 taken aspirin 

today has history of high blood pressure and cholesterol does take Plavix


Will be placed in observation will put on heparin drip and Dr. Purvis will 

take for cardiac catheterization tomorrow


CBC/BMP:  


3/30/18 0601                                                                   

             3/29/18 0558





Significant Findings





Laboratory Tests








Test


  3/29/18


05:58 3/30/18


06:01


 


Blood Urea Nitrogen


  28 MG/DL


(7-18) 


 


 


Random Glucose


  234 MG/DL


() 


 


 


Sodium Level


  135 MEQ/L


(136-145) 


 


 


Estimat Glomerular Filtration


Rate 67 ML/MIN


(>89) 


 


 


B-Type Natriuretic Peptide


  160 PG/ML


(0-100) 


 


 


Red Blood Count


  


  6.12 MIL/MM3


(4.50-5.90)


 


Mean Corpuscular Volume


  


  78.1 FL


(80.0-100.0)


 


Mean Corpuscular Hemoglobin


  


  26.1 PG


(27.0-34.0)








PE at Discharge


GENERAL: This is a well-nourished, well-developed patient, in no apparent 

distress.


CARDIOVASCULAR: Normal rate and regular rhythm without murmurs, gallops, or 

rubs. 


RESPIRATORY: Good respiratory efforts.  Diminished breath sounds at the bases, 

otherwise clear to auscultation bilaterally. 


GASTROINTESTINAL: Abdomen soft, non-tender, non-distended. Normal active bowel 

sounds


MUSCULOSKELETAL: Left arm in a sling.  Extremities without cyanosis, or edema.


NEURO:  Alert & Oriented x4 to person, place, time, situation.  Moves all ext x4


PSYCH: Appropriate mood and affect.


Pt Condition on Discharge:  Good


Discharge Disposition:  Discharge Home


Discharge Instructions


DIET: Follow Instructions for:  Heart Healthy Diet


Activities you can perform:  Regular-No Restrictions











Hitesh Bethea MD Mar 30, 2018 14:18

## 2018-03-30 NOTE — EKG
Date Performed: 03/30/2018       Time Performed: 05:09:26

 

PTAGE:      57 years

 

EKG:      Sinus rhythm 

 

 Left ventricular hypertrophy Lateral ST-T changes may be due to hypertrophy and/or ischemia Abnormal
 ECG

 

PREVIOUS TRACING       : 03/23/2018 07.05 Since the previous tracing, no significant change noted

 

DOCTOR:   Nazario Mcguire  Interpretating Date/Time  03/30/2018 11:34:17

## 2018-03-30 NOTE — PD.CARD.PN
Subjective


Subjective Remarks


Feels okay





Objective


Medications





Current Medications








 Medications


  (Trade)  Dose


 Ordered  Sig/Ilia


 Route  Start Time


 Stop Time Status Last Admin


 


  (NovoLOG


 SUPPLEMENTAL


 SCALE)  1  ACHS SLIDING  SCALE


 SQ  3/21/18 17:00


    3/29/18 20:39


 


 


  (D50w (Vial) Inj)  50 ml  UNSCH  PRN


 IV PUSH  3/21/18 16:00


     


 


 


  (Glucagon Inj)  1 mg  UNSCH  PRN


 OTHER  3/21/18 16:00


     


 


 


  (Coreg)  6.25 mg  BID


 PO  3/21/18 21:00


    3/29/18 20:39


 


 


  (Plavix)  75 mg  DAILY


 PO  3/22/18 09:00


    3/29/18 10:20


 


 


  (Nitrostat Sl)  0.4 mg  Q5M  PRN


 SL  3/21/18 16:00


    3/22/18 20:35


 


 


  (Tylenol)  650 mg  Q6H  PRN


 PO  3/21/18 16:00


    3/28/18 09:38


 


 


  (Xanax)  0.25 mg  Q8H  PRN


 PO  3/21/18 16:00


    3/26/18 21:37


 


 


  (Zofran Inj)  4 mg  Q6H  PRN


 IV PUSH  3/21/18 16:00


    3/26/18 13:15


 


 


  (Pepcid)  20 mg  BID


 PO  3/21/18 21:00


    3/29/18 20:39


 


 


  (NS Flush)  2 ml  UNSCH  PRN


 IV FLUSH  3/21/18 16:00


     


 


 


  (NS Flush)  2 ml  BID


 IV FLUSH  3/21/18 21:00


    3/29/18 20:39


 


 


  (Tylenol)  650 mg  Q4H  PRN


 PO  3/21/18 16:00


     


 


 


  (Reglan Inj)  5 mg  Q6H  PRN


 IV PUSH  3/21/18 16:00


     


 


 


  (Restoril)  15 mg  HS  PRN


 PO  3/21/18 16:00


     


 


 


  (Tylenol)  650 mg  Q6H  PRN


 PO  3/21/18 16:00


    3/27/18 14:50


 


 


  (Percocet  5-325


 Mg)  1 tab  Q6H  PRN


 PO  3/21/18 16:00


    3/22/18 12:03


 


 


  (Percocet 


 Mg)  1 tab  Q6H  PRN


 PO  3/21/18 16:00


    3/27/18 09:10


 


 


  (Morphine Inj)  2 mg  Q3H  PRN


 IV PUSH  3/21/18 16:00


     


 


 


  (Morphine Inj)  4 mg  Q3H  PRN


 IV PUSH  3/21/18 16:30


     


 


 


  (Morphine Inj)  4 mg  Q3H  PRN


 IV PUSH  3/21/18 16:30


     


 


 


  (Narcan Inj)  0.4 mg  UNSCH  PRN


 IV PUSH  3/21/18 16:00


     


 


 


  (Ursula-Colace)  1 tab  BID


 PO  3/21/18 21:00


    3/29/18 20:39


 


 


  (Milk Of


 Magnesia Liq)  30 ml  Q12H  PRN


 PO  3/21/18 16:00


     


 


 


  (Senokot)  17.2 mg  Q12H  PRN


 PO  3/21/18 16:00


     


 


 


  (Dulcolax Supp)  10 mg  DAILY  PRN


 RECTAL  3/21/18 16:00


     


 


 


  (Lactulose Liq)  30 ml  DAILY  PRN


 PO  3/21/18 16:00


     


 


 


 Sodium Chloride  1,000 ml @ 


 30 mls/hr  Q24H


 IV  3/22/18 13:10


     


 


 


  (Lipitor)  40 mg  HS


 PO  3/23/18 21:00


    3/29/18 20:39


 


 


  (Levemir Inj)  10 units  HS


 SQ  3/23/18 21:00


    3/29/18 20:40


 


 


  (Ecotrin Ec)  162 mg  DAILY


 PO  3/25/18 09:00


    3/29/18 10:19


 


 


  (Aldactone)  50 mg  DAILY


 PO  3/27/18 09:00


    3/29/18 10:19


 


 


  (Entresto 49-51


 Mg)  1 tab  BID


 PO  3/29/18 21:00


    3/29/18 20:39


 


 


  (Zofran Inj)  4 mg  Q4H  PRN


 IV PUSH  3/29/18 08:00


     


 


 


  (Tylenol-Codeine


 #3)  1 tab  Q4H  PRN


 PO  3/29/18 08:00


    3/30/18 01:11


 


 


  (Tylenol-Codeine


 #3)  2 tab  Q4H  PRN


 PO  3/29/18 08:00


    3/29/18 10:17


 


 


  (NS Flush)  2 ml  BID


 IV FLUSH  3/29/18 09:00


    3/29/18 09:00


 


 


  (NS Flush)  2 ml  UNSCH  PRN


 IV FLUSH  3/29/18 08:00


     


 








Vital Signs / I&O





Vital Signs








  Date Time  Temp Pulse Resp B/P (MAP) Pulse Ox O2 Delivery O2 Flow Rate FiO2


 


3/30/18 04:08  75      


 


3/30/18 04:00 97.9 74 20 115/83 (94) 96   


 


3/30/18 00:07  74      


 


3/30/18 00:00 97.7 76 18 117/78 (91) 95   


 


3/29/18 20:00 97.8 81 20 142/81 (101) 96   


 


3/29/18 19:51  75      


 


3/29/18 18:00  70      


 


3/29/18 17:00  68      


 


3/29/18 16:00  71      


 


3/29/18 15:15      Room Air  


 


3/29/18 15:00  70      


 


3/29/18 15:00 98.4 70 16 131/88 (102) 100   


 


3/29/18 14:00  68      


 


3/29/18 13:00  72      


 


3/29/18 11:15      Room Air  


 


3/29/18 11:00  70      


 


3/29/18 10:00  84      


 


3/29/18 09:44     95   21


 


3/29/18 09:00  68      


 


3/29/18 08:30      Room Air  














I/O      


 


 3/29/18 3/29/18 3/29/18 3/30/18 3/30/18 3/30/18





 07:00 15:00 23:00 07:00 15:00 23:00


 


Intake Total 240 ml  750 ml   


 


Output Total 500 ml  1600 ml 700 ml  


 


Balance -260 ml  -850 ml -700 ml  


 


      


 


Intake Oral 240 ml  750 ml   


 


Output Urine Total 500 ml  1600 ml 700 ml  








Physical Exam


GENERAL: Well-nourished, well-developed patient.


SKIN: Warm and dry.  Left chest wall incision well approximated without 

bruising or bleeding.


HEAD: Normocephalic.


EYES: No scleral icterus. No injection or drainage. 


NECK: Supple, trachea midline. No JVD or lymphadenopathy.


CARDIOVASCULAR: Regular rate and rhythm without murmurs, gallops, or rubs. 


RESPIRATORY: Breath sounds equal bilaterally. No accessory muscle use.


GASTROINTESTINAL: Abdomen soft, non-tender, nondistended. 


EXTREMITIES: No cyanosis, or edema. 


NEUROLOGICAL: Awake, alert, and oriented x 3. Non-focal.


Laboratory





Laboratory Tests








Test


  3/30/18


06:01


 


White Blood Count 9.1 TH/MM3 


 


Red Blood Count 6.12 MIL/MM3 


 


Hemoglobin 16.0 GM/DL 


 


Hematocrit 47.8 % 


 


Mean Corpuscular Volume 78.1 FL 


 


Mean Corpuscular Hemoglobin 26.1 PG 


 


Mean Corpuscular Hemoglobin


Concent 33.4 % 


 


 


Red Cell Distribution Width 15.5 % 


 


Platelet Count 211 TH/MM3 


 


Mean Platelet Volume 8.9 FL 











Assessment and Plan


Problem List:  


(1) Cardiomyopathy


ICD Codes:  I42.9 - Cardiomyopathy, unspecified


Plan:  Stable for discharge from EP standpoint when cleared by managing team.  

Follow-up with Dr. Lindsay in 2 weeks per my discussion with him.














Janki Michele Mar 30, 2018 08:27

## 2018-04-03 NOTE — RSPPFT
DATE OF PROCEDURE:   3/26/18



COMMENTS:   



Spirometry shows FVC of 3.6 at 77% of predicted. FEV1 of 2.2 at 60%. FEV1/FVC ratio is 
decreased. Flow is decreased at FEF 25, FEF 50, FEF 75 and FEF 25-75. There is no response 
after bronchodilator treatment. Flow volume loop indicates an obstructive pattern.  



IMPRESSION:    

   

1.    Moderately severe obstructive lung disease.

2.    No response after bronchodilator treatment.

## 2018-05-24 ENCOUNTER — HOSPITAL ENCOUNTER (INPATIENT)
Dept: HOSPITAL 17 - NEPC | Age: 58
LOS: 12 days | Discharge: HOME HEALTH | DRG: 853 | End: 2018-06-05
Attending: HOSPITALIST | Admitting: HOSPITALIST
Payer: COMMERCIAL

## 2018-05-24 VITALS
RESPIRATION RATE: 16 BRPM | SYSTOLIC BLOOD PRESSURE: 96 MMHG | HEART RATE: 86 BPM | DIASTOLIC BLOOD PRESSURE: 59 MMHG | OXYGEN SATURATION: 97 % | TEMPERATURE: 98.1 F

## 2018-05-24 VITALS
SYSTOLIC BLOOD PRESSURE: 131 MMHG | RESPIRATION RATE: 26 BRPM | HEART RATE: 103 BPM | DIASTOLIC BLOOD PRESSURE: 80 MMHG | OXYGEN SATURATION: 97 %

## 2018-05-24 VITALS — WEIGHT: 187.17 LBS | HEIGHT: 71 IN | BODY MASS INDEX: 26.2 KG/M2

## 2018-05-24 VITALS
SYSTOLIC BLOOD PRESSURE: 92 MMHG | OXYGEN SATURATION: 97 % | HEART RATE: 94 BPM | RESPIRATION RATE: 18 BRPM | DIASTOLIC BLOOD PRESSURE: 58 MMHG

## 2018-05-24 VITALS
DIASTOLIC BLOOD PRESSURE: 59 MMHG | HEART RATE: 98 BPM | RESPIRATION RATE: 18 BRPM | SYSTOLIC BLOOD PRESSURE: 97 MMHG | OXYGEN SATURATION: 97 %

## 2018-05-24 VITALS
RESPIRATION RATE: 18 BRPM | HEART RATE: 96 BPM | SYSTOLIC BLOOD PRESSURE: 98 MMHG | OXYGEN SATURATION: 97 % | DIASTOLIC BLOOD PRESSURE: 58 MMHG

## 2018-05-24 VITALS
TEMPERATURE: 97.8 F | HEART RATE: 110 BPM | RESPIRATION RATE: 28 BRPM | SYSTOLIC BLOOD PRESSURE: 112 MMHG | OXYGEN SATURATION: 100 % | DIASTOLIC BLOOD PRESSURE: 68 MMHG

## 2018-05-24 VITALS
HEART RATE: 108 BPM | SYSTOLIC BLOOD PRESSURE: 112 MMHG | OXYGEN SATURATION: 100 % | TEMPERATURE: 97.8 F | RESPIRATION RATE: 28 BRPM | DIASTOLIC BLOOD PRESSURE: 69 MMHG

## 2018-05-24 VITALS
SYSTOLIC BLOOD PRESSURE: 137 MMHG | HEART RATE: 100 BPM | DIASTOLIC BLOOD PRESSURE: 89 MMHG | RESPIRATION RATE: 18 BRPM | OXYGEN SATURATION: 98 %

## 2018-05-24 VITALS
DIASTOLIC BLOOD PRESSURE: 59 MMHG | RESPIRATION RATE: 16 BRPM | OXYGEN SATURATION: 96 % | HEART RATE: 85 BPM | SYSTOLIC BLOOD PRESSURE: 94 MMHG

## 2018-05-24 VITALS
HEART RATE: 93 BPM | SYSTOLIC BLOOD PRESSURE: 89 MMHG | OXYGEN SATURATION: 100 % | DIASTOLIC BLOOD PRESSURE: 58 MMHG | RESPIRATION RATE: 16 BRPM

## 2018-05-24 VITALS — OXYGEN SATURATION: 98 %

## 2018-05-24 VITALS — OXYGEN SATURATION: 100 %

## 2018-05-24 DIAGNOSIS — Z91.041: ICD-10-CM

## 2018-05-24 DIAGNOSIS — M86.171: ICD-10-CM

## 2018-05-24 DIAGNOSIS — Z85.828: ICD-10-CM

## 2018-05-24 DIAGNOSIS — I25.10: ICD-10-CM

## 2018-05-24 DIAGNOSIS — E11.42: ICD-10-CM

## 2018-05-24 DIAGNOSIS — Z79.84: ICD-10-CM

## 2018-05-24 DIAGNOSIS — I47.2: ICD-10-CM

## 2018-05-24 DIAGNOSIS — L03.031: ICD-10-CM

## 2018-05-24 DIAGNOSIS — B95.61: ICD-10-CM

## 2018-05-24 DIAGNOSIS — I48.92: ICD-10-CM

## 2018-05-24 DIAGNOSIS — R65.20: ICD-10-CM

## 2018-05-24 DIAGNOSIS — I27.20: ICD-10-CM

## 2018-05-24 DIAGNOSIS — E11.69: ICD-10-CM

## 2018-05-24 DIAGNOSIS — A41.01: Primary | ICD-10-CM

## 2018-05-24 DIAGNOSIS — R06.82: ICD-10-CM

## 2018-05-24 DIAGNOSIS — I21.4: ICD-10-CM

## 2018-05-24 DIAGNOSIS — I50.9: ICD-10-CM

## 2018-05-24 DIAGNOSIS — E11.52: ICD-10-CM

## 2018-05-24 DIAGNOSIS — I11.0: ICD-10-CM

## 2018-05-24 DIAGNOSIS — J44.9: ICD-10-CM

## 2018-05-24 DIAGNOSIS — Z95.1: ICD-10-CM

## 2018-05-24 DIAGNOSIS — Z79.82: ICD-10-CM

## 2018-05-24 DIAGNOSIS — Z95.810: ICD-10-CM

## 2018-05-24 DIAGNOSIS — I25.2: ICD-10-CM

## 2018-05-24 DIAGNOSIS — I49.01: ICD-10-CM

## 2018-05-24 DIAGNOSIS — I47.1: ICD-10-CM

## 2018-05-24 DIAGNOSIS — I25.5: ICD-10-CM

## 2018-05-24 DIAGNOSIS — Z82.49: ICD-10-CM

## 2018-05-24 DIAGNOSIS — D65: ICD-10-CM

## 2018-05-24 DIAGNOSIS — Z87.891: ICD-10-CM

## 2018-05-24 DIAGNOSIS — E78.5: ICD-10-CM

## 2018-05-24 DIAGNOSIS — N17.9: ICD-10-CM

## 2018-05-24 DIAGNOSIS — E87.2: ICD-10-CM

## 2018-05-24 LAB
ALBUMIN SERPL-MCNC: 3 GM/DL (ref 3.4–5)
ALP SERPL-CCNC: 114 U/L (ref 45–117)
ALT SERPL-CCNC: 25 U/L (ref 12–78)
AST SERPL-CCNC: 37 U/L (ref 15–37)
BASOPHILS # BLD AUTO: 0 TH/MM3 (ref 0–0.2)
BASOPHILS NFR BLD: 0.1 % (ref 0–2)
BILIRUB SERPL-MCNC: 0.9 MG/DL (ref 0.2–1)
BUN SERPL-MCNC: 36 MG/DL (ref 7–18)
CALCIUM SERPL-MCNC: 8.8 MG/DL (ref 8.5–10.1)
CHLORIDE SERPL-SCNC: 95 MEQ/L (ref 98–107)
CREAT SERPL-MCNC: 1.84 MG/DL (ref 0.6–1.3)
CRP SERPL-MCNC: 26.8 MG/DL (ref 0–0.3)
DOHLE BOD BLD QL SMEAR: PRESENT
EOSINOPHIL # BLD: 0 TH/MM3 (ref 0–0.4)
EOSINOPHIL NFR BLD: 0 % (ref 0–4)
ERYTHROCYTE [DISTWIDTH] IN BLOOD BY AUTOMATED COUNT: 17.1 % (ref 11.6–17.2)
ERYTHROCYTE [DISTWIDTH] IN BLOOD BY AUTOMATED COUNT: 17.5 % (ref 11.6–17.2)
GFR SERPLBLD BASED ON 1.73 SQ M-ARVRAT: 38 ML/MIN (ref 89–?)
GLUCOSE SERPL-MCNC: 407 MG/DL (ref 74–106)
HCO3 BLD-SCNC: 19.2 MEQ/L (ref 21–32)
HCT VFR BLD CALC: 43.6 % (ref 39–51)
HCT VFR BLD CALC: 43.9 % (ref 39–51)
HGB BLD-MCNC: 14.5 GM/DL (ref 13–17)
HGB BLD-MCNC: 15 GM/DL (ref 13–17)
INR PPP: 1.3 RATIO
LYMPHOCYTES # BLD AUTO: 0.3 TH/MM3 (ref 1–4.8)
LYMPHOCYTES NFR BLD AUTO: 2.8 % (ref 9–44)
LYMPHOCYTES: 2 % (ref 9–44)
MCH RBC QN AUTO: 27.1 PG (ref 27–34)
MCH RBC QN AUTO: 27.9 PG (ref 27–34)
MCHC RBC AUTO-ENTMCNC: 33.2 % (ref 32–36)
MCHC RBC AUTO-ENTMCNC: 34.1 % (ref 32–36)
MCV RBC AUTO: 81.7 FL (ref 80–100)
MCV RBC AUTO: 81.8 FL (ref 80–100)
METAMYELOCYTES NFR BLD: 1 % (ref 0–1)
MONOCYTE #: 0.5 TH/MM3 (ref 0–0.9)
MONOCYTES NFR BLD: 5.4 % (ref 0–8)
MONOCYTES: 4 % (ref 0–8)
MYELOCYTES NFR BLD: 1 % (ref 0–0)
NEUTROPHILS # BLD AUTO: 8.8 TH/MM3 (ref 1.8–7.7)
NEUTROPHILS NFR BLD AUTO: 91.7 % (ref 16–70)
NEUTS BAND # BLD MANUAL: 9 TH/MM3 (ref 1.8–7.7)
NEUTS BAND NFR BLD: 19 % (ref 0–6)
NEUTS SEG NFR BLD MANUAL: 73 % (ref 16–70)
OVALOCYTES BLD QL SMEAR: (no result)
PLATELET # BLD: 105 TH/MM3 (ref 150–450)
PLATELET # BLD: 133 TH/MM3 (ref 150–450)
PMV BLD AUTO: 9.1 FL (ref 7–11)
PMV BLD AUTO: 9.2 FL (ref 7–11)
PROT SERPL-MCNC: 7.4 GM/DL (ref 6.4–8.2)
PROTHROMBIN TIME: 13.4 SEC (ref 9.8–11.6)
RBC # BLD AUTO: 5.34 MIL/MM3 (ref 4.5–5.9)
RBC # BLD AUTO: 5.37 MIL/MM3 (ref 4.5–5.9)
SODIUM SERPL-SCNC: 130 MEQ/L (ref 136–145)
SODIUM,RANDOM URINE: 12 MEQ/L
TROPONIN I SERPL-MCNC: 3.04 NG/ML (ref 0.02–0.05)
WBC # BLD AUTO: 13 TH/MM3 (ref 4–11)
WBC # BLD AUTO: 9.6 TH/MM3 (ref 4–11)

## 2018-05-24 PROCEDURE — 86403 PARTICLE AGGLUT ANTBDY SCRN: CPT

## 2018-05-24 PROCEDURE — 84300 ASSAY OF URINE SODIUM: CPT

## 2018-05-24 PROCEDURE — 87205 SMEAR GRAM STAIN: CPT

## 2018-05-24 PROCEDURE — A9577 INJ MULTIHANCE: HCPCS

## 2018-05-24 PROCEDURE — 87206 SMEAR FLUORESCENT/ACID STAI: CPT

## 2018-05-24 PROCEDURE — 82948 REAGENT STRIP/BLOOD GLUCOSE: CPT

## 2018-05-24 PROCEDURE — 84439 ASSAY OF FREE THYROXINE: CPT

## 2018-05-24 PROCEDURE — 87147 CULTURE TYPE IMMUNOLOGIC: CPT

## 2018-05-24 PROCEDURE — 36569 INSJ PICC 5 YR+ W/O IMAGING: CPT

## 2018-05-24 PROCEDURE — 80048 BASIC METABOLIC PNL TOTAL CA: CPT

## 2018-05-24 PROCEDURE — 88305 TISSUE EXAM BY PATHOLOGIST: CPT

## 2018-05-24 PROCEDURE — 93005 ELECTROCARDIOGRAM TRACING: CPT

## 2018-05-24 PROCEDURE — 85379 FIBRIN DEGRADATION QUANT: CPT

## 2018-05-24 PROCEDURE — 73720 MRI LWR EXTREMITY W/O&W/DYE: CPT

## 2018-05-24 PROCEDURE — G0475 HIV COMBINATION ASSAY: HCPCS

## 2018-05-24 PROCEDURE — 84156 ASSAY OF PROTEIN URINE: CPT

## 2018-05-24 PROCEDURE — 80053 COMPREHEN METABOLIC PANEL: CPT

## 2018-05-24 PROCEDURE — 83605 ASSAY OF LACTIC ACID: CPT

## 2018-05-24 PROCEDURE — 84100 ASSAY OF PHOSPHORUS: CPT

## 2018-05-24 PROCEDURE — 85384 FIBRINOGEN ACTIVITY: CPT

## 2018-05-24 PROCEDURE — 83880 ASSAY OF NATRIURETIC PEPTIDE: CPT

## 2018-05-24 PROCEDURE — 93306 TTE W/DOPPLER COMPLETE: CPT

## 2018-05-24 PROCEDURE — 85730 THROMBOPLASTIN TIME PARTIAL: CPT

## 2018-05-24 PROCEDURE — 76775 US EXAM ABDO BACK WALL LIM: CPT

## 2018-05-24 PROCEDURE — 93923 UPR/LXTR ART STDY 3+ LVLS: CPT

## 2018-05-24 PROCEDURE — 80170 ASSAY OF GENTAMICIN: CPT

## 2018-05-24 PROCEDURE — 83735 ASSAY OF MAGNESIUM: CPT

## 2018-05-24 PROCEDURE — 87070 CULTURE OTHR SPECIMN AEROBIC: CPT

## 2018-05-24 PROCEDURE — 87015 SPECIMEN INFECT AGNT CONCNTJ: CPT

## 2018-05-24 PROCEDURE — 82805 BLOOD GASES W/O2 SATURATION: CPT

## 2018-05-24 PROCEDURE — 36600 WITHDRAWAL OF ARTERIAL BLOOD: CPT

## 2018-05-24 PROCEDURE — 83036 HEMOGLOBIN GLYCOSYLATED A1C: CPT

## 2018-05-24 PROCEDURE — 87102 FUNGUS ISOLATION CULTURE: CPT

## 2018-05-24 PROCEDURE — C9113 INJ PANTOPRAZOLE SODIUM, VIA: HCPCS

## 2018-05-24 PROCEDURE — 82565 ASSAY OF CREATININE: CPT

## 2018-05-24 PROCEDURE — 85007 BL SMEAR W/DIFF WBC COUNT: CPT

## 2018-05-24 PROCEDURE — 82570 ASSAY OF URINE CREATININE: CPT

## 2018-05-24 PROCEDURE — 84443 ASSAY THYROID STIM HORMONE: CPT

## 2018-05-24 PROCEDURE — 80074 ACUTE HEPATITIS PANEL: CPT

## 2018-05-24 PROCEDURE — 73660 X-RAY EXAM OF TOE(S): CPT

## 2018-05-24 PROCEDURE — 85610 PROTHROMBIN TIME: CPT

## 2018-05-24 PROCEDURE — 88311 DECALCIFY TISSUE: CPT

## 2018-05-24 PROCEDURE — 87040 BLOOD CULTURE FOR BACTERIA: CPT

## 2018-05-24 PROCEDURE — 71045 X-RAY EXAM CHEST 1 VIEW: CPT

## 2018-05-24 PROCEDURE — 86140 C-REACTIVE PROTEIN: CPT

## 2018-05-24 PROCEDURE — L3260 AMBULATORY SURGICAL BOOT EAC: HCPCS

## 2018-05-24 PROCEDURE — 96365 THER/PROPH/DIAG IV INF INIT: CPT

## 2018-05-24 PROCEDURE — 76937 US GUIDE VASCULAR ACCESS: CPT

## 2018-05-24 PROCEDURE — 85652 RBC SED RATE AUTOMATED: CPT

## 2018-05-24 PROCEDURE — 84484 ASSAY OF TROPONIN QUANT: CPT

## 2018-05-24 PROCEDURE — 87389 HIV-1 AG W/HIV-1&-2 AB AG IA: CPT

## 2018-05-24 PROCEDURE — 94664 DEMO&/EVAL PT USE INHALER: CPT

## 2018-05-24 PROCEDURE — 80061 LIPID PANEL: CPT

## 2018-05-24 PROCEDURE — 85027 COMPLETE CBC AUTOMATED: CPT

## 2018-05-24 PROCEDURE — 75635 CT ANGIO ABDOMINAL ARTERIES: CPT

## 2018-05-24 PROCEDURE — 96375 TX/PRO/DX INJ NEW DRUG ADDON: CPT

## 2018-05-24 PROCEDURE — 87116 MYCOBACTERIA CULTURE: CPT

## 2018-05-24 PROCEDURE — 87186 SC STD MICRODIL/AGAR DIL: CPT

## 2018-05-24 PROCEDURE — 86022 PLATELET ANTIBODIES: CPT

## 2018-05-24 PROCEDURE — 85025 COMPLETE CBC W/AUTO DIFF WBC: CPT

## 2018-05-24 RX ADMIN — TAZOBACTAM SODIUM AND PIPERACILLIN SODIUM SCH MLS/HR: 250; 2 INJECTION, SOLUTION INTRAVENOUS at 19:16

## 2018-05-24 RX ADMIN — PHENYTOIN SODIUM SCH MLS/HR: 50 INJECTION INTRAMUSCULAR; INTRAVENOUS at 18:23

## 2018-05-24 RX ADMIN — HYDROCODONE BITARTRATE AND ACETAMINOPHEN PRN TAB: 5; 325 TABLET ORAL at 15:18

## 2018-05-24 RX ADMIN — INSULIN ASPART SCH: 100 INJECTION, SOLUTION INTRAVENOUS; SUBCUTANEOUS at 21:58

## 2018-05-24 RX ADMIN — HEPARIN SODIUM PRN MLS/HR: 10000 INJECTION, SOLUTION INTRAVENOUS at 16:02

## 2018-05-24 RX ADMIN — INSULIN ASPART SCH: 100 INJECTION, SOLUTION INTRAVENOUS; SUBCUTANEOUS at 18:23

## 2018-05-24 RX ADMIN — FAMOTIDINE SCH MG: 20 TABLET, FILM COATED ORAL at 21:22

## 2018-05-24 NOTE — RADRPT
EXAM DATE:  5/24/2018 10:34 AM EDT

AGE/SEX:        57 years / Male



INDICATIONS:  Pain,swelling,and laceration right 2nd toe



CLINICAL DATA:  This is the patient's initial encounter. Patient reports that signs and symptoms have
 been present for 4 - 6 days and indicates a pain score of 7/10. 

                                                                          

MEDICAL/SURGICAL HISTORY:       Diabetes mellitus type II. None.



COMPARISON:      No prior [facility] exams available for comparison.



FINDINGS:  

There is a questionable small erosion of the distal phalanx terminal tuft second toe. No fracture or 
dislocation. Mild osteoarthritis in the right foot.



CONCLUSION: 

Questionable small erosion distal phalanx right second toe. This could be infectious in nature if the
re is clinical evidence for soft tissue infection.



 







Electronically signed by: Jay Monk MD  5/24/2018 10:37 AM EDT

## 2018-05-24 NOTE — PD.CONS
HPI


Service


Nephrology


Consult Requested By


Dr. Purvis


Reason for Consult


Acute renal failure


Primary Care Physician


Dhaval Malloy, DO


History of Present Illness


Patient is a 57-year-old white male with history of diabetes, coronary artery 

disease, CABG, severe coronary artery disease leading to ischemic 

cardiomyopathy EF of 25% who had noticed redness in the right middle toe which 

got worse and it is black and he developed shortness of breath as well, patient 

has a heart catheterization in March and after which he had AICD placed due to 

very low ejection fraction and ischemic cardiomyopathy with severe coronary 

artery disease he has previous bypass surgery in 2008, patient states that he 

was smoking until 3 years ago he was a , he never had kidney 

problems until recently, the creatinine is 1.8.





Review of Systems


Constitutional:  COMPLAINS OF: Fatigue


Respiratory:  COMPLAINS OF: Shortness of breath


Cardiovascular:  COMPLAINS OF: Dyspnea on Exertion, Orthopnea


Musculoskeletal:  COMPLAINS OF: Joint pain, Muscle aches, Back pain


Integumentary:  COMPLAINS OF: Rash


Neurologic:  COMPLAINS OF: Abnormal gait


Psychiatric:  COMPLAINS OF: Anxiety





Past Family Social History


Allergies:  


Coded Allergies:  


     iodine (Unverified  Allergy, Severe, Swelling, 3/21/18)


     potassium iodide (Unverified  Allergy, Severe, Swelling, 3/21/18)


     povidone-iodine (Unverified  Allergy, Severe, Swelling, 3/21/18)


     shellfish derived (Unverified  Allergy, Severe, 3/21/18)


     sodium iodide (Unverified  Allergy, Severe, Swelling, 3/21/18)


     sodium iodide (Unverified  Allergy, Severe, Swelling, 3/21/18)


Past Medical History


Coronary artery disease


Ischemic cardiomyopathy EF of 20-25%


AICD


Hypertension


Diabetes


History of smoking


COPD


Past Surgical History


CABG 2008


Several heart catheterization


Foot surgery


Back surgery


Reported Medications





Reported Meds & Active Scripts


Active


Cephalexin 500 Mg Cap 500 Mg PO Q8HR


Famotidine 20 Mg Tab 20 Mg PO BID


Aspirin DR (Aspirin) 81 Mg Tabdr 162 Mg PO DAILY


Aldactone (Spironolactone) 50 Mg Tab 50 Mg PO DAILY


Entresto (Sacubitril-Valsartan) 49-51 Mg Tab 1 Tab PO BID


Carvedilol 6.25 Mg Tab 6.25 Mg PO BID


Pravastatin 40 Mg Tab 40 Mg PO DAILY


Clopidogrel (Clopidogrel Bisulfate) 75 Mg Tab 75 Mg PO DAILY


Reported


Glucophage (Metformin HCl) 850 Mg Tab 850 Mg PO TIDPC


Glyburide 2.5 Mg Tab 2.5 Mg PO BID


     Take with meals at the same time each day


Active Ordered Medications





Current Medications








 Medications


  (Trade)  Dose


 Ordered  Sig/Ilia


 Route  Start Time


 Stop Time Status Last Admin


 


  (NS Flush)  2 ml  UNSCH  PRN


 IVF  5/24/18 10:00


     


 


 


 Heparin Sodium/


 Dextrose  250 ml @ 


 10 mls/hr  TITRATE  PRN


 IV  5/24/18 11:45


    5/24/18 16:02


 


 


  (D50w (Vial) Inj)  50 ml  UNSCH  PRN


 IV PUSH  5/24/18 13:30


     


 


 


  (Glucagon Inj)  1 mg  UNSCH  PRN


 OTHER  5/24/18 13:30


     


 


 


  (NovoLOG


 SUPPLEMENTAL


 SCALE)  1  ACHS SLIDING  SCALE


 SQ  5/24/18 17:00


    5/24/18 18:23


 


 


 Sodium Chloride  1,000 ml @ 


 60 mls/hr  [id number]


 IV  5/24/18 13:30


    5/24/18 18:23


 


 


 Piperacillin Sod/


 Tazobactam Sod  50 ml @ 


 100 mls/hr  Q6H


 IV  5/24/18 18:00


     


 


 


 Pharmacy Profile


 Note  0 ml @ 0


 mls/hr  UNSCH


 OTHER  5/24/18 13:30


     


 


 


  (Ecotrin Ec)  162 mg  DAILY


 PO  5/25/18 09:00


     


 


 


  (Pepcid)  20 mg  BID


 PO  5/24/18 21:00


     


 


 


  (Pravachol)  40 mg  DAILY


 PO  5/25/18 09:00


     


 


 


  (Duoneb Neb)  1 ampule  Q4HR NEB  PRN


 NEB  5/24/18 13:45


    5/24/18 15:38


 


 


 Vancomycin HCl


 1500 mg/Sodium


 Chloride  515 ml @ 


 250 mls/hr  Q24H


 IV  5/25/18 14:00


     


 


 


  (Tylenol)  650 mg  Q4H  PRN


 PO  5/24/18 14:30


     


 


 


  (Norco  5-325 Mg)  1 tab  Q4H  PRN


 PO  5/24/18 14:30


    5/24/18 15:18


 








Family History


Noncontributory


Social History


History of 2 packs per day smoking 3 years ago, he drinks beer occasionally





Physical Exam


Vital Signs





Vital Signs








  Date Time  Temp Pulse Resp B/P (MAP) Pulse Ox O2 Delivery O2 Flow Rate FiO2


 


5/24/18 18:00  98 18 97/59 (72) 97 Nasal Cannula 2.00 


 


5/24/18 16:03  96 18 98/58 (71) 97 Nasal Cannula 2.00 


 


5/24/18 16:03   18     


 


5/24/18 15:38     98 Nasal Cannula 2.00 


 


5/24/18 14:16  100 18 137/89 (105) 98 Room Air  


 


5/24/18 12:00  103 26 131/80 (97) 97 Nasal Cannula 2.00 


 


5/24/18 09:58     100 Nasal Cannula 2.00 


 


5/24/18 09:58     100 Nasal Cannula 2.00 


 


5/24/18 09:54  110 28  100 Nasal Cannula 2.00 


 


5/24/18 09:54 97.8 110 28 112/68 (83) 100 Nasal Cannula 2.00 


 


5/24/18 09:49 97.8 108 28 112/69 (83) 100   








Physical Exam


GENERAL: Well-nourished, well-developed patient.


SKIN: Warm and dry.


HEAD: Normocephalic.


EYES: No scleral icterus. No injection or drainage. 


NECK: Supple, trachea midline. No JVD or lymphadenopathy.


CARDIOVASCULAR: AICD in place as well as to diminish in intensity


RESPIRATORY: Breath sounds diminished at bases.


GASTROINTESTINAL: Abdomen soft, non-tender, nondistended. 


EXTREMITIES: No cyanosis, systemic black right middle toe. 


NEUROLOGICAL: Awake, alert, and oriented x 3. Non-focal.


Laboratory





Laboratory Tests








Test


  5/24/18


10:00 5/24/18


10:05 5/24/18


18:32


 


White Blood Count 9.6   13.0 


 


Red Blood Count 5.37   5.34 


 


Hemoglobin 15.0   14.5 


 


Hematocrit 43.9   43.6 


 


Mean Corpuscular Volume 81.8   81.7 


 


Mean Corpuscular Hemoglobin 27.9   27.1 


 


Mean Corpuscular Hemoglobin


Concent 34.1 


  


  33.2 


 


 


Red Cell Distribution Width 17.1   17.5 


 


Platelet Count 133   105 


 


Mean Platelet Volume 9.1   9.2 


 


Neutrophils (%) (Auto) 91.7   


 


Lymphocytes (%) (Auto) 2.8   


 


Monocytes (%) (Auto) 5.4   


 


Eosinophils (%) (Auto) 0.0   


 


Basophils (%) (Auto) 0.1   


 


Neutrophils # (Auto) 8.8   


 


Lymphocytes # (Auto) 0.3   


 


Monocytes # (Auto) 0.5   


 


Eosinophils # (Auto) 0.0   


 


Basophils # (Auto) 0.0   


 


CBC Comment AUTO DIFF   


 


Differential Total Cells


Counted 100 


  


  


 


 


Neutrophils % (Manual) 73   


 


Band Neutrophils % 19   


 


Lymphocytes % 2   


 


Monocytes % 4   


 


Neutrophils # (Manual) 9.0   


 


Metamyelocytes 1   


 


Myelocytes 1   


 


Differential Comment


  FINAL DIFF


MANUAL 


  


 


 


Dohle Bodies PRESENT   


 


Platelet Estimate LOW   


 


Platelet Morphology Comment NORMAL   


 


Ovalocytes 1+   


 


Erythrocyte Sedimentation Rate 41   


 


Prothrombin Time 13.4   


 


Prothromb Time International


Ratio 1.3 


  


  


 


 


Activated Partial


Thromboplast Time 27.3 


  


  


 


 


Blood Urea Nitrogen 36   


 


Creatinine 1.84   


 


Random Glucose 407   


 


Total Protein 7.4   


 


Albumin 3.0   


 


Calcium Level 8.8   


 


Alkaline Phosphatase 114   


 


Aspartate Amino Transf


(AST/SGOT) 37 


  


  


 


 


Alanine Aminotransferase


(ALT/SGPT) 25 


  


  


 


 


Total Bilirubin 0.9   


 


Sodium Level 130   


 


Potassium Level 4.4   


 


Chloride Level 95   


 


Carbon Dioxide Level 19.2   


 


Anion Gap 16   


 


Estimat Glomerular Filtration


Rate 38 


  


  


 


 


Troponin I 3.04   


 


C-Reactive Protein 26.80   


 


B-Type Natriuretic Peptide 3148   


 


Lactic Acid Level  5.5  














 Date/Time


Source Procedure


Growth Status


 


 


 5/24/18 10:08


Blood Peripheral Aerobic Blood Culture


Pending Received


 


 5/24/18 10:08


Blood Peripheral Anaerobic Blood Culture


Pending Received








Result Diagram:  


5/24/18 1832                                                                   

             5/24/18 1000





Imaging





Last Impressions








Chest X-Ray 5/24/18 0955 Signed





Impressions: 





 CONCLUSION: 





 1.  No acute abnormality or significant interval change.





  





 


 


Toe X-Ray 5/24/18 0000 Signed





Impressions: 





 CONCLUSION: 





 Questionable small erosion distal phalanx right second toe. This could be infec





 tious in nature if there is clinical evidence for soft tissue infection.





  





 





  





  





 





  





 





  





 


 


Foot MRI 5/24/18 0000 Signed





Impressions: 





 CONCLUSION:





 1.  No evidence for osteomyelitis. Soft tissue swelling at the second toe.





  





 











Assessment and Plan


Problem List:  


(1) Acute renal failure


ICD Codes:  N17.9 - Acute kidney failure, unspecified


Plan:  Patient has her creatinine this could be the result of underlying sepsis 

and gangrene of the toe, he also has cardiomyopathy which can cause cardiorenal 

syndrome


Patient is maintaining urine output


We will check urine studies sodium, creatinine, protein


Avoid nephrotoxic agent


Follow BMP


Baseline kidney ultrasound ordered





(2) Severe sepsis


ICD Codes:  A41.9 - Sepsis, unspecified organism; R65.20 - Severe sepsis 

without septic shock


Plan:  Patient is on antibiotic vancomycin and Zosyn monitor vancomycin levels





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


Plan:  Started on IV antibiotics as above





(4) Cardiomyopathy


ICD Codes:  I42.9 - Cardiomyopathy, unspecified


Plan:  EF of 25% and AICD in place





(5) Diabetes mellitus


ICD Codes:  E11.9 - Type 2 diabetes mellitus without complications


Plan:  Monitor blood glucose














Vandana Skelton MD May 24, 2018 19:16

## 2018-05-24 NOTE — MB
cc:

Abdiel Purvis MD

****

 

 

DATE:

05/24/2018

 

HISTORY OF PRESENT ILLNESS:

Eddie is a 57-year-old gentleman with severe ischemic cardiomyopathy, 

who has been recommended to go on the transplant list, however, due to

lack of insurance, he has not been eligible to be seen in the 

transplant center.  He had been placed on optimal medical therapy and 

been doing well as an outpatient, but presented with severe shortness 

of breath, mild chest pain, has a gangrenous second toe.

 

PAST MEDICAL HISTORY:

As per History of Present Illness.  He ____ for ICD placement by Dr. Lindsay, status post 3-vessel bypass, hyperlipidemia, diabetes, 

myocardial infarction in March 2013, appendectomy, skin grafts of the 

right foot as a child, back surgery x 3.

 

SOCIAL HISTORY:

He drinks alcohol socially.  He quit smoking 3 years ago.

 

ALLERGIES:

POTASSIUM IODIDE, POVIDONE IODINE, SHELLFISH, SODIUM IODIDE.

 

MEDICATIONS PRIOR TO ADMISSION:

1.  Cephalexin

2.  Famotidine.

3.  Aspirin 81 mg a day.

4.  Aldactone 50 mg daily.

5.  Entresto 49-51.

6.  Coreg 6.25 mg b.i.d.

7.  Pravastatin 40 mg daily.

8.  Clopidogrel 75 mg daily.

9.  Glucophage 850 t.i.d.

10.  Glyburide 2.5 b.i.d..

 

MEDICATIONS IN THE HOSPITAL:

1.  Vancomycin.

2.  Aspirin 162 mg daily.

3.  Pravastatin 40 mg daily.

4.  Pepcid 20 mg b.i.d.

5.  Piperacillin-tazobactam.

6.  IV heparin.

 

PHYSICAL EXAMINATION:

VITAL SIGNS:  Temperature 97.8, sats 97% on nasal cannula, pulse 96, 

blood pressure 98/58.

GENERAL:  He is lethargic, oriented x 3, in moderate to severe 

distress.

NECK:  Supple.  No JVD.  No bruit.

CARDIOVASCULAR:  S1, S2.  No murmurs, rubs or gallops.

LUNGS:  Clear to auscultation bilaterally.

ABDOMEN:  Soft, nontender, nondistended, with positive bowel sounds.

EXTREMITIES:  No extremity edema.

 

IMAGING:

Chest x-ray, no acute abnormality or significant interval change.  Toe

x-ray, questionable small erosion distal phalanx, right second toe.  

This could be infectious in nature if there is clinical evidence for 

soft tissue infection.

 

LABORATORY DATA:

Blood cultures are pending.  EKG is not available.  White count 9.6, 

hemoglobin 15.0, hematocrit 43.9, platelet count 133.  INR 1.3.  

Sodium 130, potassium 4.4, chloride 95, bicarbonate 19.2, creatinine 

1.84, glucose 407.  Lactic acid 5.5.  Troponin 3.04.  C-reactive 

protein ____.  BNP is 3148.

 

HE HAS THE FOLLOWING DIAGNOSES:

1.  Coronary artery disease.

2.  Cardiomyopathy.

3.  Ischemic cardiomyopathy.

4.  Decompensated congestive heart failure.

5.  Possible osteomyelitis.

6.  Lactic acidosis.

7.  Coagulopathy.

8.  Non-ST elevation myocardial infarction.

9.  Acute renal failure.

10.  Hyponatremia.

11.  Thrombocytopenia.

 

DISCUSSION:

This patient is very high risk for poor outcome due to the above 

listed multiple comorbidities.  He has very severe ____ coronary 

disease and has been evaluated by interventional cardiology and CT 

surgery.  He is not a candidate for revascularization.  It was 

recommended that he be put on a transplant list, however, he had no 

insurance coverage and does not have this option.  He has very minimal

reserve and suspect that he has possible SIRS and/or sepsis, which 

will be very difficult for him to meet the metabolic demands from a 

cardiovascular standpoint and suspect this is why he has a non-STEMI 

and decompensated congestive heart failure.  This presents multiple 

medical dilemmas, as he is also in acute renal failure, it will be 

difficult to diurese him.  Nevertheless, we will get a renal consult. 

I think his prognosis is very poor.  His condition is guarded.  We 

will get a palliative care consult initially as well.  Agree with 

aspirin and heparin.  He is very high risk for noncardiac surgery due 

to the aforementioned comorbidities.  I discussed this in detail with 

Dr. Whalen.

 

 

__________________________________

MD JAZMÍN Rdz/MARIBELL/donna

D: 05/24/2018, 04:42 PM

T: 05/24/2018, 05:29 PM

Visit #: [id number]

Job #: [id number]

## 2018-05-24 NOTE — PD.CONS
History of Present Illness


Service


Podiatry/foot and ankle surgery


Consult Requested By





Reason for Consult


Right second digit ischemia/infection


Primary Care Physician


Dhaval Malloy DO


Diagnoses:  


History of Present Illness


Foot and ankle surgery/podiatry consulted for this 57-year-old male with a 

history of congestive heart failure and diabetes for right second digit necrosis

/infection.  Patient states he presented to the emergency department because 

over the past 4 days he has developed difficulty breathing.  Patient denies any 

chest pain.  Patient states he has had multiple surgeries to right foot 

including skin grafts as he had an injury to his right foot when he was 7 years 

old, he has always had trouble with the right second digit as it is prone to 

ulceration because of the weight since





Review of Systems


Constitutional:  COMPLAINS OF: Diaphoretic episodes, Fatigue, DENIES: Fever


Endocrine:  COMPLAINS OF: Heat/cold intolerance


Eyes:  DENIES: Blurred vision


Respiratory:  COMPLAINS OF: Shortness of breath


Cardiovascular:  COMPLAINS OF: Claudication, DENIES: Chest pain, Palpitations


Psychiatric:  DENIES: Anxiety, Confusion





Past Family Social History


Allergies:  


Coded Allergies:  


     iodine (Unverified  Allergy, Severe, Swelling, 3/21/18)


     potassium iodide (Unverified  Allergy, Severe, Swelling, 3/21/18)


     povidone-iodine (Unverified  Allergy, Severe, Swelling, 3/21/18)


     shellfish derived (Unverified  Allergy, Severe, 3/21/18)


     sodium iodide (Unverified  Allergy, Severe, Swelling, 3/21/18)


     sodium iodide (Unverified  Allergy, Severe, Swelling, 3/21/18)


Past Medical History


As per HPI


Past Surgical History


Recent pacemaker placement


Active Ordered Medications





Current Medications








 Medications


  (Trade)  Dose


 Ordered  Sig/Ilia


 Route  Start Time


 Stop Time Status Last Admin


 


  (NS Flush)  2 ml  UNSCH  PRN


 IVF  5/24/18 10:00


     


 


 


 Heparin Sodium/


 Dextrose  250 ml @ 


 10 mls/hr  TITRATE  PRN


 IV  5/24/18 11:45


    5/24/18 16:02


 


 


  (D50w (Vial) Inj)  50 ml  UNSCH  PRN


 IV PUSH  5/24/18 13:30


     


 


 


  (Glucagon Inj)  1 mg  UNSCH  PRN


 OTHER  5/24/18 13:30


     


 


 


  (NovoLOG


 SUPPLEMENTAL


 SCALE)  1  ACHS SLIDING  SCALE


 SQ  5/24/18 17:00


    5/24/18 18:23


 


 


 Sodium Chloride  1,000 ml @ 


 60 mls/hr  [id number]


 IV  5/24/18 13:30


    5/24/18 18:23


 


 


 Piperacillin Sod/


 Tazobactam Sod  50 ml @ 


 100 mls/hr  Q6H


 IV  5/24/18 18:00


    5/24/18 19:16


 


 


 Pharmacy Profile


 Note  0 ml @ 0


 mls/hr  UNSCH


 OTHER  5/24/18 13:30


     


 


 


  (Ecotrin Ec)  162 mg  DAILY


 PO  5/25/18 09:00


     


 


 


  (Pepcid)  20 mg  BID


 PO  5/24/18 21:00


     


 


 


  (Pravachol)  40 mg  DAILY


 PO  5/25/18 09:00


     


 


 


  (Duoneb Neb)  1 ampule  Q4HR NEB  PRN


 NEB  5/24/18 13:45


    5/24/18 15:38


 


 


 Vancomycin HCl


 1500 mg/Sodium


 Chloride  515 ml @ 


 250 mls/hr  Q24H


 IV  5/25/18 14:00


     


 


 


  (Tylenol)  650 mg  Q4H  PRN


 PO  5/24/18 14:30


     


 


 


  (Norco  5-325 Mg)  1 tab  Q4H  PRN


 PO  5/24/18 14:30


    5/24/18 15:18


 











Physical Exam


Vital Signs





Vital Signs








  Date Time  Temp Pulse Resp B/P (MAP) Pulse Ox O2 Delivery O2 Flow Rate FiO2


 


5/24/18 19:08  93 16 89/58 (68) 100 Nasal Cannula 2.00 


 


5/24/18 18:00  98 18 97/59 (72) 97 Nasal Cannula 2.00 


 


5/24/18 16:03  96 18 98/58 (71) 97 Nasal Cannula 2.00 


 


5/24/18 16:03   18     


 


5/24/18 15:38     98 Nasal Cannula 2.00 


 


5/24/18 14:16  100 18 137/89 (105) 98 Room Air  


 


5/24/18 12:00  103 26 131/80 (97) 97 Nasal Cannula 2.00 


 


5/24/18 09:58     100 Nasal Cannula 2.00 


 


5/24/18 09:58     100 Nasal Cannula 2.00 


 


5/24/18 09:54  110 28  100 Nasal Cannula 2.00 


 


5/24/18 09:54 97.8 110 28 112/68 (83) 100 Nasal Cannula 2.00 


 


5/24/18 09:49 97.8 108 28 112/69 (83) 100   








Physical Exam


GENERAL: This is a well-nourished, well-developed patient, in no apparent 

distress.


SKIN: 


HEAD: Atraumatic. 


EYES: Pupils equal round and reactive. 


ENT:  Airway patent.


NECK: Trachea midline.


RESPIRATORY: Nonlabored breathing.


MUSCULOSKELETAL:. Negative Homans sign bilaterally.


NEUROLOGICAL: Awake and alert. Normal speech.





Lower extremity physical exam:


Vascular: Dorsalis pedis diminished, posterior tibial diminished.  Capillary 

refill time within normal limits to digits X4 right foot, absent capillary 

refill time noted to right second digit. edema present mildly right foot


Neuro: Gross sensation intact to bilateral lower extremity.  Pinpoint sensation 

decreased bilateral lower extremity. No hyperalgesia noted to bilateral lower 

extremity


Dermatology: This ischemic discoloration noted to right second digit with 

dorsal right second digit ulceration, no purulent drainage upon compression, no 

probe to bone, associated erythema noted into midfoot dorsally, with decreased 

temperaturecool to touch right second digit.


Musculoskeletal: Overlapping second digit third digit noted.


Laboratory





Laboratory Tests








Test


  5/24/18


10:00 5/24/18


10:05 5/24/18


18:32


 


White Blood Count 9.6   13.0 


 


Red Blood Count 5.37   5.34 


 


Hemoglobin 15.0   14.5 


 


Hematocrit 43.9   43.6 


 


Mean Corpuscular Volume 81.8   81.7 


 


Mean Corpuscular Hemoglobin 27.9   27.1 


 


Mean Corpuscular Hemoglobin


Concent 34.1 


  


  33.2 


 


 


Red Cell Distribution Width 17.1   17.5 


 


Platelet Count 133   105 


 


Mean Platelet Volume 9.1   9.2 


 


Neutrophils (%) (Auto) 91.7   


 


Lymphocytes (%) (Auto) 2.8   


 


Monocytes (%) (Auto) 5.4   


 


Eosinophils (%) (Auto) 0.0   


 


Basophils (%) (Auto) 0.1   


 


Neutrophils # (Auto) 8.8   


 


Lymphocytes # (Auto) 0.3   


 


Monocytes # (Auto) 0.5   


 


Eosinophils # (Auto) 0.0   


 


Basophils # (Auto) 0.0   


 


CBC Comment AUTO DIFF   


 


Differential Total Cells


Counted 100 


  


  


 


 


Neutrophils % (Manual) 73   


 


Band Neutrophils % 19   


 


Lymphocytes % 2   


 


Monocytes % 4   


 


Neutrophils # (Manual) 9.0   


 


Metamyelocytes 1   


 


Myelocytes 1   


 


Differential Comment


  FINAL DIFF


MANUAL 


  


 


 


Dohle Bodies PRESENT   


 


Platelet Estimate LOW   


 


Platelet Morphology Comment NORMAL   


 


Ovalocytes 1+   


 


Erythrocyte Sedimentation Rate 41   


 


Prothrombin Time 13.4   


 


Prothromb Time International


Ratio 1.3 


  


  


 


 


Activated Partial


Thromboplast Time 27.3 


  


  32.4 


 


 


Blood Urea Nitrogen 36   


 


Creatinine 1.84   


 


Random Glucose 407   


 


Total Protein 7.4   


 


Albumin 3.0   


 


Calcium Level 8.8   


 


Alkaline Phosphatase 114   


 


Aspartate Amino Transf


(AST/SGOT) 37 


  


  


 


 


Alanine Aminotransferase


(ALT/SGPT) 25 


  


  


 


 


Total Bilirubin 0.9   


 


Sodium Level 130   


 


Potassium Level 4.4   


 


Chloride Level 95   


 


Carbon Dioxide Level 19.2   


 


Anion Gap 16   


 


Estimat Glomerular Filtration


Rate 38 


  


  


 


 


Troponin I 3.04   3.94 


 


C-Reactive Protein 26.80   


 


B-Type Natriuretic Peptide 3148   


 


Lactic Acid Level  5.5  3.1 














 Date/Time


Source Procedure


Growth Status


 


 


 5/24/18 10:08


Blood Peripheral Aerobic Blood Culture


Pending Received


 


 5/24/18 10:08


Blood Peripheral Anaerobic Blood Culture


Pending Received








Result Diagram:  


5/24/18 1832                                                                   

             5/24/18 1000





Imaging





Last Impressions








Chest X-Ray 5/24/18 0955 Signed





Impressions: 





 CONCLUSION: 





 1.  No acute abnormality or significant interval change.





  





 


 


Toe X-Ray 5/24/18 0000 Signed





Impressions: 





 CONCLUSION: 





 Questionable small erosion distal phalanx right second toe. This could be infec





 tious in nature if there is clinical evidence for soft tissue infection.





  





 





  





  





 





  





 





  





 


 


Foot MRI 5/24/18 0000 Signed





Impressions: 





 CONCLUSION:





 1.  No evidence for osteomyelitis. Soft tissue swelling at the second toe.





  





 











Assessment and Plan


Assessment and Plan


57-year-old male with right second digit ischemia/infection , elevated troponin





Patient evaluated and examined with all questions answered with wife present 

bedside


Awaiting vascular studies


Patient will need right second digit amputation, awaiting demarcation


Betadine with dry sterile dressing to be applied daily


Will discuss with hospitalist











Jeanette Miguel DPM May 24, 2018 20:15

## 2018-05-24 NOTE — RADRPT
EXAM DATE:  5/24/2018 10:31 AM EDT

AGE/SEX:        57 years / Male



INDICATIONS:  Short of breath



CLINICAL DATA:  This is the patient's initial encounter. Patient reports that signs and symptoms have
 been present for 2 weeks and indicates a pain score of 0/10. 

                                                                          

MEDICAL/SURGICAL HISTORY:       Cardiovascular disease. CABG.  Pacemaker.



COMPAR[place]      [facility].  .



FINDINGS:  

Stable postsurgical features of prior median sternotomy and single lead AICD device in place. No new 
focal pleural or parenchymal opacities. Cardiomediastinal contours are within normal limits. Bony tho
rax is intact.



CONCLUSION: 

1.  No acute abnormality or significant interval change.



Electronically signed by: Isaac Cao MD  5/24/2018 10:34 AM EDT

## 2018-05-24 NOTE — RADRPT
EXAM DATE:  5/24/2018 5:37 PM EDT

AGE/SEX:        57 years / Male



INDICATIONS:    .  Second toe right foot is blue with an open wound on anterior surface.



CLINICAL DATA:  This is the patient's subsequent encounter. Patient reports that signs and symptoms h
ave been present for 4 - 6 days and indicates a pain score of 7/10. 

                                                                          

MEDICAL/SURGICAL HISTORY:       Diabetes mellitus type II. CABG.  Fusion, lumbar. foot, St Trav defib




COMPARISON:      No prior [facility] exams available for comparison.



TECHNIQUE:     Multiplanar, multisequence MRI examination was performed without contrast and after th
e intravenous administration of 18 ml Multihance (gadobenate)  single exam dose.







FINDINGS:  

No significant marrow signal abnormality is seen to suggest osteomyelitis. There is some soft tissue 
swelling at the second toe. No significant abnormal marrow enhancement is seen to suggest osteomyelit
is. No abnormal fluid collections.



CONCLUSION:

1.  No evidence for osteomyelitis. Soft tissue swelling at the second toe.



Electronically signed by: Jay Monk MD  5/24/2018 5:42 PM EDT

## 2018-05-24 NOTE — RADRPT
EXAM DATE:  5/24/2018 8:43 PM EDT

AGE/SEX:        57 years / Male



INDICATIONS:  Increased BUN and Creatinine.



CLINICAL DATA:  This is the patient's initial encounter. Patient reports that signs and symptoms have
 been present for 1 week and indicates a pain score of 2/10. 

                                                                          

MEDICAL/SURGICAL HISTORY:       Diabetes mellitus type II.  Chronic obstructive pulmonary disease.  H
ypertension.  Cardiomyopathy.    CABG.



COMPARISON:      No prior [facility] exams available for comparison. No external comparison.



MEASUREMENTS:

Right Kidney:__13.2 x 5.3 x 6.2 cm   cm

Left Kidney:__12.6 x 5.9 x 6.9 cm  cm



FINDINGS: 

Right Kidney: Normal in size and shape. The echogenicity is equal to that of the liver. There is no f
ocal mass, calculi or hydronephrosis.

Left Kidney: Normal in size and shape. There is increased echogenicity. There is no focal mass, calcu
li or hydronephrosis.

Bladder: Within normal limits given the degree of distension. 



CONCLUSION: 

1.  No hydronephrosis.

2.  Increased echogenicity most characteristic of medical renal disease.



Electronically signed by: John Garcia MD  5/24/2018 9:37 PM EDT

## 2018-05-24 NOTE — HHI.HP
__________________________________________________





\[facility]\""


Service


[facility]


Primary Care Physician


Dhaval Malloy DO


Admission Diagnosis





severe sepsis, elevated troponin


Diagnoses:  


(1) Severe sepsis


Diagnosis:  Principal





(2) Elevated troponin


Diagnosis:  Principal





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


Diagnosis:  Principal





Chief Complaint:  


sob


Travel History


International Travel<30 Days:  No


Contact w/Intl Traveler <30 Da:  No


Traveled to Known Affected Are:  No





Sepsis Criteria


SIRS Criteria (2 or more):  Heart rate over 90, RR  > 20 or PaCO2 < 32


Sepsis Criteria (SIRS+source):  Infect source susp/known


Severe Sepsis (+one):  Lactate >2


Criteria Outcome:  Meets severe sepsis criteria


History of Present Illness


patient is a 56 y/o male with history of severe three vessel disease, 

cardiomyopathy, s/p defibrillator placement, hypertension, diabetes,COPD who 

presented to ER with worsening sob. he says that he started to have sob three 

days ago. it gradually got worse the past few days. he also says that he 

noticed some redness over the right second toe around the same time. he says 

that ' I'm on blood thinners and I might've bumped my foot on something'. he 

says that it started as a small redness but this got worse over the past few 

days. he denies any fever. he says that he had some occasional cough and some 

lower chest pain which is worse with the cough. of note he was admitted to this 

hospital about two months ago, underwent cardiac catheterization when he was 

found to have severe three vessel disease. he was evaluated by CT surgery -

however he was believed to have poor targets for redo sternotomy. he had 

defibrillator placement at the time.





Review of Systems


Constitutional:  DENIES: Fever, Weight loss, Chills, Night Sweats


Eyes:  DENIES: Blurred vision, Diplopia, Vision loss, Double Vision


Ears, nose, mouth, throat:  DENIES: Tinnitus, Vertigo, Throat pain, Epistaxis


Respiratory:  COMPLAINS OF: Shortness of breath, DENIES: Apneas, Cough, Snoring

, Wheezing, Hemoptysis, Sputum production


Cardiovascular:  COMPLAINS OF: Chest pain, DENIES: Palpitations, Syncope, 

Dyspnea on Exertion, PND, Lower Extremity Edema, Orthopnea, Claudication


Gastrointestinal:  DENIES: Abdominal pain, Black stools, Bloody stools, 

Constipation, Diarrhea, Nausea, Vomiting, Difficulty Swallowing, Anorexia


Genitourinary:  DENIES: Urinary frequency, Urgency, Hematuria, Dysuria


Musculoskeletal:  COMPLAINS OF: Joint Swelling (right second toe.), DENIES: 

Joint pain, Muscle aches, Stiffness


Integumentary:  DENIES: Rash


Neurologic:  DENIES: Abnormal gait, Headache, Localized weakness, Paresthesias, 

Seizures, Speech Problems, Tremor, Poor Balance


Psychiatric:  DENIES: Anxiety, Confusion, Mood changes, Depression, 

Hallucinations, Agitation, Suicidal Ideation, Homicidal Ideation, Delusions





Past Family Social History


Past Medical History


CAD/ hypertension/diabetes mellitus/ COPD/cardiomyopathy.


Past Surgical History


CABG/ foot and back surgeries.


Reported Medications


Cephalexin 500 Mg Cap 500 Mg PO Q8HR


Famotidine 20 Mg Tab 20 Mg PO BID


Aspirin DR (Aspirin) 81 Mg Tabdr 162 Mg PO DAILY


Aldactone (Spironolactone) 50 Mg Tab 50 Mg PO DAILY


Entresto (Sacubitril-Valsartan) 49-51 Mg Tab 1 Tab PO BID


Carvedilol 6.25 Mg Tab 6.25 Mg PO BID


Pravastatin 40 Mg Tab 40 Mg PO DAILY


Clopidogrel (Clopidogrel Bisulfate) 75 Mg Tab 75 Mg PO DAILY


Reported


Glucophage (Metformin HCl) 850 Mg Tab 850 Mg PO TIDPC


Glyburide 2.5 Mg Tab 2.5 Mg PO BID


     Take with meals at the same time each day


Allergies:  


Coded Allergies:  


     iodine (Unverified  Allergy, Severe, Swelling, 3/21/18)


     potassium iodide (Unverified  Allergy, Severe, Swelling, 3/21/18)


     povidone-iodine (Unverified  Allergy, Severe, Swelling, 3/21/18)


     shellfish derived (Unverified  Allergy, Severe, 3/21/18)


     sodium iodide (Unverified  Allergy, Severe, Swelling, 3/21/18)


     sodium iodide (Unverified  Allergy, Severe, Swelling, 3/21/18)


Active Ordered Medications





Inpatient Medications


Aspirin (Aspirin Chew) 162 mg ONCE  ONCE CHEW  Last administered on 18at 12

:02;  Start 18 at 11:30;  Stop 18 at 11:31;  Status DC


Heparin Sodium (Porcine) (Heparin Inj) 4,000 units ONCE  ONCE IV PUSH  Last 

administered on 18at 12:03;  Start 18 at 11:45;  Stop 18 at 11:46

;  Status DC


Heparin Sodium/ Dextrose 250 ml @  10 mls/hr TITRATE  PRN IV Coagulation 

Management;  Start 18 at 11:45


Piperacillin Sod/ Tazobactam Sod 50 ml @  100 mls/hr ONCE  ONCE IV  Last 

administered on 18at 12:07;  Start 18 at 11:30;  Stop 18 at 11:59

;  Status DC


Sodium Chloride (NS Flush) 2 ml UNSCH  PRN IVF FLUSH AFTER USING IV ACCESS;  

Start 18 at 10:00


Vancomycin HCl 1350 mg/Sodium Chloride 513.5 ml @  250 mls/hr ONCE  ONCE IV  

Last administered on 18at 13:27;  Start 18 at 11:30;  Stop 18 at 

13:33


Social History


doesn't smoke/ drinks occasionally.





Physical Exam


Vital Signs





Vital Signs








  Date Time  Temp Pulse Resp B/P (MAP) Pulse Ox O2 Delivery O2 Flow Rate FiO2


 


18 12:00  103 26 131/80 (97) 97 Nasal Cannula 2.00 


 


18 09:58     100 Nasal Cannula 2.00 


 


18 09:58     100 Nasal Cannula 2.00 


 


18 09:54  110 28  100 Nasal Cannula 2.00 


 


18 09:54 97.8 110 28 112/68 (83) 100 Nasal Cannula 2.00 


 


18 09:49 97.8 108 28 112/69 (83) 100   








Physical Exam


GENERAL: This is a well-nourished, well-developed patient, in no apparent 

distress.


SKIN: No rashes, ecchymoses or lesions. Cool and dry.


HEAD: Atraumatic. Normocephalic. No temporal or scalp tenderness.


EYES: Pupils equal round and reactive. Extraocular motions intact. No scleral 

icterus. No injection or drainage. 


ENT: Nose without bleeding, purulent drainage or septal hematoma. Throat 

without erythema, tonsillar hypertrophy or exudate. Uvula midline. Airway 

patent.


NECK: Trachea midline. No JVD or lymphadenopathy. Supple, nontender, no 

meningeal signs.


CARDIOVASCULAR: Regular rate and rhythm without murmurs, gallops, or rubs. 


RESPIRATORY: Clear to auscultation. Breath sounds equal bilaterally. No wheezes

, rales, or rhonchi.  


GASTROINTESTINAL: Abdomen soft, non-tender, nondistended. No hepato-splenomegaly

, or palpable masses. No guarding.


MUSCULOSKELETAL: Extremities without clubbing, cyanosis, or edema. No joint 

tenderness, effusion, or edema noted. No calf tenderness. Negative Homans sign 

bilaterally.


NEUROLOGICAL: Awake and alert. Cranial nerves II through XII intact.  Motor and 

sensory grossly within normal limits. Five out of 5 muscle strength in all 

muscle groups.  Normal speech.


Laboratory





Laboratory Tests








Test


  18


10:00 18


10:05


 


White Blood Count 9.6  


 


Red Blood Count 5.37  


 


Hemoglobin 15.0  


 


Hematocrit 43.9  


 


Mean Corpuscular Volume 81.8  


 


Mean Corpuscular Hemoglobin 27.9  


 


Mean Corpuscular Hemoglobin


Concent 34.1 


  


 


 


Red Cell Distribution Width 17.1  


 


Platelet Count 133  


 


Mean Platelet Volume 9.1  


 


Neutrophils (%) (Auto) 91.7  


 


Lymphocytes (%) (Auto) 2.8  


 


Monocytes (%) (Auto) 5.4  


 


Eosinophils (%) (Auto) 0.0  


 


Basophils (%) (Auto) 0.1  


 


Neutrophils # (Auto) 8.8  


 


Lymphocytes # (Auto) 0.3  


 


Monocytes # (Auto) 0.5  


 


Eosinophils # (Auto) 0.0  


 


Basophils # (Auto) 0.0  


 


CBC Comment AUTO DIFF  


 


Differential Total Cells


Counted 100 


  


 


 


Neutrophils % (Manual) 73  


 


Band Neutrophils % 19  


 


Lymphocytes % 2  


 


Monocytes % 4  


 


Neutrophils # (Manual) 9.0  


 


Metamyelocytes 1  


 


Myelocytes 1  


 


Differential Comment


  FINAL DIFF


MANUAL 


 


 


Dohle Bodies PRESENT  


 


Platelet Estimate LOW  


 


Platelet Morphology Comment NORMAL  


 


Ovalocytes 1+  


 


Erythrocyte Sedimentation Rate 41  


 


Prothrombin Time 13.4  


 


Prothromb Time International


Ratio 1.3 


  


 


 


Activated Partial


Thromboplast Time 27.3 


  


 


 


Blood Urea Nitrogen 36  


 


Creatinine 1.84  


 


Random Glucose 407  


 


Total Protein 7.4  


 


Albumin 3.0  


 


Calcium Level 8.8  


 


Alkaline Phosphatase 114  


 


Aspartate Amino Transf


(AST/SGOT) 37 


  


 


 


Alanine Aminotransferase


(ALT/SGPT) 25 


  


 


 


Total Bilirubin 0.9  


 


Sodium Level 130  


 


Potassium Level 4.4  


 


Chloride Level 95  


 


Carbon Dioxide Level 19.2  


 


Anion Gap 16  


 


Estimat Glomerular Filtration


Rate 38 


  


 


 


Troponin I 3.04  


 


C-Reactive Protein 26.80  


 


B-Type Natriuretic Peptide 3148  


 


Lactic Acid Level  5.5 














 Date/Time


Source Procedure


Growth Status


 


 


 18 10:08


Blood Peripheral Aerobic Blood Culture


Pending Received


 


 18 10:08


Blood Peripheral Anaerobic Blood Culture


Pending Received








Result Diagram:  


18 1000                                                                   

             18 1000








Caprini VTE Risk Assessment


Caprini VTE Risk Assessment:  Mod/High Risk (score >= 2)


Caprini Risk Assessment Model











 Point Value = 1          Point Value = 2  Point Value = 3  Point Value = 5


 


Age 41-60


Minor surgery


BMI > 25 kg/m2


Swollen legs


Varicose veins


Pregnancy or postpartum


History of unexplained or recurrent


   spontaneous 


Oral contraceptives or hormone


   replacement


Sepsis (< 1 month)


Serious lung disease, including


   pneumonia (< 1 month)


Abnormal pulmonary function


Acute myocardial infarction


Congestive heart failure (< 1 month)


History of inflammatory bowel disease


Medical patient at bed rest Age 61-74


Arthroscopic surgery


Major open surgery (> 45 min)


Laparoscopic surgery (> 45 min)


Malignancy


Confined to bed (> 72 hours)


Immobilizing plaster cast


Central venous access Age >= 75


History of VTE


Family history of VTE


Factor V Leiden


Prothrombin 63215P


Lupus anticoagulant


Anticardiolipin antibodies


Elevated serum homocysteine


Heparin-induced thrombocytopenia


Other congenital or acquired


   thrombophilia Stroke (< 1 month)


Elective arthroplasty


Hip, pelvis, or leg fracture


Acute spinal cord injury (< 1 month)








Prophylaxis Regimen











   Total Risk


Factor Score Risk Level Prophylaxis Regimen


 


0-1      Low Early ambulation


 


2 Moderate Order ONE of the following:


*Sequential Compression Device (SCD)


*Heparin 5000 units SQ BID


 


3-4 Higher Order ONE of the following medications:


*Heparin 5000 units SQ TID


*Enoxaparin/Lovenox 40 mg SQ daily (WT < 150 kg, CrCl > 30 mL/min)


*Enoxaparin/Lovenox 30 mg SQ daily (WT < 150 kg, CrCl > 10-29 mL/min)


*Enoxaparin/Lovenox 30 mg SQ BID (WT < 150 kg, CrCl > 30 mL/min)


AND/OR


*Sequential Compression Device (SCD)


 


5 or more Highest Order ONE of the following medications:


*Heparin 5000 units SQ TID (Preferred with Epidurals)


*Enoxaparin/Lovenox 40 mg SQ daily (WT < 150 kg, CrCl > 30 mL/min)


*Enoxaparin/Lovenox 30 mg SQ daily (WT < 150 kg, CrCl > 10-29 mL/min)


*Enoxaparin/Lovenox 30 mg SQ BID (WT < 150 kg, CrCl > 30 mL/min)


AND


*Sequential Compression Device (SCD)











Assessment and Plan


Assessment and Plan


A/P





- severe sepsis due to gangrene/ infection of the right second toe 


  continue with broad-spectrum IV antibiotics, heparin drip and pain control- 

follow the cultures- consult podiatry.


-elevated troponin with history of CAD- had cardiac cath two months ago with 

severe three vessel disease- was evaluated by CT surgery at the time with poor 

targets for redo sternotomy


 started on heparin drip- resume aspirin- will continue to trend the cardiac 

enzymes- cardiology consulted.


-acute kidney injury; start on gentle IV hydration in light of cardiomyopathy- 

monitor renal function; BMP tomorrow.


-cardiomyopathy with EF 25%- s/p defibrillator placement- resume Coreg and 

Entresto if BP remains stable.


-COPD with no exacerbation; neb treatment as needed.


-diabetes mellitus; uncontrolled- hold oral hypoglycemics- start on accu-check 

with SSI. will consider adding long-acting insulin.


-hypertension; will hold BP meds for now- continue to monitor.


-DVT prophylaxis; on heparin drip.


Discussed Condition With


ER physician and the patient.





Physician Certification


2 Midnight Certification Type:  Admission for Inpatient Services


Order for Inpatient Services


The services are ordered in accordance with Medicare regulations or non-

Medicare payer requirements, as applicable.  In the case of services not 

specified as inpatient-only, they are appropriately provided as inpatient 

services in accordance with the 2-midnight benchmark.


Estimated LOS (days):  3


 days is the estimated time the patient will need to remain in the hospital, 

assuming treatment plan goals are met and no additional complications.


Post-Hospital Plan:  Not yet determined











Deneen Anton MD May 24, 2018 13:50

## 2018-05-24 NOTE — PD
HPI


Chief Complaint:  Respiratory Distress


Time Seen by Provider:  09:55


Travel History


International Travel<30 days:  No


Contact w/Intl Traveler<30days:  No


Traveled to known affect area:  No





History of Present Illness


HPI


This is a 57-year-old male with a history of congestive heart failure and 

diabetes who presents to the emergency department with 4 days of increased 

difficulty breathing, constant, severe, worse with exertion, improved with 

rest.  He feels like he cannot catch his breath.  He denies any chest pain, but 

he said he felt a little clammy over the weekend.  He has been having some 

vomiting at home.  He has been having significant color changes in his right 

second toe from red to purple and he thinks there is an infection there.  He 

was recently hospitalized in late March and had an AICD placed by Dr. Lindsay.





[facility]


Past Medical History


Hx Anticoagulant Therapy:  Yes (PLAVIX)


Heart Rhythm Problems:  Yes


Cancer:  No


Cardiac Catheterization:  Yes


Cardiovascular Problems:  Yes (MI, TRIPLE BYPASS, DEFIBRILLATOR)


High Cholesterol:  Yes


Chest Pain:  Yes


Congestive Heart Failure:  Yes


Coronary Artery Disease:  Yes


Diabetes:  Yes


Patient Takes Glucophage:  Yes


Diminished Hearing:  No


Endocrine:  Yes


Gastrointestinal Disorders:  Yes


Genitourinary:  No


Hypertension:  Yes


Immune Disorder:  No


Musculoskeletal:  Yes


Neurologic:  No


Psychiatric:  No


Reproductive:  No


Respiratory:  Yes


Immunizations Current:  Yes


Myocardial Infarction:  Yes (3/2013)


Ulcer:  Yes


Tetanus Vaccination:  Unknown


Influenza Vaccination:  No


Pregnant?:  Not Pregnant





Past Surgical History


Appendectomy:  Yes


Cardiac Surgery:  Yes (CABG X3)


Coronary Artery Bypass Graft:  Yes


Other Surgery:  Yes (CABG, APPENDECTOMY, SKIN GRAFTS TO RIGHT FOOT AS A CHILD, 

BACK SURGERY X 3)





Family History


Family Myocardial Infarction:  Yes





Social History


Alcohol Use:  Yes (SOCIALLY)


Tobacco Use:  No (QUIT 3 YEARS AGO)


Substance Use:  No





Allergies-Medications


(Allergen,Severity, Reaction):  


Coded Allergies:  


     iodine (Unverified  Allergy, Severe, Swelling, 3/21/18)


     potassium iodide (Unverified  Allergy, Severe, Swelling, 3/21/18)


     povidone-iodine (Unverified  Allergy, Severe, Swelling, 3/21/18)


     shellfish derived (Unverified  Allergy, Severe, 3/21/18)


     sodium iodide (Unverified  Allergy, Severe, Swelling, 3/21/18)


     sodium iodide (Unverified  Allergy, Severe, Swelling, 3/21/18)


Reported Meds & Prescriptions





Reported Meds & Active Scripts


Active


Cephalexin 500 Mg Cap 500 Mg PO Q8HR


Famotidine 20 Mg Tab 20 Mg PO BID


Aspirin DR (Aspirin) 81 Mg Tabdr 162 Mg PO DAILY


Aldactone (Spironolactone) 50 Mg Tab 50 Mg PO DAILY


Entresto (Sacubitril-Valsartan) 49-51 Mg Tab 1 Tab PO BID


Carvedilol 6.25 Mg Tab 6.25 Mg PO BID


Pravastatin 40 Mg Tab 40 Mg PO DAILY


Clopidogrel (Clopidogrel Bisulfate) 75 Mg Tab 75 Mg PO DAILY


Reported


Glucophage (Metformin HCl) 850 Mg Tab 850 Mg PO TIDPC


Glyburide 2.5 Mg Tab 2.5 Mg PO BID


     Take with meals at the same time each day








Review of Systems


Except as stated in HPI:  all other systems reviewed are Neg





Physical Exam


Narrative


GENERAL: Anxious and uncomfortable appearing


SKIN: Purple discoloration of the right second toe, cool and poorly perfused 

with some streaking and erythema of the right dorsal foot


HEAD: Atraumatic. Normocephalic. 


EYES: Pupils equal and round.  No injection or drainage. 


ENT:  Moist mucous membranes


NECK: Trachea midline. 


CARDIOVASCULAR: Tachycardic, no murmurs appreciated


RESPIRATORY: Rales in the bilateral lung bases, tachypneic


GASTROINTESTINAL: Abdomen soft, non-tender, nondistended. 


MUSCULOSKELETAL: No obvious deformities. 


NEUROLOGICAL: Awake and alert. No obvious cranial nerve deficits.  Moving all 

extremities.


PSYCHIATRIC: Appropriate mood and affect; insight and judgment normal.





Data


Data


Last Documented VS





Vital Signs








  Date Time  Temp Pulse Resp B/P (MAP) Pulse Ox O2 Delivery O2 Flow Rate FiO2


 


5/24/18 12:00  103 26 131/80 (97) 97 Nasal Cannula 2.00 


 


5/24/18 09:54 97.8       








Orders





 Orders


Complete Blood Count With Diff (5/24/18 09:55)


Comprehensive Metabolic Panel (5/24/18 09:55)


B-Type Natriuretic Peptide (5/24/18 09:55)


Act Partial Throm Time (Ptt) (5/24/18 09:55)


Prothrombin Time / Inr (Pt) (5/24/18 09:55)


Troponin I (5/24/18 09:55)


Iv Access Insert/Monitor (5/24/18 09:55)


Electrocardiogram (5/24/18 09:55)


Ecg Monitoring (5/24/18 09:55)


Oximetry (5/24/18 09:55)


Oxygen Administration (5/24/18 09:55)


Chest, Single Ap (5/24/18 09:55)


Sodium Chloride 0.9% Flush (Ns Flush) (5/24/18 10:00)


Blood Culture (5/24/18 09:55)


Lactic Acid (5/24/18 09:55)


Westergren Sedimentation Rate (5/24/18 09:55)


C-Reactive Protein (Crp) (5/24/18 09:55)


Toe (Min 2vws) (5/24/18 )


Piperacil-Tazo 3.375 Gm Premix (Zosyn 3. (5/24/18 11:30)


Vancomycin Inj (Vancomycin Inj) (5/24/18 11:30)


Aspirin Chew (Aspirin Chew) (5/24/18 11:30)


Heparin Inj (Heparin Inj) (5/24/18 11:45)


Heparin-D5w 25,000 U/250 Ml (Heparin-D5w (5/24/18 11:45)


Act Partial Throm Time (Ptt) (5/24/18 11:43)


Cbc No Diff, Includes Plts (5/24/18 11:43)


Cbc No Diff, Includes Plts (5/27/18 06:00)


Act Partial Throm Time (Ptt) (5/24/18 18:43)


Occult Blood (Hemoccult) Stool (5/24/18 11:43)


Consult Podiatry (5/24/18 )


Consult Cardiology (5/24/18 )


Mri Foot W&W/O Contrast (5/24/18 )


(Hub Use Only)Inp Phy Cons/Ref (5/24/18 )


Admit Order (Ed Use Only) (5/24/18 13:02)





Labs





Laboratory Tests








Test


  5/24/18


10:00 5/24/18


10:05


 


White Blood Count 9.6 TH/MM3  


 


Red Blood Count 5.37 MIL/MM3  


 


Hemoglobin 15.0 GM/DL  


 


Hematocrit 43.9 %  


 


Mean Corpuscular Volume 81.8 FL  


 


Mean Corpuscular Hemoglobin 27.9 PG  


 


Mean Corpuscular Hemoglobin


Concent 34.1 % 


  


 


 


Red Cell Distribution Width 17.1 %  


 


Platelet Count 133 TH/MM3  


 


Mean Platelet Volume 9.1 FL  


 


Neutrophils (%) (Auto) 91.7 %  


 


Lymphocytes (%) (Auto) 2.8 %  


 


Monocytes (%) (Auto) 5.4 %  


 


Eosinophils (%) (Auto) 0.0 %  


 


Basophils (%) (Auto) 0.1 %  


 


Neutrophils # (Auto) 8.8 TH/MM3  


 


Lymphocytes # (Auto) 0.3 TH/MM3  


 


Monocytes # (Auto) 0.5 TH/MM3  


 


Eosinophils # (Auto) 0.0 TH/MM3  


 


Basophils # (Auto) 0.0 TH/MM3  


 


CBC Comment AUTO DIFF  


 


Differential Total Cells


Counted 100 


  


 


 


Neutrophils % (Manual) 73 %  


 


Band Neutrophils % 19 %  


 


Lymphocytes % 2 %  


 


Monocytes % 4 %  


 


Neutrophils # (Manual) 9.0 TH/MM3  


 


Metamyelocytes 1 %  


 


Myelocytes 1 %  


 


Differential Comment


  FINAL DIFF


MANUAL 


 


 


Dohle Bodies PRESENT  


 


Platelet Estimate LOW  


 


Platelet Morphology Comment NORMAL  


 


Ovalocytes 1+  


 


Erythrocyte Sedimentation Rate 41 mm/hr  


 


Prothrombin Time 13.4 SEC  


 


Prothromb Time International


Ratio 1.3 RATIO 


  


 


 


Activated Partial


Thromboplast Time 27.3 SEC 


  


 


 


Blood Urea Nitrogen 36 MG/DL  


 


Creatinine 1.84 MG/DL  


 


Random Glucose 407 MG/DL  


 


Total Protein 7.4 GM/DL  


 


Albumin 3.0 GM/DL  


 


Calcium Level 8.8 MG/DL  


 


Alkaline Phosphatase 114 U/L  


 


Aspartate Amino Transf


(AST/SGOT) 37 U/L 


  


 


 


Alanine Aminotransferase


(ALT/SGPT) 25 U/L 


  


 


 


Total Bilirubin 0.9 MG/DL  


 


Sodium Level 130 MEQ/L  


 


Potassium Level 4.4 MEQ/L  


 


Chloride Level 95 MEQ/L  


 


Carbon Dioxide Level 19.2 MEQ/L  


 


Anion Gap 16 MEQ/L  


 


Estimat Glomerular Filtration


Rate 38 ML/MIN 


  


 


 


Troponin I 3.04 NG/ML  


 


C-Reactive Protein 26.80 MG/DL  


 


B-Type Natriuretic Peptide 3148 PG/ML  


 


Lactic Acid Level  5.5 mmol/L 











[facility]


Medical Decision Making


Medical Screen Exam Complete:  Yes


Emergency Medical Condition:  Yes


Medical Record Reviewed:  Yes (Patient was admitted in March and had a cardiac 

catheterization demonstrating severe three-vessel coronary artery disease and 

cardiomyopathy with an ejection fraction of 25-30%.)


Interpretation(s)


EKG: Normal sinus rhythm, ST depressions in the inferior and lateral leads


CBC: 19% bandemia


CMP: Low bicarb with anion gap likely secondary to lactic acidosis


Lactic acid is 5.5


Renal insufficiency


Troponin is 3


BNP is 3100





Last 24 hours Impressions








Chest X-Ray 5/24/18 0955 Signed





Impressions: 





 CONCLUSION: 





 1.  No acute abnormality or significant interval change.





  





 


 


Toe X-Ray 5/24/18 0000 Signed





Impressions: 





 CONCLUSION: 





 Questionable small erosion distal phalanx right second toe. This could be infec





 tious in nature if there is clinical evidence for soft tissue infection.





  





 





  





  





 





  





 





  





 








Differential Diagnosis


Congestive heart failure, pneumonia, pulmonary embolism, myocardial infarction, 

sepsis


Narrative Course


This is a 57-year-old male who presents to the emergency department reporting 

shortness of breath.  He was placed on a monitor and an IV was established.  He 

was found to be tachypneic within normal oxygen saturation.  Labs are 

concerning for severe sepsis with a lactic acid of 5.5.  The source appears to 

be osteomyelitis and gangrene of the right second toe.  I discussed this with 

Dr. Miguel, who requested an MRI and will see the patient as an inpatient.  He 

was given broad-spectrum antibiotics.  IV fluids were deferred as the patient 

has a reduced ejection fraction, is  dyspneic on exam and his BNP is 3100.  

Patient has a troponin of 3.  I suspect this is demand ischemia in the setting 

of underlying sepsis.  I did discuss this with Dr. Purvis and we agreed to 

treat patient medically with aspirin and heparin.  Patient will be admitted for 

medical management and podiatry consultation.


Critical Care Narrative


Aggregate critical care time was 45 minutes. Time to perform other separately 

billable procedures was not included in the critical care time. My time did not 

include minutes spent treating any other patients simultaneously or on 

activities that did not directly contribute to the patient's treatment.  





The services I provided to this patient were to treat and/or prevent clinically 

significant deterioration that could result in: Disability, death





I provided critical care services requiring my management, as noted below:


Chart data review, documentation time, medication orders and management, vital 

sign assessments/reviewing monitor data, ordering and reviewing lab tests, 

ordering and interpreting/reviewing x-rays and diagnostic studies, care of the 

patient and discussion of the patient with the admitting physicians.





Physician Communication


Physician Communication


Discussed with Dr. Lemon, Dr. Purvis, and Dr. Miguel





Diagnosis





 Primary Impression:  


 Severe sepsis





Admitting Information


Admitting Physician Requests:  Admit











Kylah Desai MD May 24, 2018 10:02

## 2018-05-25 VITALS
RESPIRATION RATE: 20 BRPM | HEART RATE: 110 BPM | SYSTOLIC BLOOD PRESSURE: 114 MMHG | DIASTOLIC BLOOD PRESSURE: 80 MMHG | OXYGEN SATURATION: 99 % | TEMPERATURE: 98.3 F

## 2018-05-25 VITALS
HEART RATE: 90 BPM | DIASTOLIC BLOOD PRESSURE: 69 MMHG | RESPIRATION RATE: 17 BRPM | OXYGEN SATURATION: 96 % | TEMPERATURE: 98 F | SYSTOLIC BLOOD PRESSURE: 107 MMHG

## 2018-05-25 VITALS
SYSTOLIC BLOOD PRESSURE: 106 MMHG | HEART RATE: 95 BPM | OXYGEN SATURATION: 97 % | DIASTOLIC BLOOD PRESSURE: 64 MMHG | RESPIRATION RATE: 18 BRPM | TEMPERATURE: 98 F

## 2018-05-25 VITALS
SYSTOLIC BLOOD PRESSURE: 118 MMHG | DIASTOLIC BLOOD PRESSURE: 72 MMHG | TEMPERATURE: 97.8 F | RESPIRATION RATE: 20 BRPM | OXYGEN SATURATION: 97 % | HEART RATE: 98 BPM

## 2018-05-25 VITALS
RESPIRATION RATE: 18 BRPM | DIASTOLIC BLOOD PRESSURE: 74 MMHG | HEART RATE: 113 BPM | SYSTOLIC BLOOD PRESSURE: 127 MMHG | OXYGEN SATURATION: 96 %

## 2018-05-25 VITALS
HEART RATE: 95 BPM | RESPIRATION RATE: 16 BRPM | SYSTOLIC BLOOD PRESSURE: 95 MMHG | OXYGEN SATURATION: 97 % | DIASTOLIC BLOOD PRESSURE: 54 MMHG

## 2018-05-25 VITALS
RESPIRATION RATE: 16 BRPM | DIASTOLIC BLOOD PRESSURE: 74 MMHG | HEART RATE: 100 BPM | SYSTOLIC BLOOD PRESSURE: 129 MMHG | TEMPERATURE: 98.9 F | OXYGEN SATURATION: 97 %

## 2018-05-25 VITALS
DIASTOLIC BLOOD PRESSURE: 69 MMHG | HEART RATE: 92 BPM | RESPIRATION RATE: 18 BRPM | OXYGEN SATURATION: 98 % | SYSTOLIC BLOOD PRESSURE: 107 MMHG

## 2018-05-25 VITALS
HEART RATE: 155 BPM | RESPIRATION RATE: 36 BRPM | SYSTOLIC BLOOD PRESSURE: 101 MMHG | DIASTOLIC BLOOD PRESSURE: 54 MMHG | TEMPERATURE: 100.1 F

## 2018-05-25 VITALS
DIASTOLIC BLOOD PRESSURE: 50 MMHG | SYSTOLIC BLOOD PRESSURE: 87 MMHG | HEART RATE: 96 BPM | OXYGEN SATURATION: 100 % | RESPIRATION RATE: 20 BRPM

## 2018-05-25 VITALS
OXYGEN SATURATION: 100 % | HEART RATE: 118 BPM | RESPIRATION RATE: 34 BRPM | SYSTOLIC BLOOD PRESSURE: 121 MMHG | DIASTOLIC BLOOD PRESSURE: 61 MMHG

## 2018-05-25 VITALS
OXYGEN SATURATION: 95 % | HEART RATE: 107 BPM | SYSTOLIC BLOOD PRESSURE: 143 MMHG | DIASTOLIC BLOOD PRESSURE: 83 MMHG | RESPIRATION RATE: 18 BRPM

## 2018-05-25 VITALS
DIASTOLIC BLOOD PRESSURE: 69 MMHG | OXYGEN SATURATION: 98 % | HEART RATE: 105 BPM | TEMPERATURE: 98 F | RESPIRATION RATE: 15 BRPM | SYSTOLIC BLOOD PRESSURE: 123 MMHG

## 2018-05-25 VITALS — HEART RATE: 104 BPM

## 2018-05-25 VITALS
RESPIRATION RATE: 26 BRPM | OXYGEN SATURATION: 100 % | DIASTOLIC BLOOD PRESSURE: 60 MMHG | HEART RATE: 95 BPM | SYSTOLIC BLOOD PRESSURE: 94 MMHG

## 2018-05-25 VITALS
RESPIRATION RATE: 20 BRPM | DIASTOLIC BLOOD PRESSURE: 75 MMHG | OXYGEN SATURATION: 100 % | SYSTOLIC BLOOD PRESSURE: 122 MMHG | HEART RATE: 106 BPM

## 2018-05-25 VITALS — HEART RATE: 111 BPM

## 2018-05-25 VITALS — OXYGEN SATURATION: 96 %

## 2018-05-25 LAB
ALBUMIN SERPL-MCNC: 2.6 GM/DL (ref 3.4–5)
ALP SERPL-CCNC: 98 U/L (ref 45–117)
ALT SERPL-CCNC: 27 U/L (ref 12–78)
AST SERPL-CCNC: 40 U/L (ref 15–37)
BASOPHILS # BLD AUTO: 0 TH/MM3 (ref 0–0.2)
BASOPHILS NFR BLD: 0.1 % (ref 0–2)
BILIRUB SERPL-MCNC: 0.7 MG/DL (ref 0.2–1)
BUN SERPL-MCNC: 46 MG/DL (ref 7–18)
CALCIUM SERPL-MCNC: 8.6 MG/DL (ref 8.5–10.1)
CHLORIDE SERPL-SCNC: 102 MEQ/L (ref 98–107)
CREAT SERPL-MCNC: 1.21 MG/DL (ref 0.6–1.3)
CRP SERPL-MCNC: 22 MG/DL (ref 0–0.3)
EOSINOPHIL # BLD: 0.1 TH/MM3 (ref 0–0.4)
EOSINOPHIL NFR BLD: 1.3 % (ref 0–4)
ERYTHROCYTE [DISTWIDTH] IN BLOOD BY AUTOMATED COUNT: 17.2 % (ref 11.6–17.2)
GFR SERPLBLD BASED ON 1.73 SQ M-ARVRAT: 61 ML/MIN (ref 89–?)
GLUCOSE SERPL-MCNC: 249 MG/DL (ref 74–106)
HCO3 BLD-SCNC: 16.5 MEQ/L (ref 21–32)
HCT VFR BLD CALC: 43.8 % (ref 39–51)
HGB BLD-MCNC: 14.7 GM/DL (ref 13–17)
LYMPHOCYTES # BLD AUTO: 0.3 TH/MM3 (ref 1–4.8)
LYMPHOCYTES NFR BLD AUTO: 3.1 % (ref 9–44)
LYMPHOCYTES: 1 % (ref 9–44)
MCH RBC QN AUTO: 27.3 PG (ref 27–34)
MCHC RBC AUTO-ENTMCNC: 33.5 % (ref 32–36)
MCV RBC AUTO: 81.5 FL (ref 80–100)
MONOCYTE #: 1 TH/MM3 (ref 0–0.9)
MONOCYTES NFR BLD: 9.4 % (ref 0–8)
MONOCYTES: 7 % (ref 0–8)
NEUTROPHILS # BLD AUTO: 9.6 TH/MM3 (ref 1.8–7.7)
NEUTROPHILS NFR BLD AUTO: 86.1 % (ref 16–70)
NEUTS BAND # BLD MANUAL: 10.2 TH/MM3 (ref 1.8–7.7)
NEUTS BAND NFR BLD: 32 % (ref 0–6)
NEUTS SEG NFR BLD MANUAL: 60 % (ref 16–70)
PHOSPHATE SERPL-MCNC: 2.4 MG/DL (ref 2.5–4.9)
PLATELET # BLD: 86 TH/MM3 (ref 150–450)
PMV BLD AUTO: 10 FL (ref 7–11)
PROT SERPL-MCNC: 6.7 GM/DL (ref 6.4–8.2)
RBC # BLD AUTO: 5.37 MIL/MM3 (ref 4.5–5.9)
SODIUM SERPL-SCNC: 135 MEQ/L (ref 136–145)
WBC # BLD AUTO: 11.1 TH/MM3 (ref 4–11)

## 2018-05-25 RX ADMIN — TAZOBACTAM SODIUM AND PIPERACILLIN SODIUM SCH MLS/HR: 250; 2 INJECTION, SOLUTION INTRAVENOUS at 18:12

## 2018-05-25 RX ADMIN — PHENYTOIN SODIUM SCH MLS/HR: 50 INJECTION INTRAMUSCULAR; INTRAVENOUS at 06:10

## 2018-05-25 RX ADMIN — INSULIN ASPART SCH: 100 INJECTION, SOLUTION INTRAVENOUS; SUBCUTANEOUS at 17:14

## 2018-05-25 RX ADMIN — INSULIN ASPART SCH: 100 INJECTION, SOLUTION INTRAVENOUS; SUBCUTANEOUS at 14:21

## 2018-05-25 RX ADMIN — HEPARIN SODIUM PRN MLS/HR: 10000 INJECTION, SOLUTION INTRAVENOUS at 16:19

## 2018-05-25 RX ADMIN — TAZOBACTAM SODIUM AND PIPERACILLIN SODIUM SCH MLS/HR: 250; 2 INJECTION, SOLUTION INTRAVENOUS at 12:29

## 2018-05-25 RX ADMIN — FAMOTIDINE SCH MG: 20 TABLET, FILM COATED ORAL at 21:26

## 2018-05-25 RX ADMIN — PRAVASTATIN SODIUM SCH MG: 40 TABLET ORAL at 08:04

## 2018-05-25 RX ADMIN — ACETAMINOPHEN PRN MG: 325 TABLET ORAL at 20:09

## 2018-05-25 RX ADMIN — TAZOBACTAM SODIUM AND PIPERACILLIN SODIUM SCH MLS/HR: 250; 2 INJECTION, SOLUTION INTRAVENOUS at 06:00

## 2018-05-25 RX ADMIN — INSULIN ASPART SCH: 100 INJECTION, SOLUTION INTRAVENOUS; SUBCUTANEOUS at 08:14

## 2018-05-25 RX ADMIN — Medication PRN ML: at 21:28

## 2018-05-25 RX ADMIN — TAZOBACTAM SODIUM AND PIPERACILLIN SODIUM SCH MLS/HR: 250; 2 INJECTION, SOLUTION INTRAVENOUS at 00:21

## 2018-05-25 RX ADMIN — ASPIRIN SCH MG: 81 TABLET ORAL at 08:03

## 2018-05-25 RX ADMIN — INSULIN ASPART SCH: 100 INJECTION, SOLUTION INTRAVENOUS; SUBCUTANEOUS at 21:26

## 2018-05-25 RX ADMIN — TAZOBACTAM SODIUM AND PIPERACILLIN SODIUM SCH MLS/HR: 250; 2 INJECTION, SOLUTION INTRAVENOUS at 23:37

## 2018-05-25 RX ADMIN — ACYCLOVIR SCH UNITS: 800 TABLET ORAL at 21:26

## 2018-05-25 RX ADMIN — FAMOTIDINE SCH MG: 20 TABLET, FILM COATED ORAL at 08:03

## 2018-05-25 RX ADMIN — PHENYTOIN SODIUM SCH MLS/HR: 50 INJECTION INTRAMUSCULAR; INTRAVENOUS at 21:17

## 2018-05-25 NOTE — HHI.NPPN
Subjective


History of Present Illness


Patient is a 57-year-old with ischemic cardiomyopathy, diabetes, came in right 

toe infection





Review of Systems


Musculoskeletal


MS:  Pain/Stiffness





Objective Data


Data











 5/25/18 5/26/18





 19:00 07:00


 


Intake Total 565 ml 


 


Balance 565 ml 


 


  


 


Intake IV Total 565 ml 











Vital Signs








  Date Time  Temp Pulse Resp B/P (MAP) Pulse Ox O2 Delivery O2 Flow Rate FiO2


 


5/25/18 17:05 98.9 100 16 129/74 (92) 97 Nasal Cannula 2.00 


 


5/25/18 15:15 98.0 105 15 123/69 (87) 98 Nasal Cannula 2.00 


 


5/25/18 14:15 97.8 98 20 118/72 (87) 97 Nasal Cannula 2.00 


 


5/25/18 09:34 98.0 95 18 106/64 (78) 97 Nasal Cannula 2.00 


 


5/25/18 07:45     96   21


 


5/25/18 07:30 98.0 90 17 107/69 (82) 96 Room Air  


 


5/25/18 07:10  96 18  99 Nasal Cannula 2.00 


 


5/25/18 06:06  92 18 107/69 (82) 98 Nasal Cannula 2.00 


 


5/25/18 05:30  96 20 87/50 (62) 100 Nasal Cannula 2.00 


 


5/25/18 03:34  95 26 94/60 (71) 100 Nasal Cannula 2.00 


 


5/25/18 02:42  118 34 121/61 (81) 100 Nasal Cannula 2.00 


 


5/25/18 02:29 100.1 155 36 101/54 (70)  Nasal Cannula 2.00 


 


5/25/18 02:00  113 18 127/74 (91) 96 Nasal Cannula 2.00 


 


5/25/18 01:00  95 16 95/54 (68) 97 Nasal Cannula 2.00 


 


5/24/18 23:00  85 16 94/59 (71) 96 Nasal Cannula 2.00 


 


5/24/18 22:00 98.1 86 16 96/59 (71) 97 Nasal Cannula 2.00 


 


5/24/18 20:00  94 18 92/58 (69) 97 Nasal Cannula 2.00 


 


5/24/18 19:08  93 16 89/58 (68) 100 Nasal Cannula 2.00 


 


5/24/18 18:00  98 18 97/59 (72) 97 Nasal Cannula 2.00 








-:  


5/25/18 0350                                                                   

             5/25/18 0350








 Microbiology


5/25/18 Aerobic Blood Culture, Received


          Pending


5/25/18 Anaerobic Blood Culture, Received


          Pending


5/25/18 Aerobic Blood Culture, Received


          Pending


5/25/18 Anaerobic Blood Culture, Received


          Pending





Physical Exam


General


Appearance:  Well Developed, Well Nourished





Neck


Neck Exam:  Neck Supple





Pulmonary


Resp Exam:  Clear Bilaterally, Breath Sounds Equal





Cardiology


CV Exam:  Regular, Normal Sinus Rhythm





Gastrointestinal/Abdomen


GI Exam:  Soft, Non-Tender, Bowel Sounds Present





Integumentary


Skin Exam:  Lesion(s)





Extremeties


Extremities Exam:  Trace Edema





Assessment/Plan


Problem List:  


(1) Acute renal failure


ICD Codes:  N17.9 - Acute kidney failure, unspecified


Plan:  Patient has her creatinine this could be the result of underlying sepsis 

and gangrene of the toe, he also has cardiomyopathy which can cause cardiorenal 

syndrome


Patient is maintaining urine output


Kidney ultrasound showed increased echogenicity


Creatinine declined to 1.21


Nephrology to follow as needed





(2) Severe sepsis


ICD Codes:  A41.9 - Sepsis, unspecified organism; R65.20 - Severe sepsis 

without septic shock


Plan:  Patient is on antibiotic vancomycin and Zosyn monitor vancomycin levels


Staph aureus positive in cultures





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


Plan:  Started on IV antibiotics as above





(4) Cardiomyopathy


ICD Codes:  I42.9 - Cardiomyopathy, unspecified


Plan:  EF of 25% and AICD in place





(5) Diabetes mellitus


ICD Codes:  E11.9 - Type 2 diabetes mellitus without complications


Plan:  Monitor blood glucose














Vandana Skelton MD May 25, 2018 17:39

## 2018-05-25 NOTE — HHI.PR
Addendum to Inpatient Note


Additional Information


RN called to inform that patient was having difficulty breathing, somewhat 

agitated and his heart rate is in the 150s. Went to see patient. His heart rate 

was around 150-155, suspicious for Atrial flutter. EKG confirmed Aflutter. We 

pushed 5mg of IV Metoprolol which immediately decreased his heart rate to 120s. 

I discussed with RN - we can give him one or two more doses of IV metoprolol 

tartrate 5mg. Reviewed chart. Dr. Purvis (Cardiology) is following patient. 

Palliative care consult pending. Patient is already on Vanc and Zosyn for 

sepsis.











María Elena Angel DO May 25, 2018 02:51

## 2018-05-25 NOTE — PD.ID.CON
History of Present Illness


Service


ID


Consult Requested By





Reason for Consult


Evaluation and Mment of Staph bacteremia and osteomyelitis of 2nd digit of 

right foot.


Primary Care Physician


Dhaval Malloy DO


Diagnoses:  


History of Present Illness


 is a 58 y/o CM with PMHx of CHF, Ischemic Cardiomyopathy with last 

reported EF 20-25%, he has prior h/o CABG x 3, defibrillator and pacemaker, DM, 

HTN and hyperlipidemia. CT surgery evaluated and found him not to be a 

revascularization candidate and recommended referral to a tertiary center for 

transplant consideration.  As the patient has no  insurance this option has not 

been found feasible.  He underwent placement of a single-chamber St Trav ICD in 

March 2018 for sudden cardiac death prevention. 





With this background patient presents to the ED at [facility] with 4 day history 

of progressive dyspnea described as severe, worsening with exertion, and 

improved with rest. He has had associated symptoms of diaphoresis, nausea and 

vomiting over the weekend.  He additionally has noted painful changes in his 

right second toe with color changes suspicious for infection.  





In ED patient was noted to have WBC 9.6, hemoglobin 15.0, hematocrit 43.9, 

platelets 133, sodium 130, potassium 4.4, BUN 36, creatinine 1.84, random 

glucose 407 alkaline phosphatase 114, AST 37, ALT 25, total bilirubin 0.9, 

troponin 3.04, C-reactive protein 26.8, B natruretic peptide 3148, lactic acid 

5.5. Ax Xray of Rt toe showed erosion. MRI with no osteomyelitis. Chest x-ray 

showed no acute abnormality or significant interval change.





At baseline prior to this infectious process patient was able to work around 

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  He states that he is only able to be active 

for 5-10 minutes before becoming very dyspneic and exhausted.  He was 

previously an over the road  but has been unable to work since 

March of this year.  He is now attempting to get disability as he has a nearly 

homebound existence and requires assistance to get out to doctor's appointments.





Sepsis workup on admission positive for Staph bacteremia and ID consulted for 

evaluation and Mment of the same.





Review of Systems


ROS Limitations:  Poor Historian


Constitutional:  COMPLAINS OF: Fatigue, DENIES: Diaphoretic episodes, Fever, 

Weight gain, Weight loss, Chills, Dizziness, Change in appetite, Night Sweats


Endocrine:  DENIES: Heat/cold intolerance, Polydipsia, Polyuria, Polyphagia


Eyes:  DENIES: Blurred vision, Diplopia, Eye inflammation, Eye pain, Vision loss

, Photosensitivity, Double Vision


Ears, nose, mouth, throat:  DENIES: Tinnitus, Hearing loss, Vertigo, Nasal 

discharge, Oral lesions, Throat pain, Hoarseness, Ear Pain, Running Nose, 

Epistaxis, Sinus Pain, Toothache, Odynophagia


Respiratory:  COMPLAINS OF: Shortness of breath, DENIES: Apneas, Cough, Snoring

, Wheezing, Hemoptysis, Sputum production


Cardiovascular:  COMPLAINS OF: Dyspnea on Exertion, Orthopnea, DENIES: Chest 

pain, Palpitations, Syncope, PND, Lower Extremity Edema, Claudication


Gastrointestinal:  DENIES: Abdominal pain, Black stools, Bloody stools, 

Constipation, Diarrhea, Nausea, Vomiting, Difficulty Swallowing, Anorexia


Genitourinary:  DENIES: Sexual dysfunction, Urinary frequency, Urinary 

incontinence, Urgency, Hematuria, Dysuria, Nocturia, Penile Discharge, 

Testicular Pain, Testicular Swelling


Musculoskeletal:  COMPLAINS OF: Joint pain, Joint Swelling


Integumentary:  COMPLAINS OF: Abnormal pigmentation, DENIES: Nail changes, 

Pruritus, Rash


Hematologic/lymphatic:  DENIES: Bruising, Lymphadenopathy


Immunologic/allergic:  DENIES: Eczema, Urticaria


Neurologic:  DENIES: Abnormal gait, Headache, Localized weakness, Paresthesias, 

Seizures, Speech Problems, Tremor, Poor Balance


Psychiatric:  DENIES: Anxiety, Confusion, Mood changes, Depression, 

Hallucinations, Agitation, Suicidal Ideation, Homicidal Ideation, Delusions


Except as stated in HPI:  all other systems reviewed are Neg





Past Family Social History


Allergies:  


Coded Allergies:  


     iodine (Unverified  Allergy, Severe, Swelling, 3/21/18)


     potassium iodide (Unverified  Allergy, Severe, Swelling, 3/21/18)


     povidone-iodine (Unverified  Allergy, Severe, Swelling, 3/21/18)


     shellfish derived (Unverified  Allergy, Severe, 3/21/18)


     sodium iodide (Unverified  Allergy, Severe, Swelling, 3/21/18)


     sodium iodide (Unverified  Allergy, Severe, Swelling, 3/21/18)


Past Medical History


Severe ischemic cardiomyopathy EF 20-25%


Hyperlipidemia


Diabetes


Coronary artery disease status post MI 3/13


COPD


Past Surgical History


CABG 10 years ago


Pacemaker/Defibrillator insertion


Appendectomy


Skin graft to right foot as a child


Back surgeries 3 has hardware in back per report.


Reported Medications





Reported Meds & Active Scripts


Active


Cephalexin 500 Mg Cap 500 Mg PO Q8HR


Famotidine 20 Mg Tab 20 Mg PO BID


Aspirin DR (Aspirin) 81 Mg Tabdr 162 Mg PO DAILY


Aldactone (Spironolactone) 50 Mg Tab 50 Mg PO DAILY


Entresto (Sacubitril-Valsartan) 49-51 Mg Tab 1 Tab PO BID


Carvedilol 6.25 Mg Tab 6.25 Mg PO BID


Pravastatin 40 Mg Tab 40 Mg PO DAILY


Clopidogrel (Clopidogrel Bisulfate) 75 Mg Tab 75 Mg PO DAILY


Reported


Glucophage (Metformin HCl) 850 Mg Tab 850 Mg PO TIDPC


Glyburide 2.5 Mg Tab 2.5 Mg PO BID


     Take with meals at the same time each day


Active Ordered Medications





Current Medications








 Medications


  (Trade)  Dose


 Ordered  Sig/Ilia


 Route  Start Time


 Stop Time Status Last Admin


 


  (NS Flush)  2 ml  UNSCH  PRN


 IVF  5/24/18 10:00


    5/25/18 21:28


 


 


 Heparin Sodium/


 Dextrose  250 ml @ 


 10 mls/hr  TITRATE  PRN


 IV  5/24/18 11:45


    5/25/18 16:19


 


 


  (D50w (Vial) Inj)  50 ml  UNSCH  PRN


 IV PUSH  5/24/18 13:30


     


 


 


  (Glucagon Inj)  1 mg  UNSCH  PRN


 OTHER  5/24/18 13:30


     


 


 


  (NovoLOG


 SUPPLEMENTAL


 SCALE)  1  ACHS SLIDING  SCALE


 SQ  5/24/18 17:00


    5/25/18 21:26


 


 


 Sodium Chloride  1,000 ml @ 


 60 mls/hr  [id number]


 IV  5/24/18 13:30


    5/24/18 18:23


 


 


 Piperacillin Sod/


 Tazobactam Sod  50 ml @ 


 100 mls/hr  Q6H


 IV  5/24/18 18:00


    5/25/18 18:12


 


 


 Pharmacy Profile


 Note  0 ml @ 0


 mls/hr  UNSCH


 OTHER  5/24/18 13:30


     


 


 


  (Ecotrin Ec)  162 mg  DAILY


 PO  5/25/18 09:00


    5/25/18 08:03


 


 


  (Pepcid)  20 mg  BID


 PO  5/24/18 21:00


    5/25/18 21:26


 


 


  (Pravachol)  40 mg  DAILY


 PO  5/25/18 09:00


    5/25/18 08:04


 


 


  (Duoneb Neb)  1 ampule  Q4HR NEB  PRN


 NEB  5/24/18 13:45


    5/24/18 15:38


 


 


  (Tylenol)  650 mg  Q4H  PRN


 PO  5/24/18 14:30


    5/25/18 20:09


 


 


  (Norco  5-325 Mg)  1 tab  Q4H  PRN


 PO  5/24/18 14:30


    5/24/18 15:18


 


 


  (Reglan Inj)  5 mg  Q6H  PRN


 IV PUSH  5/25/18 02:30


    5/25/18 20:10


 


 


  (Zofran  Odt)  4 mg  Q6H  PRN


 PO  5/25/18 02:30


     


 


 


  (Lopressor Inj)  5 mg  Q5M  PRN


 IV PUSH  5/25/18 02:45


    5/25/18 03:06


 


 


  (Levemir Inj)  10 units  HS


 SQ  5/25/18 21:00


    5/25/18 21:26


 


 


 Vancomycin HCl


 1000 mg/Sodium


 Chloride  250 ml @ 


 250 mls/hr  Q12H


 IV  5/26/18 02:00


     


 


 


  ([facility]


 Ordered Lab Info)  SPECIFIC


 LAB TO BE


 ...  ONCE  ONCE


 .XX  5/27/18 13:45


 5/27/18 13:46   


 








Family History


reviewed significant for MI,CAD and HTN in multiple family members.


Social History


Lives with significant other. Smokes for 45 yrs quit 2 yrs back. POA is 

significant other per palliative care notes. Denies alcohol or smoking. Used to 

be a  in past.





Physical Exam


Vital Signs





Vital Signs








  Date Time  Temp Pulse Resp B/P (MAP) Pulse Ox O2 Delivery O2 Flow Rate FiO2


 


5/25/18 15:15 98.0 105 15 123/69 (87) 98 Nasal Cannula 2.00 


 


5/25/18 14:15 97.8 98 20 118/72 (87) 97 Nasal Cannula 2.00 


 


5/25/18 09:34 98.0 95 18 106/64 (78) 97 Nasal Cannula 2.00 


 


5/25/18 07:45     96   21


 


5/25/18 07:30 98.0 90 17 107/69 (82) 96 Room Air  


 


5/25/18 07:10  96 18  99 Nasal Cannula 2.00 


 


5/25/18 06:06  92 18 107/69 (82) 98 Nasal Cannula 2.00 


 


5/25/18 05:30  96 20 87/50 (62) 100 Nasal Cannula 2.00 


 


5/25/18 03:34  95 26 94/60 (71) 100 Nasal Cannula 2.00 


 


5/25/18 02:42  118 34 121/61 (81) 100 Nasal Cannula 2.00 


 


5/25/18 02:29 100.1 155 36 101/54 (70)  Nasal Cannula 2.00 


 


5/25/18 02:00  113 18 127/74 (91) 96 Nasal Cannula 2.00 


 


5/25/18 01:00  95 16 95/54 (68) 97 Nasal Cannula 2.00 


 


5/24/18 23:00  85 16 94/59 (71) 96 Nasal Cannula 2.00 


 


5/24/18 22:00 98.1 86 16 96/59 (71) 97 Nasal Cannula 2.00 


 


5/24/18 20:00  94 18 92/58 (69) 97 Nasal Cannula 2.00 


 


5/24/18 19:08  93 16 89/58 (68) 100 Nasal Cannula 2.00 


 


5/24/18 18:00  98 18 97/59 (72) 97 Nasal Cannula 2.00 








Physical Exam


GENERAL: This is a well-nourished, well-developed patient, in no apparent 

distress.


SKIN: No rashes or lesions. Cool and dry.


HEAD: Atraumatic. Normocephalic. No temporal or scalp tenderness.


EYES: Pupils equal round and reactive. Extraocular motions intact. No scleral 

icterus. No injection or drainage. 


ENT: Nose without bleeding, purulent drainage or septal hematoma. Throat 

without erythema, tonsillar hypertrophy or exudate. Uvula midline. Airway 

patent.


NECK: Trachea midline. Supple, nontender, no meningeal signs.


CARDIOVASCULAR: HS audible.


RESPIRATORY: Clear to auscultation. Breath sounds equal bilaterally. No wheezes

, rales, or rhonchi.  


GASTROINTESTINAL: Abdomen soft, non-tender, nondistended. 


MUSCULOSKELETAL: Right 2nd toe with greyish black hue, cold, clammy, with 

surrounding erythema. Streaks noted on dorsum of foot. Decreased dorsalis pedis

, posterior tibialis as well as diminished popliteal pulsations.


NEUROLOGICAL: Awake and alert. Cranial nerves II through XII intact.  Motor and 

sensory grossly within normal limits. Five out of 5 muscle strength in all 

muscle groups.  Normal speech.


Psych cooperative


IV line sites with no e.o infection.


Laboratory





Laboratory Tests








Test


  5/24/18


18:32 5/24/18


21:00 5/24/18


22:05 5/25/18


02:24


 


White Blood Count 13.0    


 


Red Blood Count 5.34    


 


Hemoglobin 14.5    


 


Hematocrit 43.6    


 


Mean Corpuscular Volume 81.7    


 


Mean Corpuscular Hemoglobin 27.1    


 


Mean Corpuscular Hemoglobin


Concent 33.2 


  


  


  


 


 


Red Cell Distribution Width 17.5    


 


Platelet Count 105    


 


Mean Platelet Volume 9.2    


 


Activated Partial


Thromboplast Time 32.4 


  


  34.5 


  


 


 


Lactic Acid Level 3.1    


 


Troponin I 3.94   2.93  


 


Urine Random Creatinine  81   


 


Urine Random Total Protein  98   


 


Urine Random Sodium  12   


 


Urine Protein/Creatinine Ratio  1.21   


 


Blood Gas Puncture Site    RT RADIAL 


 


Blood Gas Patient Temperature    98.6 


 


Blood Gas HCO3    12 


 


Blood Gas Base Excess    -12.2 


 


Blood Gas Oxygen Saturation    97 


 


Arterial Blood pH    7.39 


 


Arterial Blood Partial


Pressure CO2 


  


  


  20 


 


 


Arterial Blood Partial


Pressure O2 


  


  


  133 


 


 


Arterial Blood Oxygen Content    21.4 


 


Arterial Blood


Carboxyhemoglobin 


  


  


  0.5 


 


 


Arterial Blood Methemoglobin    0.8 


 


Blood Gas Hemoglobin    15.5 


 


Oxygen Delivery Device    NASAL CANNULA 


 


Blood Gas Liter Flow    2 


 


Test


  5/25/18


03:50 5/25/18


05:55 5/25/18


11:35 5/25/18


13:10


 


White Blood Count 11.1    


 


Red Blood Count 5.37    


 


Hemoglobin 14.7    


 


Hematocrit 43.8    


 


Mean Corpuscular Volume 81.5    


 


Mean Corpuscular Hemoglobin 27.3    


 


Mean Corpuscular Hemoglobin


Concent 33.5 


  


  


  


 


 


Red Cell Distribution Width 17.2    


 


Platelet Count 86    


 


Mean Platelet Volume 10.0    


 


Neutrophils (%) (Auto) 86.1    


 


Lymphocytes (%) (Auto) 3.1    


 


Monocytes (%) (Auto) 9.4    


 


Eosinophils (%) (Auto) 1.3    


 


Basophils (%) (Auto) 0.1    


 


Neutrophils # (Auto) 9.6    


 


Lymphocytes # (Auto) 0.3    


 


Monocytes # (Auto) 1.0    


 


Eosinophils # (Auto) 0.1    


 


Basophils # (Auto) 0.0    


 


CBC Comment AUTO DIFF    


 


Differential Total Cells


Counted 100 


  


  


  


 


 


Neutrophils % (Manual) 60    


 


Band Neutrophils % 32    


 


Lymphocytes % 1    


 


Monocytes % 7    


 


Neutrophils # (Manual) 10.2    


 


Differential Comment


  FINAL DIFF


MANUAL 


  


  


 


 


Platelet Estimate LOW    


 


Platelet Morphology Comment NORMAL    


 


Red Cell Morphology Comment NORMAL    


 


Blood Urea Nitrogen 46    


 


Creatinine 1.21    


 


Random Glucose 249    


 


Total Protein 6.7    


 


Albumin 2.6    


 


Calcium Level 8.6    


 


Phosphorus Level 2.4    


 


Alkaline Phosphatase 98    


 


Aspartate Amino Transf


(AST/SGOT) 40 


  


  


  


 


 


Alanine Aminotransferase


(ALT/SGPT) 27 


  


  


  


 


 


Total Bilirubin 0.7    


 


Sodium Level 135    


 


Potassium Level 3.9    


 


Chloride Level 102    


 


Carbon Dioxide Level 16.5    


 


Anion Gap 17    


 


Estimat Glomerular Filtration


Rate 61 


  


  


  


 


 


Activated Partial


Thromboplast Time 


  34.2 


  


  28.7 


 


 


Lactic Acid Level   2.4  














 Date/Time


Source Procedure


Growth Status


 


 


 5/24/18 10:08


Blood Peripheral Aerobic Blood Culture - Preliminary


Staphylococcus Aureus Resulted


 


 5/24/18 10:08 Anaerobic Blood Culture - Preliminary


Staphylococcus Aureus Resulted








Result Diagram:  


5/25/18 0350                                                                   

             5/25/18 0350





Imaging





Last Impressions








Chest X-Ray 5/24/18 0955 Signed





Impressions: 





 CONCLUSION: 





 1.  No acute abnormality or significant interval change.





  





 


 


Toe X-Ray 5/24/18 0000 Signed





Impressions: 





 CONCLUSION: 





 Questionable small erosion distal phalanx right second toe. This could be infec





 tious in nature if there is clinical evidence for soft tissue infection.





  





 





  





  





 





  





 





  





 


 


Renal Ultrasound 5/24/18 0000 Signed





Impressions: 





 CONCLUSION: 





 1.  No hydronephrosis.





 2.  Increased echogenicity most characteristic of medical renal disease.





  





 


 


Foot MRI 5/24/18 0000 Signed





Impressions: 





 CONCLUSION:





 1.  No evidence for osteomyelitis. Soft tissue swelling at the second toe.





  





 











Assessment and Plan


Assessment and Plan


Sepsis (leucocytosis, tachycardia, lactic acidemia)


Staph aureus bacteremia likely secondary to Right 2nd toe cellulitis, 

osteomyelitis.


Right 2nd toe cellulitis, osteomyelitis.


Hepatitis C antibody positive.


AICD status.


CAD with severe ischemic cardiomyopathy.





Recs


Continue Zosyn IV for now


Continue Vanco IV (target 15-20 for bacteremia and possible osteomyelitis)


Reviewed  note: will need digit amputation. Await vascular studies. 

Will likely need Vascular consult as pulses diminished and concern for gangrene.


Check CRP


Check HIV screen


Check Hepatitis profile.


Follow cultures to adjust antibiotics.


Follow clinically.


 to cover for me this weekend.











Bev Brandon MD May 25, 2018 16:04

## 2018-05-25 NOTE — PD.CONS
Consult


Service


Palliative Care


Consult Requested By


Dr. Abdiel Purvis


.


Primary Care Physician


Dhaval Malloy, DO


Reason for Consultation


   a.  To assist with evaluation and management of symptoms including: Dyspnea, 

vomiting, pain


   b.  To assist medical decision maker(s) with: better understanding of 

current medical conditions; weighing benefits/burdens of medical treatment 

options; making        


        medical treatment decisions.





HPI


History of Present Illness


This is a 57-year-old male with a history of class IV congestive heart failure, 

EF 20-25%, ischemic cardiomyopathy not amenable to revascularization, diabetes, 

hypertension, hyperlipidemia, COPD, who presents to the emergency room with a 

four-day history of progressive dyspnea constant in duration severe, worsening 

with exertion, improved with rest.  He describes this as being unable to catch 

his breath.  He has had associated symptoms of diaphoresis, nausea and vomiting 

over the weekend.  He additionally has noted painful changes in his right 

second toe with color changes suspicious for infection.  He underwent three-

vessel bypass in [place] in 2016.   He underwent cardiac catheterization by 

Dr. Purvis 3/22/2018 showing sequential 70-80% stenoses in the LAD just 

before the graft insertion site and then in the mid to distal LAD and distal 

LAD.  Left main coronary artery had an ostial 50-60% stenosis and the LAD was 

occluded at the first diagonal artery.  First diagonal is subtotally occluded 

in the ostial proximal segment with NATALIYA II-III flow into the vessel.  In the 

proximal mid segment there is a 95% stenosis prior to a bifurcation with the 

medial branch subtotally occluded and the more lateral branch subtotally 

occluded in the mid segment.  Of note vessel size was measured between 0.25-1 

mm.  Left circumflex was subtotally occluded after the second obtuse marginal 

and there was sequential subtotal occlusions in the mid to distal AV groove, 

left circumflex supplying a very small 0.5 mm distal posterior lateral artery.  

First obtuse marginal vessel again small, 1 mm, moderate disease in the 

proximal mid segment up to 50-60%.  Second obtuse marginal is a 1.5-2 mm 

diameter, occluded in the mid segment.  Left to right collaterals were noted in 

the distal right PDA, estimated at 0.5 mm at most.  CT surgery evaluated and 

found him not to be a revascularization candidate and recommended referral to a 

tertiary center  For transplant consideration.  As the patient has no  

insurance this option has not been found feasible.  He underwent placement of a 

single-chamber St Trav ICD in March 2018 for sudden cardiac death prevention. 





 ED course:


* Laboratory: WBC 9.6, hemoglobin 15.0, hematocrit 43.9, platelets 133, sodium 

130, potassium 4.4, BUN 36, creatinine 1.84, random glucose 407 alkaline 

phosphatase 114, AST 37, ALT 25, total bilirubin 0.9, troponin 3.04, C-reactive 

protein 26.8, B natruretic peptide 3148, lactic acid 5.5.


* Radiology: Right toe x-ray showed Questionable small erosion distal phalanx 

right second toe.  Foot MRI showed no evidence for osteomyelitis.  Renal 

ultrasound showed no hydronephrosis with increased echogenicity most 

characteristic of medical renal disease.  Chest x-ray showed no acute 

abnormality or significant interval change.





On evaluation this is a well-developed, well-nourished  male appearing 

to be his stated age sitting up in bed in no acute distress.  He denies any 

current chest pain but is seen to be mildly tachypneic with conversation or 

repositioning.  He is reclining in bed at approximately 25 head of bed 

elevation without evident orthopnea.  He has no significant edema.  He showed 

fairly good insight into his disease process and understands that without a 

heart transplant, his condition is terminal.  He still maintains some hope that 

he will be able to complete his Medicaid application which was previously 

rejected due to missing information and is aware that it takes an average of 3-

6 months for review and processing, even if his application is declined.  He 

described his dyspnea at home as severe, constant with a feeling of not being 

able to get enough air, worsening with activity.  He states that when the 

dyspnea worsens, he begins to cough severely enough to cause vomiting and then 

has episodes of tremors and diaphoresis post emesis.  He denies any preliminary 

nausea to these episodes.  He did complain of some midsternal chest pain on 

admission, none now, worsening with activity, improving with rest, 6 out of 10 

in intensity, intermittent.  He also has pain in his second right toe which is 

reddened, swollen and draining.





.


Function/Cognitive Trajectory


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  He 

states that he is only able to be active for 5-10 minutes before becoming very 

dyspneic and exhausted.  He was previously an over the road  but 

has been unable to work since March of this year.  He is now attempting to get 

disability as he has a nearly homebound existence and requires assistance to 

get out to doctor's appointments.


.





Review of Systems


Constitutional:  COMPLAINS OF: Diaphoretic episodes


Endocrine:  DENIES: Heat/cold intolerance, Polydipsia, Polyuria, Polyphagia


Eyes:  DENIES: Blurred vision, Diplopia, Eye inflammation, Eye pain, Vision loss

, Photosensitivity, Double Vision, Blind spots


Ears, nose, mouth, throat:  DENIES: Tinnitus, Hearing loss, Vertigo, Nasal 

discharge, Oral lesions, Throat pain, Hoarseness, Ear Pain, Running Nose, 

Epistaxis, Sinus Pain, Toothache, Odynophagia


Respiratory:  COMPLAINS OF: Shortness of breath


Cardiovascular:  COMPLAINS OF: Dyspnea on Exertion, Orthopnea


Gastrointestinal:  COMPLAINS OF: Vomiting, DENIES: Abdominal pain, Black stools

, Bloody stools, Constipation, Diarrhea, Nausea, Difficulty Swallowing, Anorexia

, Dyspepsia or heartburn, Excessive gas, Bloating, Vomiting blood


Genitourinary:  DENIES: Sexual dysfunction, Urinary frequency, Urinary 

incontinence, Urgency, Hematuria, Dysuria, Nocturia, Penile Discharge, 

Testicular Pain, Testicular Swelling, Hesitancy, Dribbling, Decreased stream


Musculoskeletal:  DENIES: Joint pain, Muscle aches, Stiffness, Joint Swelling, 

Back pain, Neck pain, Decreased range of motion


Integumentary:  COMPLAINS OF: Abnormal pigmentation, Nail changes, Pruritus, 

Rash, Nodules, Tumors, Excessive dryness, Non-healing sores


Hematologic/Lymphatics:  DENIES: Bruising, Lymphadenopathy, Prolonged bleed w/ 

proced, History of transfusions


Immunologic/Allergic:  DENIES: Eczema, Urticaria


Neurologic:  DENIES: Abnormal gait, Headache, Localized weakness, Paresthesias, 

Seizures, Speech Problems, Tremor, Poor Balance, Change in smell or taste


Psychiatric:  DENIES: Anxiety, Confusion, Mood changes, Depression, 

Hallucinations, Agitation, Suicidal Ideation, Homicidal Ideation, Delusions, 

Anhedonia





Past Family Social History


Coded Allergies:  


     iodine (Unverified  Allergy, Severe, Swelling, 3/21/18)


     potassium iodide (Unverified  Allergy, Severe, Swelling, 3/21/18)


     povidone-iodine (Unverified  Allergy, Severe, Swelling, 3/21/18)


     shellfish derived (Unverified  Allergy, Severe, 3/21/18)


     sodium iodide (Unverified  Allergy, Severe, Swelling, 3/21/18)


     sodium iodide (Unverified  Allergy, Severe, Swelling, 3/21/18)


Past Medical History


Severe ischemic cardiomyopathy EF 20-25%


Hyperlipidemia


Diabetes


Coronary artery disease status post MI 3/13


COPD





.


Past Surgical History


CABG 10 years ago


Appendectomy


Skin graft to right foot as a child


Back surgeries 3


.


Reported Medications





Reported Meds & Active Scripts


Active


Cephalexin 500 Mg Cap 500 Mg PO Q8HR


Famotidine 20 Mg Tab 20 Mg PO BID


Aspirin DR (Aspirin) 81 Mg Tabdr 162 Mg PO DAILY


Aldactone (Spironolactone) 50 Mg Tab 50 Mg PO DAILY


Entresto (Sacubitril-Valsartan) 49-51 Mg Tab 1 Tab PO BID


Carvedilol 6.25 Mg Tab 6.25 Mg PO BID


Pravastatin 40 Mg Tab 40 Mg PO DAILY


Clopidogrel (Clopidogrel Bisulfate) 75 Mg Tab 75 Mg PO DAILY


Reported


Glucophage (Metformin HCl) 850 Mg Tab 850 Mg PO TIDPC


Glyburide 2.5 Mg Tab 2.5 Mg PO BID


     Take with meals at the same time each day





Current Medications








 Medications


  (Trade)  Dose


 Ordered  Sig/Ilia


 Route  Start Time


 Stop Time Status Last Admin


 


  (NS Flush)  2 ml  UNSCH  PRN


 IVF  5/24/18 10:00


     


 


 


 Heparin Sodium/


 Dextrose  250 ml @ 


 10 mls/hr  TITRATE  PRN


 IV  5/24/18 11:45


    5/24/18 16:02


 


 


  (D50w (Vial) Inj)  50 ml  UNSCH  PRN


 IV PUSH  5/24/18 13:30


     


 


 


  (Glucagon Inj)  1 mg  UNSCH  PRN


 OTHER  5/24/18 13:30


     


 


 


  (NovoLOG


 SUPPLEMENTAL


 SCALE)  1  ACHS SLIDING  SCALE


 SQ  5/24/18 17:00


    5/25/18 08:14


 


 


 Sodium Chloride  1,000 ml @ 


 60 mls/hr  [id number]


 IV  5/24/18 13:30


    5/24/18 18:23


 


 


 Piperacillin Sod/


 Tazobactam Sod  50 ml @ 


 100 mls/hr  Q6H


 IV  5/24/18 18:00


    5/25/18 06:00


 


 


 Pharmacy Profile


 Note  0 ml @ 0


 mls/hr  UNSCH


 OTHER  5/24/18 13:30


     


 


 


  (Ecotrin Ec)  162 mg  DAILY


 PO  5/25/18 09:00


    5/25/18 08:03


 


 


  (Pepcid)  20 mg  BID


 PO  5/24/18 21:00


    5/25/18 08:03


 


 


  (Pravachol)  40 mg  DAILY


 PO  5/25/18 09:00


    5/25/18 08:04


 


 


  (Duoneb Neb)  1 ampule  Q4HR NEB  PRN


 NEB  5/24/18 13:45


    5/24/18 15:38


 


 


 Vancomycin HCl


 1500 mg/Sodium


 Chloride  515 ml @ 


 250 mls/hr  Q24H


 IV  5/25/18 14:00


     


 


 


  (Tylenol)  650 mg  Q4H  PRN


 PO  5/24/18 14:30


     


 


 


  (Norco  5-325 Mg)  1 tab  Q4H  PRN


 PO  5/24/18 14:30


    5/24/18 15:18


 


 


  (Reglan Inj)  5 mg  Q6H  PRN


 IV PUSH  5/25/18 02:30


     


 


 


  (Zofran  Odt)  4 mg  Q6H  PRN


 PO  5/25/18 02:30


     


 


 


  (Lopressor Inj)  5 mg  Q5M  PRN


 IV PUSH  5/25/18 02:45


    5/25/18 03:06


 








Family History


Has a family history of coronary artery disease, MI and hypertension.


.


Substance Use


Tobacco: Smoked from age 12 until 3 years ago, approximately 45 years.


Alcohol: Denies excessive use.


Prescription med abuse: Denies.


Illicits: Denies.


.


Psychosocial History


He was born in [place] and moved to [place] before he was 8 years old.  He 

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  He has 

never been  and has no children.  He has a significant other whom he has 

made a healthcare surrogate.  He was in the  service but did not serve 

during more time.


.


Spiritual/Cultural Factors


Identifies with the Taoist michelle and would accept  visits.


.


Living Will:  Never completed


Health Care Surrogate:  Copy in medical record


Durable Power of :  Never completed


Date completed:


5/25/2018


Health Care Surrogate(s):


Healthcare surrogate is Giovanna Ramsay, his significant other.  He has made his 

sister, Shy Win his alternate healthcare surrogate.


.


Documented care wishes:


No living will completed.


.


Today's verbally stated goals:


He wishes to avoid pain and at this time is not certain whether he would be 

willing to undergo intubation or not.  5 wishes booklet and living will form 

were provided to patient for review and discussion with his significant other.


.


Family/friends goals:


His significant other's goals are to "remain as positive as possible for as 

long as possible and not think about any of this".


.


Ethical and Legal Issues


None noted.


.





Physical Exam





Vital Signs








  Date Time  Temp Pulse Resp B/P (MAP) Pulse Ox O2 Delivery O2 Flow Rate FiO2


 


5/25/18 09:34 98.0 95 18 106/64 (78) 97 Nasal Cannula 2.00 


 


5/25/18 07:45     96   21


 


5/25/18 07:30 98.0 90 17 107/69 (82) 96 Room Air  


 


5/25/18 07:10  96 18  99 Nasal Cannula 2.00 


 


5/25/18 06:06  92 18 107/69 (82) 98 Nasal Cannula 2.00 


 


5/25/18 05:30  96 20 87/50 (62) 100 Nasal Cannula 2.00 


 


5/25/18 03:34  95 26 94/60 (71) 100 Nasal Cannula 2.00 


 


5/25/18 02:42  118 34 121/61 (81) 100 Nasal Cannula 2.00 


 


5/25/18 02:29 100.1 155 36 101/54 (70)  Nasal Cannula 2.00 


 


5/25/18 02:00  113 18 127/74 (91) 96 Nasal Cannula 2.00 


 


5/25/18 01:00  95 16 95/54 (68) 97 Nasal Cannula 2.00 


 


5/24/18 23:00  85 16 94/59 (71) 96 Nasal Cannula 2.00 


 


5/24/18 22:00 98.1 86 16 96/59 (71) 97 Nasal Cannula 2.00 


 


5/24/18 20:00  94 18 92/58 (69) 97 Nasal Cannula 2.00 


 


5/24/18 19:08  93 16 89/58 (68) 100 Nasal Cannula 2.00 


 


5/24/18 18:00  98 18 97/59 (72) 97 Nasal Cannula 2.00 


 


5/24/18 16:03  96 18 98/58 (71) 97 Nasal Cannula 2.00 


 


5/24/18 16:03   18     


 


5/24/18 15:38     98 Nasal Cannula 2.00 


 


5/24/18 14:16  100 18 137/89 (105) 98 Room Air  


 


5/24/18 12:00  103 26 131/80 (97) 97 Nasal Cannula 2.00 


 


5/24/18 09:58     100 Nasal Cannula 2.00 


 


5/24/18 09:58     100 Nasal Cannula 2.00 


 


5/24/18 09:54  110 28  100 Nasal Cannula 2.00 


 


5/24/18 09:54 97.8 110 28 112/68 (83) 100 Nasal Cannula 2.00 


 


5/24/18 09:49 97.8 108 28 112/69 (83) 100   








Exam


CONSTITUTIONAL/GENERAL: This is an adequately nourished patient, in no apparent 

distress.


TUBES/LINES/DRAINS: PIV bilateral


SKIN: No jaundice, rashes, or lesions.  No wounds seen anteriorly. Skin 

temperature appropriate. Not diaphoretic. 


HEAD: Atraumatic. Normocephalic.


EYES: Pupils equal and round and reactive. Extraocular motions intact. No 

scleral icterus. No injection or drainage. Fundi not examined.


ENT: Hearing grossly normal. Nose without bleeding or purulent drainage. Throat 

without visible erythema, exudates, masses, or lesions.


NECK: Trachea midline. Supple, nontender. No palpable thyroid enlargement or 

nodularity. 


CARDIOVASCULAR: Regular rate and rhythm without murmurs, gallops, or rubs. No 

JVD. Peripheral pulses symmetric.


RESPIRATORY/CHEST: Symmetric, unlabored respirations. Clear to auscultation. 

Breath sounds equal bilaterally. No wheezes, rales, or rhonchi.  


GASTROINTESTINAL: Abdomen soft, non-tender, nondistended. No hepato-splenomegaly

, or palpable masses. No guarding. Bowel sounds present.


GENITOURINARY: Without palpable bladder distension. 


MUSCULOSKELETAL: Extremities without clubbing, cyanosis, or edema. No joint 

tenderness or effusion noted. No calf tenderness. No mottling or clubbing.  

Right second toe discolored with serosanguineous drainage, dressed.


LYMPHATICS: No palpable cervical or supraclavicular adenopathy.


NEUROLOGICAL: Awake and alert. Motor and sensory grossly within normal limits. 

Follows commands. Cognitively sharp. Moves all extremities.


PSYCHIATRIC: No obvious anxiety/depression. no apparent hallucinations or other 

psychotic thought process.


.





Diagnostic Tests


Laboratory





Laboratory Tests








Test


  5/24/18


10:00 5/24/18


10:05 5/24/18


18:32 5/24/18


21:00


 


White Blood Count


  9.6 TH/MM3


(4.0-11.0) 


  13.0 TH/MM3


(4.0-11.0) 


 


 


Red Blood Count


  5.37 MIL/MM3


(4.50-5.90) 


  5.34 MIL/MM3


(4.50-5.90) 


 


 


Hemoglobin


  15.0 GM/DL


(13.0-17.0) 


  14.5 GM/DL


(13.0-17.0) 


 


 


Hematocrit


  43.9 %


(39.0-51.0) 


  43.6 %


(39.0-51.0) 


 


 


Mean Corpuscular Volume


  81.8 FL


(80.0-100.0) 


  81.7 FL


(80.0-100.0) 


 


 


Mean Corpuscular Hemoglobin


  27.9 PG


(27.0-34.0) 


  27.1 PG


(27.0-34.0) 


 


 


Mean Corpuscular Hemoglobin


Concent 34.1 %


(32.0-36.0) 


  33.2 %


(32.0-36.0) 


 


 


Red Cell Distribution Width


  17.1 %


(11.6-17.2) 


  17.5 %


(11.6-17.2) 


 


 


Platelet Count


  133 TH/MM3


(150-450) 


  105 TH/MM3


(150-450) 


 


 


Mean Platelet Volume


  9.1 FL


(7.0-11.0) 


  9.2 FL


(7.0-11.0) 


 


 


Neutrophils (%) (Auto)


  91.7 %


(16.0-70.0) 


  


  


 


 


Lymphocytes (%) (Auto)


  2.8 %


(9.0-44.0) 


  


  


 


 


Monocytes (%) (Auto)


  5.4 %


(0.0-8.0) 


  


  


 


 


Eosinophils (%) (Auto)


  0.0 %


(0.0-4.0) 


  


  


 


 


Basophils (%) (Auto)


  0.1 %


(0.0-2.0) 


  


  


 


 


Neutrophils # (Auto)


  8.8 TH/MM3


(1.8-7.7) 


  


  


 


 


Lymphocytes # (Auto)


  0.3 TH/MM3


(1.0-4.8) 


  


  


 


 


Monocytes # (Auto)


  0.5 TH/MM3


(0-0.9) 


  


  


 


 


Eosinophils # (Auto)


  0.0 TH/MM3


(0-0.4) 


  


  


 


 


Basophils # (Auto)


  0.0 TH/MM3


(0-0.2) 


  


  


 


 


CBC Comment AUTO DIFF    


 


Differential Total Cells


Counted 100 


  


  


  


 


 


Neutrophils % (Manual) 73 % (16-70)    


 


Band Neutrophils % 19 % (0-6)    


 


Lymphocytes % 2 % (9-44)    


 


Monocytes % 4 % (0-8)    


 


Neutrophils # (Manual)


  9.0 TH/MM3


(1.8-7.7) 


  


  


 


 


Metamyelocytes 1 % (0-1)    


 


Myelocytes 1 % (0-0)    


 


Differential Comment


  FINAL DIFF


MANUAL 


  


  


 


 


Dohle Bodies


  PRESENT (NONE


SEEN) 


  


  


 


 


Platelet Estimate LOW (NORMAL)    


 


Platelet Morphology Comment


  NORMAL


(NORMAL) 


  


  


 


 


Ovalocytes 1+ (NORMAL)    


 


Erythrocyte Sedimentation Rate


  41 mm/hr


(0-20) 


  


  


 


 


Prothrombin Time


  13.4 SEC


(9.8-11.6) 


  


  


 


 


Prothromb Time International


Ratio 1.3 RATIO 


  


  


  


 


 


Activated Partial


Thromboplast Time 27.3 SEC


(24.3-30.1) 


  32.4 SEC


(24.3-30.1) 


 


 


Blood Urea Nitrogen


  36 MG/DL


(7-18) 


  


  


 


 


Creatinine


  1.84 MG/DL


(0.60-1.30) 


  


  


 


 


Random Glucose


  407 MG/DL


() 


  


  


 


 


Total Protein


  7.4 GM/DL


(6.4-8.2) 


  


  


 


 


Albumin


  3.0 GM/DL


(3.4-5.0) 


  


  


 


 


Calcium Level


  8.8 MG/DL


(8.5-10.1) 


  


  


 


 


Alkaline Phosphatase


  114 U/L


() 


  


  


 


 


Aspartate Amino Transf


(AST/SGOT) 37 U/L (15-37) 


  


  


  


 


 


Alanine Aminotransferase


(ALT/SGPT) 25 U/L (12-78) 


  


  


  


 


 


Total Bilirubin


  0.9 MG/DL


(0.2-1.0) 


  


  


 


 


Sodium Level


  130 MEQ/L


(136-145) 


  


  


 


 


Potassium Level


  4.4 MEQ/L


(3.5-5.1) 


  


  


 


 


Chloride Level


  95 MEQ/L


() 


  


  


 


 


Carbon Dioxide Level


  19.2 MEQ/L


(21.0-32.0) 


  


  


 


 


Anion Gap


  16 MEQ/L


(5-15) 


  


  


 


 


Estimat Glomerular Filtration


Rate 38 ML/MIN


(>89) 


  


  


 


 


Troponin I


  3.04 NG/ML


(0.02-0.05) 


  3.94 NG/ML


(0.02-0.05) 


 


 


C-Reactive Protein


  26.80 MG/DL


(0.00-0.30) 


  


  


 


 


B-Type Natriuretic Peptide


  3148 PG/ML


(0-100) 


  


  


 


 


Lactic Acid Level


  


  5.5 mmol/L


(0.4-2.0) 3.1 mmol/L


(0.4-2.0) 


 


 


Urine Random Creatinine


  


  


  


  81 MG/DL


()


 


Urine Random Total Protein


  


  


  


  98 MG/DL


(0-11.8)


 


Urine Random Sodium    12 MEQ/L 


 


Urine Protein/Creatinine Ratio


  


  


  


  1.21


(0.00-0.14)


 


Test


  5/24/18


22:05 5/25/18


02:24 5/25/18


03:50 5/25/18


05:55


 


Activated Partial


Thromboplast Time 34.5 SEC


(24.3-30.1) 


  


  34.2 SEC


(24.3-30.1)


 


Troponin I


  2.93 NG/ML


(0.02-0.05) 


  


  


 


 


Blood Gas Puncture Site  RT RADIAL   


 


Blood Gas Patient Temperature  98.6   


 


Blood Gas HCO3


  


  12 mmol/L


(22-26) 


  


 


 


Blood Gas Base Excess


  


  -12.2 mmol/L


(-2-2) 


  


 


 


Blood Gas Oxygen Saturation  97 % ()   


 


Arterial Blood pH


  


  7.39


(7.380-7.420) 


  


 


 


Arterial Blood Partial


Pressure CO2 


  20 mmHg


(38-42) 


  


 


 


Arterial Blood Partial


Pressure O2 


  133 mmHG


() 


  


 


 


Arterial Blood Oxygen Content


  


  21.4 Vol %


(12.0-20.0) 


  


 


 


Arterial Blood


Carboxyhemoglobin 


  0.5 % (0-4) 


  


  


 


 


Arterial Blood Methemoglobin  0.8 % (0-2)   


 


Blood Gas Hemoglobin


  


  15.5 G/DL


(12.0-16.0) 


  


 


 


Oxygen Delivery Device  NASAL CANNULA   


 


Blood Gas Liter Flow  2 L/M   


 


White Blood Count


  


  


  11.1 TH/MM3


(4.0-11.0) 


 


 


Red Blood Count


  


  


  5.37 MIL/MM3


(4.50-5.90) 


 


 


Hemoglobin


  


  


  14.7 GM/DL


(13.0-17.0) 


 


 


Hematocrit


  


  


  43.8 %


(39.0-51.0) 


 


 


Mean Corpuscular Volume


  


  


  81.5 FL


(80.0-100.0) 


 


 


Mean Corpuscular Hemoglobin


  


  


  27.3 PG


(27.0-34.0) 


 


 


Mean Corpuscular Hemoglobin


Concent 


  


  33.5 %


(32.0-36.0) 


 


 


Red Cell Distribution Width


  


  


  17.2 %


(11.6-17.2) 


 


 


Platelet Count


  


  


  86 TH/MM3


(150-450) 


 


 


Mean Platelet Volume


  


  


  10.0 FL


(7.0-11.0) 


 


 


Neutrophils (%) (Auto)


  


  


  86.1 %


(16.0-70.0) 


 


 


Lymphocytes (%) (Auto)


  


  


  3.1 %


(9.0-44.0) 


 


 


Monocytes (%) (Auto)


  


  


  9.4 %


(0.0-8.0) 


 


 


Eosinophils (%) (Auto)


  


  


  1.3 %


(0.0-4.0) 


 


 


Basophils (%) (Auto)


  


  


  0.1 %


(0.0-2.0) 


 


 


Neutrophils # (Auto)


  


  


  9.6 TH/MM3


(1.8-7.7) 


 


 


Lymphocytes # (Auto)


  


  


  0.3 TH/MM3


(1.0-4.8) 


 


 


Monocytes # (Auto)


  


  


  1.0 TH/MM3


(0-0.9) 


 


 


Eosinophils # (Auto)


  


  


  0.1 TH/MM3


(0-0.4) 


 


 


Basophils # (Auto)


  


  


  0.0 TH/MM3


(0-0.2) 


 


 


CBC Comment   AUTO DIFF  


 


Differential Total Cells


Counted 


  


  100 


  


 


 


Neutrophils % (Manual)   60 % (16-70)  


 


Band Neutrophils %   32 % (0-6)  


 


Lymphocytes %   1 % (9-44)  


 


Monocytes %   7 % (0-8)  


 


Neutrophils # (Manual)


  


  


  10.2 TH/MM3


(1.8-7.7) 


 


 


Differential Comment


  


  


  FINAL DIFF


MANUAL 


 


 


Platelet Estimate   LOW (NORMAL)  


 


Platelet Morphology Comment


  


  


  NORMAL


(NORMAL) 


 


 


Red Cell Morphology Comment


  


  


  NORMAL


(NORMAL) 


 


 


Blood Urea Nitrogen


  


  


  46 MG/DL


(7-18) 


 


 


Creatinine


  


  


  1.21 MG/DL


(0.60-1.30) 


 


 


Random Glucose


  


  


  249 MG/DL


() 


 


 


Total Protein


  


  


  6.7 GM/DL


(6.4-8.2) 


 


 


Albumin


  


  


  2.6 GM/DL


(3.4-5.0) 


 


 


Calcium Level


  


  


  8.6 MG/DL


(8.5-10.1) 


 


 


Phosphorus Level


  


  


  2.4 MG/DL


(2.5-4.9) 


 


 


Alkaline Phosphatase


  


  


  98 U/L


() 


 


 


Aspartate Amino Transf


(AST/SGOT) 


  


  40 U/L (15-37) 


  


 


 


Alanine Aminotransferase


(ALT/SGPT) 


  


  27 U/L (12-78) 


  


 


 


Total Bilirubin


  


  


  0.7 MG/DL


(0.2-1.0) 


 


 


Sodium Level


  


  


  135 MEQ/L


(136-145) 


 


 


Potassium Level


  


  


  3.9 MEQ/L


(3.5-5.1) 


 


 


Chloride Level


  


  


  102 MEQ/L


() 


 


 


Carbon Dioxide Level


  


  


  16.5 MEQ/L


(21.0-32.0) 


 


 


Anion Gap


  


  


  17 MEQ/L


(5-15) 


 


 


Estimat Glomerular Filtration


Rate 


  


  61 ML/MIN


(>89) 


 








Result Diagram:  


5/25/18 0350                                                                   

             5/25/18 0350





Microbiology





Microbiology








 Date/Time


Source Procedure


Growth Status


 


 


 5/24/18 10:08


Blood Peripheral Aerobic Blood Culture - Preliminary


Gram Positive Cocci Resulted


 


 5/24/18 10:08 Anaerobic Blood Culture - Preliminary


Gram Positive Cocci Resulted





 5/24/18 10:00


Blood Peripheral Aerobic Blood Culture - Preliminary


Gram Positive Cocci Resulted


 


 5/24/18 10:00 Anaerobic Blood Culture - Preliminary


Gram Positive Cocci Resulted








Imaging





Last Impressions








Chest X-Ray 5/24/18 0955 Signed





Impressions: 





 CONCLUSION: 





 1.  No acute abnormality or significant interval change.





  





 


 


Toe X-Ray 5/24/18 0000 Signed





Impressions: 





 CONCLUSION: 





 Questionable small erosion distal phalanx right second toe. This could be infec





 tious in nature if there is clinical evidence for soft tissue infection.





  





 





  





  





 





  





 





  





 


 


Renal Ultrasound 5/24/18 0000 Signed





Impressions: 





 CONCLUSION: 





 1.  No hydronephrosis.





 2.  Increased echogenicity most characteristic of medical renal disease.





  





 


 


Foot MRI 5/24/18 0000 Signed





Impressions: 





 CONCLUSION:





 1.  No evidence for osteomyelitis. Soft tissue swelling at the second toe.





  





 











Patient/Family Conference


Present at Family Conference:


Spoke with patient and his significant other, Giovanna at bedside.  Reviewed 

pathophysiology of heart failure, ischemic cardiomyopathy and COPD, to include 

likely disease course, treatment options and goals of care.  Reviewed social, 

medical, surgical and psychosocial history.  Discussed palliative care purpose, 

focus and the below listed items.  Provided patient with 5 wishes booklet and 

living will form to review.  Healthcare surrogate paperwork completed and 

copies provided.


.


Family Conference Location:  Bedside


Issues Discussed:


* Palliative care role, purpose, approach


* Additional medical, psychosocial, and spiritual history


* Patients general health, functional status, and cognitive changes in the 

months leading up to the current hospitalization


* Patient/family understanding of the current medical problems


* Patient/family understanding of prognosis


* Patients goals of care as best understood from advance directives and/or 

conversations and/or values


* Current medical treatment options and benefits/burdens of those options


* Likely scenarios comparing ongoing aggressive care with a transition to 

comfort measures only


* Questions answered to the best of my ability


* Palliative care contact information provided





Assessment and Plan


Disease Oriented Problem List:  


(1) Multi-vessel coronary artery stenosis


(2) Pulmonary HTN


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


(4) Cardiomyopathy


(5) Acute renal failure


(6) Diabetes mellitus


(7) Hypertension


(8) Hyperlipidemia


Symptom Scale:  


(1) Pain, generalized


(2) Dyspnea and respiratory abnormalities


(3) Vomiting


Pertinent Non-Medical Issues


Psychosocial:He was born in [place] and moved to [place] before he was 8 

years old.  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 

living.  He has never been  and has no children.  He has a significant 

other whom he has made a healthcare surrogate.  He was in the  service 

but did not serve during more time.


Spiritual: He is Taoist and would accept  visits.


Legal: Healthcare surrogate paperwork has been completed naming his significant 

other as his primary and his sister is secondary.


Ethical issues impacting care: None noted.


.


Important Contacts


Significant other wife: Giovanna Ramsay


Brother: John Ornelas [phone]


Sister: Shy Win [phone], cell [phone]


.


Prognosis


 His prognosis is poor. He has a severely reduced ejection fraction of 20-25% 

with ischemic cardiomyopathy not a candidate for revascularization.  He was 

referred to a tertiary center for transplant however does not have the 

insurance funding to do that.  He is on maximal medical therapy and his 

symptoms remain uncontrolled.  He has had for admissions to [facility] so far this 

year and is likely to continue to have frequent readmissions, complications and 

decline.


.


Code Status:  Full Code (By default)


Plan


PLAN:


   Legal decision maker: Patient is currently capacitated to make his own 

decisions, but in the event that he becomes incapacitated, he has completed a 

healthcare surrogate form naming his significant other Giovanna as the primary 

healthcare surrogate and his sister Shy, as the alternate.





   Goals: To be determined.





   CODE STATUS: FULL CODE, by default.





SYMPTOMS:


* Dyspnea: He complained of significant dyspnea at home with frequent coughing 

spells, severe enough to cause him to vomit.  His dyspnea is under better 

control on 2 L nasal cannula, DuoNeb treatments.  He is currently not receiving 

optimal cardiac medications of carvedilol, Entresto and spironolactone and 

would recommend resuming those medications as soon as cardiology feels it is 

appropriate.


* Vomiting: He has not been vomiting at the hospital and states that it was 

triggered by severe coughing associated with violent tremors and generalized 

weakness.  Ondansetron is available if needed.


* Pain: Multifactorial to include right second toe infection, and non-STEMI on 

admission secondary to metabolic demands, bedbound status and invasive lines.  

Norco 5/325 mg is available and he has taken a total of 1 dose in 24 hours.





SUMMARY


This is a 57-year-old  male with class IV heart failure, not a 

transplant candidate secondary to no funding.  His ejection fraction is 20-25% 

and he is having recurrent exacerbations of heart failure.  At this time he 

wishes to review his options and process the information given today prior to 

making decisions on goals of care.  As I have explained to him and his 

significant other, this, by default, makes him in FULL CODE.  He is aware that 

this comes with intubation and possible CPR.  He does have an implantable ICD 

that is active at this time.  Hospice was discussed and it is an option that he 

wishes to consider.  He would be hospice appropriate if goals were consistent.





Palliative care will continue to follow the patient during hospital course as 

condition evolves, to assist patient/decision-maker with understanding of their 

medical conditions, weighing benefits/burdens of treatment options, for 

clarification of goals of treatment.  Additionally will assist with any 

symptoms of palliative concern.


.





Thank you for the opportunity to participate in the care of Mr. Murphy.





Attestation


To help prompt me to consider important information that might be impacting 

today's encounter and assessment, information from prior notes written by 

myself or my colleagues may have been "brought forward" into today's note.  My 

signature on this note, however, is an attestation that I personally performed 

the exam, history, and/or decision-making noted today, and, unless otherwise 

indicated, the interactions with patient, family, and staff as well as the 

review of records all occurred today.  I also attest that the listed assessment 

and stated plan reflect my best clinical judgment today based on the 

combination of historical information, prior notes, and today's exam/ 

interactions.  When time spent is documented, it refers only to time spent 

today by the signer, or if indicated, combined time spent today by 

collaborating physician/nurse practitioner.


.











Janki Michele May 25, 2018 10:05

## 2018-05-25 NOTE — HHI.PR
Subjective


Remarks


f/u; infected right foot/NSTEMI


in no acute distress.


denies chest pain.


afebrile.


doesn't report any foot pain.





Objective


Vitals





Vital Signs








  Date Time  Temp Pulse Resp B/P (MAP) Pulse Ox O2 Delivery O2 Flow Rate FiO2


 


5/25/18 09:34 98.0 95 18 106/64 (78) 97 Nasal Cannula 2.00 


 


5/25/18 07:45     96   21


 


5/25/18 07:30 98.0 90 17 107/69 (82) 96 Room Air  


 


5/25/18 07:10  96 18  99 Nasal Cannula 2.00 


 


5/25/18 06:06  92 18 107/69 (82) 98 Nasal Cannula 2.00 


 


5/25/18 05:30  96 20 87/50 (62) 100 Nasal Cannula 2.00 


 


5/25/18 03:34  95 26 94/60 (71) 100 Nasal Cannula 2.00 


 


5/25/18 02:42  118 34 121/61 (81) 100 Nasal Cannula 2.00 


 


5/25/18 02:29 100.1 155 36 101/54 (70)  Nasal Cannula 2.00 


 


5/25/18 02:00  113 18 127/74 (91) 96 Nasal Cannula 2.00 


 


5/25/18 01:00  95 16 95/54 (68) 97 Nasal Cannula 2.00 


 


5/24/18 23:00  85 16 94/59 (71) 96 Nasal Cannula 2.00 


 


5/24/18 22:00 98.1 86 16 96/59 (71) 97 Nasal Cannula 2.00 


 


5/24/18 20:00  94 18 92/58 (69) 97 Nasal Cannula 2.00 


 


5/24/18 19:08  93 16 89/58 (68) 100 Nasal Cannula 2.00 


 


5/24/18 18:00  98 18 97/59 (72) 97 Nasal Cannula 2.00 


 


5/24/18 16:03  96 18 98/58 (71) 97 Nasal Cannula 2.00 


 


5/24/18 16:03   18     


 


5/24/18 15:38     98 Nasal Cannula 2.00 


 


5/24/18 14:16  100 18 137/89 (105) 98 Room Air  


 


5/24/18 12:00  103 26 131/80 (97) 97 Nasal Cannula 2.00 














I/O      


 


 5/24/18 5/24/18 5/24/18 5/25/18 5/25/18 5/25/18





 07:00 15:00 23:00 07:00 15:00 23:00


 


Intake Total  50 ml 50 ml 613.5 ml  


 


Output Total   400 ml 600 ml  


 


Balance  50 ml -350 ml 13.5 ml  


 


      


 


Intake IV Total  50 ml 50 ml 613.5 ml  


 


Output Urine Total   400 ml 600 ml  








Result Diagram:  


5/25/18 0350                                                                   

             5/25/18 0350





Imaging





Last Impressions








Chest X-Ray 5/24/18 0955 Signed





Impressions: 





 CONCLUSION: 





 1.  No acute abnormality or significant interval change.





  





 


 


Toe X-Ray 5/24/18 0000 Signed





Impressions: 





 CONCLUSION: 





 Questionable small erosion distal phalanx right second toe. This could be infec





 tious in nature if there is clinical evidence for soft tissue infection.





  





 





  





  





 





  





 





  





 


 


Renal Ultrasound 5/24/18 0000 Signed





Impressions: 





 CONCLUSION: 





 1.  No hydronephrosis.





 2.  Increased echogenicity most characteristic of medical renal disease.





  





 


 


Foot MRI 5/24/18 0000 Signed





Impressions: 





 CONCLUSION:





 1.  No evidence for osteomyelitis. Soft tissue swelling at the second toe.





  





 








Objective Remarks


GENERAL: This is a well-nourished, well-developed patient, in no apparent 

distress.


CARDIOVASCULAR: Regular rate and regular rhythm without murmurs, gallops, or 

rubs. 


RESPIRATORY: Clear to auscultation. Breath sounds equal bilaterally. No wheezes

, rales, or rhonchi.  


GASTROINTESTINAL: Abdomen soft, non-tender, nondistended. 


Normal, active bowel sounds


MUSCULOSKELETAL: gangrene of the right second toe


NEURO:  Alert & Oriented x4 to person, place, time, situation.  Moves all ext x4


Medications and IVs





Inpatient Medications


Acetaminophen (Tylenol) 650 mg Q4H  PRN PO FEVER/PAIN 1-5;  Start 5/24/18 at 14:

30


Acetaminophen/ Hydrocodone Bitart (Norco  5-325 Mg) 1 tab Q4H  PRN PO PAIN 6-10 

Last administered on 5/24/18at 15:18;  Start 5/24/18 at 14:30


Albuterol/ Ipratropium (Duoneb Neb) 1 ampule Q4HR NEB  PRN NEB SHORTNESS OF 

BREATH Last administered on 5/24/18at 15:38;  Start 5/24/18 at 13:45


Aspirin (Aspirin Chew) 162 mg ONCE  ONCE CHEW  Last administered on 5/24/18at 12

:02;  Start 5/24/18 at 11:30;  Stop 5/24/18 at 11:31;  Status DC


Aspirin (Ecotrin Ec) 162 mg DAILY PO  Last administered on 5/25/18at 08:03;  

Start 5/25/18 at 09:00


Dextrose (D50w (Vial) Inj) 50 ml UNSCH  PRN IV PUSH HYPOGLYCEMIA-SEE COMMENTS;  

Start 5/24/18 at 13:30


Famotidine (Pepcid) 20 mg BID PO  Last administered on 5/25/18at 08:03;  Start 5 /24/18 at 21:00


Glucagon (Glucagon Inj) 1 mg UNSCH  PRN OTHER HYPOGLYCEMIA-SEE COMMENTS;  Start 

5/24/18 at 13:30


Heparin Sodium (Porcine) (Heparin Inj) 4,000 units ONCE  ONCE IV PUSH  Last 

administered on 5/24/18at 12:03;  Start 5/24/18 at 11:45;  Stop 5/24/18 at 11:46

;  Status DC


Heparin Sodium/ Dextrose 250 ml @  10 mls/hr TITRATE  PRN IV Coagulation 

Management Last administered on 5/24/18at 16:02;  Start 5/24/18 at 11:45


Insulin Aspart (NovoLOG SUPPLEMENTAL SCALE) 1 ACHS SLIDING  SCALE SQ  Last 

administered on 5/25/18at 08:14;  Start 5/24/18 at 17:00


Metoclopramide HCl (Reglan Inj) 5 mg Q6H  PRN IV PUSH NAUSEA OR VOMITING;  

Start 5/25/18 at 02:30


Metoprolol Tartrate (Lopressor Inj) 5 mg Q5M  PRN IV PUSH ATRIAL FLUTTER Last 

administered on 5/25/18at 03:06;  Start 5/25/18 at 02:45


Miscellaneous Information ([facility] Ordered Lab Info) SPECIFIC LAB TO BE 

... ONCE  ONCE .XX ;  Start 5/27/18 at 12:45;  Stop 5/27/18 at 12:46


Ondansetron HCl (Zofran  Odt) 4 mg Q6H  PRN PO NAUSEA OR VOMITING;  Start 5/25/ 18 at 02:30


Pharmacy Profile Note 0 ml @ 0 mls/hr UNSCH OTHER ;  Start 5/24/18 at 13:30


Piperacillin Sod/ Tazobactam Sod 50 ml @  100 mls/hr Q6H IV  Last administered 

on 5/25/18at 06:00;  Start 5/24/18 at 18:00


Pravastatin Sodium (Pravachol) 40 mg DAILY PO  Last administered on 5/25/18at 08

:04;  Start 5/25/18 at 09:00


Sodium Chloride 1,000 ml @  60 mls/hr [id number] IV  Last administered on 5/24/ 18at 18:23;  Start 5/24/18 at 13:30


Sodium Chloride (NS Flush) 2 ml UNSCH  PRN IVF FLUSH AFTER USING IV ACCESS;  

Start 5/24/18 at 10:00


Vancomycin HCl 1350 mg/Sodium Chloride 513.5 ml @  250 mls/hr ONCE  ONCE IV  

Last administered on 5/24/18at 13:27;  Start 5/24/18 at 11:30;  Stop 5/24/18 at 

13:33;  Status DC


Vancomycin HCl 1500 mg/Sodium Chloride 515 ml @  250 mls/hr Q24H IV ;  Start 5/ 25/18 at 14:00





A/P


Problem List:  


(1) Severe sepsis


ICD Code:  A41.9 - Sepsis, unspecified organism; R65.20 - Severe sepsis without 

septic shock


(2) Elevated troponin


ICD Code:  R74.8 - Abnormal levels of other serum enzymes


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


Assessment and Plan


A/P  





- severe sepsis due to gangrene/ infection of the right second toe 


  continue with broad-spectrum IV antibiotics, heparin drip and pain control- 

follow the cultures- 


  podiatry consult appreciated and plan for amputation of the right second toe.


-staph aureus bacteremia- continue Vancomycin- check echo- consult ID.


-NSTEMI with history of CAD- had cardiac cath two months ago with severe three 

vessel disease- was evaluated by CT surgery at the time with poor targets for 

redo sternotomy


 started on heparin drip- resumed aspirin- cardiology consult appreciated.


-acute kidney injury; improved- started on gentle IV hydration in light of 

cardiomyopathy- monitor renal function; BMP tomorrow.


-cardiomyopathy with EF 25%- s/p defibrillator placement- resume Coreg and 

Entresto if BP remains stable.


-thrombocytopenia- will monitor closely while on Heparin drip; CBC tomorrow.


-COPD with no exacerbation; neb treatment as needed.


-diabetes mellitus; uncontrolled- hold oral hypoglycemics- start on accu-check 

with SSI. will add Levemir.


-hypertension; will hold BP meds for now- continue to monitor.


-DVT prophylaxis; on heparin drip.








palliative care consulted.











Deneen Anton MD May 25, 2018 11:25

## 2018-05-25 NOTE — PD.CARD.PN
Subjective


Subjective Remarks


chest pain resolved, dyspnea improved to mild





Objective


Medications





Current Medications








 Medications


  (Trade)  Dose


 Ordered  Sig/Ilia


 Route  Start Time


 Stop Time Status Last Admin


 


  (NS Flush)  2 ml  UNSCH  PRN


 IVF  5/24/18 10:00


     


 


 


 Heparin Sodium/


 Dextrose  250 ml @ 


 10 mls/hr  TITRATE  PRN


 IV  5/24/18 11:45


    5/24/18 16:02


 


 


  (D50w (Vial) Inj)  50 ml  UNSCH  PRN


 IV PUSH  5/24/18 13:30


     


 


 


  (Glucagon Inj)  1 mg  UNSCH  PRN


 OTHER  5/24/18 13:30


     


 


 


  (NovoLOG


 SUPPLEMENTAL


 SCALE)  1  ACHS SLIDING  SCALE


 SQ  5/24/18 17:00


    5/25/18 08:14


 


 


 Sodium Chloride  1,000 ml @ 


 60 mls/hr  [id number]


 IV  5/24/18 13:30


    5/24/18 18:23


 


 


 Piperacillin Sod/


 Tazobactam Sod  50 ml @ 


 100 mls/hr  Q6H


 IV  5/24/18 18:00


    5/25/18 12:29


 


 


 Pharmacy Profile


 Note  0 ml @ 0


 mls/hr  UNSCH


 OTHER  5/24/18 13:30


     


 


 


  (Ecotrin Ec)  162 mg  DAILY


 PO  5/25/18 09:00


    5/25/18 08:03


 


 


  (Pepcid)  20 mg  BID


 PO  5/24/18 21:00


    5/25/18 08:03


 


 


  (Pravachol)  40 mg  DAILY


 PO  5/25/18 09:00


    5/25/18 08:04


 


 


  (Duoneb Neb)  1 ampule  Q4HR NEB  PRN


 NEB  5/24/18 13:45


    5/24/18 15:38


 


 


 Vancomycin HCl


 1500 mg/Sodium


 Chloride  515 ml @ 


 250 mls/hr  Q24H


 IV  5/25/18 14:00


     


 


 


  (Tylenol)  650 mg  Q4H  PRN


 PO  5/24/18 14:30


     


 


 


  (Norco  5-325 Mg)  1 tab  Q4H  PRN


 PO  5/24/18 14:30


    5/24/18 15:18


 


 


  (Reglan Inj)  5 mg  Q6H  PRN


 IV PUSH  5/25/18 02:30


     


 


 


  (Zofran  Odt)  4 mg  Q6H  PRN


 PO  5/25/18 02:30


     


 


 


  (Lopressor Inj)  5 mg  Q5M  PRN


 IV PUSH  5/25/18 02:45


    5/25/18 03:06


 


 


  ([facility]


 Ordered Lab Info)  SPECIFIC


 LAB TO BE


 TYLOR...  ONCE  ONCE


 .XX  5/27/18 12:45


 5/27/18 12:46   


 


 


  (Levemir Inj)  10 units  HS


 SQ  5/25/18 21:00


     


 








Vital Signs / I&O





Vital Signs








  Date Time  Temp Pulse Resp B/P (MAP) Pulse Ox O2 Delivery O2 Flow Rate FiO2


 


5/25/18 09:34 98.0 95 18 106/64 (78) 97 Nasal Cannula 2.00 


 


5/25/18 07:45     96   21


 


5/25/18 07:30 98.0 90 17 107/69 (82) 96 Room Air  


 


5/25/18 07:10  96 18  99 Nasal Cannula 2.00 


 


5/25/18 06:06  92 18 107/69 (82) 98 Nasal Cannula 2.00 


 


5/25/18 05:30  96 20 87/50 (62) 100 Nasal Cannula 2.00 


 


5/25/18 03:34  95 26 94/60 (71) 100 Nasal Cannula 2.00 


 


5/25/18 02:42  118 34 121/61 (81) 100 Nasal Cannula 2.00 


 


5/25/18 02:29 100.1 155 36 101/54 (70)  Nasal Cannula 2.00 


 


5/25/18 02:00  113 18 127/74 (91) 96 Nasal Cannula 2.00 


 


5/25/18 01:00  95 16 95/54 (68) 97 Nasal Cannula 2.00 


 


5/24/18 23:00  85 16 94/59 (71) 96 Nasal Cannula 2.00 


 


5/24/18 22:00 98.1 86 16 96/59 (71) 97 Nasal Cannula 2.00 


 


5/24/18 20:00  94 18 92/58 (69) 97 Nasal Cannula 2.00 


 


5/24/18 19:08  93 16 89/58 (68) 100 Nasal Cannula 2.00 


 


5/24/18 18:00  98 18 97/59 (72) 97 Nasal Cannula 2.00 


 


5/24/18 16:03  96 18 98/58 (71) 97 Nasal Cannula 2.00 


 


5/24/18 16:03   18     


 


5/24/18 15:38     98 Nasal Cannula 2.00 


 


5/24/18 14:16  100 18 137/89 (105) 98 Room Air  














I/O      


 


 5/24/18 5/24/18 5/24/18 5/25/18 5/25/18 5/25/18





 07:00 15:00 23:00 07:00 15:00 23:00


 


Intake Total  50 ml 50 ml 613.5 ml  


 


Output Total   400 ml 600 ml  


 


Balance  50 ml -350 ml 13.5 ml  


 


      


 


Intake IV Total  50 ml 50 ml 613.5 ml  


 


Output Urine Total   400 ml 600 ml  








Physical Exam


GENERAL: 


SKIN: Warm and dry.


HEAD: Normocephalic.


EYES: No scleral icterus. No injection or drainage. 


NECK: Supple, trachea midline. No JVD or lymphadenopathy.


CARDIOVASCULAR: Regular rate and rhythm without murmurs, gallops, or rubs. 


RESPIRATORY: Breath sounds equal bilaterally. No accessory muscle use.


GASTROINTESTINAL: Abdomen soft, non-tender, nondistended. 


MUSCULOSKELETAL: No cyanosis, or edema. 


BACK: Nontender without obvious deformity. No CVA tenderness.





Laboratory





Laboratory Tests








Test


  5/24/18


18:32 5/24/18


21:00 5/24/18


22:05 5/25/18


02:24


 


White Blood Count 13.0 TH/MM3    


 


Red Blood Count 5.34 MIL/MM3    


 


Hemoglobin 14.5 GM/DL    


 


Hematocrit 43.6 %    


 


Mean Corpuscular Volume 81.7 FL    


 


Mean Corpuscular Hemoglobin 27.1 PG    


 


Mean Corpuscular Hemoglobin


Concent 33.2 % 


  


  


  


 


 


Red Cell Distribution Width 17.5 %    


 


Platelet Count 105 TH/MM3    


 


Mean Platelet Volume 9.2 FL    


 


Activated Partial


Thromboplast Time 32.4 SEC 


  


  34.5 SEC 


  


 


 


Lactic Acid Level 3.1 mmol/L    


 


Troponin I 3.94 NG/ML   2.93 NG/ML  


 


Urine Random Creatinine  81 MG/DL   


 


Urine Random Total Protein  98 MG/DL   


 


Urine Random Sodium  12 MEQ/L   


 


Urine Protein/Creatinine Ratio  1.21   


 


Blood Gas Puncture Site    RT RADIAL 


 


Blood Gas Patient Temperature    98.6 


 


Blood Gas HCO3    12 mmol/L 


 


Blood Gas Base Excess    -12.2 mmol/L 


 


Blood Gas Oxygen Saturation    97 % 


 


Arterial Blood pH    7.39 


 


Arterial Blood Partial


Pressure CO2 


  


  


  20 mmHg 


 


 


Arterial Blood Partial


Pressure O2 


  


  


  133 mmHG 


 


 


Arterial Blood Oxygen Content    21.4 Vol % 


 


Arterial Blood


Carboxyhemoglobin 


  


  


  0.5 % 


 


 


Arterial Blood Methemoglobin    0.8 % 


 


Blood Gas Hemoglobin    15.5 G/DL 


 


Oxygen Delivery Device    NASAL CANNULA 


 


Blood Gas Liter Flow    2 L/M 


 


Test


  5/25/18


03:50 5/25/18


05:55 5/25/18


11:35 5/25/18


13:10


 


White Blood Count 11.1 TH/MM3    


 


Red Blood Count 5.37 MIL/MM3    


 


Hemoglobin 14.7 GM/DL    


 


Hematocrit 43.8 %    


 


Mean Corpuscular Volume 81.5 FL    


 


Mean Corpuscular Hemoglobin 27.3 PG    


 


Mean Corpuscular Hemoglobin


Concent 33.5 % 


  


  


  


 


 


Red Cell Distribution Width 17.2 %    


 


Platelet Count 86 TH/MM3    


 


Mean Platelet Volume 10.0 FL    


 


Neutrophils (%) (Auto) 86.1 %    


 


Lymphocytes (%) (Auto) 3.1 %    


 


Monocytes (%) (Auto) 9.4 %    


 


Eosinophils (%) (Auto) 1.3 %    


 


Basophils (%) (Auto) 0.1 %    


 


Neutrophils # (Auto) 9.6 TH/MM3    


 


Lymphocytes # (Auto) 0.3 TH/MM3    


 


Monocytes # (Auto) 1.0 TH/MM3    


 


Eosinophils # (Auto) 0.1 TH/MM3    


 


Basophils # (Auto) 0.0 TH/MM3    


 


CBC Comment AUTO DIFF    


 


Differential Total Cells


Counted 100 


  


  


  


 


 


Neutrophils % (Manual) 60 %    


 


Band Neutrophils % 32 %    


 


Lymphocytes % 1 %    


 


Monocytes % 7 %    


 


Neutrophils # (Manual) 10.2 TH/MM3    


 


Differential Comment


  FINAL DIFF


MANUAL 


  


  


 


 


Platelet Estimate LOW    


 


Platelet Morphology Comment NORMAL    


 


Red Cell Morphology Comment NORMAL    


 


Blood Urea Nitrogen 46 MG/DL    


 


Creatinine 1.21 MG/DL    


 


Random Glucose 249 MG/DL    


 


Total Protein 6.7 GM/DL    


 


Albumin 2.6 GM/DL    


 


Calcium Level 8.6 MG/DL    


 


Phosphorus Level 2.4 MG/DL    


 


Alkaline Phosphatase 98 U/L    


 


Aspartate Amino Transf


(AST/SGOT) 40 U/L 


  


  


  


 


 


Alanine Aminotransferase


(ALT/SGPT) 27 U/L 


  


  


  


 


 


Total Bilirubin 0.7 MG/DL    


 


Sodium Level 135 MEQ/L    


 


Potassium Level 3.9 MEQ/L    


 


Chloride Level 102 MEQ/L    


 


Carbon Dioxide Level 16.5 MEQ/L    


 


Anion Gap 17 MEQ/L    


 


Estimat Glomerular Filtration


Rate 61 ML/MIN 


  


  


  


 


 


Activated Partial


Thromboplast Time 


  34.2 SEC 


  


  28.7 SEC 


 


 


Lactic Acid Level   2.4 mmol/L  











Assessment and Plan


Problem List:  


(1) ARF (acute renal failure)


ICD Codes:  N17.9 - Acute kidney failure, unspecified


(2) NSTEMI (non-ST elevated myocardial infarction)


ICD Codes:  I21.4 - Non-ST elevation (NSTEMI) myocardial infarction


(3) CAD (coronary artery disease)


ICD Codes:  I25.10 - Atherosclerotic heart disease of native coronary artery 

without angina pectoris


(4) Decreased cardiac ejection fraction


ICD Codes:  R93.1 - Abnormal findings on diagnostic imaging of heart and 

coronary circulation


(5) Elevated troponin


ICD Codes:  R74.8 - Abnormal levels of other serum enzymes


(6) Severe sepsis


ICD Codes:  A41.9 - Sepsis, unspecified organism; R65.20 - Severe sepsis 

without septic shock


(7) Diabetes mellitus


ICD Codes:  E11.9 - Type 2 diabetes mellitus without complications


(8) Acute renal failure


ICD Codes:  N17.9 - Acute kidney failure, unspecified


(9) Cardiomyopathy


ICD Codes:  I42.9 - Cardiomyopathy, unspecified


(10) gangrene of the right second toe


(11) Dyspnea and respiratory abnormalities


ICD Codes:  R06.00 - Dyspnea, unspecified; R06.89 - Other abnormalities of 

breathing


(12) Multi-vessel coronary artery stenosis


ICD Codes:  I25.10 - Atherosclerotic heart disease of native coronary artery 

without angina pectoris


(13) Hx of CABG


ICD Codes:  Z95.1 - Presence of aortocoronary bypass graft


Assessment and Plan


1.) Ischemic cardiomyopathy - nstemi, chf excacerbation and arf due to severe 

unrevascularizable cad, cardiomyopathy and high metabolic demand from sepsis, 

clinically improving on iv heparin, aspirin and antibiotics, noncardica 

procedure is high risk due to multiple unstable comorbidities and nstemi











Abdiel Purvis MD May 25, 2018 14:19

## 2018-05-25 NOTE — EKG
Date Performed: 05/24/2018       Time Performed: 09:54:31

 

PTAGE:      57 years

 

EKG:      SINUS TACHYCARDIA WITH SHORT DC INTERVAL POSSIBLE LEFT ATRIAL ENLARGEMENT NONSPECIFIC ST & 
T-WAVE ABNORMALITY ABNORMAL ECG

 

PREVIOUS TRACING       : 03/30/2018 05.09

 

DOCTOR:   Juan Lindsay  Interpretating Date/Time  05/25/2018 07:20:00

## 2018-05-25 NOTE — EKG
Date Performed: 2018       Time Performed: 02:31:45

 

PTAGE:      57 years

 

EKG:      ATRIAL FIBRILLATION WITH RAPID VENTRICULAR RESPONSE ST DEVIATION AND MODERATE T-WAVE ABNORM
ALITY, CONSIDER LATERAL ISCHEMIA ABNORMAL ECG Compared to 

 

 PREVIOUS TRACING            , the patient is in afib with rapid ventricular rate. PREVIOUS TRACIN2018 11.30

 

DOCTOR:   Makenna Lerner  Interpretating Date/Time  2018 19:41:15

## 2018-05-25 NOTE — EKG
Date Performed: 05/24/2018       Time Performed: 11:30:46

 

PTAGE:      57 years

 

EKG:      Sinus rhythm 

 

 POSSIBLE LEFT ATRIAL ENLARGEMENT NONSPECIFIC ST & T-WAVE ABNORMALITY ABNORMAL ECG

 

PREVIOUS TRACING       : 05/24/2018 09.54

 

DOCTOR:   Juan Lindsay  Interpretating Date/Time  05/25/2018 07:16:54

## 2018-05-26 VITALS
RESPIRATION RATE: 18 BRPM | HEART RATE: 96 BPM | SYSTOLIC BLOOD PRESSURE: 123 MMHG | OXYGEN SATURATION: 98 % | TEMPERATURE: 98.3 F | DIASTOLIC BLOOD PRESSURE: 76 MMHG

## 2018-05-26 VITALS
TEMPERATURE: 98.2 F | SYSTOLIC BLOOD PRESSURE: 115 MMHG | HEART RATE: 93 BPM | DIASTOLIC BLOOD PRESSURE: 66 MMHG | RESPIRATION RATE: 18 BRPM | OXYGEN SATURATION: 96 %

## 2018-05-26 VITALS — HEART RATE: 104 BPM

## 2018-05-26 VITALS — HEART RATE: 90 BPM

## 2018-05-26 VITALS
SYSTOLIC BLOOD PRESSURE: 117 MMHG | DIASTOLIC BLOOD PRESSURE: 74 MMHG | HEART RATE: 91 BPM | OXYGEN SATURATION: 95 % | RESPIRATION RATE: 18 BRPM | TEMPERATURE: 98.4 F

## 2018-05-26 VITALS — OXYGEN SATURATION: 99 %

## 2018-05-26 VITALS — HEART RATE: 122 BPM

## 2018-05-26 VITALS
RESPIRATION RATE: 20 BRPM | HEART RATE: 116 BPM | DIASTOLIC BLOOD PRESSURE: 71 MMHG | OXYGEN SATURATION: 98 % | SYSTOLIC BLOOD PRESSURE: 100 MMHG

## 2018-05-26 VITALS
RESPIRATION RATE: 16 BRPM | OXYGEN SATURATION: 98 % | SYSTOLIC BLOOD PRESSURE: 101 MMHG | HEART RATE: 104 BPM | TEMPERATURE: 97.6 F | DIASTOLIC BLOOD PRESSURE: 57 MMHG

## 2018-05-26 VITALS — OXYGEN SATURATION: 100 % | HEART RATE: 120 BPM

## 2018-05-26 VITALS — HEART RATE: 96 BPM

## 2018-05-26 VITALS — HEART RATE: 160 BPM

## 2018-05-26 VITALS — HEART RATE: 94 BPM

## 2018-05-26 VITALS — HEART RATE: 212 BPM

## 2018-05-26 VITALS — HEART RATE: 92 BPM

## 2018-05-26 VITALS — OXYGEN SATURATION: 100 %

## 2018-05-26 VITALS — HEART RATE: 88 BPM

## 2018-05-26 VITALS — HEART RATE: 181 BPM

## 2018-05-26 VITALS — HEART RATE: 167 BPM

## 2018-05-26 VITALS — HEART RATE: 110 BPM

## 2018-05-26 VITALS — HEART RATE: 84 BPM

## 2018-05-26 LAB
BASOPHILS # BLD AUTO: 0 TH/MM3 (ref 0–0.2)
BASOPHILS NFR BLD: 0.2 % (ref 0–2)
BUN SERPL-MCNC: 39 MG/DL (ref 7–18)
CALCIUM SERPL-MCNC: 8.3 MG/DL (ref 8.5–10.1)
CHLORIDE SERPL-SCNC: 103 MEQ/L (ref 98–107)
CREAT SERPL-MCNC: 1.16 MG/DL (ref 0.6–1.3)
EOSINOPHIL # BLD: 0.1 TH/MM3 (ref 0–0.4)
EOSINOPHIL NFR BLD: 0.8 % (ref 0–4)
ERYTHROCYTE [DISTWIDTH] IN BLOOD BY AUTOMATED COUNT: 17.2 % (ref 11.6–17.2)
GFR SERPLBLD BASED ON 1.73 SQ M-ARVRAT: 64 ML/MIN (ref 89–?)
GLUCOSE SERPL-MCNC: 245 MG/DL (ref 74–106)
HCO3 BLD-SCNC: 20.1 MEQ/L (ref 21–32)
HCT VFR BLD CALC: 42.9 % (ref 39–51)
HGB BLD-MCNC: 14.5 GM/DL (ref 13–17)
LYMPHOCYTES # BLD AUTO: 0.6 TH/MM3 (ref 1–4.8)
LYMPHOCYTES NFR BLD AUTO: 4.6 % (ref 9–44)
LYMPHOCYTES: 5 % (ref 9–44)
MCH RBC QN AUTO: 27.5 PG (ref 27–34)
MCHC RBC AUTO-ENTMCNC: 33.7 % (ref 32–36)
MCV RBC AUTO: 81.5 FL (ref 80–100)
MONOCYTE #: 1.2 TH/MM3 (ref 0–0.9)
MONOCYTES NFR BLD: 9.5 % (ref 0–8)
MONOCYTES: 4 % (ref 0–8)
NEUTROPHILS # BLD AUTO: 11.2 TH/MM3 (ref 1.8–7.7)
NEUTROPHILS NFR BLD AUTO: 84.9 % (ref 16–70)
NEUTS BAND # BLD MANUAL: 12 TH/MM3 (ref 1.8–7.7)
NEUTS BAND NFR BLD: 3 % (ref 0–6)
NEUTS SEG NFR BLD MANUAL: 88 % (ref 16–70)
NUCLEATED RED BLOOD CELL: 1 (ref 0–0)
PLATELET # BLD: 69 TH/MM3 (ref 150–450)
PMV BLD AUTO: 10 FL (ref 7–11)
RBC # BLD AUTO: 5.26 MIL/MM3 (ref 4.5–5.9)
SODIUM SERPL-SCNC: 139 MEQ/L (ref 136–145)
WBC # BLD AUTO: 13.2 TH/MM3 (ref 4–11)
WBC NRBC COR # BLD: 1 /100 WBC (ref 0–0)
WBC TOXIC VACUOLES BLD QL SMEAR: PRESENT

## 2018-05-26 RX ADMIN — CARVEDILOL SCH MG: 6.25 TABLET, FILM COATED ORAL at 11:45

## 2018-05-26 RX ADMIN — TAZOBACTAM SODIUM AND PIPERACILLIN SODIUM SCH MLS/HR: 250; 2 INJECTION, SOLUTION INTRAVENOUS at 05:56

## 2018-05-26 RX ADMIN — CEFAZOLIN SODIUM SCH MLS/HR: 2 SOLUTION INTRAVENOUS at 21:32

## 2018-05-26 RX ADMIN — INSULIN ASPART SCH: 100 INJECTION, SOLUTION INTRAVENOUS; SUBCUTANEOUS at 21:35

## 2018-05-26 RX ADMIN — ACETAMINOPHEN PRN MG: 325 TABLET ORAL at 03:07

## 2018-05-26 RX ADMIN — FAMOTIDINE SCH MG: 20 TABLET, FILM COATED ORAL at 09:05

## 2018-05-26 RX ADMIN — PRAVASTATIN SODIUM SCH MG: 40 TABLET ORAL at 09:05

## 2018-05-26 RX ADMIN — BENZONATATE PRN MG: 100 CAPSULE ORAL at 03:42

## 2018-05-26 RX ADMIN — INSULIN ASPART SCH: 100 INJECTION, SOLUTION INTRAVENOUS; SUBCUTANEOUS at 11:19

## 2018-05-26 RX ADMIN — SACUBITRIL AND VALSARTAN SCH TAB: 49; 51 TABLET, FILM COATED ORAL at 21:32

## 2018-05-26 RX ADMIN — ACYCLOVIR SCH UNITS: 800 TABLET ORAL at 21:33

## 2018-05-26 RX ADMIN — TAZOBACTAM SODIUM AND PIPERACILLIN SODIUM SCH MLS/HR: 250; 2 INJECTION, SOLUTION INTRAVENOUS at 11:15

## 2018-05-26 RX ADMIN — CARVEDILOL SCH MG: 6.25 TABLET, FILM COATED ORAL at 21:32

## 2018-05-26 RX ADMIN — MAGNESIUM SULFATE IN DEXTROSE SCH MLS/HR: 10 INJECTION, SOLUTION INTRAVENOUS at 09:45

## 2018-05-26 RX ADMIN — MAGNESIUM SULFATE IN DEXTROSE SCH MLS/HR: 10 INJECTION, SOLUTION INTRAVENOUS at 09:05

## 2018-05-26 RX ADMIN — PHENYTOIN SODIUM SCH MLS/HR: 50 INJECTION INTRAMUSCULAR; INTRAVENOUS at 11:45

## 2018-05-26 RX ADMIN — INSULIN ASPART SCH: 100 INJECTION, SOLUTION INTRAVENOUS; SUBCUTANEOUS at 16:20

## 2018-05-26 RX ADMIN — INSULIN ASPART SCH: 100 INJECTION, SOLUTION INTRAVENOUS; SUBCUTANEOUS at 08:00

## 2018-05-26 RX ADMIN — BENZONATATE PRN MG: 100 CAPSULE ORAL at 11:45

## 2018-05-26 RX ADMIN — ASPIRIN SCH MG: 81 TABLET ORAL at 09:05

## 2018-05-26 RX ADMIN — CEFAZOLIN SODIUM SCH MLS/HR: 2 SOLUTION INTRAVENOUS at 11:45

## 2018-05-26 RX ADMIN — TAZOBACTAM SODIUM AND PIPERACILLIN SODIUM SCH MLS/HR: 250; 2 INJECTION, SOLUTION INTRAVENOUS at 17:02

## 2018-05-26 RX ADMIN — FAMOTIDINE SCH MG: 20 TABLET, FILM COATED ORAL at 21:32

## 2018-05-26 NOTE — MB
cc:

Yin Quiñones MD, Slobodan MD

****

 

 

DATE:

05/26/2018

 

 

REASON FOR CONSULTATION:

Gangrene of the toes of the right foot, peripheral vascular disease.

 

HISTORY OF PRESENT ILLNESS:

This 57-year-old male who is a known vasculopath presents to the 

hospital at this time with about a 2-week history of change in color 

in his second and third toe of the right foot, which are now clearly 

gangrenous.  Question arises about any vascular implications and 

possible remedies.  This 57-year-old male has a complex medical 

history.  He has an ejection fraction of 20-25% with ischemic 

cardiomyopathy, diabetes, hypertension, hyperlipidemia, COPD, and 

shortness of breath.

 

PAST SURGICAL HISTORY:

Three-vessel coronary artery bypass surgery in 2008, the this was 

followed by several catheterizations,  the last one in 03/2018, which 

revealed 3-vessel disease including 80% stenosis LAD and this is all 

non-amenable to any further vascular reconstruction.  The patient has 

now ischemic cardiomyopathy with severely impaired cardiac function.  

It should be noted that his defibrillator went off several times last 

night.

 

MEDICATIONS:

Can be found on the record.

 

ALLERGIES:

NONE

 

SOCIAL HISTORY:

The patient used to smoke until about 3 years ago, i.e., smoked 

through the bypass surgery and catheterizations and here is the 

result.  He has been diabetic for many years.  He was in the [employer] 

in [place] for about 3 years in the 70s.

 

PHYSICAL EXAMINATION:

GENERAL:  Reveals a very pleasant 57-year-old gentleman.

HEENT:  Normocephalic.  No trauma to the head.  Pupils are equal, 

reactive.  Extraocular muscles are intact.

NECK:  Bilateral carotid pulses.  No bruits.

CHEST:  Bilateral breath sounds, decreased over both lungs consistent 

with moderate COPD.

HEART:  Regular rhythm.  Defibrillator in the left subclavian area.

ABDOMEN:  Soft.  Active bowel sounds.  No rebound, no guarding, no 

masses.

EXTREMITIES:  The patient has palpable femoral pulses, palpable 

popliteal pulses and dopplerable dorsalis pedis and posterior tibial 

pulses bilateral.  Left foot appears to be well perfused with good 

capillary refill.  On the right side, the patient has limited changes 

to the second and third toe, which are clearly gangrenous and will 

need to be amputated, but the rest of the foot looks pretty much okay.

 The cellulitis has receded and phlegmonous changes have decreased.

NEUROLOGIC:  The patient's [place] coma scale is 15.  Neurologically, 

he is fully intact with decreased sensation of both dorsi and plantar 

surfaces of the feet.

 

IMPRESSION AND RECOMMENDATIONS:

1.  This patient clearly has systemic vascular changes consistent with

diabetes with decreased pulses in both legs below the level of the 

knee and consistent with a small vessel disease below the level of the

ankles.  The gangrene of the first 2 toes was probably due to trauma 

and swelling that patient did not even notice and then superimposed 

occlusion of the small vessels.   In a few patients, this will occur 

also from distal embolization, but that is unlikely because the 

patient is on anticoagulants.  I do not believe that his proximal 

inflow is compromised, but I do believe he probably has significant 

disease below the level of the knee, especially close to the ankles.  

At this point, it is a valid point to do a CT angiogram with runoff 

and see what this looks like.  It should be noted that a patient with 

this degree of ejection fraction and coronary impairment is not a 

candidate for any major surgery more than absolutely necessary, but he

may be okay for some balloon angioplasty distally.  At this point, we 

will do CTA with runoff and see which way it goes.  In addition, it 

should be noted the patient is relatively young and probably should be

placed on a cardiac heart transplant list.  I think much for the 

referral.  We will continue to follow the patient.

 

 

__________________________________

MD SAI Jimenes/

D: 05/26/2018, 07:52 PM

T: 05/26/2018, 08:15 PM

Visit #: [id number]

Job #: [id number]

## 2018-05-26 NOTE — EKG
Date Performed: 05/25/2018       Time Performed: 09:22:42

 

PTAGE:      57 years

 

EKG:      Sinus rhythm 

 

 POSSIBLE LEFT ATRIAL ENLARGEMENT NONSPECIFIC ST & T-WAVE ABNORMALITY ABNORMAL ECG 

 

NO PREVIOUS TRACING            

 

DOCTOR:   Jens Nelson  Interpretating Date/Time  05/26/2018 15:32:13

## 2018-05-26 NOTE — HHI.FPPN
Addendum to progress note


ADDENDUM


Reason for addendum:  Additonal documentation


Additional information


S: Drs Landau and Zay were paged at 0205AM for a [facility] at [facility]. Both 

residents responded to bedside where the pt, a 56YO male who presented with 

NSTEMI, CHF exacerbation, non-revascularizable CAD, CABGx3, AICD placement by 

Dr Lindsay in March 2018, with sepsis 2/2 MRSA bacteremia due likely to right 2nd 

toe gangrene and DM was breathing at an increased rate and complaining of SOB. 

Pt stated he was sleeping and began to feel chills and then began to cough and 

then he said it felt like someone kicked him in the side of the head several 

times and then he was short of breath and shivering, trying to catch his 

breath. The nurse brought in a tele strip showing monomorphic V-tach waveform 

followed by 6-8 large artifacts on tele (likely his AICD firing), followed by 

restoral of NSR. During time in the pt's room he stated he was SOB but w/o CPs 

or palpitations, N/V. He could follow commands.





O:


VS: T - 97.7 /  / /62 / RR 32








Vital Signs








  Date Time  Temp Pulse Resp B/P (MAP) Pulse Ox O2 Delivery O2 Flow Rate FiO2


 


5/25/18 23:00  106 20 122/75 (91) 100   


 


5/25/18 21:10 98.3       


 


5/25/18 19:00      Room Air  


 


5/25/18 17:05       2.00 


 


5/25/18 07:45        21











GENERAL: Well-nourished, well-developed patient in respiratory distress.


SKIN: Cool and dry.


HEAD: Normocephalic.


EYES: No scleral icterus. No injection or drainage. 


NECK: Supple, trachea midline. No lymphadenopathy.


CARDIOVASCULAR: Tachycardia with regular rhythm without murmurs, gallops, or 

rubs. 


RESPIRATORY: Distant left lung sounds; right lung inspiratory squeak. Accessory 

muscle use that resolved during the exam. 


GASTROINTESTINAL: Abdomen soft, non-tender, nondistended. 


EXTREMITIES: No cyanosis or edema. Extremities cool to touch. Right 2nd toe is 

grayish black and wrapped in a sterile dressing. Peripheral pulses thready and 

weak.


NEUROLOGICAL: Awake, alert, and oriented x 3. Non-focal. Normal neurological 

exam w/CNs II-XII intact.





A/P:


56YO male with NSTEMI, CHF exacerbation, recent AICD, DM who presented with 

sepsis 2/2 MRSA bacteremia and gangrene of right 2nd toe had an episode of 

monomorphic v-tach which stimulated AICD discharge to restore normal sinus 

rhythm. Pt has little/no cardiac reserve and cough, other stress can trigger 

arrhythmia.





-EKG


-Plan for CXR and lactate cancelled as pt improved quickly


-Interrogate AICD in the morning


-Tessalon perles 100mg q6h PRN 


-Guaifenisen w/codeine 10ml q6h PRN











Jaspreet Collazo MD R1 May 26, 2018 03:09

## 2018-05-26 NOTE — HHI.IDPN
Subjective


Subjective


Remarks


 is a 56 y/o CM with PMHx of CHF, Ischemic Cardiomyopathy with last 

reported EF 20-25%, he has prior h/o CABG x 3, defibrillator and pacemaker, DM, 

HTN and hyperlipidemia. CT surgery evaluated and found him not to be a 

revascularization candidate and recommended referral to a tertiary center for 

transplant consideration.  As the patient has no  insurance this option has not 

been found feasible.  He underwent placement of a single-chamber St Trav ICD in 

March 2018 for sudden cardiac death prevention. 


With this background patient presents to the ED at [facility] with 4 day history 

of progressive dyspnea described as severe, worsening with exertion, and 

improved with rest. He has had associated symptoms of diaphoresis, nausea and 

vomiting over the weekend.  He additionally has noted painful changes in his 

right second toe with color changes suspicious for infection.  


In ED patient was noted to have WBC 9.6, hemoglobin 15.0, hematocrit 43.9, 

platelets 133, sodium 130, potassium 4.4, BUN 36, creatinine 1.84, random 

glucose 407 alkaline phosphatase 114, AST 37, ALT 25, total bilirubin 0.9, 

troponin 3.04, C-reactive protein 26.8, B natruretic peptide 3148, lactic acid 

5.5. Ax Xray of Rt toe showed erosion. MRI with no osteomyelitis. Chest x-ray 

showed no acute abnormality or significant interval change.


At baseline prior to this infectious process patient was able to work around 

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  He states that he is only able to be active 

for 5-10 minutes before becoming very dyspneic and exhausted.  He was 

previously an over the road  but has been unable to work since 

March of this year.  He is now attempting to get disability as he has a nearly 

homebound existence and requires assistance to get out to doctor's appointments.


Sepsis workup on admission positive for Staph bacteremia and ID consulted for 

evaluation and Mment of the same.





Notes reviewed


Temps ok


All BC with Staph aureus


Podiatry and vascular following for gangrene of his R 2nd toe


Has cardiomyopathy


S/P ICD placement March 2018


Has not had any problem with his ICD until in hospital


Has fired since here


Antibiotics





Current Medications








 Medications


  (Trade)  Dose


 Ordered  Sig/Ilia


 Route  Start Time


 Stop Time Status Last Admin


 


  (NS Flush)  2 ml  UNSCH  PRN


 IVF  5/24/18 10:00


    5/25/18 21:28


 


 


 Heparin Sodium/


 Dextrose  250 ml @ 


 10 mls/hr  TITRATE  PRN


 IV  5/24/18 11:45


    5/25/18 16:19


 


 


  (D50w (Vial) Inj)  50 ml  UNSCH  PRN


 IV PUSH  5/24/18 13:30


     


 


 


  (Glucagon Inj)  1 mg  UNSCH  PRN


 OTHER  5/24/18 13:30


     


 


 


  (NovoLOG


 SUPPLEMENTAL


 SCALE)  1  ACHS SLIDING  SCALE


 SQ  5/24/18 17:00


    5/25/18 21:26


 


 


 Sodium Chloride  1,000 ml @ 


 60 mls/hr  [id number]


 IV  5/24/18 13:30


    5/24/18 18:23


 


 


 Piperacillin Sod/


 Tazobactam Sod  50 ml @ 


 100 mls/hr  Q6H


 IV  5/24/18 18:00


    5/26/18 05:56


 


 


 Pharmacy Profile


 Note  0 ml @ 0


 mls/hr  UNSCH


 OTHER  5/24/18 13:30


     


 


 


  (Ecotrin Ec)  162 mg  DAILY


 PO  5/25/18 09:00


    5/26/18 09:05


 


 


  (Pepcid)  20 mg  BID


 PO  5/24/18 21:00


    5/26/18 09:05


 


 


  (Pravachol)  40 mg  DAILY


 PO  5/25/18 09:00


    5/26/18 09:05


 


 


  (Duoneb Neb)  1 ampule  Q4HR NEB  PRN


 NEB  5/24/18 13:45


    5/24/18 15:38


 


 


  (Tylenol)  650 mg  Q4H  PRN


 PO  5/24/18 14:30


    5/26/18 03:07


 


 


  (Norco  5-325 Mg)  1 tab  Q4H  PRN


 PO  5/24/18 14:30


    5/24/18 15:18


 


 


  (Reglan Inj)  5 mg  Q6H  PRN


 IV PUSH  5/25/18 02:30


    5/25/18 20:10


 


 


  (Zofran  Odt)  4 mg  Q6H  PRN


 PO  5/25/18 02:30


     


 


 


  (Lopressor Inj)  5 mg  Q5M  PRN


 IV PUSH  5/25/18 02:45


    5/25/18 03:06


 


 


  (Levemir Inj)  10 units  HS


 SQ  5/25/18 21:00


    5/25/18 21:26


 


 


 Vancomycin HCl


 1000 mg/Sodium


 Chloride  250 ml @ 


 250 mls/hr  Q12H


 IV  5/26/18 02:00


    5/26/18 01:06


 


 


  ([facility]


 Ordered Lab Info)  SPECIFIC


 LAB TO BE


 ..  ONCE  ONCE


 .XX  5/27/18 13:45


 5/27/18 13:46   


 


 


  (Tessalon)  100 mg  TID  PRN


 PO  5/26/18 03:00


    5/26/18 03:42


 


 


  (Robitussin Ac


 200-20 Mg/10 ml


 Liq)  10 ml  Q6H  PRN


 PO  5/26/18 03:00


     


 








Lines


PIV no evidence of infection


Past Medical History


Severe ischemic cardiomyopathy EF 20-25%


Hyperlipidemia


Diabetes


Coronary artery disease status post MI 3/13


COPD


Past Surgical History


CABG 10 years ago


Pacemaker/Defibrillator insertion


Appendectomy


Skin graft to right foot as a child


Back surgeries 3 has hardware in back per report.


Allergies:  


Coded Allergies:  


     iodine (Unverified  Allergy, Severe, Swelling, 3/21/18)


     potassium iodide (Unverified  Allergy, Severe, Swelling, 3/21/18)


     povidone-iodine (Unverified  Allergy, Severe, Swelling, 3/21/18)


     shellfish derived (Unverified  Allergy, Severe, 3/21/18)


     sodium iodide (Unverified  Allergy, Severe, Swelling, 3/21/18)


     sodium iodide (Unverified  Allergy, Severe, Swelling, 3/21/18)





Objective


.





Vital Signs








  Date Time  Temp Pulse Resp B/P (MAP) Pulse Ox O2 Delivery O2 Flow Rate FiO2


 


5/26/18 08:00  99      


 


5/26/18 08:00 98.3 96 18 123/76 (92) 98   


 


5/26/18 06:00  96      


 


5/26/18 05:00  92      


 


5/26/18 04:00  110      


 


5/26/18 03:30  116 20 100/71 (81) 98   


 


5/26/18 03:00  122      


 


5/26/18 02:10  212      


 


5/26/18 02:07  181      


 


5/26/18 02:06  167      


 


5/26/18 02:04     100  3.00 


 


5/26/18 02:00     100 Nasal Cannula 3.00 


 


5/26/18 02:00     100  3.00 


 


5/26/18 02:00  120      


 


5/26/18 01:00  160      


 


5/26/18 00:00  104      


 


5/25/18 23:00  106 20 122/75 (91) 100   


 


5/25/18 23:00  117      


 


5/25/18 22:00  104      


 


5/25/18 21:16  111      


 


5/25/18 21:10 98.3 110 20 114/80 (91) 99   


 


5/25/18 20:59        


 


5/25/18 19:00  107 18 143/83 (103) 95 Room Air  


 


5/25/18 17:05 98.9 100 16 129/74 (92) 97 Nasal Cannula 2.00 


 


5/25/18 15:15 98.0 105 15 123/69 (87) 98 Nasal Cannula 2.00 


 


5/25/18 14:15 97.8 98 20 118/72 (87) 97 Nasal Cannula 2.00 








.





Laboratory Tests








Test


  5/24/18


18:32 5/25/18


03:50 5/26/18


02:54


 


White Blood Count 13.0 TH/MM3  11.1 TH/MM3  13.2 TH/MM3 


 


Red Blood Count 5.34 MIL/MM3  5.37 MIL/MM3  5.26 MIL/MM3 


 


Hemoglobin 14.5 GM/DL  14.7 GM/DL  14.5 GM/DL 


 


Hematocrit 43.6 %  43.8 %  42.9 % 


 


Mean Corpuscular Volume 81.7 FL  81.5 FL  81.5 FL 


 


Mean Corpuscular Hemoglobin 27.1 PG  27.3 PG  27.5 PG 


 


Mean Corpuscular Hemoglobin


Concent 33.2 % 


  33.5 % 


  33.7 % 


 


 


Red Cell Distribution Width 17.5 %  17.2 %  17.2 % 


 


Platelet Count 105 TH/MM3  86 TH/MM3  69 TH/MM3 


 


Mean Platelet Volume 9.2 FL  10.0 FL  10.0 FL 


 


Neutrophils (%) (Auto)  86.1 %  84.9 % 


 


Lymphocytes (%) (Auto)  3.1 %  4.6 % 


 


Monocytes (%) (Auto)  9.4 %  9.5 % 


 


Eosinophils (%) (Auto)  1.3 %  0.8 % 


 


Basophils (%) (Auto)  0.1 %  0.2 % 


 


Neutrophils # (Auto)  9.6 TH/MM3  11.2 TH/MM3 


 


Lymphocytes # (Auto)  0.3 TH/MM3  0.6 TH/MM3 


 


Monocytes # (Auto)  1.0 TH/MM3  1.2 TH/MM3 


 


Eosinophils # (Auto)  0.1 TH/MM3  0.1 TH/MM3 


 


Basophils # (Auto)  0.0 TH/MM3  0.0 TH/MM3 


 


CBC Comment  AUTO DIFF  AUTO DIFF 


 


Differential Total Cells


Counted 


  100 


  100 


 


 


Neutrophils % (Manual)  60 %  88 % 


 


Band Neutrophils %  32 %  3 % 


 


Lymphocytes %  1 %  5 % 


 


Monocytes %  7 %  4 % 


 


Neutrophils # (Manual)  10.2 TH/MM3  12.0 TH/MM3 


 


Differential Comment


  


  FINAL DIFF


MANUAL FINAL DIFF


MANUAL


 


Platelet Estimate  LOW  LOW 


 


Platelet Morphology Comment  NORMAL  NORMAL 


 


Red Cell Morphology Comment  NORMAL  


 


Nucleated Red Blood Cells   1 /100 WBC 


 


Toxic Vacuolation   PRESENT 








Laboratory Tests








Test


  5/24/18


18:32 5/24/18


22:05 5/25/18


03:50 5/25/18


11:35


 


Lactic Acid Level 3.1 mmol/L    2.4 mmol/L 


 


Troponin I 3.94 NG/ML  2.93 NG/ML   


 


Blood Urea Nitrogen   46 MG/DL  


 


Creatinine   1.21 MG/DL  


 


Random Glucose   249 MG/DL  


 


Total Protein   6.7 GM/DL  


 


Albumin   2.6 GM/DL  


 


Calcium Level   8.6 MG/DL  


 


Phosphorus Level   2.4 MG/DL  


 


Alkaline Phosphatase   98 U/L  


 


Aspartate Amino Transf


(AST/SGOT) 


  


  40 U/L 


  


 


 


Alanine Aminotransferase


(ALT/SGPT) 


  


  27 U/L 


  


 


 


Total Bilirubin   0.7 MG/DL  


 


Sodium Level   135 MEQ/L  


 


Potassium Level   3.9 MEQ/L  


 


Chloride Level   102 MEQ/L  


 


Carbon Dioxide Level   16.5 MEQ/L  


 


Anion Gap   17 MEQ/L  


 


Estimat Glomerular Filtration


Rate 


  


  61 ML/MIN 


  


 


 


C-Reactive Protein   22.00 MG/DL  


 


Test


  5/26/18


02:54 5/26/18


09:50 


  


 


 


Blood Urea Nitrogen 39 MG/DL    


 


Creatinine 1.16 MG/DL    


 


Random Glucose 245 MG/DL    


 


Calcium Level 8.3 MG/DL    


 


Sodium Level 139 MEQ/L    


 


Potassium Level 3.7 MEQ/L    


 


Chloride Level 103 MEQ/L    


 


Carbon Dioxide Level 20.1 MEQ/L    


 


Anion Gap 16 MEQ/L    


 


Estimat Glomerular Filtration


Rate 64 ML/MIN 


  


  


  


 


 


B-Type Natriuretic Peptide  632 PG/ML   








Microbiology








 Date/Time


Source Procedure


Growth Status


 


 


 5/25/18 16:33


Blood Peripheral Aerobic Blood Culture - Preliminary


Gram Positive Cocci Resulted


 


 5/25/18 16:33


Blood Peripheral Anaerobic Blood Culture - Preliminary


NO GROWTH IN 1 DAY Resulted





 5/25/18 16:15


Blood Peripheral Aerobic Blood Culture - Preliminary


Gram Positive Cocci Resulted


 


 5/25/18 16:15


Blood Peripheral Anaerobic Blood Culture - Preliminary


NO GROWTH IN 1 DAY Resulted





 5/24/18 10:08


Blood Peripheral Aerobic Blood Culture - Preliminary


Staphylococcus Aureus Resulted


 


 5/24/18 10:08 Anaerobic Blood Culture - Preliminary


Staphylococcus Aureus Resulted





 5/24/18 10:00


Blood Peripheral Aerobic Blood Culture - Final


Staphylococcus Aureus Complete


 


 5/24/18 10:00 Anaerobic Blood Culture - Final


Staphylococcus Aureus Complete








Imaging





Last Impressions








Chest X-Ray 5/24/18 0955 Signed





Impressions: 





 CONCLUSION: 





 1.  No acute abnormality or significant interval change.





  





 


 


Toe X-Ray 5/24/18 0000 Signed





Impressions: 





 CONCLUSION: 





 Questionable small erosion distal phalanx right second toe. This could be infec





 tious in nature if there is clinical evidence for soft tissue infection.





  





 





  





  





 





  





 





  





 


 


Renal Ultrasound 5/24/18 0000 Signed





Impressions: 





 CONCLUSION: 





 1.  No hydronephrosis.





 2.  Increased echogenicity most characteristic of medical renal disease.





  





 


 


Foot MRI 5/24/18 0000 Signed





Impressions: 





 CONCLUSION:





 1.  No evidence for osteomyelitis. Soft tissue swelling at the second toe.





  





 








Physical Exam


GENERAL:  Awake and alert, NAD


SKIN:  No rashes or lesions. Cool and dry.


HEAD: Atraumatic. Normocephalic. No temporal or scalp tenderness.


EYES: Pupils equal round and reactive. Extraocular motions intact. No scleral 

icterus. No injection or drainage. 


ENT: Nose without bleeding, purulent drainage or septal hematoma. Throat 

without erythema, tonsillar hypertrophy or exudate. Uvula midline. Airway 

patent.


NECK: Trachea midline. Supple, nontender, no meningeal signs.


CARDIOVASCULAR: HS audible.  ICD, has mild pink color on his L chest, not 

indurated,  seems swollen, not tender, has well healed incision


RESPIRATORY: Clear to auscultation. Breath sounds equal bilaterally. No wheezes

, rales, or rhonchi.  


GASTROINTESTINAL: Abdomen soft, non-tender, nondistended. 


MUSCULOSKELETAL:  Has wet gangrene Right 2nd toe  with erythema on dorsum of 

his foot. 


NEUROLOGICAL:   Grossly noon-focal.


Psych cooperative


IV line sites with no e.o infection.





Assessment & Plan


Remarks


High grade MSSA Sepsis (leucocytosis, tachycardia, lactic acidemia)


   -  source ?2nd toe,  concern with ICD


Right 2nd toe gwet gangrene, osteomyelitis.


Hepatitis C antibody positive.


AICD status.


CAD with severe ischemic cardiomyopathy.





Recs


Continue Zosyn IV for now


IV Ancef


Repeat BC to document clearing


Echo


May need SHAYY


Podiatry following


Follow C/S


Monitor progress





Explained plan to the patient











Kady Barber MD May 26, 2018 11:24

## 2018-05-26 NOTE — PD.CAR.PN
CVT Progress Note


Subjective/Hospital Course:


Patient seen full consult dictated


Thanks


J


Objective:





Vital Signs








  Date Time  Temp Pulse Resp B/P (MAP) Pulse Ox O2 Delivery O2 Flow Rate FiO2


 


5/26/18 18:00  104      


 


5/26/18 17:00  96      


 


5/26/18 16:00  87      


 


5/26/18 16:00 98.4 91 18 117/74 (88) 95   


 


5/26/18 15:00  90      


 


5/26/18 14:00  94      


 


5/26/18 13:00  88      


 


5/26/18 12:00  94      


 


5/26/18 12:00 98.2 93 18 115/66 (82) 96   


 


5/26/18 11:17     99 Nasal Cannula 2.00 


 


5/26/18 11:00  84      


 


5/26/18 10:00  90      


 


5/26/18 09:00  92      


 


5/26/18 08:00  99      


 


5/26/18 08:00 98.3 96 18 123/76 (92) 98   


 


5/26/18 07:00  96      


 


5/26/18 06:00  96      


 


5/26/18 05:00  92      


 


5/26/18 04:00  110      


 


5/26/18 03:30  116 20 100/71 (81) 98   


 


5/26/18 03:00  122      


 


5/26/18 02:10  212      


 


5/26/18 02:07  181      


 


5/26/18 02:06  167      


 


5/26/18 02:04     100  3.00 


 


5/26/18 02:00     100 Nasal Cannula 3.00 


 


5/26/18 02:00     100  3.00 


 


5/26/18 02:00  120      


 


5/26/18 01:00  160      


 


5/26/18 00:00  104      


 


5/25/18 23:00  106 20 122/75 (91) 100   


 


5/25/18 23:00  117      


 


5/25/18 22:00  104      


 


5/25/18 21:16  111      


 


5/25/18 21:10 98.3 110 20 114/80 (91) 99   


 


5/25/18 20:59        








Labs:





Laboratory Tests








Test


  5/26/18


09:10 5/26/18


09:50 5/26/18


15:47


 


Activated Partial


Thromboplast Time 36.9 SEC


(24.3-30.1) 


  36.6 SEC


(24.3-30.1)


 


B-Type Natriuretic Peptide


  


  632 PG/ML


(0-100) 


 








Result Diagram:  


5/26/18 0254                                                                   

             5/26/18 0254








(1) ARF (acute renal failure)


(2) NSTEMI (non-ST elevated myocardial infarction)


(3) CAD (coronary artery disease)


(4) Decreased cardiac ejection fraction


(5) Elevated troponin


(6) Severe sepsis


(7) Diabetes mellitus


(8) Acute renal failure


(9) Cardiomyopathy


(10) gangrene of the right second toe


(11) Dyspnea and respiratory abnormalities


(12) Multi-vessel coronary artery stenosis


(13) Hx of CABG











Yin Quiñones MD May 26, 2018 19:46

## 2018-05-26 NOTE — EKG
Date Performed: 05/26/2018       Time Performed: 02:09:28

 

PTAGE:      57 years

 

EKG:      Sinus tachycardia with PAC(s). Extensive ST-T changes may be due to myocardial ischemia Abn
ormal ECG Compared to 

 

 PREVIOUS TRACING            , the heart rate is much faster. ST-T changes are more prominent. Clinic
al correlation is recommended. PREVIOUS TRACING

 

DOCTOR:   Jens Nelson  Interpretating Date/Time  05/26/2018 15:33:06

## 2018-05-26 NOTE — HHI.PR
Addendum to Inpatient Note


Addendum Reason:  Additional Documentation


Additional Information


Paged by RN for concern of low platelet count. Patient is currently on a 

heparin drip for elevated troponin, platelets have decreased from 105 to 69. 

Patient is also on zosyn for sepsis. Discussed with on call cardiology who 

agrees with holding the heparin drip. HIT AB ordered, and zosyn was changed to 

cefepime IV. Cont to monitor platelets.











Analia Girard May 26, 2018 23:39

## 2018-05-26 NOTE — PD.CARD.PN
Subjective


Subjective Remarks


vt last night, bethany called, appears comfortable in nad this am





Objective


Medications





Current Medications








 Medications


  (Trade)  Dose


 Ordered  Sig/Ilia


 Route  Start Time


 Stop Time Status Last Admin


 


  (NS Flush)  2 ml  UNSCH  PRN


 IVF  5/24/18 10:00


    5/25/18 21:28


 


 


 Heparin Sodium/


 Dextrose  250 ml @ 


 10 mls/hr  TITRATE  PRN


 IV  5/24/18 11:45


    5/25/18 16:19


 


 


  (D50w (Vial) Inj)  50 ml  UNSCH  PRN


 IV PUSH  5/24/18 13:30


     


 


 


  (Glucagon Inj)  1 mg  UNSCH  PRN


 OTHER  5/24/18 13:30


     


 


 


  (NovoLOG


 SUPPLEMENTAL


 SCALE)  1  ACHS SLIDING  SCALE


 SQ  5/24/18 17:00


    5/25/18 21:26


 


 


 Sodium Chloride  1,000 ml @ 


 60 mls/hr  [id number]


 IV  5/24/18 13:30


    5/24/18 18:23


 


 


 Piperacillin Sod/


 Tazobactam Sod  50 ml @ 


 100 mls/hr  Q6H


 IV  5/24/18 18:00


    5/26/18 11:15


 


 


 Pharmacy Profile


 Note  0 ml @ 0


 mls/hr  UNSCH


 OTHER  5/24/18 13:30


     


 


 


  (Ecotrin Ec)  162 mg  DAILY


 PO  5/25/18 09:00


    5/26/18 09:05


 


 


  (Pepcid)  20 mg  BID


 PO  5/24/18 21:00


    5/26/18 09:05


 


 


  (Pravachol)  40 mg  DAILY


 PO  5/25/18 09:00


    5/26/18 09:05


 


 


  (Duoneb Neb)  1 ampule  Q4HR NEB  PRN


 NEB  5/24/18 13:45


    5/24/18 15:38


 


 


  (Tylenol)  650 mg  Q4H  PRN


 PO  5/24/18 14:30


    5/26/18 03:07


 


 


  (Norco  5-325 Mg)  1 tab  Q4H  PRN


 PO  5/24/18 14:30


    5/24/18 15:18


 


 


  (Reglan Inj)  5 mg  Q6H  PRN


 IV PUSH  5/25/18 02:30


    5/25/18 20:10


 


 


  (Zofran  Odt)  4 mg  Q6H  PRN


 PO  5/25/18 02:30


     


 


 


  (Lopressor Inj)  5 mg  Q5M  PRN


 IV PUSH  5/25/18 02:45


    5/25/18 03:06


 


 


  (Levemir Inj)  10 units  HS


 SQ  5/25/18 21:00


    5/25/18 21:26


 


 


 Vancomycin HCl


 1000 mg/Sodium


 Chloride  250 ml @ 


 250 mls/hr  Q12H


 IV  5/26/18 02:00


    5/26/18 01:06


 


 


  ([facility]


 Ordered Lab Info)  SPECIFIC


 LAB TO BE


 TYLOR...  ONCE  ONCE


 .XX  5/27/18 13:45


 5/27/18 13:46   


 


 


  (Tessalon)  100 mg  TID  PRN


 PO  5/26/18 03:00


    5/26/18 03:42


 


 


  (Robitussin Ac


 200-20 Mg/10 ml


 Liq)  10 ml  Q6H  PRN


 PO  5/26/18 03:00


     


 








Vital Signs / I&O





Vital Signs








  Date Time  Temp Pulse Resp B/P (MAP) Pulse Ox O2 Delivery O2 Flow Rate FiO2


 


5/26/18 11:17     99 Nasal Cannula 2.00 


 


5/26/18 08:00  99      


 


5/26/18 08:00 98.3 96 18 123/76 (92) 98   


 


5/26/18 06:00  96      


 


5/26/18 05:00  92      


 


5/26/18 04:00  110      


 


5/26/18 03:30  116 20 100/71 (81) 98   


 


5/26/18 03:00  122      


 


5/26/18 02:10  212      


 


5/26/18 02:07  181      


 


5/26/18 02:06  167      


 


5/26/18 02:04     100  3.00 


 


5/26/18 02:00     100 Nasal Cannula 3.00 


 


5/26/18 02:00     100  3.00 


 


5/26/18 02:00  120      


 


5/26/18 01:00  160      


 


5/26/18 00:00  104      


 


5/25/18 23:00  106 20 122/75 (91) 100   


 


5/25/18 23:00  117      


 


5/25/18 22:00  104      


 


5/25/18 21:16  111      


 


5/25/18 21:10 98.3 110 20 114/80 (91) 99   


 


5/25/18 20:59        


 


5/25/18 19:00  107 18 143/83 (103) 95 Room Air  


 


5/25/18 17:05 98.9 100 16 129/74 (92) 97 Nasal Cannula 2.00 


 


5/25/18 15:15 98.0 105 15 123/69 (87) 98 Nasal Cannula 2.00 


 


5/25/18 14:15 97.8 98 20 118/72 (87) 97 Nasal Cannula 2.00 














I/O      


 


 5/25/18 5/25/18 5/25/18 5/26/18 5/26/18 5/26/18





 07:00 15:00 23:00 07:00 15:00 23:00


 


Intake Total 613.5 ml  565 ml 590 ml  


 


Output Total 1200 ml     


 


Balance -586.5 ml  565 ml 590 ml  


 


      


 


Intake Oral    240 ml  


 


IV Total 613.5 ml  565 ml 350 ml  


 


Output Urine Total 1200 ml     


 


# Voids 1   1  


 


# Bowel Movements 0   1  








Physical Exam


GENERAL: 


SKIN: Warm and dry.


HEAD: Normocephalic.


EYES: No scleral icterus. No injection or drainage. 


NECK: Supple, trachea midline. No JVD or lymphadenopathy.


CARDIOVASCULAR: Regular rate and rhythm without murmurs, gallops, or rubs. 


RESPIRATORY: Breath sounds equal bilaterally. No accessory muscle use.


GASTROINTESTINAL: Abdomen soft, non-tender, nondistended. 


MUSCULOSKELETAL: No cyanosis, or edema. 


BACK: Nontender without obvious deformity. No CVA tenderness.





Laboratory





Laboratory Tests








Test


  5/25/18


11:35 5/25/18


13:10 5/25/18


16:30 5/25/18


19:35


 


Lactic Acid Level 2.4 mmol/L    


 


Activated Partial


Thromboplast Time 


  28.7 SEC 


  


  23.9 SEC 


 


 


Hepatitis A IgM Antibody   NONREACTIVE  


 


Hepatitis B Surface Antigen   NONREACTIVE  


 


Hepatitis B Core IgM Antibody   NONREACTIVE  


 


Hepatitis C IgG Antibody   REACTIVE  


 


HIV (1&2) Ab and P24 Ag, 4th


Gener 


  


  NONREACTIVE 


  


 


 


Test


  5/26/18


02:54 5/26/18


09:10 5/26/18


09:50 


 


 


White Blood Count 13.2 TH/MM3    


 


Red Blood Count 5.26 MIL/MM3    


 


Hemoglobin 14.5 GM/DL    


 


Hematocrit 42.9 %    


 


Mean Corpuscular Volume 81.5 FL    


 


Mean Corpuscular Hemoglobin 27.5 PG    


 


Mean Corpuscular Hemoglobin


Concent 33.7 % 


  


  


  


 


 


Red Cell Distribution Width 17.2 %    


 


Platelet Count 69 TH/MM3    


 


Mean Platelet Volume 10.0 FL    


 


Neutrophils (%) (Auto) 84.9 %    


 


Lymphocytes (%) (Auto) 4.6 %    


 


Monocytes (%) (Auto) 9.5 %    


 


Eosinophils (%) (Auto) 0.8 %    


 


Basophils (%) (Auto) 0.2 %    


 


Neutrophils # (Auto) 11.2 TH/MM3    


 


Lymphocytes # (Auto) 0.6 TH/MM3    


 


Monocytes # (Auto) 1.2 TH/MM3    


 


Eosinophils # (Auto) 0.1 TH/MM3    


 


Basophils # (Auto) 0.0 TH/MM3    


 


CBC Comment AUTO DIFF    


 


Differential Total Cells


Counted 100 


  


  


  


 


 


Neutrophils % (Manual) 88 %    


 


Band Neutrophils % 3 %    


 


Lymphocytes % 5 %    


 


Monocytes % 4 %    


 


Neutrophils # (Manual) 12.0 TH/MM3    


 


Nucleated Red Blood Cells 1 /100 WBC    


 


Differential Comment


  FINAL DIFF


MANUAL 


  


  


 


 


Toxic Vacuolation PRESENT    


 


Platelet Estimate LOW    


 


Platelet Morphology Comment NORMAL    


 


Activated Partial


Thromboplast Time 32.2 SEC 


  36.9 SEC 


  


  


 


 


Blood Urea Nitrogen 39 MG/DL    


 


Creatinine 1.16 MG/DL    


 


Random Glucose 245 MG/DL    


 


Calcium Level 8.3 MG/DL    


 


Sodium Level 139 MEQ/L    


 


Potassium Level 3.7 MEQ/L    


 


Chloride Level 103 MEQ/L    


 


Carbon Dioxide Level 20.1 MEQ/L    


 


Anion Gap 16 MEQ/L    


 


Estimat Glomerular Filtration


Rate 64 ML/MIN 


  


  


  


 


 


B-Type Natriuretic Peptide   632 PG/ML  











Assessment and Plan


Problem List:  


(1) ARF (acute renal failure)


ICD Codes:  N17.9 - Acute kidney failure, unspecified


(2) NSTEMI (non-ST elevated myocardial infarction)


ICD Codes:  I21.4 - Non-ST elevation (NSTEMI) myocardial infarction


(3) CAD (coronary artery disease)


ICD Codes:  I25.10 - Atherosclerotic heart disease of native coronary artery 

without angina pectoris


(4) Decreased cardiac ejection fraction


ICD Codes:  R93.1 - Abnormal findings on diagnostic imaging of heart and 

coronary circulation


(5) Elevated troponin


ICD Codes:  R74.8 - Abnormal levels of other serum enzymes


(6) Severe sepsis


ICD Codes:  A41.9 - Sepsis, unspecified organism; R65.20 - Severe sepsis 

without septic shock


(7) Diabetes mellitus


ICD Codes:  E11.9 - Type 2 diabetes mellitus without complications


(8) Acute renal failure


ICD Codes:  N17.9 - Acute kidney failure, unspecified


(9) Cardiomyopathy


ICD Codes:  I42.9 - Cardiomyopathy, unspecified


(10) gangrene of the right second toe


(11) Dyspnea and respiratory abnormalities


ICD Codes:  R06.00 - Dyspnea, unspecified; R06.89 - Other abnormalities of 

breathing


(12) Multi-vessel coronary artery stenosis


ICD Codes:  I25.10 - Atherosclerotic heart disease of native coronary artery 

without angina pectoris


(13) Hx of CABG


ICD Codes:  Z95.1 - Presence of aortocoronary bypass graft


Assessment and Plan


1.) Ischemic cardiomyopathy - nstemi, chf excacerbation and arf due to severe 

unrevascularizable cad, cardiomyopathy and high metabolic demand from sepsis, 

clinically improving on iv heparin, aspirin and antibiotics, noncardica 

procedure is high risk due to multiple unstable comorbidities and nstemi


2.) VT - replete electrolytes, beta blockers held due to persistent 

decompensated chf, could consider amio but he has significant copd as well, bnp

> 600 today; d/w case management further attempts to insure and obtain 

eligibility for referral to heart transplant center











Abdiel Purvis MD May 26, 2018 11:21

## 2018-05-26 NOTE — HHI.PR
Subjective


Remarks


afebrile- telemetry in SR


seen with family at bedside


no fever or chills


states started out as blister few days ago- now gangrenous





last night-  defibrillator fired





Objective


Vitals





Vital Signs








  Date Time  Temp Pulse Resp B/P (MAP) Pulse Ox O2 Delivery O2 Flow Rate FiO2


 


5/26/18 16:00  87      


 


5/26/18 15:00  90      


 


5/26/18 14:00  94      


 


5/26/18 13:00  88      


 


5/26/18 12:00  94      


 


5/26/18 12:00 98.2 93 18 115/66 (82) 96   


 


5/26/18 11:17     99 Nasal Cannula 2.00 


 


5/26/18 11:00  84      


 


5/26/18 10:00  90      


 


5/26/18 09:00  92      


 


5/26/18 08:00  99      


 


5/26/18 08:00 98.3 96 18 123/76 (92) 98   


 


5/26/18 07:00  96      


 


5/26/18 06:00  96      


 


5/26/18 05:00  92      


 


5/26/18 04:00  110      


 


5/26/18 03:30  116 20 100/71 (81) 98   


 


5/26/18 03:00  122      


 


5/26/18 02:10  212      


 


5/26/18 02:07  181      


 


5/26/18 02:06  167      


 


5/26/18 02:04     100  3.00 


 


5/26/18 02:00     100 Nasal Cannula 3.00 


 


5/26/18 02:00     100  3.00 


 


5/26/18 02:00  120      


 


5/26/18 01:00  160      


 


5/26/18 00:00  104      


 


5/25/18 23:00  106 20 122/75 (91) 100   


 


5/25/18 23:00  117      


 


5/25/18 22:00  104      


 


5/25/18 21:16  111      


 


5/25/18 21:10 98.3 110 20 114/80 (91) 99   


 


5/25/18 20:59        


 


5/25/18 19:00  107 18 143/83 (103) 95 Room Air  


 


5/25/18 17:05 98.9 100 16 129/74 (92) 97 Nasal Cannula 2.00 














I/O      


 


 5/25/18 5/25/18 5/25/18 5/26/18 5/26/18 5/26/18





 07:00 15:00 23:00 07:00 15:00 23:00


 


Intake Total 613.5 ml  565 ml 590 ml  


 


Output Total 1200 ml     


 


Balance -586.5 ml  565 ml 590 ml  


 


      


 


Intake Oral    240 ml  


 


IV Total 613.5 ml  565 ml 350 ml  


 


Output Urine Total 1200 ml     


 


# Voids 1   1  


 


# Bowel Movements 0   1  








Result Diagram:  


5/26/18 0254                                                                   

             5/26/18 0254








A/P


Problem List:  


(1) Severe sepsis


ICD Code:  A41.9 - Sepsis, unspecified organism; R65.20 - Severe sepsis without 

septic shock


(2) Elevated troponin


ICD Code:  R74.8 - Abnormal levels of other serum enzymes


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


Assessment and Plan


A/P  





Severe sepsis due to gangrene/ infection of the right second toe 


Staph aureus bacteremia- continue 


check echo- may need SHAYY


ID ff  - on zosyn and  Ancef


Repeat BC to document clearing


May need SHAYY


Podiatry following


Vascular surgery consulted





Ischemic cardiomyopathy EF 25% - nstemi, chf excacerbation and arf due to 

severe unrevascularizable 


Debrillator fired lasst evening 5.25


NSTEMI with history of CAD- had cardiac cath two months ago with severe three 

vessel disease- was evaluated by CT surgery at the time with poor targets for 

redo sternotomy


high metabolic demand from sepsis


- continue electrolyte monitoring


- ASA, Heparin drip, entrest, Coreg





-acute kidney injury; improved- started on gentle IV hydration in light of 

cardiomyopathy- monitor renal function





-thrombocytopenia- prob secondary to sepsis


- will monitor closely while on Heparin drip


- FF CBC





-COPD with no exacerbation; neb treatment as needed.


-diabetes mellitus; uncontrolled- hold oral hypoglycemics- start on accu-check 

with SSI. will add Levemir.


-hypertension; will hold BP meds for now- continue to monitor.


-DVT prophylaxis; on heparin drip.








palliative care  ff


CM ff - assisting with transfer to a tertiary center for transplant-- no 

insurance, assisting us with Medicaid application











Marcy Rosales MD May 26, 2018 16:06

## 2018-05-27 VITALS — HEART RATE: 74 BPM

## 2018-05-27 VITALS
RESPIRATION RATE: 16 BRPM | DIASTOLIC BLOOD PRESSURE: 81 MMHG | SYSTOLIC BLOOD PRESSURE: 134 MMHG | HEART RATE: 87 BPM | TEMPERATURE: 97.4 F | OXYGEN SATURATION: 98 %

## 2018-05-27 VITALS
HEART RATE: 78 BPM | DIASTOLIC BLOOD PRESSURE: 69 MMHG | OXYGEN SATURATION: 97 % | RESPIRATION RATE: 18 BRPM | SYSTOLIC BLOOD PRESSURE: 107 MMHG | TEMPERATURE: 97.9 F

## 2018-05-27 VITALS — HEART RATE: 87 BPM

## 2018-05-27 VITALS
RESPIRATION RATE: 18 BRPM | DIASTOLIC BLOOD PRESSURE: 65 MMHG | TEMPERATURE: 97.9 F | OXYGEN SATURATION: 97 % | HEART RATE: 71 BPM | SYSTOLIC BLOOD PRESSURE: 104 MMHG

## 2018-05-27 VITALS
DIASTOLIC BLOOD PRESSURE: 59 MMHG | HEART RATE: 78 BPM | OXYGEN SATURATION: 95 % | SYSTOLIC BLOOD PRESSURE: 100 MMHG | RESPIRATION RATE: 16 BRPM

## 2018-05-27 VITALS
SYSTOLIC BLOOD PRESSURE: 94 MMHG | DIASTOLIC BLOOD PRESSURE: 57 MMHG | OXYGEN SATURATION: 95 % | RESPIRATION RATE: 16 BRPM | HEART RATE: 81 BPM

## 2018-05-27 VITALS — HEART RATE: 84 BPM

## 2018-05-27 VITALS — HEART RATE: 80 BPM

## 2018-05-27 VITALS — HEART RATE: 83 BPM

## 2018-05-27 VITALS
TEMPERATURE: 97.9 F | DIASTOLIC BLOOD PRESSURE: 73 MMHG | RESPIRATION RATE: 18 BRPM | SYSTOLIC BLOOD PRESSURE: 115 MMHG | OXYGEN SATURATION: 98 %

## 2018-05-27 VITALS — HEART RATE: 78 BPM

## 2018-05-27 VITALS — HEART RATE: 70 BPM

## 2018-05-27 VITALS — HEART RATE: 88 BPM

## 2018-05-27 LAB
BUN SERPL-MCNC: 32 MG/DL (ref 7–18)
CALCIUM SERPL-MCNC: 8.2 MG/DL (ref 8.5–10.1)
CHLORIDE SERPL-SCNC: 103 MEQ/L (ref 98–107)
CREAT SERPL-MCNC: 0.82 MG/DL (ref 0.6–1.3)
D-DIMER: 1.19 MG/L FEU (ref 0–0.5)
ERYTHROCYTE [DISTWIDTH] IN BLOOD BY AUTOMATED COUNT: 17.4 % (ref 11.6–17.2)
FIBRINOGEN PPP-MCNC: 392 MG/DL (ref 227–377)
GFR SERPLBLD BASED ON 1.73 SQ M-ARVRAT: 96 ML/MIN (ref 89–?)
GLUCOSE SERPL-MCNC: 180 MG/DL (ref 74–106)
HBA1C MFR BLD: 9.6 % (ref 4.3–6)
HCO3 BLD-SCNC: 25.5 MEQ/L (ref 21–32)
HCT VFR BLD CALC: 40.3 % (ref 39–51)
HGB BLD-MCNC: 13.6 GM/DL (ref 13–17)
MAGNESIUM SERPL-MCNC: 2.7 MG/DL (ref 1.5–2.5)
MCH RBC QN AUTO: 27.2 PG (ref 27–34)
MCHC RBC AUTO-ENTMCNC: 33.7 % (ref 32–36)
MCV RBC AUTO: 80.5 FL (ref 80–100)
PF4 HEPARIN CMPLX AB SER QL: NEGATIVE
PLATELET # BLD: 64 TH/MM3 (ref 150–450)
PMV BLD AUTO: 10.7 FL (ref 7–11)
RBC # BLD AUTO: 5 MIL/MM3 (ref 4.5–5.9)
SODIUM SERPL-SCNC: 137 MEQ/L (ref 136–145)
WBC # BLD AUTO: 11 TH/MM3 (ref 4–11)

## 2018-05-27 RX ADMIN — TAZOBACTAM SODIUM AND PIPERACILLIN SODIUM SCH MLS/HR: 375; 3 INJECTION, SOLUTION INTRAVENOUS at 15:31

## 2018-05-27 RX ADMIN — CEFEPIME SCH MLS/HR: 1 INJECTION, POWDER, FOR SOLUTION INTRAMUSCULAR; INTRAVENOUS at 00:00

## 2018-05-27 RX ADMIN — ACYCLOVIR SCH UNITS: 800 TABLET ORAL at 21:27

## 2018-05-27 RX ADMIN — PHENYTOIN SODIUM SCH MLS/HR: 50 INJECTION INTRAMUSCULAR; INTRAVENOUS at 08:50

## 2018-05-27 RX ADMIN — TAZOBACTAM SODIUM AND PIPERACILLIN SODIUM SCH MLS/HR: 375; 3 INJECTION, SOLUTION INTRAVENOUS at 21:26

## 2018-05-27 RX ADMIN — CEFAZOLIN SODIUM SCH MLS/HR: 2 SOLUTION INTRAVENOUS at 17:38

## 2018-05-27 RX ADMIN — INSULIN ASPART SCH: 100 INJECTION, SOLUTION INTRAVENOUS; SUBCUTANEOUS at 08:49

## 2018-05-27 RX ADMIN — SACUBITRIL AND VALSARTAN SCH TAB: 49; 51 TABLET, FILM COATED ORAL at 21:05

## 2018-05-27 RX ADMIN — ACETAMINOPHEN PRN MG: 325 TABLET ORAL at 11:45

## 2018-05-27 RX ADMIN — INSULIN ASPART SCH: 100 INJECTION, SOLUTION INTRAVENOUS; SUBCUTANEOUS at 13:04

## 2018-05-27 RX ADMIN — TAZOBACTAM SODIUM AND PIPERACILLIN SODIUM SCH MLS/HR: 375; 3 INJECTION, SOLUTION INTRAVENOUS at 10:36

## 2018-05-27 RX ADMIN — INSULIN ASPART SCH: 100 INJECTION, SOLUTION INTRAVENOUS; SUBCUTANEOUS at 21:27

## 2018-05-27 RX ADMIN — CEFAZOLIN SODIUM SCH MLS/HR: 2 SOLUTION INTRAVENOUS at 09:44

## 2018-05-27 RX ADMIN — ASPIRIN SCH MG: 81 TABLET ORAL at 09:08

## 2018-05-27 RX ADMIN — CEFEPIME SCH MLS/HR: 1 INJECTION, POWDER, FOR SOLUTION INTRAMUSCULAR; INTRAVENOUS at 08:49

## 2018-05-27 RX ADMIN — PRAVASTATIN SODIUM SCH MG: 40 TABLET ORAL at 08:52

## 2018-05-27 RX ADMIN — CARVEDILOL SCH MG: 6.25 TABLET, FILM COATED ORAL at 08:53

## 2018-05-27 RX ADMIN — FAMOTIDINE SCH MG: 20 TABLET, FILM COATED ORAL at 08:52

## 2018-05-27 RX ADMIN — Medication PRN ML: at 21:06

## 2018-05-27 RX ADMIN — INSULIN ASPART SCH: 100 INJECTION, SOLUTION INTRAVENOUS; SUBCUTANEOUS at 17:38

## 2018-05-27 RX ADMIN — SACUBITRIL AND VALSARTAN SCH TAB: 49; 51 TABLET, FILM COATED ORAL at 08:54

## 2018-05-27 RX ADMIN — CARVEDILOL SCH MG: 6.25 TABLET, FILM COATED ORAL at 21:05

## 2018-05-27 NOTE — HHI.PR
Subjective


Remarks


Patient seen bedside with nurse present.  Improvement noted, denies shortness 

of breath or chest pain.  Denies nausea vomiting fevers or chills.  Patient 

reports eating shellfish such as oysters, clams, lobster, mussels without any 

allergic reaction.





Objective





Vital Signs








  Date Time  Temp Pulse Resp B/P (MAP) Pulse Ox O2 Delivery O2 Flow Rate FiO2


 


5/27/18 11:21 97.8 78 18 107/69 (82) 97   


 


5/27/18 10:00  88      


 


5/27/18 09:00  84      


 


5/27/18 08:05 97.9  18 115/73 (87) 98   


 


5/27/18 08:00  74      


 


5/27/18 07:00  83      


 


5/27/18 05:00  78      


 


5/27/18 05:00  86 16 100/59 (73) 95   


 


5/27/18 00:00  85 16 94/57 (69) 95   


 


5/27/18 00:00  81      


 


5/26/18 21:26     99   21


 


5/26/18 20:30 97.6 93 16 101/57 (72) 98   


 


5/26/18 20:30  104      


 


5/26/18 18:00  104      


 


5/26/18 17:00  96      


 


5/26/18 16:00  87      


 


5/26/18 16:00 98.4 91 18 117/74 (88) 95   


 


5/26/18 15:00  90      


 


5/26/18 14:00  94      














I/O      


 


 5/26/18 5/26/18 5/26/18 5/27/18 5/27/18 5/27/18





 07:00 15:00 23:00 07:00 15:00 23:00


 


Intake Total 590 ml  1385 ml 525 ml  


 


Output Total   1350 ml 600 ml  


 


Balance 590 ml  35 ml -75 ml  


 


      


 


Intake Oral 240 ml  480 ml 240 ml  


 


IV Total 350 ml  905 ml 285 ml  


 


Output Urine Total   1350 ml 600 ml  


 


# Voids 1     


 


# Bowel Movements 1  0   








Result Diagram:  


5/27/18 0510                                                                   

             5/27/18 0510





Imaging





Last Impressions








Chest X-Ray 5/24/18 0955 Signed





Impressions: 





 CONCLUSION: 





 1.  No acute abnormality or significant interval change.





  





 


 


Toe X-Ray 5/24/18 0000 Signed





Impressions: 





 CONCLUSION: 





 Questionable small erosion distal phalanx right second toe. This could be infec





 tious in nature if there is clinical evidence for soft tissue infection.





  





 





  





  





 





  





 





  





 


 


Renal Ultrasound 5/24/18 0000 Signed





Impressions: 





 CONCLUSION: 





 1.  No hydronephrosis.





 2.  Increased echogenicity most characteristic of medical renal disease.





  





 


 


Foot MRI 5/24/18 0000 Signed





Impressions: 





 CONCLUSION:





 1.  No evidence for osteomyelitis. Soft tissue swelling at the second toe.





  





 








Other Results





Microbiology








 Date/Time


Source Procedure


Growth Status


 


 


 5/27/18 05:10


Blood Peripheral Aerobic Blood Culture


Pending Received


 


 5/27/18 05:10


Blood Peripheral Anaerobic Blood Culture


Pending Received








Objective Remarks


Lower extremity physical exam:


Vascular: Dorsalis pedis diminished, posterior tibial diminished.  Capillary 

refill time within normal limits to digits X4 right foot, absent capillary 

refill time noted to right second digit. edema present mildly right foot


Neuro: Gross sensation intact to bilateral lower extremity.  Pinpoint sensation 

decreased bilateral lower extremity. No hyperalgesia noted to bilateral lower 

extremity


Dermatology: This ischemic discoloration noted to right second digit with 

dorsal right second digit ulceration, no purulent drainage upon compression, no 

probe to bone, associated erythema noted into midfoot dorsally, with decreased 

temperature cool to touch right second digit.  Significant improvement with 

continued demarcation noted, completely ischemic right second digit, dry 

gangrene noted.


Musculoskeletal: Overlapping second digit third digit noted.


Medications and IVs





Current Medications








 Medications


  (Trade)  Dose


 Ordered  Sig/Ilia


 Route  Start Time


 Stop Time Status Last Admin


 


  (NS Flush)  2 ml  UNSCH  PRN


 IVF  5/24/18 10:00


    5/25/18 21:28


 


 


 Heparin Sodium/


 Dextrose  250 ml @ 


 10 mls/hr  TITRATE  PRN


 IV  5/24/18 11:45


   Future Hold 5/25/18 16:19


 


 


  (D50w (Vial) Inj)  50 ml  UNSCH  PRN


 IV PUSH  5/24/18 13:30


     


 


 


  (Glucagon Inj)  1 mg  UNSCH  PRN


 OTHER  5/24/18 13:30


     


 


 


  (NovoLOG


 SUPPLEMENTAL


 SCALE)  1  ACHS SLIDING  SCALE


 SQ  5/24/18 17:00


    5/27/18 08:49


 


 


  (Ecotrin Ec)  162 mg  DAILY


 PO  5/25/18 09:00


   Future Hold 5/26/18 09:05


 


 


  (Pravachol)  40 mg  DAILY


 PO  5/25/18 09:00


    5/27/18 08:52


 


 


  (Duoneb Neb)  1 ampule  Q4HR NEB  PRN


 NEB  5/24/18 13:45


    5/24/18 15:38


 


 


  (Tylenol)  650 mg  Q4H  PRN


 PO  5/24/18 14:30


    5/27/18 11:45


 


 


  (Norco  5-325 Mg)  1 tab  Q4H  PRN


 PO  5/24/18 14:30


    5/24/18 15:18


 


 


  (Reglan Inj)  5 mg  Q6H  PRN


 IV PUSH  5/25/18 02:30


    5/25/18 20:10


 


 


  (Zofran  Odt)  4 mg  Q6H  PRN


 PO  5/25/18 02:30


     


 


 


  (Lopressor Inj)  5 mg  Q5M  PRN


 IV PUSH  5/25/18 02:45


    5/25/18 03:06


 


 


  (Levemir Inj)  10 units  HS


 SQ  5/25/18 21:00


    5/26/18 21:33


 


 


  ([facility]


 Ordered Lab Info)  SPECIFIC


 LAB TO BE


 ...  ONCE  ONCE


 .XX  5/27/18 13:45


 5/27/18 13:46   


 


 


  (Tessalon)  100 mg  TID  PRN


 PO  5/26/18 03:00


    5/26/18 11:45


 


 


  (Robitussin Ac


 200-20 Mg/10 ml


 Liq)  10 ml  Q6H  PRN


 PO  5/26/18 03:00


     


 


 


  (Coreg)  6.25 mg  Q12HR


 PO  5/26/18 11:30


    5/27/18 08:53


 


 


  (Entresto 49-51


 Mg)  1 tab  BID


 PO  5/26/18 21:00


    5/27/18 08:54


 


 


 Piperacillin Sod/


 Tazobactam Sod  50 ml @ 


 100 mls/hr  Q6H


 IV  5/27/18 10:00


    5/27/18 10:36


 


 


 Cefazolin Sodium/


 Dextrose  50 ml @ 


 100 mls/hr  Q8H


 IV  5/27/18 09:00


    5/27/18 09:44


 











Assessment and Plan


Assessment and Plan


57-year-old male with right second digit ischemia/infection





Patient evaluated and examined with all questions answered 


Awaiting vascular final recommendations 


Per nurse CTA canceled secondary to dye allergy


Patient will need right second digit amputation, awaiting further demarcation


Betadine with dry sterile dressing to be applied daily


Will plan on possible right second digit amputation versus continued 

conservative care once final vascular recommendations have been made











Jeanette Miguel DPRY May 27, 2018 13:05

## 2018-05-27 NOTE — ECHRPT
Indication:   Staph Bacteremia 

 

 CONCLUSIONS 

 The left ventricular systolic function is severely reduced with an estimated ejection fraction in th
e range of  

 20-25%.  

 Severely dilated left ventricle.  

 Wall thickness is normal.  

 A pacemaker wire is noted.  

 The left atrial size is moderately dilated.  

 There is a pacemaker wire present in the right atrial cavity.  

 Moderate mitral valve regurgitation.  

 There is mild tricuspid valve regurgitation.  

 The estimated pulmonary arterial pressure is 27 mmHg.  

 Trivial pulmonary valve regurgitation.  

 

 BP:        /         HR: 70                       Rhythm:           Sinus 

 

 MEASUREMENTS  (Male / Female) Normal Values       Technical Quality:Fair 

 2D ECHO 

 LV Diastolic Diameter PLAX        6.6 cm                4.2 - 5.9 / 3.9 - 5.3 cm 

 LV Systolic Diameter PLAX         6.0 cm                 

 IVS Diastolic Thickness           0.9 cm                0.6 - 1.0 / 0.6 - 0.9 cm 

 LVPW Diastolic Thickness          0.9 cm                0.6 - 1.0 / 0.6 - 0.9 cm 

 LV Relative Wall Thickness        0.3                    

 RV Internal Dim ED PLAX           3.3 cm                 

 LVOT Diameter                     2.3 cm                 

 LA Systolic Diameter LX           4.5 cm                3.0 - 4.0 / 2.7 - 3.8 cm 

 

 M-MODE 

 Aortic Root Diameter MM           2.7 cm                 

 LA Systolic Diameter MM           4.3 cm                 

 LA Ao Ratio MM                    1.6                    

 MV E Point Septal Separation      2.2 cm                 

 AV Cusp Separation MM             1.8 cm                 

 

 DOPPLER 

 AV Peak Velocity                  133.0 cm/s             

 AV Peak Gradient                  7.1 mmHg               

 LVOT Peak Velocity                75.8 cm/s              

 LVOT Peak Gradient                2.3 mmHg               

 AV Area Cont Eq pk                2.4 cm                

 MV Area PHT                       2.7 cm                

 Mitral E Point Velocity           50.0 cm/s              

 Mitral A Point Velocity           85.0 cm/s              

 Mitral E to A Ratio               0.6                    

 LV E' Lateral Velocity            3.9 cm/s               

 Mitral E to LV E' Lateral Ratio   12.8                   

 LV E' Septal Velocity             4.3 cm/s               

 Mitral E to LV E' Septal Ratio    11.7                   

 TR Peak Velocity                  206.0 cm/s             

 TR Peak Gradient                  17.0 mmHg              

 

 

 FINDINGS 

 

 LEFT VENTRICLE 

 The left ventricular systolic function is severely reduced with an estimated ejection fraction in th
e range of  

 20-25%.  

 Severely dilated left ventricle.  

 Wall thickness is normal.  

 

 RIGHT VENTRICLE 

 A pacemaker wire is noted.  

 

 LEFT ATRIUM 

 The left atrial size is moderately dilated.  

 

 RIGHT ATRIUM 

 There is a pacemaker wire present in the right atrial cavity.  

 

 ATRIAL SEPTUM 

 Normal atrial septal thickness without atrial level shunting by limited color doppler interrogation.
   

 

 AORTA 

 The aortic root and proximal ascending aorta are normal in size on limited imaging.   

 

 MITRAL VALVE 

 Moderate mitral valve regurgitation.  

 

 AORTIC VALVE 

 Trileaflet aortic valve. No aortic valve stenosis or regurgitation.   

 

 TRICUSPID VALVE 

 Structurally normal tricuspid valve.  

 There is mild tricuspid valve regurgitation.  

 The estimated pulmonary arterial pressure is 27 mmHg.  

 

 PULMONARY VALVE 

 Trivial pulmonary valve regurgitation.  

 

 VESSELS 

 The inferior vena cava is normal in size.   

 

 PERICARDIUM 

 No pericardial effusion.   

 

 

 

 

  Yunior Lopez MD, FACC 

  (Electronically Signed) 

  Final Date:27 May 2018 17:53

## 2018-05-27 NOTE — PD.CONS
History of Present Illness


Service


Hematology/oncology


Consult Requested By


The hospitalist service/cardiology service.


Reason for Consult


Progressive thrombocytopenia.


Primary Care Physician


Dhaval Malloy DO


Diagnoses:  


History of Present Illness


Chief Complaint:


1 week history of redness and darkness of the right second toe.


Fevers chills and night sweats associated with difficulty breathing starting 

the day prior to hospitalization; 2018.





History of Presenting Illness:


Mr. Urias is a 57-year-old man with multiple medical comorbid conditions as 

outlined below.  He reports having diabetes and peripheral neuropathy as a 

result of which he has numbness of his feet.  The patient reports developing a 

red boil on his second right toe about a week ago.  He applied peroxide and 

bandaged that area and despite this noticed a change in color, he reports the 

ulcer changed from red to gray and then turned black.  He reports the entire 

second digit of the right foot turned black.  At about the same time he reports 

developing symptoms of chills, sweats and difficulty breathing, he subsequently 

felt palpitations and presented to [facility] for further workup and 

evaluation.  On 2018 at the time of presentation to [facility] he was 

noted to be in sepsis, blood cultures drawn came back positive for methicillin 

sensitive Staphylococcus aureus.  He was initiated on broad-spectrum antibiotic 

coverage.  Blood work performed also revealed positivity for the hepatitis C 

antibody.  The patient over the course of this hospitalization has been 

evaluated by vascular surgery and infectious diseases, the patient tells me he 

will likely require amputation of 1 or 2 toes of the right foot to control the 

current infection.


Over the past 1 week the patient's platelet counts have been trending down ; At 

presentation his platelet count was over 130,000, platelet levels today are 

less than 70,000.  He had been on antiplatelet therapy with aspirin which has 

now been discontinued due to worsening thrombocytopenia he was also on heparin 

which has been discontinued.  A heparin/platelet factor for antibody Chey 

screening test has been ordered and the results are pending at this time.


The hematology service is been asked to see the patient for further workup and 

management of thrombocytopenia.





Review of Systems


Constitutional:  COMPLAINS OF: Diaphoretic episodes, Fatigue, Fever, Weight loss

, Chills, Dizziness, Change in appetite, Night Sweats, DENIES: Weight gain


Endocrine:  COMPLAINS OF: Heat/cold intolerance, DENIES: Polydipsia, Polyuria, 

Polyphagia


Eyes:  DENIES: Blurred vision, Diplopia, Eye inflammation, Eye pain, Vision loss

, Photosensitivity, Double Vision


Ears, nose, mouth, throat:  DENIES: Tinnitus, Hearing loss, Vertigo, Nasal 

discharge, Oral lesions, Throat pain, Hoarseness, Ear Pain, Running Nose, 

Epistaxis, Sinus Pain, Toothache, Odynophagia


Respiratory:  DENIES: Apneas, Cough, Snoring, Wheezing, Hemoptysis, Sputum 

production, Shortness of breath


Cardiovascular:  COMPLAINS OF: Chest pain, Palpitations, Dyspnea on Exertion, 

Lower Extremity Edema, Claudication, DENIES: Syncope, PND, Orthopnea


Gastrointestinal:  DENIES: Abdominal pain, Black stools, Bloody stools, 

Constipation, Diarrhea, Nausea, Vomiting, Difficulty Swallowing, Anorexia


Genitourinary:  DENIES: Sexual dysfunction, Urinary frequency, Urinary 

incontinence, Urgency, Hematuria, Dysuria, Nocturia, Penile Discharge, 

Testicular Pain, Testicular Swelling


Musculoskeletal:  COMPLAINS OF: Joint pain, Muscle aches, Back pain, DENIES: 

Stiffness, Joint Swelling, Neck pain


Integumentary:  COMPLAINS OF: Abnormal pigmentation, DENIES: Nail changes, 

Pruritus, Rash (Darkening/black discoloration of the right second toe.)


Hematologic/lymphatic:  DENIES: Bruising, Lymphadenopathy


Immunologic/allergic:  DENIES: Eczema, Urticaria


Neurologic:  DENIES: Abnormal gait, Headache, Localized weakness, Paresthesias, 

Seizures, Speech Problems, Tremor, Poor Balance


Psychiatric:  COMPLAINS OF: Anxiety, DENIES: Confusion, Mood changes, Depression

, Hallucinations, Agitation, Suicidal Ideation, Homicidal Ideation, Delusions


Except as stated in HPI:  all other systems reviewed are Neg





Past Family Social History


Allergies:  


Coded Allergies:  


     iodine (Unverified  Allergy, Severe, Swelling, 3/21/18)


     potassium iodide (Unverified  Allergy, Severe, Swelling, 3/21/18)


     povidone-iodine (Unverified  Allergy, Severe, Swelling, 3/21/18)


     shellfish derived (Unverified  Allergy, Severe, 3/21/18)


     sodium iodide (Unverified  Allergy, Severe, Swelling, 3/21/18)


     sodium iodide (Unverified  Allergy, Severe, Swelling, 3/21/18)


Past Medical History





Ischemic cardiomyopathy with LVEF of approximately 20%


Type 2 diabetes


Peripheral arterial disease


Basal cell carcinoma skin


Hepatitis C antibody positive


Previous history of tobaccoism


Past Surgical History





CABG (three-vessel) .


AICD placement in 2018.


Active Ordered Medications





Cefazolin 2 g IV every 8 hours


Zosyn 3.375 g IV every 6 hours


Hydrocodone/acetaminophen 5/325 one tablet p.o. every 4 hours needed for pain


Duo nebs 1 amp every 4 hours as needed for shortness of breath


Aspirin 162 mg p.o. daily (on hold).


Tessalon Perles 100 mg p.o. 3 times daily as needed for cough


Carvedilol 6.25 mg p.o. every 12 hours


Guaifenesin/codeine 200/20 mg per 10 mL 10 mL p.o. every 6 hours needed for 

cough


Insulin Levemir 10 mg subcu q. at bedtime.


Low-dose insulin NovoLog sliding scale


Metoclopramide 5 mill grams IV every 6 hours


Metoprolol 5 mg IV q. 5 minutes as needed for atrial flutter


Zofran 4 mg p.o. every 6 hours needed for nausea and vomiting


Pravastatin 40 mg p.o. daily


Sacubitril/valsartan 49/51 mg p.o. twice daily


Family History





Parents are both .


Mother  of complications of lung cancer and coronary artery disease


Father  of a major stroke.


Social History





Patient lives at home with his significant other, he has no children of his 

own.  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 

years, several months ago he went on disability due to CHF.





Physical Exam


Vital Signs





Vital Signs








  Date Time  Temp Pulse Resp B/P (MAP) Pulse Ox O2 Delivery O2 Flow Rate FiO2


 


18 13:00  78      


 


18 12:00  74      


 


18 11:21 97.8 78 18 107/69 (82) 97   


 


18 11:00  78      


 


18 10:00  88      


 


18 09:00  84      


 


18 08:05 97.9  18 115/73 (87) 98   


 


18 08:00  74      


 


18 07:00  83      


 


18 05:00  78      


 


18 05:00  86 16 100/59 (73) 95   


 


18 00:00  85 16 94/57 (69) 95   


 


18 00:00  81      


 


18 21:26     99   21


 


18 20:30 97.6 93 16 101/57 (72) 98   


 


18 20:30  104      


 


18 18:00  104      


 


18 17:00  96      


 


18 16:00  87      


 


18 16:00 98.4 91 18 117/74 (88) 95   


 


18 15:00  90      


 


18 14:00  94      








Physical Exam


GENERAL: Middle-aged male, laying in bed, no acute distress, is a pleasant 

disposition.  He appears to be tall and has a moderate build.


SKIN: No rashes, ecchymoses or lesions. Cool and dry.  The right second toe 

appears to be gangrenous with dark discoloration, wrapped in bandages.


HEAD: Atraumatic. Normocephalic. No temporal or scalp tenderness.  Can 

conjunctivae are pale sclerae anicteric.


EYES: Pupils equal round and reactive. Extraocular motions intact. No scleral 

icterus. No injection or drainage. 


ENT: Nose without bleeding, purulent drainage or septal hematoma. Throat 

without erythema, tonsillar hypertrophy or exudate. Uvula midline. Airway 

patent.


NECK: Trachea midline. No JVD or lymphadenopathy. Supple, nontender, no 

meningeal signs.


CARDIOVASCULAR: Regular rate and rhythm without murmurs, gallops, or rubs.  

AICD noted over the left anterior upper chest wall.


RESPIRATORY: Clear to auscultation. Breath sounds equal bilaterally. No wheezes

, rales, or rhonchi.  


GASTROINTESTINAL: Abdomen soft, non-tender, nondistended. No hepato-splenomegaly

, or palpable masses. No guarding.


MUSCULOSKELETAL: Generally decreased muscle mass over the lower extremities.  

Gangrenous changes involving the right second toe.


NEUROLOGICAL: Awake and alert. Cranial nerves II through XII intact.  Motor and 

sensory grossly within normal limits. Five out of 5 muscle strength in all 

muscle groups.  Normal speech.


Laboratory





Laboratory Tests








Test


  18


15:47 18


22:09 18


05:10 18


10:54


 


Activated Partial


Thromboplast Time 36.6 


  39.4 


  


  


 


 


White Blood Count   11.0  


 


Red Blood Count   5.00  


 


Hemoglobin   13.6  


 


Hematocrit   40.3  


 


Mean Corpuscular Volume   80.5  


 


Mean Corpuscular Hemoglobin   27.2  


 


Mean Corpuscular Hemoglobin


Concent 


  


  33.7 


  


 


 


Red Cell Distribution Width   17.4  


 


Platelet Count   64  


 


Mean Platelet Volume   10.7  


 


Blood Urea Nitrogen   32  


 


Creatinine   0.82  


 


Random Glucose   180  


 


Calcium Level   8.2  


 


Magnesium Level   2.7  


 


Sodium Level   137  


 


Potassium Level   3.9  


 


Chloride Level   103  


 


Carbon Dioxide Level   25.5  


 


Anion Gap   9  


 


Estimat Glomerular Filtration


Rate 


  


  96 


  


 


 


B-Type Natriuretic Peptide   1210  


 


Fibrinogen    392 


 


D-Dimer Quantitative (PE/DVT)    1.19 














 Date/Time


Source Procedure


Growth Status


 


 


 18 05:10


Blood Peripheral Aerobic Blood Culture


Pending Received


 


 18 05:10


Blood Peripheral Anaerobic Blood Culture


Pending Received








Result Diagram:  


18 0510                                                                   

             18 0510





Imaging


2018:


Arterial flow imaging study of the extremity.


Conclusion:


1.  Findings consistent with left outflow disease as well as small vessel 

disease in the left foot.


2.  Near normal range right ankle/brachial indices and segmental pressure.





Assessment and Plan


Assessment and Plan


57-year-old man with a history of diabetes, coronary artery disease, ischemic 

cardiomyopathy, hepatitis C antibody positive (viral titers not known).  

Presenting with gangrenous changes involving the second digit of the right 

lower extremity and to a lesser extent the first digit of the right lower 

extremity.  Presentation complicated by methicillin sensitive Staphylococcus 

aureus sepsis, resultant sepsis syndrome and cardiac arrhythmia with resultant 

firing of his AICD several times 2 nights ago.


He is currently being treated for his MSSA sepsis with broad-spectrum 

antibiotic coverage and is also being evaluated by vascular surgery for 

possible angioplasty of the right lower extremity and possible amputation of 

the first and second digits of the right foot.


The patient had been on a heparin infusion given the peripheral arterial disease

, he was also on aspirin given his history of coronary artery disease and 

ischemic cardiomyopathy.


Over the past 1 week his platelet counts have been consistently declining with 

platelet counts noted to be less than 70,000 earlier today; due to progressive 

thrombocytopenia the heparin infusion and aspirin have been discontinued.





Patient's thrombocytopenia is likely related to multiple factors but primarily 

seems to be driven by peripheral consumptive coagulopathy secondary to MSSA 

sepsis, additional contributing factors may include splenic sequestration 

secondary to hypersplenism or concomitant use of antibiotic therapy which can 

also have a myelosuppressive effect.





Recommendations:


1.  Continue antibiotic therapy for management of MSSA sepsis;  

Thrombocytopenia secondary to peripheral consumptive coagulopathy secondary to 

sepsis should improve as his sepsis resolves.


2.  Obtain ultrasound of the liver and spleen to assess hepatic architecture 

and spleen size.


3.  Await results of heparin/platelet factor for antibody titers.  This test is 

positive confirmatory testing with serotonin release assay will be ordered and 

the patient will be placed on Argatroban infusion.


4.  It should be safe for him to be on aspirin 81 mg once daily given the 

platelet counts are higher than 50,000.





Thank you for involving me in the care of this patient.











Dino Skelton MD May 27, 2018 14:02

## 2018-05-27 NOTE — PD.CARD.PN
Subjective


Subjective Remarks


appears comfortable in nad this am





Objective


Medications





Current Medications








 Medications


  (Trade)  Dose


 Ordered  Sig/Ilia


 Route  Start Time


 Stop Time Status Last Admin


 


  (NS Flush)  2 ml  UNSCH  PRN


 IVF  5/24/18 10:00


    5/25/18 21:28


 


 


 Heparin Sodium/


 Dextrose  250 ml @ 


 10 mls/hr  TITRATE  PRN


 IV  5/24/18 11:45


   Future Hold 5/25/18 16:19


 


 


  (D50w (Vial) Inj)  50 ml  UNSCH  PRN


 IV PUSH  5/24/18 13:30


     


 


 


  (Glucagon Inj)  1 mg  UNSCH  PRN


 OTHER  5/24/18 13:30


     


 


 


  (NovoLOG


 SUPPLEMENTAL


 SCALE)  1  ACHS SLIDING  SCALE


 SQ  5/24/18 17:00


    5/27/18 08:49


 


 


 Sodium Chloride  1,000 ml @ 


 60 mls/hr  [id number]


 IV  5/24/18 13:30


    5/24/18 18:23


 


 


  (Ecotrin Ec)  162 mg  DAILY


 PO  5/25/18 09:00


   Future Hold 5/26/18 09:05


 


 


  (Pepcid)  20 mg  BID


 PO  5/24/18 21:00


    5/27/18 08:52


 


 


  (Pravachol)  40 mg  DAILY


 PO  5/25/18 09:00


    5/27/18 08:52


 


 


  (Duoneb Neb)  1 ampule  Q4HR NEB  PRN


 NEB  5/24/18 13:45


    5/24/18 15:38


 


 


  (Tylenol)  650 mg  Q4H  PRN


 PO  5/24/18 14:30


    5/26/18 03:07


 


 


  (Norco  5-325 Mg)  1 tab  Q4H  PRN


 PO  5/24/18 14:30


    5/24/18 15:18


 


 


  (Reglan Inj)  5 mg  Q6H  PRN


 IV PUSH  5/25/18 02:30


    5/25/18 20:10


 


 


  (Zofran  Odt)  4 mg  Q6H  PRN


 PO  5/25/18 02:30


     


 


 


  (Lopressor Inj)  5 mg  Q5M  PRN


 IV PUSH  5/25/18 02:45


    5/25/18 03:06


 


 


  (Levemir Inj)  10 units  HS


 SQ  5/25/18 21:00


    5/26/18 21:33


 


 


  ([facility]


 Ordered Lab Info)  SPECIFIC


 LAB TO BE


 TYLOR...  ONCE  ONCE


 .XX  5/27/18 13:45


 5/27/18 13:46   


 


 


  (Tessalon)  100 mg  TID  PRN


 PO  5/26/18 03:00


    5/26/18 11:45


 


 


  (Robitussin Ac


 200-20 Mg/10 ml


 Liq)  10 ml  Q6H  PRN


 PO  5/26/18 03:00


     


 


 


  (Coreg)  6.25 mg  Q12HR


 PO  5/26/18 11:30


    5/27/18 08:53


 


 


  (Entresto 49-51


 Mg)  1 tab  BID


 PO  5/26/18 21:00


    5/27/18 08:54


 


 


 Piperacillin Sod/


 Tazobactam Sod  50 ml @ 


 100 mls/hr  Q6H


 IV  5/27/18 10:00


     


 


 


 Cefazolin Sodium/


 Dextrose  50 ml @ 


 100 mls/hr  Q8H


 IV  5/27/18 09:00


    5/27/18 09:44


 








Vital Signs / I&O





Vital Signs








  Date Time  Temp Pulse Resp B/P (MAP) Pulse Ox O2 Delivery O2 Flow Rate FiO2


 


5/27/18 08:05 97.9  18 115/73 (87) 98   


 


5/27/18 05:00  78      


 


5/27/18 05:00  86 16 100/59 (73) 95   


 


5/27/18 00:00  85 16 94/57 (69) 95   


 


5/27/18 00:00  81      


 


5/26/18 21:26     99   21


 


5/26/18 20:30 97.6 93 16 101/57 (72) 98   


 


5/26/18 20:30  104      


 


5/26/18 18:00  104      


 


5/26/18 17:00  96      


 


5/26/18 16:00  87      


 


5/26/18 16:00 98.4 91 18 117/74 (88) 95   


 


5/26/18 15:00  90      


 


5/26/18 14:00  94      


 


5/26/18 13:00  88      


 


5/26/18 12:00  94      


 


5/26/18 12:00 98.2 93 18 115/66 (82) 96   


 


5/26/18 11:17     99 Nasal Cannula 2.00 


 


5/26/18 11:00  84      


 


5/26/18 10:00  90      














I/O      


 


 5/26/18 5/26/18 5/26/18 5/27/18 5/27/18 5/27/18





 07:00 15:00 23:00 07:00 15:00 23:00


 


Intake Total 590 ml  1385 ml 525 ml  


 


Output Total   1350 ml 600 ml  


 


Balance 590 ml  35 ml -75 ml  


 


      


 


Intake Oral 240 ml  480 ml 240 ml  


 


IV Total 350 ml  905 ml 285 ml  


 


Output Urine Total   1350 ml 600 ml  


 


# Voids 1     


 


# Bowel Movements 1  0   








Physical Exam


GENERAL: 


SKIN: Warm and dry.


HEAD: Normocephalic.


EYES: No scleral icterus. No injection or drainage. 


NECK: Supple, trachea midline. No JVD or lymphadenopathy.


CARDIOVASCULAR: Regular rate and rhythm without murmurs, gallops, or rubs. 


RESPIRATORY: Breath sounds equal bilaterally. No accessory muscle use.


GASTROINTESTINAL: Abdomen soft, non-tender, nondistended. 


MUSCULOSKELETAL: No cyanosis, or edema. 


BACK: Nontender without obvious deformity. No CVA tenderness.





Laboratory





Laboratory Tests








Test


  5/26/18


15:47 5/26/18


22:09 5/27/18


05:10


 


Activated Partial


Thromboplast Time 36.6 SEC 


  39.4 SEC 


  


 


 


White Blood Count   11.0 TH/MM3 


 


Red Blood Count   5.00 MIL/MM3 


 


Hemoglobin   13.6 GM/DL 


 


Hematocrit   40.3 % 


 


Mean Corpuscular Volume   80.5 FL 


 


Mean Corpuscular Hemoglobin   27.2 PG 


 


Mean Corpuscular Hemoglobin


Concent 


  


  33.7 % 


 


 


Red Cell Distribution Width   17.4 % 


 


Platelet Count   64 TH/MM3 


 


Mean Platelet Volume   10.7 FL 


 


Blood Urea Nitrogen   32 MG/DL 


 


Creatinine   0.82 MG/DL 


 


Random Glucose   180 MG/DL 


 


Calcium Level   8.2 MG/DL 


 


Magnesium Level   2.7 MG/DL 


 


Sodium Level   137 MEQ/L 


 


Potassium Level   3.9 MEQ/L 


 


Chloride Level   103 MEQ/L 


 


Carbon Dioxide Level   25.5 MEQ/L 


 


Anion Gap   9 MEQ/L 


 


Estimat Glomerular Filtration


Rate 


  


  96 ML/MIN 


 


 


B-Type Natriuretic Peptide   1210 PG/ML 











Assessment and Plan


Problem List:  


(1) ARF (acute renal failure)


ICD Codes:  N17.9 - Acute kidney failure, unspecified


(2) NSTEMI (non-ST elevated myocardial infarction)


ICD Codes:  I21.4 - Non-ST elevation (NSTEMI) myocardial infarction


(3) CAD (coronary artery disease)


ICD Codes:  I25.10 - Atherosclerotic heart disease of native coronary artery 

without angina pectoris


(4) Decreased cardiac ejection fraction


ICD Codes:  R93.1 - Abnormal findings on diagnostic imaging of heart and 

coronary circulation


(5) Elevated troponin


ICD Codes:  R74.8 - Abnormal levels of other serum enzymes


(6) Severe sepsis


ICD Codes:  A41.9 - Sepsis, unspecified organism; R65.20 - Severe sepsis 

without septic shock


(7) Diabetes mellitus


ICD Codes:  E11.9 - Type 2 diabetes mellitus without complications


(8) Acute renal failure


ICD Codes:  N17.9 - Acute kidney failure, unspecified


(9) Cardiomyopathy


ICD Codes:  I42.9 - Cardiomyopathy, unspecified


(10) gangrene of the right second toe


(11) Dyspnea and respiratory abnormalities


ICD Codes:  R06.00 - Dyspnea, unspecified; R06.89 - Other abnormalities of 

breathing


(12) Multi-vessel coronary artery stenosis


ICD Codes:  I25.10 - Atherosclerotic heart disease of native coronary artery 

without angina pectoris


(13) Hx of CABG


ICD Codes:  Z95.1 - Presence of aortocoronary bypass graft


(14) Thrombocytopenia


ICD Codes:  D69.6 - Thrombocytopenia, unspecified


Assessment and Plan


1.) Ischemic cardiomyopathy - nstemi, chf excacerbation and arf due to severe 

unrevascularizable cad, cardiomyopathy and high metabolic demand from sepsis, 

off iv heparin and aspirin due to thrombocytopenia, hematology consult placed, 

on antibiotics, noncardiac procedure is high risk due to multiple unstable 

comorbidities and nstemi


2.) VT - replete electrolytes, on coreg; d/w case management further attempts 

to insure and obtain eligibility for referral to heart transplant center











Abdiel Purvis MD May 27, 2018 09:59

## 2018-05-27 NOTE — HHI.IDPN
Subjective


Subjective


Remarks


 is a 56 y/o CM with PMHx of CHF, Ischemic Cardiomyopathy with last 

reported EF 20-25%, he has prior h/o CABG x 3, defibrillator and pacemaker, DM, 

HTN and hyperlipidemia. CT surgery evaluated and found him not to be a 

revascularization candidate and recommended referral to a tertiary center for 

transplant consideration.  As the patient has no  insurance this option has not 

been found feasible.  He underwent placement of a single-chamber St Trav ICD in 

March 2018 for sudden cardiac death prevention. 


With this background patient presents to the ED at [place] with 4 day history 

of progressive dyspnea described as severe, worsening with exertion, and 

improved with rest. He has had associated symptoms of diaphoresis, nausea and 

vomiting over the weekend.  He additionally has noted painful changes in his 

right second toe with color changes suspicious for infection.  


In ED patient was noted to have WBC 9.6, hemoglobin 15.0, hematocrit 43.9, 

platelets 133, sodium 130, potassium 4.4, BUN 36, creatinine 1.84, random 

glucose 407 alkaline phosphatase 114, AST 37, ALT 25, total bilirubin 0.9, 

troponin 3.04, C-reactive protein 26.8, B natruretic peptide 3148, lactic acid 

5.5. Ax Xray of Rt toe showed erosion. MRI with no osteomyelitis. Chest x-ray 

showed no acute abnormality or significant interval change.


At baseline prior to this infectious process patient was able to work around 

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  He states that he is only able to be active 

for 5-10 minutes before becoming very dyspneic and exhausted.  He was 

previously an over the road  but has been unable to work since 

March of this year.  He is now attempting to get disability as he has a nearly 

homebound existence and requires assistance to get out to doctor's appointments.


Sepsis workup on admission positive for Staph bacteremia and ID consulted for 

evaluation and Mment of the same.





Notes reviewed


D/W RN


Temps ok


All BC with MSSA


Podiatry and vascular following for gangrene of his R 2nd toe


Has cardiomyopathy


S/P ICD placement March 2018


Has not had any problem with his ICD until in hospital


Has fired since here


Now on Cefepime


Zosyn on hold, ?due to low platelets


Antibiotics





Current Medications








 Medications


  (Trade)  Dose


 Ordered  Sig/Ilia


 Route  Start Time


 Stop Time Status Last Admin


 


  (NS Flush)  2 ml  UNSCH  PRN


 IVF  5/24/18 10:00


    5/25/18 21:28


 


 


 Heparin Sodium/


 Dextrose  250 ml @ 


 10 mls/hr  TITRATE  PRN


 IV  5/24/18 11:45


   Future Hold 5/25/18 16:19


 


 


  (D50w (Vial) Inj)  50 ml  UNSCH  PRN


 IV PUSH  5/24/18 13:30


     


 


 


  (Glucagon Inj)  1 mg  UNSCH  PRN


 OTHER  5/24/18 13:30


     


 


 


  (NovoLOG


 SUPPLEMENTAL


 SCALE)  1  ACHS SLIDING  SCALE


 SQ  5/24/18 17:00


    5/27/18 08:49


 


 


 Sodium Chloride  1,000 ml @ 


 60 mls/hr  [id number]


 IV  5/24/18 13:30


    5/24/18 18:23


 


 


 Piperacillin Sod/


 Tazobactam Sod  50 ml @ 


 100 mls/hr  Q6H


 IV  5/24/18 18:00


   Future Hold 5/26/18 17:02


 


 


  (Ecotrin Ec)  162 mg  DAILY


 PO  5/25/18 09:00


    5/26/18 09:05


 


 


  (Pepcid)  20 mg  BID


 PO  5/24/18 21:00


    5/27/18 08:52


 


 


  (Pravachol)  40 mg  DAILY


 PO  5/25/18 09:00


    5/27/18 08:52


 


 


  (Duoneb Neb)  1 ampule  Q4HR NEB  PRN


 NEB  5/24/18 13:45


    5/24/18 15:38


 


 


  (Tylenol)  650 mg  Q4H  PRN


 PO  5/24/18 14:30


    5/26/18 03:07


 


 


  (Norco  5-325 Mg)  1 tab  Q4H  PRN


 PO  5/24/18 14:30


    5/24/18 15:18


 


 


  (Reglan Inj)  5 mg  Q6H  PRN


 IV PUSH  5/25/18 02:30


    5/25/18 20:10


 


 


  (Zofran  Odt)  4 mg  Q6H  PRN


 PO  5/25/18 02:30


     


 


 


  (Lopressor Inj)  5 mg  Q5M  PRN


 IV PUSH  5/25/18 02:45


    5/25/18 03:06


 


 


  (Levemir Inj)  10 units  HS


 SQ  5/25/18 21:00


    5/26/18 21:33


 


 


  ([facility]


 Ordered Lab Info)  SPECIFIC


 LAB TO BE


 TYLOR...  ONCE  ONCE


 .XX  5/27/18 13:45


 5/27/18 13:46   


 


 


  (Tessalon)  100 mg  TID  PRN


 PO  5/26/18 03:00


    5/26/18 11:45


 


 


  (Robitussin Ac


 200-20 Mg/10 ml


 Liq)  10 ml  Q6H  PRN


 PO  5/26/18 03:00


     


 


 


  (Coreg)  6.25 mg  Q12HR


 PO  5/26/18 11:30


    5/27/18 08:53


 


 


  (Entresto 49-51


 Mg)  1 tab  BID


 PO  5/26/18 21:00


    5/27/18 08:54


 


 


 Cefepime HCl 1000


 mg/Sodium Chloride  100 ml @ 


 200 mls/hr  Q8H


 IV  5/27/18 00:00


    5/27/18 08:49


 








Lines


PIV no evidence of infection


Past Medical History


Severe ischemic cardiomyopathy EF 20-25%


Hyperlipidemia


Diabetes


Coronary artery disease status post MI 3/13


COPD


Past Surgical History


CABG 10 years ago


Pacemaker/Defibrillator insertion


Appendectomy


Skin graft to right foot as a child


Back surgeries 3 has hardware in back per report.


Allergies:  


Coded Allergies:  


     iodine (Unverified  Allergy, Severe, Swelling, 3/21/18)


     potassium iodide (Unverified  Allergy, Severe, Swelling, 3/21/18)


     povidone-iodine (Unverified  Allergy, Severe, Swelling, 3/21/18)


     shellfish derived (Unverified  Allergy, Severe, 3/21/18)


     sodium iodide (Unverified  Allergy, Severe, Swelling, 3/21/18)


     sodium iodide (Unverified  Allergy, Severe, Swelling, 3/21/18)





Objective


.





Vital Signs








  Date Time  Temp Pulse Resp B/P (MAP) Pulse Ox O2 Delivery O2 Flow Rate FiO2


 


5/27/18 08:05 97.9  18 115/73 (87) 98   


 


5/27/18 05:00  78      


 


5/27/18 05:00  86 16 100/59 (73) 95   


 


5/27/18 00:00  85 16 94/57 (69) 95   


 


5/27/18 00:00  81      


 


5/26/18 21:26     99   21


 


5/26/18 20:30 97.6 93 16 101/57 (72) 98   


 


5/26/18 20:30  104      


 


5/26/18 18:00  104      


 


5/26/18 17:00  96      


 


5/26/18 16:00  87      


 


5/26/18 16:00 98.4 91 18 117/74 (88) 95   


 


5/26/18 15:00  90      


 


5/26/18 14:00  94      


 


5/26/18 13:00  88      


 


5/26/18 12:00  94      


 


5/26/18 12:00 98.2 93 18 115/66 (82) 96   


 


5/26/18 11:17     99 Nasal Cannula 2.00 


 


5/26/18 11:00  84      


 


5/26/18 10:00  90      








.





Laboratory Tests








Test


  5/26/18


02:54 5/27/18


05:10


 


White Blood Count 13.2 TH/MM3  11.0 TH/MM3 


 


Red Blood Count 5.26 MIL/MM3  5.00 MIL/MM3 


 


Hemoglobin 14.5 GM/DL  13.6 GM/DL 


 


Hematocrit 42.9 %  40.3 % 


 


Mean Corpuscular Volume 81.5 FL  80.5 FL 


 


Mean Corpuscular Hemoglobin 27.5 PG  27.2 PG 


 


Mean Corpuscular Hemoglobin


Concent 33.7 % 


  33.7 % 


 


 


Red Cell Distribution Width 17.2 %  17.4 % 


 


Platelet Count 69 TH/MM3  64 TH/MM3 


 


Mean Platelet Volume 10.0 FL  10.7 FL 


 


Neutrophils (%) (Auto) 84.9 %  


 


Lymphocytes (%) (Auto) 4.6 %  


 


Monocytes (%) (Auto) 9.5 %  


 


Eosinophils (%) (Auto) 0.8 %  


 


Basophils (%) (Auto) 0.2 %  


 


Neutrophils # (Auto) 11.2 TH/MM3  


 


Lymphocytes # (Auto) 0.6 TH/MM3  


 


Monocytes # (Auto) 1.2 TH/MM3  


 


Eosinophils # (Auto) 0.1 TH/MM3  


 


Basophils # (Auto) 0.0 TH/MM3  


 


CBC Comment AUTO DIFF  


 


Differential Total Cells


Counted 100 


  


 


 


Neutrophils % (Manual) 88 %  


 


Band Neutrophils % 3 %  


 


Lymphocytes % 5 %  


 


Monocytes % 4 %  


 


Neutrophils # (Manual) 12.0 TH/MM3  


 


Nucleated Red Blood Cells 1 /100 WBC  


 


Differential Comment


  FINAL DIFF


MANUAL 


 


 


Toxic Vacuolation PRESENT  


 


Platelet Estimate LOW  


 


Platelet Morphology Comment NORMAL  








Laboratory Tests








Test


  5/25/18


11:35 5/26/18


02:54 5/26/18


09:50 5/27/18


05:10


 


Lactic Acid Level 2.4 mmol/L    


 


Blood Urea Nitrogen  39 MG/DL   32 MG/DL 


 


Creatinine  1.16 MG/DL   0.82 MG/DL 


 


Random Glucose  245 MG/DL   180 MG/DL 


 


Calcium Level  8.3 MG/DL   8.2 MG/DL 


 


Sodium Level  139 MEQ/L   137 MEQ/L 


 


Potassium Level  3.7 MEQ/L   3.9 MEQ/L 


 


Chloride Level  103 MEQ/L   103 MEQ/L 


 


Carbon Dioxide Level  20.1 MEQ/L   25.5 MEQ/L 


 


Anion Gap  16 MEQ/L   9 MEQ/L 


 


Estimat Glomerular Filtration


Rate 


  64 ML/MIN 


  


  96 ML/MIN 


 


 


B-Type Natriuretic Peptide   632 PG/ML  1210 PG/ML 


 


Magnesium Level    2.7 MG/DL 








Microbiology








 Date/Time


Source Procedure


Growth Status


 


 


 5/27/18 05:10


Blood Peripheral Aerobic Blood Culture


Pending Received


 


 5/27/18 05:10


Blood Peripheral Anaerobic Blood Culture


Pending Received





 5/26/18 12:40


Blood Peripheral Aerobic Blood Culture


Pending Received


 


 5/26/18 12:40


Blood Peripheral Anaerobic Blood Culture


Pending Received





 5/26/18 12:20


Blood Peripheral Aerobic Blood Culture


Pending Received


 


 5/26/18 12:20


Blood Peripheral Anaerobic Blood Culture


Pending Received





 5/25/18 16:33


Blood Peripheral Aerobic Blood Culture - Preliminary


Gram Positive Cocci Resulted


 


 5/25/18 16:33


Blood Peripheral Anaerobic Blood Culture - Preliminary


NO GROWTH IN 1 DAY Resulted





 5/25/18 16:15


Blood Peripheral Aerobic Blood Culture - Preliminary


Gram Positive Cocci Resulted


 


 5/25/18 16:15


Blood Peripheral Anaerobic Blood Culture - Preliminary


NO GROWTH IN 1 DAY Resulted





 5/24/18 10:08


Blood Peripheral Aerobic Blood Culture - Preliminary


Staphylococcus Aureus Resulted


 


 5/24/18 10:08 Anaerobic Blood Culture - Preliminary


Staphylococcus Aureus Resulted





 5/24/18 10:00


Blood Peripheral Aerobic Blood Culture - Final


Staphylococcus Aureus Complete


 


 5/24/18 10:00 Anaerobic Blood Culture - Final


Staphylococcus Aureus Complete








Imaging





Last Impressions








Chest X-Ray 5/24/18 0955 Signed





Impressions: 





 CONCLUSION: 





 1.  No acute abnormality or significant interval change.





  





 


 


Toe X-Ray 5/24/18 0000 Signed





Impressions: 





 CONCLUSION: 





 Questionable small erosion distal phalanx right second toe. This could be infec





 tious in nature if there is clinical evidence for soft tissue infection.





  





 





  





  





 





  





 





  





 


 


Renal Ultrasound 5/24/18 0000 Signed





Impressions: 





 CONCLUSION: 





 1.  No hydronephrosis.





 2.  Increased echogenicity most characteristic of medical renal disease.





  





 


 


Foot MRI 5/24/18 0000 Signed





Impressions: 





 CONCLUSION:





 1.  No evidence for osteomyelitis. Soft tissue swelling at the second toe.





  





 








Physical Exam


GENERAL:  Awake and alert, NAD


SKIN:  No rashes or lesions. Cool and dry.


HEAD: Atraumatic. Normocephalic. No temporal or scalp tenderness.


EYES: Pupils equal round and reactive. Extraocular motions intact. No scleral 

icterus. No injection or drainage. 


ENT: Nose without bleeding, purulent drainage or septal hematoma. Throat 

without erythema, tonsillar hypertrophy or exudate. Uvula midline. Airway 

patent.


NECK: Trachea midline. Supple, nontender, no meningeal signs.


CARDIOVASCULAR: HS audible.  ICD, has mild pink color on his L chest, not 

indurated,  seems swollen, not tender, has well healed incision


RESPIRATORY: Clear to auscultation. Breath sounds equal bilaterally. No wheezes

, rales, or rhonchi.  


GASTROINTESTINAL: Abdomen soft, non-tender, nondistended. 


MUSCULOSKELETAL:  Has wet gangrene Right 2nd toe  with erythema on dorsum of 

his foot. 


NEUROLOGICAL:   Grossly noon-focal.


Psych cooperative


IV line sites with no e.o infection.





Assessment & Plan


Remarks


High grade MSSA Sepsis (leucocytosis, tachycardia, lactic acidemia)


   -  source ?2nd toe,  concern with ICD


Right 2nd toe gwet gangrene, osteomyelitis.


Hepatitis C antibody positive.


AICD status.


CAD with severe ischemic cardiomyopathy.


Thrombocytopenia likely due to severe infection, ?DIC





Recs


Restart Zosyn IV 


IV Ancef


Repeat BC to document clearing


Echo pending


Check DIC screen


May need SHAYY


Podiatry following


Vascular workup for gangrene of toe


Follow C/S


Monitor progress





D/W Kady Alfaro MD May 27, 2018 09:03

## 2018-05-27 NOTE — HHI.PR
Subjective


Remarks


feeling better


no fever, no chills


 no foot pain, chest pain or shortness of breath





Objective


Vitals





Vital Signs








  Date Time  Temp Pulse Resp B/P (MAP) Pulse Ox O2 Delivery O2 Flow Rate FiO2


 


5/27/18 08:05 97.9  18 115/73 (87) 98   


 


5/27/18 05:00  78      


 


5/27/18 05:00  86 16 100/59 (73) 95   


 


5/27/18 00:00  85 16 94/57 (69) 95   


 


5/27/18 00:00  81      


 


5/26/18 21:26     99   21


 


5/26/18 20:30 97.6 93 16 101/57 (72) 98   


 


5/26/18 20:30  104      


 


5/26/18 18:00  104      


 


5/26/18 17:00  96      


 


5/26/18 16:00  87      


 


5/26/18 16:00 98.4 91 18 117/74 (88) 95   


 


5/26/18 15:00  90      


 


5/26/18 14:00  94      


 


5/26/18 13:00  88      


 


5/26/18 12:00  94      


 


5/26/18 12:00 98.2 93 18 115/66 (82) 96   


 


5/26/18 11:17     99 Nasal Cannula 2.00 


 


5/26/18 11:00  84      














I/O      


 


 5/26/18 5/26/18 5/26/18 5/27/18 5/27/18 5/27/18





 07:00 15:00 23:00 07:00 15:00 23:00


 


Intake Total 590 ml  1385 ml 525 ml  


 


Output Total   1350 ml 600 ml  


 


Balance 590 ml  35 ml -75 ml  


 


      


 


Intake Oral 240 ml  480 ml 240 ml  


 


IV Total 350 ml  905 ml 285 ml  


 


Output Urine Total   1350 ml 600 ml  


 


# Voids 1     


 


# Bowel Movements 1  0   








Result Diagram:  


5/27/18 0510                                                                   

             5/27/18 0510





Imaging





Last Impressions








Chest X-Ray 5/24/18 0955 Signed





Impressions: 





 CONCLUSION: 





 1.  No acute abnormality or significant interval change.





  





 


 


Toe X-Ray 5/24/18 0000 Signed





Impressions: 





 CONCLUSION: 





 Questionable small erosion distal phalanx right second toe. This could be infec





 tious in nature if there is clinical evidence for soft tissue infection.





  





 





  





  





 





  





 





  





 


 


Renal Ultrasound 5/24/18 0000 Signed





Impressions: 





 CONCLUSION: 





 1.  No hydronephrosis.





 2.  Increased echogenicity most characteristic of medical renal disease.





  





 


 


Foot MRI 5/24/18 0000 Signed





Impressions: 





 CONCLUSION:





 1.  No evidence for osteomyelitis. Soft tissue swelling at the second toe.





  





 








Objective Remarks


awake and alert, no distress


anicteric


no nuchal rigidity


lungs- no rales


regular rhythm


abdomen soft, nontender


LE- no edema, right foot with dry gangrenous 2nd toe





A/P


Problem List:  


(1) Severe sepsis


ICD Code:  A41.9 - Sepsis, unspecified organism; R65.20 - Severe sepsis without 

septic shock


(2) Elevated troponin


ICD Code:  R74.8 - Abnormal levels of other serum enzymes


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


Assessment and Plan


A/P  





Severe sepsis due to gangrene/ infection of the right second toe 


Staph aureus bacteremia- ff cultures to ensure clearance 


check echo- may need SHAYY


ID ff  - on zosyn and  Ancef


Repeat BC to document clearing


May need SHAYY


Podiatry following


Vascular surgery ff- requested for CTA runoff





Ischemic cardiomyopathy EF 25% - nstemi, 


Debrillator fired lasst evening 5.25


NSTEMI with history of CAD- had cardiac cath two months ago with severe three 

vessel disease- was evaluated by CT surgery at the time with poor targets for 

redo sternotomy


high metabolic demand from sepsis


- clinically feeling better 


- continue electrolyte monitoring


- ASA, Heparin drip- DC due to thrombocytopenia- Hematology consutled for 

recommendation


- continue  Entresto, Coreg





Acute kidney injury; improved- started on gentle IV hydration in light of 

cardiomyopathy- monitor renal function





-thrombocytopenia- prob secondary to sepsis


-  Heparin and ASA - held till evaluated by Hematology


- FF CBC


- Hematology consulted


- will also DC Pepcid- due to thrombocutopenia





-COPD with no exacerbation; neb treatment as needed.


-diabetes mellitus; uncontrolled- hold oral hypoglycemics- start on accu-check 

with SSI. continue Levemir and adjust 





-DVT prophylaxis;encoruage increase activity





palliative care  ff


CM ff - assisting with transfer to a tertiary center for transplant-- no 

insurance, assisting us with Medicaid application











Marcy Rosales MD May 27, 2018 10:10

## 2018-05-28 VITALS — HEART RATE: 78 BPM

## 2018-05-28 VITALS
HEART RATE: 79 BPM | OXYGEN SATURATION: 98 % | SYSTOLIC BLOOD PRESSURE: 108 MMHG | RESPIRATION RATE: 18 BRPM | DIASTOLIC BLOOD PRESSURE: 76 MMHG

## 2018-05-28 VITALS
SYSTOLIC BLOOD PRESSURE: 123 MMHG | TEMPERATURE: 98.8 F | HEART RATE: 73 BPM | OXYGEN SATURATION: 98 % | DIASTOLIC BLOOD PRESSURE: 77 MMHG | RESPIRATION RATE: 19 BRPM

## 2018-05-28 VITALS
RESPIRATION RATE: 16 BRPM | HEART RATE: 83 BPM | DIASTOLIC BLOOD PRESSURE: 78 MMHG | SYSTOLIC BLOOD PRESSURE: 132 MMHG | OXYGEN SATURATION: 96 % | TEMPERATURE: 98.2 F

## 2018-05-28 VITALS — HEART RATE: 74 BPM

## 2018-05-28 VITALS — HEART RATE: 85 BPM

## 2018-05-28 VITALS
DIASTOLIC BLOOD PRESSURE: 76 MMHG | HEART RATE: 74 BPM | RESPIRATION RATE: 16 BRPM | SYSTOLIC BLOOD PRESSURE: 113 MMHG | OXYGEN SATURATION: 93 %

## 2018-05-28 VITALS — HEART RATE: 83 BPM

## 2018-05-28 VITALS
RESPIRATION RATE: 16 BRPM | SYSTOLIC BLOOD PRESSURE: 119 MMHG | OXYGEN SATURATION: 100 % | TEMPERATURE: 98.4 F | HEART RATE: 82 BPM | DIASTOLIC BLOOD PRESSURE: 72 MMHG

## 2018-05-28 VITALS
TEMPERATURE: 98.2 F | HEART RATE: 76 BPM | OXYGEN SATURATION: 97 % | DIASTOLIC BLOOD PRESSURE: 65 MMHG | SYSTOLIC BLOOD PRESSURE: 110 MMHG | RESPIRATION RATE: 16 BRPM

## 2018-05-28 VITALS — HEART RATE: 82 BPM

## 2018-05-28 VITALS — HEART RATE: 84 BPM

## 2018-05-28 VITALS
DIASTOLIC BLOOD PRESSURE: 67 MMHG | HEART RATE: 72 BPM | SYSTOLIC BLOOD PRESSURE: 118 MMHG | RESPIRATION RATE: 18 BRPM | TEMPERATURE: 98.8 F | OXYGEN SATURATION: 97 %

## 2018-05-28 VITALS — HEART RATE: 76 BPM

## 2018-05-28 VITALS — HEART RATE: 75 BPM

## 2018-05-28 VITALS — HEART RATE: 80 BPM

## 2018-05-28 LAB
BUN SERPL-MCNC: 28 MG/DL (ref 7–18)
CALCIUM SERPL-MCNC: 8.9 MG/DL (ref 8.5–10.1)
CHLORIDE SERPL-SCNC: 103 MEQ/L (ref 98–107)
CREAT SERPL-MCNC: 0.77 MG/DL (ref 0.6–1.3)
ERYTHROCYTE [DISTWIDTH] IN BLOOD BY AUTOMATED COUNT: 17 % (ref 11.6–17.2)
ERYTHROCYTE [DISTWIDTH] IN BLOOD BY AUTOMATED COUNT: 17.5 % (ref 11.6–17.2)
GFR SERPLBLD BASED ON 1.73 SQ M-ARVRAT: 103 ML/MIN (ref 89–?)
GLUCOSE SERPL-MCNC: 232 MG/DL (ref 74–106)
HCO3 BLD-SCNC: 26.8 MEQ/L (ref 21–32)
HCT VFR BLD CALC: 42 % (ref 39–51)
HCT VFR BLD CALC: 43.6 % (ref 39–51)
HGB BLD-MCNC: 14.2 GM/DL (ref 13–17)
HGB BLD-MCNC: 14.6 GM/DL (ref 13–17)
INR PPP: 1.1 RATIO
MCH RBC QN AUTO: 27.3 PG (ref 27–34)
MCH RBC QN AUTO: 27.3 PG (ref 27–34)
MCHC RBC AUTO-ENTMCNC: 33.6 % (ref 32–36)
MCHC RBC AUTO-ENTMCNC: 33.8 % (ref 32–36)
MCV RBC AUTO: 80.7 FL (ref 80–100)
MCV RBC AUTO: 81.3 FL (ref 80–100)
PLATELET # BLD: 102 TH/MM3 (ref 150–450)
PLATELET # BLD: 80 TH/MM3 (ref 150–450)
PMV BLD AUTO: 10.1 FL (ref 7–11)
PMV BLD AUTO: 10.2 FL (ref 7–11)
PROTHROMBIN TIME: 11.4 SEC (ref 9.8–11.6)
RBC # BLD AUTO: 5.2 MIL/MM3 (ref 4.5–5.9)
RBC # BLD AUTO: 5.36 MIL/MM3 (ref 4.5–5.9)
SODIUM SERPL-SCNC: 138 MEQ/L (ref 136–145)
WBC # BLD AUTO: 12.1 TH/MM3 (ref 4–11)
WBC # BLD AUTO: 12.4 TH/MM3 (ref 4–11)

## 2018-05-28 RX ADMIN — CEFAZOLIN SODIUM SCH MLS/HR: 2 SOLUTION INTRAVENOUS at 00:05

## 2018-05-28 RX ADMIN — INSULIN ASPART SCH: 100 INJECTION, SOLUTION INTRAVENOUS; SUBCUTANEOUS at 21:00

## 2018-05-28 RX ADMIN — Medication PRN ML: at 08:31

## 2018-05-28 RX ADMIN — CEFAZOLIN SODIUM SCH MLS/HR: 2 SOLUTION INTRAVENOUS at 16:26

## 2018-05-28 RX ADMIN — CEFAZOLIN SODIUM SCH MLS/HR: 2 SOLUTION INTRAVENOUS at 08:32

## 2018-05-28 RX ADMIN — CLINDAMYCIN PHOSPHATE SCH MLS/HR: 150 INJECTION, SOLUTION INTRAMUSCULAR; INTRAVENOUS at 14:00

## 2018-05-28 RX ADMIN — CLINDAMYCIN PHOSPHATE SCH MLS/HR: 150 INJECTION, SOLUTION INTRAMUSCULAR; INTRAVENOUS at 21:20

## 2018-05-28 RX ADMIN — ASPIRIN 81 MG SCH MG: 81 TABLET ORAL at 12:10

## 2018-05-28 RX ADMIN — TAZOBACTAM SODIUM AND PIPERACILLIN SODIUM SCH MLS/HR: 375; 3 INJECTION, SOLUTION INTRAVENOUS at 04:00

## 2018-05-28 RX ADMIN — TAZOBACTAM SODIUM AND PIPERACILLIN SODIUM SCH MLS/HR: 375; 3 INJECTION, SOLUTION INTRAVENOUS at 10:00

## 2018-05-28 RX ADMIN — INSULIN ASPART SCH: 100 INJECTION, SOLUTION INTRAVENOUS; SUBCUTANEOUS at 08:31

## 2018-05-28 RX ADMIN — SACUBITRIL AND VALSARTAN SCH TAB: 49; 51 TABLET, FILM COATED ORAL at 20:45

## 2018-05-28 RX ADMIN — INSULIN ASPART SCH: 100 INJECTION, SOLUTION INTRAVENOUS; SUBCUTANEOUS at 16:26

## 2018-05-28 RX ADMIN — CARVEDILOL SCH MG: 6.25 TABLET, FILM COATED ORAL at 08:31

## 2018-05-28 RX ADMIN — SACUBITRIL AND VALSARTAN SCH TAB: 49; 51 TABLET, FILM COATED ORAL at 08:31

## 2018-05-28 RX ADMIN — ACYCLOVIR SCH UNITS: 800 TABLET ORAL at 21:00

## 2018-05-28 RX ADMIN — HEPARIN SODIUM PRN MLS/HR: 10000 INJECTION, SOLUTION INTRAVENOUS at 14:41

## 2018-05-28 RX ADMIN — PRAVASTATIN SODIUM SCH MG: 40 TABLET ORAL at 08:31

## 2018-05-28 RX ADMIN — HYDROCODONE BITARTRATE AND ACETAMINOPHEN PRN TAB: 5; 325 TABLET ORAL at 16:28

## 2018-05-28 RX ADMIN — CARVEDILOL SCH MG: 6.25 TABLET, FILM COATED ORAL at 20:45

## 2018-05-28 RX ADMIN — INSULIN ASPART SCH: 100 INJECTION, SOLUTION INTRAVENOUS; SUBCUTANEOUS at 11:51

## 2018-05-28 NOTE — PD.ONC.PN
Subjective


Subjective


Remarks


Patient seen and examined, vital signs, labs, medications reviewed.  

Subjectively patient reports.  No acute complaints.  He denies overt bleeding, 

the toe on the right foot remains necrotic.


Summary of lab work/updates: Heparin/platelet factor for antibody screen: 

Negative.  Platelet count this morning was noted to be 80,000 (improved from 69,

000 yesterday).


He has now been resumed on low-dose aspirin.





Objective


Data











  Date Time  Temp Pulse Resp B/P (MAP) Pulse Ox O2 Delivery O2 Flow Rate FiO2


 


5/28/18 11:00  74      


 


5/28/18 10:00  84      


 


5/28/18 09:00  84      


 


5/28/18 08:00  76      


 


5/28/18 08:00 98.2 84 16 110/65 (80) 97   


 


5/28/18 07:00  74      


 


5/28/18 06:15  74      


 


5/28/18 05:45  85      


 


5/28/18 04:59  79 18 108/76 (87) 98   


 


5/28/18 04:04  83      


 


5/28/18 01:01  78      


 


5/28/18 00:35  74      


 


5/28/18 00:04  74 16 113/76 (88) 93   


 


5/28/18 00:00  74      


 


5/27/18 23:00  84      


 


5/27/18 22:00  80      


 


5/27/18 21:00  84      


 


5/27/18 20:54 97.4 87 16 134/81 (98) 98   


 


5/27/18 20:30  87      


 


5/27/18 20:00  84      


 


5/27/18 19:00  78      


 


5/27/18 18:00  78      


 


5/27/18 17:00  74      


 


5/27/18 16:00  74      


 


5/27/18 15:26 97.9 71 18 104/65 (78) 97   


 


5/27/18 15:00  74      


 


5/27/18 14:00  70      


 


5/27/18 13:00  78      


 


5/27/18 12:00  74      














 5/28/18 5/28/18 5/28/18





 07:00 15:00 23:00


 


Intake Total 240 ml  


 


Output Total 500 ml  


 


Balance -260 ml  








Result Diagram:  


5/28/18 0405                                                                   

             5/28/18 0405





Laboratory Results





Laboratory Tests








Test


  5/28/18


04:05


 


White Blood Count 12.4 TH/MM3 


 


Red Blood Count 5.20 MIL/MM3 


 


Hemoglobin 14.2 GM/DL 


 


Hematocrit 42.0 % 


 


Mean Corpuscular Volume 80.7 FL 


 


Mean Corpuscular Hemoglobin 27.3 PG 


 


Mean Corpuscular Hemoglobin


Concent 33.8 % 


 


 


Red Cell Distribution Width 17.0 % 


 


Platelet Count 80 TH/MM3 


 


Mean Platelet Volume 10.2 FL 


 


Blood Urea Nitrogen 28 MG/DL 


 


Creatinine 0.77 MG/DL 


 


Random Glucose 232 MG/DL 


 


Calcium Level 8.9 MG/DL 


 


Sodium Level 138 MEQ/L 


 


Potassium Level 4.1 MEQ/L 


 


Chloride Level 103 MEQ/L 


 


Carbon Dioxide Level 26.8 MEQ/L 


 


Anion Gap 8 MEQ/L 


 


Estimat Glomerular Filtration


Rate 103 ML/MIN 


 


 


B-Type Natriuretic Peptide 847 PG/ML 








Culture Results





Microbiology








 Date/Time


Source Procedure


Growth Status


 


 


 5/27/18 05:10


Blood Peripheral Aerobic Blood Culture - Preliminary


Gram Positive Cocci Resulted


 


 5/27/18 05:10


Blood Peripheral Anaerobic Blood Culture - Preliminary


NO GROWTH IN 1 DAY Resulted





 5/26/18 12:40


Blood Peripheral Aerobic Blood Culture - Preliminary


Gram Positive Cocci Resulted


 


 5/26/18 12:40


Blood Peripheral Anaerobic Blood Culture - Preliminary


NO GROWTH IN 2 DAYS Resulted





 5/26/18 12:20


Blood Peripheral Aerobic Blood Culture - Preliminary


Gram Positive Cocci Resulted


 


 5/26/18 12:20


Blood Peripheral Anaerobic Blood Culture - Preliminary


NO GROWTH IN 2 DAYS Resulted





 5/25/18 16:33


Blood Peripheral Aerobic Blood Culture - Final


Staphylococcus Aureus Resulted


 


 5/25/18 16:33


Blood Peripheral Anaerobic Blood Culture - Preliminary


NO GROWTH IN 3 DAYS Resulted





 5/25/18 16:15


Blood Peripheral Aerobic Blood Culture - Final


Staphylococcus Aureus Resulted


 


 5/25/18 16:15


Blood Peripheral Anaerobic Blood Culture - Preliminary


NO GROWTH IN 3 DAYS Resulted











Administered Medications








 Medications


  (Trade)  Dose


 Ordered  Sig/Ilia


 Route


 PRN Reason  Start Time


 Stop Time Status Last Admin


Dose Admin


 


 Sodium Chloride


  (NS Flush)  2 ml  UNSCH  PRN


 IVF


 FLUSH AFTER USING IV ACCESS  5/24/18 10:00


    5/28/18 08:31


 


 


 Heparin Sodium/


 Dextrose  250 ml @ 


 10 mls/hr  TITRATE  PRN


 IV


 Coagulation Management  5/24/18 11:45


   Future Hold 5/25/18 16:19


 


 


 Insulin Aspart


  (NovoLOG


 SUPPLEMENTAL


 SCALE)  1  ACHS SLIDING  SCALE


 SQ


   5/24/18 17:00


    5/28/18 08:31


 


 


 Aspirin


  (Ecotrin Ec)  162 mg  DAILY


 PO


   5/25/18 09:00


   Future Hold 5/26/18 09:05


 


 


 Pravastatin Sodium


  (Pravachol)  40 mg  DAILY


 PO


   5/25/18 09:00


    5/28/18 08:31


 


 


 Albuterol/


 Ipratropium


  (Duoneb Neb)  1 ampule  Q4HR NEB  PRN


 NEB


 SHORTNESS OF BREATH  5/24/18 13:45


    5/24/18 15:38


 


 


 Acetaminophen


  (Tylenol)  650 mg  Q4H  PRN


 PO


 FEVER/PAIN 1-5  5/24/18 14:30


    5/27/18 11:45


 


 


 Acetaminophen/


 Hydrocodone Bitart


  (Norco  5-325 Mg)  1 tab  Q4H  PRN


 PO


 PAIN 6-10  5/24/18 14:30


    5/24/18 15:18


 


 


 Metoclopramide HCl


  (Reglan Inj)  5 mg  Q6H  PRN


 IV PUSH


 NAUSEA OR VOMITING  5/25/18 02:30


    5/25/18 20:10


 


 


 Metoprolol


 Tartrate


  (Lopressor Inj)  5 mg  Q5M  PRN


 IV PUSH


 ATRIAL FLUTTER  5/25/18 02:45


    5/25/18 03:06


 


 


 Insulin Detemir


  (Levemir Inj)  10 units  HS


 SQ


   5/25/18 21:00


    5/27/18 21:27


 


 


 Benzonatate


  (Tessalon)  100 mg  TID  PRN


 PO


 COUGH  5/26/18 03:00


    5/26/18 11:45


 


 


 Carvedilol


  (Coreg)  6.25 mg  Q12HR


 PO


   5/26/18 11:30


    5/28/18 08:31


 


 


 Sacubitril/


 Valsartan


  (Entresto 49-51


 Mg)  1 tab  BID


 PO


   5/26/18 21:00


    5/28/18 08:31


 


 


 Cefazolin Sodium/


 Dextrose  50 ml @ 


 100 mls/hr  Q8H


 IV


   5/27/18 09:00


    5/28/18 08:32


 








Objective Remarks


GENERAL: Middle-aged male, laying in bed, no acute distress, is a pleasant 

disposition.  He appears to be tall and has a moderate build.


SKIN: No rashes, ecchymoses or lesions. Cool and dry.  The right second toe 

appears to be gangrenous with dark discoloration, wrapped in bandages.


HEAD: Atraumatic. Normocephalic. No temporal or scalp tenderness.  Can 

conjunctivae are pale sclerae anicteric.


EYES: Pupils equal round and reactive. Extraocular motions intact. No scleral 

icterus. No injection or drainage. 


ENT: Nose without bleeding, purulent drainage or septal hematoma. Throat 

without erythema, tonsillar hypertrophy or exudate. Uvula midline. Airway 

patent.


NECK: Trachea midline. No JVD or lymphadenopathy. Supple, nontender, no 

meningeal signs.


CARDIOVASCULAR: Regular rate and rhythm without murmurs, gallops, or rubs.  

AICD noted over the left anterior upper chest wall.


RESPIRATORY: Clear to auscultation. Breath sounds equal bilaterally. No wheezes

, rales, or rhonchi.  


GASTROINTESTINAL: Abdomen soft, non-tender, nondistended. No hepato-splenomegaly

, or palpable masses. No guarding.


MUSCULOSKELETAL: Generally decreased muscle mass over the lower extremities.  

Gangrenous changes involving the right second toe.


NEUROLOGICAL: Awake and alert. Cranial nerves II through XII intact.  Motor and 

sensory grossly within normal limits. Five out of 5 muscle strength in all 

muscle groups.  Normal speech.





Assessment/Plan


Assessment


57-year-old man with a history of diabetes, coronary artery disease, ischemic 

cardiomyopathy, hepatitis C antibody positive (viral titers not known).  

Presenting with gangrenous changes involving the second digit of the right 

lower extremity and to a lesser extent the first digit of the right lower 

extremity.  Presentation complicated by methicillin sensitive Staphylococcus 

aureus sepsis, resultant sepsis syndrome and cardiac arrhythmia with resultant 

firing of his AICD several times 2 nights ago.


He is currently being treated for his MSSA sepsis with broad-spectrum 

antibiotic coverage and is also being evaluated by vascular surgery for 

possible angioplasty of the right lower extremity and possible amputation of 

the first and second digits of the right foot.


The patient had been on a heparin infusion given the peripheral arterial disease

, he was also on aspirin given his history of coronary artery disease and 

ischemic cardiomyopathy.


Over the past 1 week his platelet counts have been consistently declining with 

platelet counts noted to be less than 70,000 earlier today; due to progressive 

thrombocytopenia the heparin infusion and aspirin have been discontinued.





Patient's thrombocytopenia is likely related to multiple factors but primarily 

seems to be driven by peripheral consumptive coagulopathy secondary to MSSA 

sepsis, additional contributing factors may include splenic sequestration 

secondary to hypersplenism or concomitant use of antibiotic therapy which can 

also have a myelosuppressive effect.


Plan


1.  Continue antibiotic therapy for management of MSSA sepsis;  

Thrombocytopenia secondary to peripheral consumptive coagulopathy secondary to 

sepsis should improve as his sepsis resolves.


Platelet count is improved to 80,000 today.  Hit antibody negative.  Aspirin 81 

mg once daily resumed.


I will resume heparin infusion for management of his peripheral arterial 

disease.


2.  Obtain ultrasound of the liver and spleen to assess hepatic architecture 

and spleen size; this is still pending.











Dino Skelton MD May 28, 2018 11:33

## 2018-05-28 NOTE — HHI.HCPN
Reason for visit


   a.  To assist with evaluation and management of symptoms including: Dyspnea, 

pain


   b.  To assist medical decision maker(s) with: better understanding of 

current medical conditions; weighing benefits/burdens of medical treatment 

options; making        


        medical treatment decisions.





Subjective/Interval History


Patient seen to follow-up on management of dyspnea and pain.





Patient had episodes of cardiac arrhythmias seen on ICD interrogation form to 

include 2 episodes of ventricular fibrillation, 3 episodes of ventricular 

tachycardia, 7 episodes of SVT, for nonsustained resulting in 5 shocks, 

antitachycardia pacing 3, all of which occurred between 1:33 AM and 2:10 AM.  

He complained of significant discomfort with this, as though a "metal box had 

exploded in his head".  The pain was acute, sharp in vibrating, resolving 

quickly, severe in intensity.  He denied any chest pain with these episodes but 

did have episodes of palpitations and dyspnea.  He states that his dyspnea 

begins with a cough which exacerbates into dry heaving and then becomes chills 

and muscle stiffness.  He has dyspnea on exertion, becoming fatigued after 5 

minutes of activity requiring rest for several hours before he has recovered 

enough to resume activity.  This occurs with walking or doing any light 

housework.








.


Family/friend interactions


His significant other is at bedside during this discussion and events reviewed 

included the ICD shocks, cultures and sensitivity of his blood cultures, 

potential risks and consideration for possible transesophageal echocardiogram 

to evaluate for endocarditis.  If endocarditis is discovered, there may also be 

consideration for ICD explantation pending clearing of the infection.  As 

previously stated by Dr. Purvis, he is not a candidate for revascularization 

and given his multiple comorbidities may not be a candidate for valve 

replacement if needed.


.





Advance Directives


Living Will:  Never completed


Health Care Surrogate:  Copy in medical record


Durable Power of :  Never completed


Advance Directive Specifics


Date completed:


5/25/2018


Health Care Surrogate(s):


Healthcare surrogate is Giovanna Ramsay, his significant other.  He has made his 

sister, Shy Win his alternate healthcare surrogate.


.


Documented care wishes:


No living will completed.


.





Objective





Vital Signs








  Date Time  Temp Pulse Resp B/P (MAP) Pulse Ox O2 Delivery O2 Flow Rate FiO2


 


5/28/18 12:00  83      


 


5/28/18 12:00 98.4 82 16 119/72 (88) 100   


 


5/28/18 11:00  74      


 


5/28/18 10:00  84      


 


5/28/18 09:00  84      


 


5/28/18 08:00  76      


 


5/28/18 08:00 98.2 84 16 110/65 (80) 97   


 


5/28/18 07:00  74      


 


5/28/18 06:15  74      


 


5/28/18 05:45  85      


 


5/28/18 04:59  79 18 108/76 (87) 98   


 


5/28/18 04:04  83      


 


5/28/18 01:01  78      


 


5/28/18 00:35  74      


 


5/28/18 00:04  74 16 113/76 (88) 93   


 


5/28/18 00:00  74      


 


5/27/18 23:00  84      


 


5/27/18 22:00  80      


 


5/27/18 21:00  84      


 


5/27/18 20:54 97.4 87 16 134/81 (98) 98   


 


5/27/18 20:30  87      


 


5/27/18 20:00  84      


 


5/27/18 19:00  78      


 


5/27/18 18:00  78      


 


5/27/18 17:00  74      














Intake & Output  


 


 5/28/18 5/28/18





 07:00 19:00


 


Intake Total 240 ml 


 


Output Total 500 ml 


 


Balance -260 ml 


 


  


 


Intake Oral 240 ml 


 


Output Urine Total 500 ml 








Physical Exam


CONSTITUTIONAL/GENERAL: This is an adequately nourished patient, in no apparent 

distress.


TUBES/LINES/DRAINS: PIV bilateral


SKIN: No jaundice, rashes, or lesions.  No wounds seen anteriorly. Skin 

temperature appropriate. Not diaphoretic. 


HEAD: Atraumatic. Normocephalic.


EYES: Pupils equal and round and reactive. Extraocular motions intact. No 

scleral icterus. No injection or drainage. Fundi not examined.


ENT: Hearing grossly normal. Nose without bleeding or purulent drainage. Throat 

without visible erythema, exudates, masses, or lesions.


NECK: Trachea midline. Supple, nontender. No palpable thyroid enlargement or 

nodularity. 


CARDIOVASCULAR: Regular rate and rhythm without murmurs, gallops, or rubs. No 

JVD. Peripheral pulses symmetric.


RESPIRATORY/CHEST: Symmetric, unlabored respirations. Clear to auscultation. 

Breath sounds equal bilaterally. No wheezes, rales, or rhonchi.  


GASTROINTESTINAL: Abdomen soft, non-tender, nondistended. No hepato-splenomegaly

, or palpable masses. No guarding. Bowel sounds present.


GENITOURINARY: Without palpable bladder distension. 


MUSCULOSKELETAL: Extremities without clubbing, cyanosis, or edema. No joint 

tenderness or effusion noted. No calf tenderness. No mottling or clubbing.  

Right second toe black, shriveled, with third toe involvement.


LYMPHATICS: No palpable cervical or supraclavicular adenopathy.


NEUROLOGICAL: Awake and alert. Motor and sensory grossly within normal limits. 

Follows commands. Cognitively sharp. Moves all extremities.


PSYCHIATRIC: No obvious anxiety/depression. no apparent hallucinations or other 

psychotic thought process.


.





Diagnostic Tests


Laboratory





Laboratory Tests








Test


  5/25/18


16:30 5/25/18


19:35 5/26/18


02:54 5/26/18


09:10


 


Hepatitis A IgM Antibody


  NONREACTIVE


(NONREACTIVE) 


  


  


 


 


Hepatitis B Surface Antigen


  NONREACTIVE


(NONREACTIVE) 


  


  


 


 


Hepatitis B Core IgM Antibody


  NONREACTIVE


(NONREACTIVE) 


  


  


 


 


Hepatitis C IgG Antibody


  REACTIVE


(NONREACTIVE) 


  


  


 


 


HIV (1&2) Ab and P24 Ag, 4th


Gener NONREACTIVE


(NONREACTIVE) 


  


  


 


 


Activated Partial


Thromboplast Time 


  23.9 SEC


(24.3-30.1) 32.2 SEC


(24.3-30.1) 36.9 SEC


(24.3-30.1)


 


White Blood Count


  


  


  13.2 TH/MM3


(4.0-11.0) 


 


 


Red Blood Count


  


  


  5.26 MIL/MM3


(4.50-5.90) 


 


 


Hemoglobin


  


  


  14.5 GM/DL


(13.0-17.0) 


 


 


Hematocrit


  


  


  42.9 %


(39.0-51.0) 


 


 


Mean Corpuscular Volume


  


  


  81.5 FL


(80.0-100.0) 


 


 


Mean Corpuscular Hemoglobin


  


  


  27.5 PG


(27.0-34.0) 


 


 


Mean Corpuscular Hemoglobin


Concent 


  


  33.7 %


(32.0-36.0) 


 


 


Red Cell Distribution Width


  


  


  17.2 %


(11.6-17.2) 


 


 


Platelet Count


  


  


  69 TH/MM3


(150-450) 


 


 


Mean Platelet Volume


  


  


  10.0 FL


(7.0-11.0) 


 


 


Neutrophils (%) (Auto)


  


  


  84.9 %


(16.0-70.0) 


 


 


Lymphocytes (%) (Auto)


  


  


  4.6 %


(9.0-44.0) 


 


 


Monocytes (%) (Auto)


  


  


  9.5 %


(0.0-8.0) 


 


 


Eosinophils (%) (Auto)


  


  


  0.8 %


(0.0-4.0) 


 


 


Basophils (%) (Auto)


  


  


  0.2 %


(0.0-2.0) 


 


 


Neutrophils # (Auto)


  


  


  11.2 TH/MM3


(1.8-7.7) 


 


 


Lymphocytes # (Auto)


  


  


  0.6 TH/MM3


(1.0-4.8) 


 


 


Monocytes # (Auto)


  


  


  1.2 TH/MM3


(0-0.9) 


 


 


Eosinophils # (Auto)


  


  


  0.1 TH/MM3


(0-0.4) 


 


 


Basophils # (Auto)


  


  


  0.0 TH/MM3


(0-0.2) 


 


 


CBC Comment   AUTO DIFF  


 


Differential Total Cells


Counted 


  


  100 


  


 


 


Neutrophils % (Manual)   88 % (16-70)  


 


Band Neutrophils %   3 % (0-6)  


 


Lymphocytes %   5 % (9-44)  


 


Monocytes %   4 % (0-8)  


 


Neutrophils # (Manual)


  


  


  12.0 TH/MM3


(1.8-7.7) 


 


 


Nucleated Red Blood Cells


  


  


  1 /100 WBC


(0-0) 


 


 


Differential Comment


  


  


  FINAL DIFF


MANUAL 


 


 


Toxic Vacuolation


  


  


  PRESENT (NONE


SEEN) 


 


 


Platelet Estimate   LOW (NORMAL)  


 


Platelet Morphology Comment


  


  


  NORMAL


(NORMAL) 


 


 


Blood Urea Nitrogen


  


  


  39 MG/DL


(7-18) 


 


 


Creatinine


  


  


  1.16 MG/DL


(0.60-1.30) 


 


 


Random Glucose


  


  


  245 MG/DL


() 


 


 


Calcium Level


  


  


  8.3 MG/DL


(8.5-10.1) 


 


 


Sodium Level


  


  


  139 MEQ/L


(136-145) 


 


 


Potassium Level


  


  


  3.7 MEQ/L


(3.5-5.1) 


 


 


Chloride Level


  


  


  103 MEQ/L


() 


 


 


Carbon Dioxide Level


  


  


  20.1 MEQ/L


(21.0-32.0) 


 


 


Anion Gap


  


  


  16 MEQ/L


(5-15) 


 


 


Estimat Glomerular Filtration


Rate 


  


  64 ML/MIN


(>89) 


 


 


Test


  5/26/18


09:50 5/26/18


15:47 5/26/18


22:09 5/27/18


05:10


 


B-Type Natriuretic Peptide


  632 PG/ML


(0-100) 


  


  1210 PG/ML


(0-100)


 


Activated Partial


Thromboplast Time 


  36.6 SEC


(24.3-30.1) 39.4 SEC


(24.3-30.1) 


 


 


White Blood Count


  


  


  


  11.0 TH/MM3


(4.0-11.0)


 


Red Blood Count


  


  


  


  5.00 MIL/MM3


(4.50-5.90)


 


Hemoglobin


  


  


  


  13.6 GM/DL


(13.0-17.0)


 


Hematocrit


  


  


  


  40.3 %


(39.0-51.0)


 


Mean Corpuscular Volume


  


  


  


  80.5 FL


(80.0-100.0)


 


Mean Corpuscular Hemoglobin


  


  


  


  27.2 PG


(27.0-34.0)


 


Mean Corpuscular Hemoglobin


Concent 


  


  


  33.7 %


(32.0-36.0)


 


Red Cell Distribution Width


  


  


  


  17.4 %


(11.6-17.2)


 


Platelet Count


  


  


  


  64 TH/MM3


(150-450)


 


Mean Platelet Volume


  


  


  


  10.7 FL


(7.0-11.0)


 


Blood Urea Nitrogen


  


  


  


  32 MG/DL


(7-18)


 


Creatinine


  


  


  


  0.82 MG/DL


(0.60-1.30)


 


Random Glucose


  


  


  


  180 MG/DL


()


 


Calcium Level


  


  


  


  8.2 MG/DL


(8.5-10.1)


 


Magnesium Level


  


  


  


  2.7 MG/DL


(1.5-2.5)


 


Sodium Level


  


  


  


  137 MEQ/L


(136-145)


 


Potassium Level


  


  


  


  3.9 MEQ/L


(3.5-5.1)


 


Chloride Level


  


  


  


  103 MEQ/L


()


 


Carbon Dioxide Level


  


  


  


  25.5 MEQ/L


(21.0-32.0)


 


Anion Gap    9 MEQ/L (5-15) 


 


Estimat Glomerular Filtration


Rate 


  


  


  96 ML/MIN


(>89)


 


Hemoglobin A1c


  


  


  


  9.6 %


(4.3-6.0)


 


Heparin-Induced Platelet Ab


(Chey) 


  


  


  NEGATIVE


(NEGATIVE)


 


HIPA Patient Optical Density


  


  


  


  0.041 O.D.


(0.000-0.300)


 


Test


  5/27/18


10:54 5/28/18


04:05 5/28/18


14:25 


 


 


Fibrinogen


  392 mg/dL


(227-377) 


  


  


 


 


D-Dimer Quantitative (PE/DVT)


  1.19 MG/L FEU


(0.00-0.50) 


  


  


 


 


White Blood Count


  


  12.4 TH/MM3


(4.0-11.0) 12.1 TH/MM3


(4.0-11.0) 


 


 


Red Blood Count


  


  5.20 MIL/MM3


(4.50-5.90) 5.36 MIL/MM3


(4.50-5.90) 


 


 


Hemoglobin


  


  14.2 GM/DL


(13.0-17.0) 14.6 GM/DL


(13.0-17.0) 


 


 


Hematocrit


  


  42.0 %


(39.0-51.0) 43.6 %


(39.0-51.0) 


 


 


Mean Corpuscular Volume


  


  80.7 FL


(80.0-100.0) 81.3 FL


(80.0-100.0) 


 


 


Mean Corpuscular Hemoglobin


  


  27.3 PG


(27.0-34.0) 27.3 PG


(27.0-34.0) 


 


 


Mean Corpuscular Hemoglobin


Concent 


  33.8 %


(32.0-36.0) 33.6 %


(32.0-36.0) 


 


 


Red Cell Distribution Width


  


  17.0 %


(11.6-17.2) 17.5 %


(11.6-17.2) 


 


 


Platelet Count


  


  80 TH/MM3


(150-450) 102 TH/MM3


(150-450) 


 


 


Mean Platelet Volume


  


  10.2 FL


(7.0-11.0) 10.1 FL


(7.0-11.0) 


 


 


Blood Urea Nitrogen


  


  28 MG/DL


(7-18) 


  


 


 


Creatinine


  


  0.77 MG/DL


(0.60-1.30) 


  


 


 


Random Glucose


  


  232 MG/DL


() 


  


 


 


Calcium Level


  


  8.9 MG/DL


(8.5-10.1) 


  


 


 


Sodium Level


  


  138 MEQ/L


(136-145) 


  


 


 


Potassium Level


  


  4.1 MEQ/L


(3.5-5.1) 


  


 


 


Chloride Level


  


  103 MEQ/L


() 


  


 


 


Carbon Dioxide Level


  


  26.8 MEQ/L


(21.0-32.0) 


  


 


 


Anion Gap  8 MEQ/L (5-15)   


 


Estimat Glomerular Filtration


Rate 


  103 ML/MIN


(>89) 


  


 


 


B-Type Natriuretic Peptide


  


  847 PG/ML


(0-100) 


  


 


 


Prothrombin Time


  


  


  11.4 SEC


(9.8-11.6) 


 


 


Prothromb Time International


Ratio 


  


  1.1 RATIO 


  


 


 


Activated Partial


Thromboplast Time 


  


  24.1 SEC


(24.3-30.1) 


 








Result Diagram:  


5/28/18 1425                                                                   

             5/28/18 0405





Microbiology





Microbiology








 Date/Time


Source Procedure


Growth Status


 


 


 5/27/18 05:10


Blood Peripheral Aerobic Blood Culture - Preliminary


Gram Positive Cocci Resulted


 


 5/27/18 05:10


Blood Peripheral Anaerobic Blood Culture - Preliminary


NO GROWTH IN 1 DAY Resulted





 5/26/18 12:40


Blood Peripheral Aerobic Blood Culture - Preliminary


Gram Positive Cocci Resulted


 


 5/26/18 12:40


Blood Peripheral Anaerobic Blood Culture - Preliminary


NO GROWTH IN 2 DAYS Resulted





 5/26/18 12:20


Blood Peripheral Aerobic Blood Culture - Preliminary


Gram Positive Cocci Resulted


 


 5/26/18 12:20


Blood Peripheral Anaerobic Blood Culture - Preliminary


NO GROWTH IN 2 DAYS Resulted





 5/25/18 16:33


Blood Peripheral Aerobic Blood Culture - Final


Staphylococcus Aureus Resulted


 


 5/25/18 16:33


Blood Peripheral Anaerobic Blood Culture - Preliminary


NO GROWTH IN 3 DAYS Resulted





 5/25/18 16:15


Blood Peripheral Aerobic Blood Culture - Final


Staphylococcus Aureus Resulted


 


 5/25/18 16:15


Blood Peripheral Anaerobic Blood Culture - Preliminary


NO GROWTH IN 3 DAYS Resulted








Imaging





Last Impressions








Aorta w/Runoff CTA 5/28/18 0000 Signed





Impressions: 





 CONCLUSION:





 1.  Atherosclerotic changes without significant stenosis bilateral lower extrem





 ities.





  





 


 


Chest X-Ray 5/24/18 0955 Signed





Impressions: 





 CONCLUSION: 





 1.  No acute abnormality or significant interval change.





  





 


 


Toe X-Ray 5/24/18 0000 Signed





Impressions: 





 CONCLUSION: 





 Questionable small erosion distal phalanx right second toe. This could be infec





 tious in nature if there is clinical evidence for soft tissue infection.





  





 





  





  





 





  





 





  





 


 


Renal Ultrasound 5/24/18 0000 Signed





Impressions: 





 CONCLUSION: 





 1.  No hydronephrosis.





 2.  Increased echogenicity most characteristic of medical renal disease.





  





 


 


Foot MRI 5/24/18 0000 Signed





Impressions: 





 CONCLUSION:





 1.  No evidence for osteomyelitis. Soft tissue swelling at the second toe.





  





 











Assessment and Plan


Disease Oriented Problem List:  


(1) Multi-vessel coronary artery stenosis


(2) Pulmonary HTN


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


(4) Cardiomyopathy


(5) Acute renal failure


(6) Diabetes mellitus


(7) Hypertension


(8) Hyperlipidemia


Symptom Scale:  


(1) Pain, generalized


(2) Dyspnea and respiratory abnormalities


(3) Vomiting


Pertinent Non-Medical Issues


Psychosocial:He was born in [place] and moved to [place] before he was 8 

years old.  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 

living.  He has never been  and has no children.  He has a significant 

other whom he has made a healthcare surrogate.  He was in the  service 

but did not serve during more time.


Spiritual: He is Bahai and would accept  visits.


Legal: Healthcare surrogate paperwork has been completed naming his significant 

other as his primary and his sister is secondary.


Ethical issues impacting care: None noted.


.


Important Contacts


Significant other wife: Giovanna Sobia


Brother: John Ornelas [phone]


Sister: Shy Win [phone], cell [phone]


.


Prognosis


 His prognosis is poor. He has a severely reduced ejection fraction of 20-25% 

with ischemic cardiomyopathy not a candidate for revascularization.  He was 

referred to a tertiary center for transplant however does not have the 

insurance funding to do that.  He is on maximal medical therapy and his 

symptoms remain uncontrolled.  He has had for admissions to [facility] so far this 

year and is likely to continue to have frequent readmissions, complications and 

decline.


.


Code Status:  Full Code (By default)


Plan


PLAN:


   Legal decision maker: Patient is currently capacitated to make his own 

decisions, but in the event that he becomes incapacitated, he has completed a 

healthcare surrogate form naming his significant other Giovanna as the primary 

healthcare surrogate and his sister Shy, as the alternate.





   Goals: To be determined.





   CODE STATUS: FULL CODE, by default.





SYMPTOMS:


* Dyspnea: He complained of significant dyspnea at home with frequent coughing 

spells, severe enough to cause him to vomit.  His dyspnea is under better 

control on 2 L nasal cannula, DuoNeb treatments.  He is currently on optimal 

cardiac medications with improved dyspnea at rest.





* Pain: Multifactorial to include right second toe infection, ICD shocks and non

-STEMI on admission secondary to metabolic demands, bedbound status and 

invasive lines.  Norco 5/325 mg is available but used infrequently.  At this 

evaluation, his pain is under control.  He is pending amputation of 1-2 toes 

tomorrow and will likely have a higher pain level.











Palliative care will continue to follow the patient during hospital course as 

condition evolves, to assist patient/decision-maker with understanding of their 

medical conditions, weighing benefits/burdens of treatment options, for 

clarification of goals of treatment.  Additionally will assist with any 

symptoms of palliative concern.


.





Attestation


To help prompt me to consider important information that might be impacting 

today's encounter and assessment, information from prior notes written by 

myself or my colleagues may have been "brought forward" into today's note.  My 

signature on this note, however, is an attestation that I personally performed 

the exam, history, and/or decision-making noted today, and, unless otherwise 

indicated, the interactions with patient, family, and staff as well as the 

review of records all occurred today.  I also attest that the listed assessment 

and stated plan reflect my best clinical judgment today based on the 

combination of historical information, prior notes, and today's exam/ 

interactions.  When time spent is documented, it refers only to time spent 

today by the signer, or if indicated, combined time spent today by 

collaborating physician/nurse practitioner.


.











Janki Michele May 28, 2018 4:26 pm

## 2018-05-28 NOTE — HHI.IDPN
Subjective


Subjective


Remarks


 is a 58 y/o CM with PMHx of CHF, Ischemic Cardiomyopathy with last 

reported EF 20-25%, he has prior h/o CABG x 3, defibrillator and pacemaker, DM, 

HTN and hyperlipidemia. CT surgery evaluated and found him not to be a 

revascularization candidate and recommended referral to a tertiary center for 

transplant consideration.  As the patient has no  insurance this option has not 

been found feasible.  He underwent placement of a single-chamber St Trav ICD in 

March 2018 for sudden cardiac death prevention. 


With this background patient presents to the ED at [facility] with 4 day history 

of progressive dyspnea described as severe, worsening with exertion, and 

improved with rest. He has had associated symptoms of diaphoresis, nausea and 

vomiting over the weekend.  He additionally has noted painful changes in his 

right second toe with color changes suspicious for infection.  


In ED patient was noted to have WBC 9.6, hemoglobin 15.0, hematocrit 43.9, 

platelets 133, sodium 130, potassium 4.4, BUN 36, creatinine 1.84, random 

glucose 407 alkaline phosphatase 114, AST 37, ALT 25, total bilirubin 0.9, 

troponin 3.04, C-reactive protein 26.8, B natruretic peptide 3148, lactic acid 

5.5. Ax Xray of Rt toe showed erosion. MRI with no osteomyelitis. Chest x-ray 

showed no acute abnormality or significant interval change.


At baseline prior to this infectious process patient was able to work around 

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  He states that he is only able to be active 

for 5-10 minutes before becoming very dyspneic and exhausted.  He was 

previously an over the road  but has been unable to work since 

March of this year.  He is now attempting to get disability as he has a nearly 

homebound existence and requires assistance to get out to doctor's appointments.


Sepsis workup on admission positive for Staph bacteremia and ID consulted for 

evaluation and Mment of the same.





Notes reviewed


D/W RN


Temps ok


No rash


No diarrhea


All BC with MSSA persistent while on Ancef IV.


Podiatry and vascular following for gangrene of his R 2nd toe


Has cardiomyopathy


S/P ICD placement March 2018


Has not had any problem with his ICD until in hospital


Antibiotics





Current Medications








 Medications


  (Trade)  Dose


 Ordered  Sig/Ilia


 Route  Start Time


 Stop Time Status Last Admin


 


  (NS Flush)  2 ml  UNSCH  PRN


 IVF  5/24/18 10:00


    5/28/18 08:31


 


 


 Heparin Sodium/


 Dextrose  250 ml @ 


 10 mls/hr  TITRATE  PRN


 IV  5/24/18 11:45


   Future Hold 5/25/18 16:19


 


 


  (D50w (Vial) Inj)  50 ml  UNSCH  PRN


 IV PUSH  5/24/18 13:30


     


 


 


  (Glucagon Inj)  1 mg  UNSCH  PRN


 OTHER  5/24/18 13:30


     


 


 


  (NovoLOG


 SUPPLEMENTAL


 SCALE)  1  ACHS SLIDING  SCALE


 SQ  5/24/18 17:00


    5/28/18 11:51


 


 


  (Pravachol)  40 mg  DAILY


 PO  5/25/18 09:00


    5/28/18 08:31


 


 


  (Duoneb Neb)  1 ampule  Q4HR NEB  PRN


 NEB  5/24/18 13:45


    5/24/18 15:38


 


 


  (Tylenol)  650 mg  Q4H  PRN


 PO  5/24/18 14:30


    5/27/18 11:45


 


 


  (Norco  5-325 Mg)  1 tab  Q4H  PRN


 PO  5/24/18 14:30


    5/24/18 15:18


 


 


  (Reglan Inj)  5 mg  Q6H  PRN


 IV PUSH  5/25/18 02:30


    5/25/18 20:10


 


 


  (Zofran  Odt)  4 mg  Q6H  PRN


 PO  5/25/18 02:30


    5/28/18 11:50


 


 


  (Lopressor Inj)  5 mg  Q5M  PRN


 IV PUSH  5/25/18 02:45


    5/25/18 03:06


 


 


  (Levemir Inj)  10 units  HS


 SQ  5/25/18 21:00


    5/27/18 21:27


 


 


  (Tessalon)  100 mg  TID  PRN


 PO  5/26/18 03:00


    5/26/18 11:45


 


 


  (Robitussin Ac


 200-20 Mg/10 ml


 Liq)  10 ml  Q6H  PRN


 PO  5/26/18 03:00


     


 


 


  (Coreg)  6.25 mg  Q12HR


 PO  5/26/18 11:30


    5/28/18 08:31


 


 


  (Entresto 49-51


 Mg)  1 tab  BID


 PO  5/26/18 21:00


    5/28/18 08:31


 


 


 Cefazolin Sodium/


 Dextrose  50 ml @ 


 100 mls/hr  Q8H


 IV  5/27/18 09:00


    5/28/18 08:32


 


 


  (Aspirin Chew)  81 mg  DAILY


 CHEW  5/28/18 12:10


    5/28/18 12:10


 


 


 Pharmacy Profile


 Note  0 ml @ 0


 mls/hr  UNSCH


 OTHER  5/28/18 11:15


     


 


 


 Heparin Sodium/


 Dextrose  250 ml @ 


 16.128 mls/


 hr  TITRATE  PRN


 IV  5/28/18 12:30


    5/28/18 14:41


 


 


 Gentamicin


 Sulfate 90 mg/


 Sodium Chloride  102.25 ml


  @ 200 mls/


 hr  Q8H


 IV  5/28/18 14:00


     


 


 


  ([facility]


 Ordered Lab Info)  SPECIFIC


 LAB TO BE


 TYLOR...  ONCE  ONCE


 .XX  5/29/18 13:45


 5/29/18 13:46   


 


 


  ([facility]


 Ordered Lab Info)  SPECIFIC


 LAB TO BE


 TYLOR...  ONCE  ONCE


 .XX  5/29/18 15:30


 5/29/18 15:31   


 








Lines


PIV no evidence of infection


Past Medical History


Severe ischemic cardiomyopathy EF 20-25%


Hyperlipidemia


Diabetes


Coronary artery disease status post MI 3/13


COPD


Past Surgical History


CABG 10 years ago


Pacemaker/Defibrillator insertion


Appendectomy


Skin graft to right foot as a child


Back surgeries 3 has hardware in back per report.


Allergies:  


Coded Allergies:  


     potassium iodide (Unverified  Allergy, Severe, Swelling, 3/21/18)


     povidone-iodine (Unverified  Allergy, Severe, Swelling, 3/21/18)


     shellfish derived (Unverified  Allergy, Severe, 3/21/18)


     sodium iodide (Unverified  Allergy, Severe, Swelling, 3/21/18)


     sodium iodide (Unverified  Allergy, Severe, Swelling, 3/21/18)





Objective


.





Vital Signs








  Date Time  Temp Pulse Resp B/P (MAP) Pulse Ox O2 Delivery O2 Flow Rate FiO2


 


5/28/18 12:00  83      


 


5/28/18 12:00 98.4 82 16 119/72 (88) 100   


 


5/28/18 11:00  74      


 


5/28/18 10:00  84      


 


5/28/18 09:00  84      


 


5/28/18 08:00  76      


 


5/28/18 08:00 98.2 84 16 110/65 (80) 97   


 


5/28/18 07:00  74      


 


5/28/18 06:15  74      


 


5/28/18 05:45  85      


 


5/28/18 04:59  79 18 108/76 (87) 98   


 


5/28/18 04:04  83      


 


5/28/18 01:01  78      


 


5/28/18 00:35  74      


 


5/28/18 00:04  74 16 113/76 (88) 93   


 


5/28/18 00:00  74      


 


5/27/18 23:00  84      


 


5/27/18 22:00  80      


 


5/27/18 21:00  84      


 


5/27/18 20:54 97.4 87 16 134/81 (98) 98   


 


5/27/18 20:30  87      


 


5/27/18 20:00  84      


 


5/27/18 19:00  78      


 


5/27/18 18:00  78      


 


5/27/18 17:00  74      








.





Laboratory Tests








Test


  5/27/18


05:10 5/28/18


04:05 5/28/18


14:25


 


White Blood Count 11.0 TH/MM3  12.4 TH/MM3  12.1 TH/MM3 


 


Red Blood Count 5.00 MIL/MM3  5.20 MIL/MM3  5.36 MIL/MM3 


 


Hemoglobin 13.6 GM/DL  14.2 GM/DL  14.6 GM/DL 


 


Hematocrit 40.3 %  42.0 %  43.6 % 


 


Mean Corpuscular Volume 80.5 FL  80.7 FL  81.3 FL 


 


Mean Corpuscular Hemoglobin 27.2 PG  27.3 PG  27.3 PG 


 


Mean Corpuscular Hemoglobin


Concent 33.7 % 


  33.8 % 


  33.6 % 


 


 


Red Cell Distribution Width 17.4 %  17.0 %  17.5 % 


 


Platelet Count 64 TH/MM3  80 TH/MM3  102 TH/MM3 


 


Mean Platelet Volume 10.7 FL  10.2 FL  10.1 FL 








Laboratory Tests








Test


  5/27/18


05:10 5/28/18


04:05


 


Blood Urea Nitrogen 32 MG/DL  28 MG/DL 


 


Creatinine 0.82 MG/DL  0.77 MG/DL 


 


Random Glucose 180 MG/DL  232 MG/DL 


 


Calcium Level 8.2 MG/DL  8.9 MG/DL 


 


Magnesium Level 2.7 MG/DL  


 


Sodium Level 137 MEQ/L  138 MEQ/L 


 


Potassium Level 3.9 MEQ/L  4.1 MEQ/L 


 


Chloride Level 103 MEQ/L  103 MEQ/L 


 


Carbon Dioxide Level 25.5 MEQ/L  26.8 MEQ/L 


 


Anion Gap 9 MEQ/L  8 MEQ/L 


 


Estimat Glomerular Filtration


Rate 96 ML/MIN 


  103 ML/MIN 


 


 


Hemoglobin A1c 9.6 %  


 


B-Type Natriuretic Peptide 1210 PG/ML  847 PG/ML 








Microbiology








 Date/Time


Source Procedure


Growth Status


 


 


 5/27/18 05:10


Blood Peripheral Aerobic Blood Culture - Preliminary


Gram Positive Cocci Resulted


 


 5/27/18 05:10


Blood Peripheral Anaerobic Blood Culture - Preliminary


NO GROWTH IN 1 DAY Resulted





 5/26/18 12:40


Blood Peripheral Aerobic Blood Culture - Preliminary


Gram Positive Cocci Resulted


 


 5/26/18 12:40


Blood Peripheral Anaerobic Blood Culture - Preliminary


NO GROWTH IN 2 DAYS Resulted





 5/26/18 12:20


Blood Peripheral Aerobic Blood Culture - Preliminary


Gram Positive Cocci Resulted


 


 5/26/18 12:20


Blood Peripheral Anaerobic Blood Culture - Preliminary


NO GROWTH IN 2 DAYS Resulted





 5/25/18 16:33


Blood Peripheral Aerobic Blood Culture - Final


Staphylococcus Aureus Resulted


 


 5/25/18 16:33


Blood Peripheral Anaerobic Blood Culture - Preliminary


NO GROWTH IN 3 DAYS Resulted





 5/25/18 16:15


Blood Peripheral Aerobic Blood Culture - Final


Staphylococcus Aureus Resulted


 


 5/25/18 16:15


Blood Peripheral Anaerobic Blood Culture - Preliminary


NO GROWTH IN 3 DAYS Resulted








Imaging





Last Impressions








Chest X-Ray 5/24/18 0955 Signed





Impressions: 





 CONCLUSION: 





 1.  No acute abnormality or significant interval change.





  





 


 


Toe X-Ray 5/24/18 0000 Signed





Impressions: 





 CONCLUSION: 





 Questionable small erosion distal phalanx right second toe. This could be infec





 tious in nature if there is clinical evidence for soft tissue infection.





  





 





  





  





 





  





 





  





 


 


Renal Ultrasound 5/24/18 0000 Signed





Impressions: 





 CONCLUSION: 





 1.  No hydronephrosis.





 2.  Increased echogenicity most characteristic of medical renal disease.





  





 


 


Foot MRI 5/24/18 0000 Signed





Impressions: 





 CONCLUSION:





 1.  No evidence for osteomyelitis. Soft tissue swelling at the second toe.





  





 








Physical Exam


GENERAL:  Awake and alert, NAD


SKIN:  No rashes or lesions. Cool and dry.


HEAD: Atraumatic. Normocephalic. No temporal or scalp tenderness.


EYES: Pupils equal round and reactive. Extraocular motions intact. No scleral 

icterus. No injection or drainage. 


ENT: Nose without bleeding, purulent drainage or septal hematoma. Throat 

without erythema, tonsillar hypertrophy or exudate. Uvula midline. Airway 

patent.


NECK: Trachea midline. Supple, nontender, no meningeal signs.


CARDIOVASCULAR: HS audible.  ICD, has mild pink color on his L chest, not 

indurated,  seems swollen, not tender, has well healed incision


RESPIRATORY: Clear to auscultation. Breath sounds equal bilaterally. No wheezes

, rales, or rhonchi.  


GASTROINTESTINAL: Abdomen soft, non-tender, nondistended. 


MUSCULOSKELETAL:  Has wet gangrene Right 2nd toe  with erythema on dorsum of 

his foot. 


NEUROLOGICAL:   Grossly noon-focal.


Psych cooperative


IV line sites with no e.o infection.





Assessment & Plan


Remarks


High grade MSSA Sepsis (leucocytosis, tachycardia, lactic acidemia)


   -  source ?2nd toe,  concern with ICD


Right 2nd toe wet gangrene, osteomyelitis.


Hepatitis C antibody positive.


AICD status.


CAD with severe ischemic cardiomyopathy.


Thrombocytopenia likely due to severe infection, ?DIC





Recs


DC Zosyn IV


Continue IV Ancef


Add Genta IV for synergy pending BCX turning negative and source control.


dw  who dw : need for source control, concern for AICD 

infection due to persistent bacteremia.


Repeat BC to document clearing after surgery.


Echo negative. If persistent bacteremia after the surgery will need SHAYY to look 

for valve and AICD lead infection.


Follow C/S


Monitor progress


D/W patient and family in room: explained the plan. discussed the need for IV 

antibiotics for 6 weeks irrespective of bone pathology results.











Bev Brandon MD May 28, 2018 16:07

## 2018-05-28 NOTE — PD.POD
Subjective


Podiatric Problems


Gangrene right 2nd toe





Past Med/Surg/Social History


Social History


Smoking Status:  Former Smoker





Objective


Vital Signs





Vital Signs








  Date Time  Temp Pulse Resp B/P (MAP) Pulse Ox O2 Delivery O2 Flow Rate FiO2


 


5/28/18 18:00  78      


 


5/28/18 17:24   16     


 


5/28/18 17:00  82      


 


5/28/18 16:00 98.2 81 16 132/78 (96) 96   


 


5/28/18 16:00  83      


 


5/28/18 15:00  84      


 


5/28/18 13:00  76      


 


5/28/18 12:00  83      


 


5/28/18 12:00 98.4 82 16 119/72 (88) 100   


 


5/28/18 11:00  74      


 


5/28/18 10:00  84      


 


5/28/18 09:00  84      


 


5/28/18 08:00  76      


 


5/28/18 08:00 98.2 84 16 110/65 (80) 97   


 


5/28/18 07:00  74      


 


5/28/18 06:15  74      


 


5/28/18 05:45  85      


 


5/28/18 04:59  79 18 108/76 (87) 98   


 


5/28/18 04:04  83      


 


5/28/18 01:01  78      


 


5/28/18 00:35  74      


 


5/28/18 00:04  74 16 113/76 (88) 93   


 


5/28/18 00:00  74      


 


5/27/18 23:00  84      


 


5/27/18 22:00  80      


 


5/27/18 21:00  84      


 


5/27/18 20:54 97.4 87 16 134/81 (98) 98   


 


5/27/18 20:30  87      


 


5/27/18 20:00  84      


 


5/27/18 19:00  78      








Coded Allergies:  


     potassium iodide (Unverified  Allergy, Severe, Swelling, 3/21/18)


     povidone-iodine (Unverified  Allergy, Severe, Swelling, 3/21/18)


     shellfish derived (Unverified  Allergy, Severe, 3/21/18)


     sodium iodide (Unverified  Allergy, Severe, Swelling, 3/21/18)


     sodium iodide (Unverified  Allergy, Severe, Swelling, 3/21/18)


Objective Remarks


Right 2nd digit dark and mummified distally. Margins just distal to MTP joint 

level appear dry and stable. No drainage, no malodor. No sign of active 

infection present.





Assessment & Plan


A/P


Gangrene right 2nd toe


   To OR tomorrow for amputation right 2nd toe.


   NPO after breakfast


   Plan to do surgery under local anesthesia to reduce risk











Marc Mckeon DPM May 28, 2018 18:47

## 2018-05-28 NOTE — PD.CARD.PN
Subjective


Subjective Remarks


appears comfortable in nad





Objective


Medications





Current Medications








 Medications


  (Trade)  Dose


 Ordered  Sig/Ilia


 Route  Start Time


 Stop Time Status Last Admin


 


  (NS Flush)  2 ml  UNSCH  PRN


 IVF  5/24/18 10:00


    5/28/18 08:31


 


 


 Heparin Sodium/


 Dextrose  250 ml @ 


 10 mls/hr  TITRATE  PRN


 IV  5/24/18 11:45


   Future Hold 5/25/18 16:19


 


 


  (D50w (Vial) Inj)  50 ml  UNSCH  PRN


 IV PUSH  5/24/18 13:30


     


 


 


  (Glucagon Inj)  1 mg  UNSCH  PRN


 OTHER  5/24/18 13:30


     


 


 


  (NovoLOG


 SUPPLEMENTAL


 SCALE)  1  ACHS SLIDING  SCALE


 SQ  5/24/18 17:00


    5/28/18 16:26


 


 


  (Pravachol)  40 mg  DAILY


 PO  5/25/18 09:00


    5/28/18 08:31


 


 


  (Duoneb Neb)  1 ampule  Q4HR NEB  PRN


 NEB  5/24/18 13:45


    5/24/18 15:38


 


 


  (Tylenol)  650 mg  Q4H  PRN


 PO  5/24/18 14:30


    5/27/18 11:45


 


 


  (Norco  5-325 Mg)  1 tab  Q4H  PRN


 PO  5/24/18 14:30


    5/28/18 16:28


 


 


  (Reglan Inj)  5 mg  Q6H  PRN


 IV PUSH  5/25/18 02:30


    5/25/18 20:10


 


 


  (Zofran  Odt)  4 mg  Q6H  PRN


 PO  5/25/18 02:30


    5/28/18 11:50


 


 


  (Lopressor Inj)  5 mg  Q5M  PRN


 IV PUSH  5/25/18 02:45


    5/25/18 03:06


 


 


  (Levemir Inj)  10 units  HS


 SQ  5/25/18 21:00


    5/27/18 21:27


 


 


  (Tessalon)  100 mg  TID  PRN


 PO  5/26/18 03:00


    5/26/18 11:45


 


 


  (Robitussin Ac


 200-20 Mg/10 ml


 Liq)  10 ml  Q6H  PRN


 PO  5/26/18 03:00


     


 


 


  (Coreg)  6.25 mg  Q12HR


 PO  5/26/18 11:30


    5/28/18 08:31


 


 


  (Entresto 49-51


 Mg)  1 tab  BID


 PO  5/26/18 21:00


    5/28/18 08:31


 


 


 Cefazolin Sodium/


 Dextrose  50 ml @ 


 100 mls/hr  Q8H


 IV  5/27/18 09:00


    5/28/18 16:26


 


 


  (Aspirin Chew)  81 mg  DAILY


 CHEW  5/28/18 12:10


    5/28/18 12:10


 


 


 Pharmacy Profile


 Note  0 ml @ 0


 mls/hr  UNSCH


 OTHER  5/28/18 11:15


     


 


 


 Heparin Sodium/


 Dextrose  250 ml @ 


 16.128 mls/


 hr  TITRATE  PRN


 IV  5/28/18 12:30


    5/28/18 14:41


 


 


 Gentamicin


 Sulfate 90 mg/


 Sodium Chloride  102.25 ml


  @ 200 mls/


 hr  Q8H


 IV  5/28/18 14:00


    5/28/18 14:00


 


 


  ([facility]


 Ordered Lab Info)  SPECIFIC


 LAB TO BE


 TYLOR...  ONCE  ONCE


 .XX  5/29/18 13:45


 5/29/18 13:46   


 


 


  ([facility]


 Ordered Lab Info)  SPECIFIC


 LAB TO BE


 TYLOR...  ONCE  ONCE


 .XX  5/29/18 15:30


 5/29/18 15:31   


 








Vital Signs / I&O





Vital Signs








  Date Time  Temp Pulse Resp B/P (MAP) Pulse Ox O2 Delivery O2 Flow Rate FiO2


 


5/28/18 18:00  78      


 


5/28/18 17:24   16     


 


5/28/18 17:00  82      


 


5/28/18 16:00 98.2 81 16 132/78 (96) 96   


 


5/28/18 16:00  83      


 


5/28/18 15:00  84      


 


5/28/18 13:00  76      


 


5/28/18 12:00  83      


 


5/28/18 12:00 98.4 82 16 119/72 (88) 100   


 


5/28/18 11:00  74      


 


5/28/18 10:00  84      


 


5/28/18 09:00  84      


 


5/28/18 08:00  76      


 


5/28/18 08:00 98.2 84 16 110/65 (80) 97   


 


5/28/18 07:00  74      


 


5/28/18 06:15  74      


 


5/28/18 05:45  85      


 


5/28/18 04:59  79 18 108/76 (87) 98   


 


5/28/18 04:04  83      


 


5/28/18 01:01  78      


 


5/28/18 00:35  74      


 


5/28/18 00:04  74 16 113/76 (88) 93   


 


5/28/18 00:00  74      


 


5/27/18 23:00  84      


 


5/27/18 22:00  80      


 


5/27/18 21:00  84      


 


5/27/18 20:54 97.4 87 16 134/81 (98) 98   


 


5/27/18 20:30  87      


 


5/27/18 20:00  84      














I/O      


 


 5/27/18 5/27/18 5/27/18 5/28/18 5/28/18 5/28/18





 07:00 15:00 23:00 07:00 15:00 23:00


 


Intake Total 525 ml 200 ml 1290 ml 240 ml  1040 ml


 


Output Total 600 ml  1025 ml 500 ml  950 ml


 


Balance -75 ml 200 ml 265 ml -260 ml  90 ml


 


      


 


Intake Oral 240 ml  1290 ml 240 ml  620 ml


 


IV Total 285 ml 200 ml    420 ml


 


Output Urine Total 600 ml  1025 ml 500 ml  950 ml


 


# Bowel Movements   1   1








Physical Exam


GENERAL: 


SKIN: Warm and dry.


HEAD: Normocephalic.


EYES: No scleral icterus. No injection or drainage. 


NECK: Supple, trachea midline. No JVD or lymphadenopathy.


CARDIOVASCULAR: Regular rate and rhythm without murmurs, gallops, or rubs. 


RESPIRATORY: Breath sounds equal bilaterally. No accessory muscle use.


GASTROINTESTINAL: Abdomen soft, non-tender, nondistended. 


MUSCULOSKELETAL: No cyanosis, or edema. 


BACK: Nontender without obvious deformity. No CVA tenderness.





Laboratory





Laboratory Tests








Test


  5/28/18


04:05 5/28/18


14:25


 


White Blood Count 12.4 TH/MM3  12.1 TH/MM3 


 


Red Blood Count 5.20 MIL/MM3  5.36 MIL/MM3 


 


Hemoglobin 14.2 GM/DL  14.6 GM/DL 


 


Hematocrit 42.0 %  43.6 % 


 


Mean Corpuscular Volume 80.7 FL  81.3 FL 


 


Mean Corpuscular Hemoglobin 27.3 PG  27.3 PG 


 


Mean Corpuscular Hemoglobin


Concent 33.8 % 


  33.6 % 


 


 


Red Cell Distribution Width 17.0 %  17.5 % 


 


Platelet Count 80 TH/MM3  102 TH/MM3 


 


Mean Platelet Volume 10.2 FL  10.1 FL 


 


Blood Urea Nitrogen 28 MG/DL  


 


Creatinine 0.77 MG/DL  


 


Random Glucose 232 MG/DL  


 


Calcium Level 8.9 MG/DL  


 


Sodium Level 138 MEQ/L  


 


Potassium Level 4.1 MEQ/L  


 


Chloride Level 103 MEQ/L  


 


Carbon Dioxide Level 26.8 MEQ/L  


 


Anion Gap 8 MEQ/L  


 


Estimat Glomerular Filtration


Rate 103 ML/MIN 


  


 


 


B-Type Natriuretic Peptide 847 PG/ML  


 


Prothrombin Time  11.4 SEC 


 


Prothromb Time International


Ratio 


  1.1 RATIO 


 


 


Activated Partial


Thromboplast Time 


  24.1 SEC 


 








Imaging





Last 24 hours Impressions








Aorta w/Runoff CTA 5/28/18 0000 Signed





Impressions: 





 CONCLUSION:





 1.  Atherosclerotic changes without significant stenosis bilateral lower extrem





 ities.





  





 











Assessment and Plan


Problem List:  


(1) ARF (acute renal failure)


ICD Codes:  N17.9 - Acute kidney failure, unspecified


(2) NSTEMI (non-ST elevated myocardial infarction)


ICD Codes:  I21.4 - Non-ST elevation (NSTEMI) myocardial infarction


(3) CAD (coronary artery disease)


ICD Codes:  I25.10 - Atherosclerotic heart disease of native coronary artery 

without angina pectoris


(4) Decreased cardiac ejection fraction


ICD Codes:  R93.1 - Abnormal findings on diagnostic imaging of heart and 

coronary circulation


(5) Elevated troponin


ICD Codes:  R74.8 - Abnormal levels of other serum enzymes


(6) Severe sepsis


ICD Codes:  A41.9 - Sepsis, unspecified organism; R65.20 - Severe sepsis 

without septic shock


(7) Diabetes mellitus


ICD Codes:  E11.9 - Type 2 diabetes mellitus without complications


(8) Acute renal failure


ICD Codes:  N17.9 - Acute kidney failure, unspecified


(9) Cardiomyopathy


ICD Codes:  I42.9 - Cardiomyopathy, unspecified


(10) gangrene of the right second toe


(11) Dyspnea and respiratory abnormalities


ICD Codes:  R06.00 - Dyspnea, unspecified; R06.89 - Other abnormalities of 

breathing


(12) Multi-vessel coronary artery stenosis


ICD Codes:  I25.10 - Atherosclerotic heart disease of native coronary artery 

without angina pectoris


(13) Hx of CABG


ICD Codes:  Z95.1 - Presence of aortocoronary bypass graft


(14) Thrombocytopenia


ICD Codes:  D69.6 - Thrombocytopenia, unspecified


Assessment and Plan


1.) Ischemic cardiomyopathy - nstemi, chf excacerbation and arf due to severe 

unrevascularizable cad, cardiomyopathy and high metabolic demand from sepsis, 

off iv heparin and aspirin due to thrombocytopenia, hematology consult placed, 

on antibiotics, noncardiac procedure is high risk due to multiple unstable 

comorbidities and nstemi


2.) VT - replete electrolytes, on coreg; d/w case management further attempts 

to insure and obtain eligibility for referral to heart transplant center











Abdiel Purvis MD May 28, 2018 19:44

## 2018-05-28 NOTE — HHI.PR
Subjective


Remarks


awake and alert, no fever or chills


denies any chest discomfort, shortness of breath, no diarrhea- stools formed














for CTA run off study-  staff still investigating -  his iodine allergy





Objective


Vitals





Vital Signs








  Date Time  Temp Pulse Resp B/P (MAP) Pulse Ox O2 Delivery O2 Flow Rate FiO2


 


5/28/18 08:00  76      


 


5/28/18 08:00 98.2 84 16 110/65 (80) 97   


 


5/28/18 06:15  74      


 


5/28/18 05:45  85      


 


5/28/18 04:59  79 18 108/76 (87) 98   


 


5/28/18 04:04  83      


 


5/28/18 01:01  78      


 


5/28/18 00:35  74      


 


5/28/18 00:04  74 16 113/76 (88) 93   


 


5/28/18 00:00  74      


 


5/27/18 23:00  84      


 


5/27/18 22:00  80      


 


5/27/18 21:00  84      


 


5/27/18 20:54 97.4 87 16 134/81 (98) 98   


 


5/27/18 20:30  87      


 


5/27/18 20:00  84      


 


5/27/18 19:00  78      


 


5/27/18 18:00  78      


 


5/27/18 17:00  74      


 


5/27/18 16:00  74      


 


5/27/18 15:26 97.9 71 18 104/65 (78) 97   


 


5/27/18 15:00  74      


 


5/27/18 14:00  70      


 


5/27/18 13:00  78      


 


5/27/18 12:00  74      


 


5/27/18 11:21 97.8 78 18 107/69 (82) 97   


 


5/27/18 11:00  78      














I/O      


 


 5/27/18 5/27/18 5/27/18 5/28/18 5/28/18 5/28/18





 07:00 15:00 23:00 07:00 15:00 23:00


 


Intake Total 525 ml 200 ml 1290 ml 240 ml  


 


Output Total 600 ml  1025 ml 500 ml  


 


Balance -75 ml 200 ml 265 ml -260 ml  


 


      


 


Intake Oral 240 ml  1290 ml 240 ml  


 


IV Total 285 ml 200 ml    


 


Output Urine Total 600 ml  1025 ml 500 ml  


 


# Bowel Movements   1   








Result Diagram:  


5/28/18 0405                                                                   

             5/28/18 0405





Imaging





Last Impressions








Chest X-Ray 5/24/18 0955 Signed





Impressions: 





 CONCLUSION: 





 1.  No acute abnormality or significant interval change.





  





 


 


Toe X-Ray 5/24/18 0000 Signed





Impressions: 





 CONCLUSION: 





 Questionable small erosion distal phalanx right second toe. This could be infec





 tious in nature if there is clinical evidence for soft tissue infection.





  





 





  





  





 





  





 





  





 


 


Renal Ultrasound 5/24/18 0000 Signed





Impressions: 





 CONCLUSION: 





 1.  No hydronephrosis.





 2.  Increased echogenicity most characteristic of medical renal disease.





  





 


 


Foot MRI 5/24/18 0000 Signed





Impressions: 





 CONCLUSION:





 1.  No evidence for osteomyelitis. Soft tissue swelling at the second toe.





  





 








Objective Remarks


awake and alert, no distress


anicteric


no nuchal rigidity


lungs- no rales


regular rhythm


abdomen soft, nontender


LE- no edema, right foot with dry gangrenous 2nd toe, erytehma improving





A/P


Problem List:  


(1) Severe sepsis


ICD Code:  A41.9 - Sepsis, unspecified organism; R65.20 - Severe sepsis without 

septic shock


(2) Elevated troponin


ICD Code:  R74.8 - Abnormal levels of other serum enzymes


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


Assessment and Plan


A/P  





Severe sepsis due to gangrene/ infection of the right second toe 


Staph aureus bacteremia-- persistent bacteremia- 


check echo- may need SHAYY


ID ff  - on zosyn and  Ancef


Repeat BC to document clearing in next few days 


May need SHAYY


Podiatry following


Vascular surgery ff- requested for CTA runoff





Ischemic cardiomyopathy EF 25% - nstemi, 


[facility] fired evening 5.25


NSTEMI with history of CAD- had cardiac cath two months ago with severe three 

vessel disease- was evaluated by CT surgery at the time with poor targets for 

redo sternotomy


high metabolic demand from sepsis


- clinically feeling better 


- continue electrolyte monitoring


- ASA, Heparin drip- DC due to thrombocytopenia- Hematology consutled for 

recommendation


- continue  Entresto, Coreg


- will start ASA





Acute kidney injury; improved- started on gentle IV hydration in light of 

cardiomyopathy- monitor renal function





-thrombocytopenia- likely due to sepsis


-  continue ASA


- FF CBC


-seen by Dr. Skelton. HIT ab negative- restart heparin drip- will d/w Dr. Skelton


- will also DC Pepcid- due to thrombocytopenia





Diabetes mellitus- sliding scale 





-COPD with no exacerbation; neb treatment as needed.


-diabetes mellitus; uncontrolled- hold oral hypoglycemics- start on accu-check 

with SSI. continue Levemir and adjust 





-DVT prophylaxis;encoruage increase activity





palliative care  ff


CM ff - assisting with transfer to a tertiary center for transplant-- no 

insurance, assisting us with Medicaid application











Marcy Rosales MD May 28, 2018 10:40

## 2018-05-28 NOTE — RADRPT
EXAM DATE:  5/28/2018 2:21 PM EDT

AGE/SEX:        57 years / Male



INDICATIONS:  Peripheral vascular disease.  Gangrenous right middle toe.



CLINICAL DATA:  This is the patient's initial encounter. Patient reports that signs and symptoms have
 been present for 1 week and indicates a pain score of 5/10. 

                                                                          

MEDICAL/SURGICAL HISTORY:   Peripheral vascular disease.  Cardiovascular disease.  Diabetes. Appendec
roxana.



RADIATION DOSE:  10.34 CTDI (mGy)









COMPARISON: .



TECHNIQUE:  Volumetric scanning was performed using a multi-row detector CT scanner during bolus infu
kahlil of 100 ml Omnipaque 350 (iohexol)  nonionic water-soluble contrast as a single exam dose.   The 
data was post processed with a variety of visualization algorithms including full volume maximum inte
nsity projection, multi-planar sliding thin slab reformation, curved planar reformation, and surface 
rendering techniques.  Using automated exposure control and adjustment of the mA and/or kV according 
to patient size, radiation dose was kept as low as reasonably achievable to obtain optimal diagnostic
 quality images.



FINDINGS:  

Abdominal Aorta:  Extensive atherosclerotic changes without aneurysmal dilation.  The proximal celiac
 and superior mesenteric arteries are patent and normal in diameter.  There are solitary renal arteri
es bilaterally with atherosclerotic changes but no critical stenosis. 

Bifurcation:  Atherosclerotic changes without aneurysm.

Right Pelvis:  The right common iliac, internal iliac, and external iliac vessels are patent without 
significant stenosis. Atherosclerotic changes are seen. 

Left Pelvis:  The left common iliac, internal iliac, and external iliac vessels are patent and withou
t significant stenosis. Atherosclerotic changes are seen. 

Right Thigh:  The superficial femoral and profunda vessels demonstrates atherosclerotic changes witho
ut significant stenosis.

Left Thigh:  The superficial femoral and profunda vessels demonstrates atherosclerotic changes withou
t significant stenosis.

Right Knee:  The distal femoral and popliteal arteries demonstrates atherosclerotic changes without s
ignificant stenosis.

Left Knee:  The distal femoral and popliteal arteries demonstrates atherosclerotic changes without si
gnificant stenosis.

Right Leg:  The trifurcation is intact.

Left Leg:  The trifurcation is intact.



CONCLUSION:

1.  Atherosclerotic changes without significant stenosis bilateral lower extremities.



Electronically signed by: Leonardo Henderson MD  5/28/2018 3:12 PM EDT

## 2018-05-28 NOTE — PD.CAR.PN
CVT Progress Note


Subjective/Hospital Course:


Patient seen full consult dictated


Shraddha HAYDEN


5/28/2018


This patient clearly has systemic vascular changes consistent with diabetes 

with decreased pulses in both legs below the level of the 


knee and consistent with a small vessel disease below the level of the ankles.  

The gangrene of the first 2 toes was probably due to trauma 


and swelling that patient did not even notice and then superimposed occlusion 

of the small vessels.   In a few patients, this will occur 


also from distal embolization, but that is unlikely because the patient is on 

anticoagulants.  I do not believe that his proximal 


inflow is compromised, but I do believe he probably has significant disease 

below the level of the knee, especially close to the ankles.  


At this point, it is a valid point to do a CT angiogram with runoff and see 

what this looks like.  It should be noted that a patient with 


this degree of ejection fraction and coronary impairment is not a candidate for 

any major surgery more than absolutely necessary, but he


may be okay for some balloon angioplasty distally.  At this point, we will do 

CTA with runoff and see which way it goes.


Objective:





Vital Signs








  Date Time  Temp Pulse Resp B/P (MAP) Pulse Ox O2 Delivery O2 Flow Rate FiO2


 


5/28/18 08:00  76      


 


5/28/18 08:00 98.2 84 16 110/65 (80) 97   


 


5/28/18 06:15  74      


 


5/28/18 05:45  85      


 


5/28/18 04:59  79 18 108/76 (87) 98   


 


5/28/18 04:04  83      


 


5/28/18 01:01  78      


 


5/28/18 00:35  74      


 


5/28/18 00:04  74 16 113/76 (88) 93   


 


5/28/18 00:00  74      


 


5/27/18 23:00  84      


 


5/27/18 22:00  80      


 


5/27/18 21:00  84      


 


5/27/18 20:54 97.4 87 16 134/81 (98) 98   


 


5/27/18 20:30  87      


 


5/27/18 20:00  84      


 


5/27/18 19:00  78      


 


5/27/18 18:00  78      


 


5/27/18 17:00  74      


 


5/27/18 16:00  74      


 


5/27/18 15:26 97.9 71 18 104/65 (78) 97   


 


5/27/18 15:00  74      


 


5/27/18 14:00  70      


 


5/27/18 13:00  78      


 


5/27/18 12:00  74      


 


5/27/18 11:21 97.8 78 18 107/69 (82) 97   


 


5/27/18 11:00  78      








Labs:





Laboratory Tests








Test


  5/28/18


04:05


 


White Blood Count


  12.4 TH/MM3


(4.0-11.0)


 


Red Blood Count


  5.20 MIL/MM3


(4.50-5.90)


 


Hemoglobin


  14.2 GM/DL


(13.0-17.0)


 


Hematocrit


  42.0 %


(39.0-51.0)


 


Mean Corpuscular Volume


  80.7 FL


(80.0-100.0)


 


Mean Corpuscular Hemoglobin


  27.3 PG


(27.0-34.0)


 


Mean Corpuscular Hemoglobin


Concent 33.8 %


(32.0-36.0)


 


Red Cell Distribution Width


  17.0 %


(11.6-17.2)


 


Platelet Count


  80 TH/MM3


(150-450)


 


Mean Platelet Volume


  10.2 FL


(7.0-11.0)


 


Blood Urea Nitrogen


  28 MG/DL


(7-18)


 


Creatinine


  0.77 MG/DL


(0.60-1.30)


 


Random Glucose


  232 MG/DL


()


 


Calcium Level


  8.9 MG/DL


(8.5-10.1)


 


Sodium Level


  138 MEQ/L


(136-145)


 


Potassium Level


  4.1 MEQ/L


(3.5-5.1)


 


Chloride Level


  103 MEQ/L


()


 


Carbon Dioxide Level


  26.8 MEQ/L


(21.0-32.0)


 


Anion Gap 8 MEQ/L (5-15) 


 


Estimat Glomerular Filtration


Rate 103 ML/MIN


(>89)


 


B-Type Natriuretic Peptide


  847 PG/ML


(0-100)








Result Diagram:  


5/28/18 0405                                                                   

             5/28/18 0405








(1) ARF (acute renal failure)


(2) NSTEMI (non-ST elevated myocardial infarction)


(3) CAD (coronary artery disease)


(4) Decreased cardiac ejection fraction


(5) Elevated troponin


(6) Severe sepsis


(7) Diabetes mellitus


(8) Acute renal failure


(9) Cardiomyopathy


(10) gangrene of the right second toe


(11) Dyspnea and respiratory abnormalities


(12) Multi-vessel coronary artery stenosis


(13) Hx of CABG


(14) Thrombocytopenia











Jazarevic,Slobodan MD May 28, 2018 10:09

## 2018-05-29 VITALS — HEART RATE: 68 BPM

## 2018-05-29 VITALS — HEART RATE: 74 BPM

## 2018-05-29 VITALS — HEART RATE: 82 BPM

## 2018-05-29 VITALS — HEART RATE: 70 BPM

## 2018-05-29 VITALS
TEMPERATURE: 98.1 F | OXYGEN SATURATION: 95 % | SYSTOLIC BLOOD PRESSURE: 124 MMHG | HEART RATE: 68 BPM | DIASTOLIC BLOOD PRESSURE: 82 MMHG | RESPIRATION RATE: 20 BRPM

## 2018-05-29 VITALS
HEART RATE: 74 BPM | OXYGEN SATURATION: 98 % | RESPIRATION RATE: 16 BRPM | SYSTOLIC BLOOD PRESSURE: 106 MMHG | TEMPERATURE: 98 F | DIASTOLIC BLOOD PRESSURE: 60 MMHG

## 2018-05-29 VITALS — OXYGEN SATURATION: 92 %

## 2018-05-29 VITALS — SYSTOLIC BLOOD PRESSURE: 102 MMHG | DIASTOLIC BLOOD PRESSURE: 64 MMHG

## 2018-05-29 VITALS
RESPIRATION RATE: 16 BRPM | TEMPERATURE: 98.3 F | OXYGEN SATURATION: 97 % | DIASTOLIC BLOOD PRESSURE: 75 MMHG | SYSTOLIC BLOOD PRESSURE: 113 MMHG | HEART RATE: 70 BPM

## 2018-05-29 VITALS
HEART RATE: 75 BPM | DIASTOLIC BLOOD PRESSURE: 56 MMHG | SYSTOLIC BLOOD PRESSURE: 82 MMHG | TEMPERATURE: 98.2 F | RESPIRATION RATE: 16 BRPM

## 2018-05-29 VITALS — HEART RATE: 78 BPM

## 2018-05-29 VITALS
HEART RATE: 70 BPM | OXYGEN SATURATION: 97 % | DIASTOLIC BLOOD PRESSURE: 53 MMHG | SYSTOLIC BLOOD PRESSURE: 99 MMHG | RESPIRATION RATE: 16 BRPM | TEMPERATURE: 98.3 F

## 2018-05-29 VITALS — HEART RATE: 76 BPM

## 2018-05-29 VITALS — HEART RATE: 72 BPM

## 2018-05-29 VITALS — HEART RATE: 80 BPM

## 2018-05-29 VITALS — HEART RATE: 75 BPM

## 2018-05-29 VITALS — OXYGEN SATURATION: 95 %

## 2018-05-29 LAB
BASOPHILS # BLD AUTO: 0.2 TH/MM3 (ref 0–0.2)
BASOPHILS NFR BLD: 1.3 % (ref 0–2)
CREAT SERPL-MCNC: 0.73 MG/DL (ref 0.6–1.3)
EOSINOPHIL # BLD: 0.2 TH/MM3 (ref 0–0.4)
EOSINOPHIL NFR BLD: 1.7 % (ref 0–4)
ERYTHROCYTE [DISTWIDTH] IN BLOOD BY AUTOMATED COUNT: 17.1 % (ref 11.6–17.2)
GFR SERPLBLD BASED ON 1.73 SQ M-ARVRAT: 110 ML/MIN (ref 89–?)
HCT VFR BLD CALC: 39.5 % (ref 39–51)
HGB BLD-MCNC: 13.4 GM/DL (ref 13–17)
LYMPHOCYTES # BLD AUTO: 2.2 TH/MM3 (ref 1–4.8)
LYMPHOCYTES NFR BLD AUTO: 19.1 % (ref 9–44)
MCH RBC QN AUTO: 27.1 PG (ref 27–34)
MCHC RBC AUTO-ENTMCNC: 34 % (ref 32–36)
MCV RBC AUTO: 79.8 FL (ref 80–100)
MONOCYTE #: 1.5 TH/MM3 (ref 0–0.9)
MONOCYTES NFR BLD: 13.3 % (ref 0–8)
NEUTROPHILS # BLD AUTO: 7.3 TH/MM3 (ref 1.8–7.7)
NEUTROPHILS NFR BLD AUTO: 64.6 % (ref 16–70)
PLATELET # BLD: 98 TH/MM3 (ref 150–450)
PMV BLD AUTO: 9.4 FL (ref 7–11)
RBC # BLD AUTO: 4.95 MIL/MM3 (ref 4.5–5.9)
WBC # BLD AUTO: 11.3 TH/MM3 (ref 4–11)

## 2018-05-29 PROCEDURE — 0Y6R0Z0 DETACHMENT AT RIGHT 2ND TOE, COMPLETE, OPEN APPROACH: ICD-10-PCS

## 2018-05-29 RX ADMIN — INSULIN ASPART SCH: 100 INJECTION, SOLUTION INTRAVENOUS; SUBCUTANEOUS at 20:09

## 2018-05-29 RX ADMIN — CEFAZOLIN SODIUM SCH MLS/HR: 2 SOLUTION INTRAVENOUS at 01:25

## 2018-05-29 RX ADMIN — CLINDAMYCIN PHOSPHATE SCH MLS/HR: 150 INJECTION, SOLUTION INTRAMUSCULAR; INTRAVENOUS at 05:26

## 2018-05-29 RX ADMIN — SACUBITRIL AND VALSARTAN SCH TAB: 49; 51 TABLET, FILM COATED ORAL at 19:55

## 2018-05-29 RX ADMIN — SACUBITRIL AND VALSARTAN SCH TAB: 49; 51 TABLET, FILM COATED ORAL at 09:17

## 2018-05-29 RX ADMIN — CLINDAMYCIN PHOSPHATE SCH MLS/HR: 150 INJECTION, SOLUTION INTRAMUSCULAR; INTRAVENOUS at 22:30

## 2018-05-29 RX ADMIN — HEPARIN SODIUM PRN MLS/HR: 10000 INJECTION, SOLUTION INTRAVENOUS at 03:26

## 2018-05-29 RX ADMIN — ASPIRIN 81 MG SCH MG: 81 TABLET ORAL at 09:17

## 2018-05-29 RX ADMIN — HEPARIN SODIUM PRN MLS/HR: 10000 INJECTION, SOLUTION INTRAVENOUS at 18:54

## 2018-05-29 RX ADMIN — PHENYTOIN SODIUM SCH MLS/HR: 50 INJECTION INTRAMUSCULAR; INTRAVENOUS at 20:09

## 2018-05-29 RX ADMIN — PRAVASTATIN SODIUM SCH MG: 40 TABLET ORAL at 09:18

## 2018-05-29 RX ADMIN — CEFAZOLIN SODIUM SCH MLS/HR: 2 SOLUTION INTRAVENOUS at 17:30

## 2018-05-29 RX ADMIN — ACYCLOVIR SCH UNITS: 800 TABLET ORAL at 20:00

## 2018-05-29 RX ADMIN — CARVEDILOL SCH MG: 6.25 TABLET, FILM COATED ORAL at 19:55

## 2018-05-29 RX ADMIN — CEFAZOLIN SODIUM SCH MLS/HR: 2 SOLUTION INTRAVENOUS at 09:18

## 2018-05-29 RX ADMIN — INSULIN ASPART SCH: 100 INJECTION, SOLUTION INTRAVENOUS; SUBCUTANEOUS at 17:30

## 2018-05-29 RX ADMIN — CLINDAMYCIN PHOSPHATE SCH MLS/HR: 150 INJECTION, SOLUTION INTRAMUSCULAR; INTRAVENOUS at 14:51

## 2018-05-29 RX ADMIN — CARVEDILOL SCH MG: 6.25 TABLET, FILM COATED ORAL at 09:17

## 2018-05-29 RX ADMIN — INSULIN ASPART SCH: 100 INJECTION, SOLUTION INTRAVENOUS; SUBCUTANEOUS at 12:00

## 2018-05-29 RX ADMIN — PHENYTOIN SODIUM SCH MLS/HR: 50 INJECTION INTRAMUSCULAR; INTRAVENOUS at 12:51

## 2018-05-29 RX ADMIN — INSULIN ASPART SCH: 100 INJECTION, SOLUTION INTRAVENOUS; SUBCUTANEOUS at 09:17

## 2018-05-29 RX ADMIN — BENZONATATE PRN MG: 100 CAPSULE ORAL at 02:00

## 2018-05-29 NOTE — PD.CAR.PN
CVT Progress Note


Subjective/Hospital Course:


Patient seen full consult dictated


Thanks


CATRACHO


5/28/2018


This patient clearly has systemic vascular changes consistent with diabetes 

with decreased pulses in both legs below the level of the 


knee and consistent with a small vessel disease below the level of the ankles.  

The gangrene of the first 2 toes was probably due to trauma 


and swelling that patient did not even notice and then superimposed occlusion 

of the small vessels.   In a few patients, this will occur 


also from distal embolization, but that is unlikely because the patient is on 

anticoagulants.  I do not believe that his proximal 


inflow is compromised, but I do believe he probably has significant disease 

below the level of the knee, especially close to the ankles.  


At this point, it is a valid point to do a CT angiogram with runoff and see 

what this looks like.  It should be noted that a patient with 


this degree of ejection fraction and coronary impairment is not a candidate for 

any major surgery more than absolutely necessary, but he


may be okay for some balloon angioplasty distally.  At this point, we will do 

CTA with runoff and see which way it goes. 


5/29/2018


I reviewed the CTA with a runoff and this confirms the clinical impression above


Patient does not have any vascular flow limiting occlusive inflow disease but 

indeed diffusely calcified vessels


This continues through the SFA and into the trifurcation below the level of the 

knee


Vessels are heavily calcified but no flow-limiting lesions are noted except 

diffuse disease


Clearly small vessel disease in the feet is severe and hence the gangrene of 

the toes


At this point there is no vascular or endovascular wrist construction to be 

done and there are no lesions that need any work 


Would go ahead with toe amputations and in all likelihood this is going to heal


This is probably most likely in the best case scenario.  He would be unlikely 

but certainly possible that patient will need in the future higher level 

amputation even to the level of BKA but this would be unusual


Objective:





Vital Signs








  Date Time  Temp Pulse Resp B/P (MAP) Pulse Ox O2 Delivery O2 Flow Rate FiO2


 


5/29/18 11:10    102/64 (77)    


 


5/29/18 11:00 98.2 77 16 82/56 (65)    


 


5/29/18 10:40     92   21


 


5/29/18 07:00 98.3 69 16 113/75 (88) 97   


 


5/29/18 07:00  70      


 


5/29/18 06:00  82      


 


5/29/18 05:00  80      


 


5/29/18 04:00  68      


 


5/29/18 03:29 98.1 68 20 124/82 (96) 95   


 


5/29/18 03:00  75      


 


5/29/18 02:00  72      


 


5/29/18 01:00  76      


 


5/29/18 00:00  76      


 


5/28/18 23:10 98.8 72 18 118/67 (84) 97   


 


5/28/18 23:00  75      


 


5/28/18 22:00  84      


 


5/28/18 21:00  74      


 


5/28/18 20:34 98.8 73 19 123/77 (92) 98   


 


5/28/18 20:00  80      


 


5/28/18 19:00  74      


 


5/28/18 18:00  78      


 


5/28/18 17:24   16     


 


5/28/18 17:00  82      


 


5/28/18 16:00 98.2 81 16 132/78 (96) 96   


 


5/28/18 16:00  83      


 


5/28/18 15:00  84      








Labs:





Laboratory Tests








Test


  5/29/18


03:23 5/29/18


10:15


 


White Blood Count


  11.3 TH/MM3


(4.0-11.0) 


 


 


Red Blood Count


  4.95 MIL/MM3


(4.50-5.90) 


 


 


Hemoglobin


  13.4 GM/DL


(13.0-17.0) 


 


 


Hematocrit


  39.5 %


(39.0-51.0) 


 


 


Mean Corpuscular Volume


  79.8 FL


(80.0-100.0) 


 


 


Mean Corpuscular Hemoglobin


  27.1 PG


(27.0-34.0) 


 


 


Mean Corpuscular Hemoglobin


Concent 34.0 %


(32.0-36.0) 


 


 


Red Cell Distribution Width


  17.1 %


(11.6-17.2) 


 


 


Platelet Count


  98 TH/MM3


(150-450) 


 


 


Mean Platelet Volume


  9.4 FL


(7.0-11.0) 


 


 


Neutrophils (%) (Auto)


  64.6 %


(16.0-70.0) 


 


 


Lymphocytes (%) (Auto)


  19.1 %


(9.0-44.0) 


 


 


Monocytes (%) (Auto)


  13.3 %


(0.0-8.0) 


 


 


Eosinophils (%) (Auto)


  1.7 %


(0.0-4.0) 


 


 


Basophils (%) (Auto)


  1.3 %


(0.0-2.0) 


 


 


Neutrophils # (Auto)


  7.3 TH/MM3


(1.8-7.7) 


 


 


Lymphocytes # (Auto)


  2.2 TH/MM3


(1.0-4.8) 


 


 


Monocytes # (Auto)


  1.5 TH/MM3


(0-0.9) 


 


 


Eosinophils # (Auto)


  0.2 TH/MM3


(0-0.4) 


 


 


Basophils # (Auto)


  0.2 TH/MM3


(0-0.2) 


 


 


CBC Comment DIFF FINAL  


 


Differential Comment   


 


Activated Partial


Thromboplast Time 37.3 SEC


(24.3-30.1) 42.0 SEC


(24.3-30.1)


 


Creatinine


  0.73 MG/DL


(0.60-1.30) 


 


 


Estimat Glomerular Filtration


Rate 110 ML/MIN


(>89) 


 








Result Diagram:  


5/29/18 0323 5/29/18 0323








(1) ARF (acute renal failure)


(2) NSTEMI (non-ST elevated myocardial infarction)


(3) CAD (coronary artery disease)


(4) Decreased cardiac ejection fraction


(5) Elevated troponin


(6) Severe sepsis


(7) Diabetes mellitus


(8) Acute renal failure


(9) Cardiomyopathy


(10) gangrene of the right second toe


(11) Dyspnea and respiratory abnormalities


(12) Multi-vessel coronary artery stenosis


(13) Hx of CABG


(14) Thrombocytopenia











Yin Quiñones MD May 29, 2018 14:07

## 2018-05-29 NOTE — PD.CARD.PN
Subjective


Subjective Remarks


appears comfortable in nad





Objective


Medications





Current Medications








 Medications


  (Trade)  Dose


 Ordered  Sig/Ilia


 Route  Start Time


 Stop Time Status Last Admin


 


  (NS Flush)  2 ml  UNSCH  PRN


 IVF  5/24/18 10:00


    5/28/18 08:31


 


 


 Heparin Sodium/


 Dextrose  250 ml @ 


 10 mls/hr  TITRATE  PRN


 IV  5/24/18 11:45


   Future Hold 5/25/18 16:19


 


 


  (D50w (Vial) Inj)  50 ml  UNSCH  PRN


 IV PUSH  5/24/18 13:30


     


 


 


  (Glucagon Inj)  1 mg  UNSCH  PRN


 OTHER  5/24/18 13:30


     


 


 


  (NovoLOG


 SUPPLEMENTAL


 SCALE)  1  ACHS SLIDING  SCALE


 SQ  5/24/18 17:00


    5/29/18 09:17


 


 


  (Pravachol)  40 mg  DAILY


 PO  5/25/18 09:00


    5/29/18 09:18


 


 


  (Duoneb Neb)  1 ampule  Q4HR NEB  PRN


 NEB  5/24/18 13:45


    5/24/18 15:38


 


 


  (Tylenol)  650 mg  Q4H  PRN


 PO  5/24/18 14:30


    5/27/18 11:45


 


 


  (Norco  5-325 Mg)  1 tab  Q4H  PRN


 PO  5/24/18 14:30


    5/28/18 16:28


 


 


  (Reglan Inj)  5 mg  Q6H  PRN


 IV PUSH  5/25/18 02:30


    5/25/18 20:10


 


 


  (Zofran  Odt)  4 mg  Q6H  PRN


 PO  5/25/18 02:30


    5/28/18 11:50


 


 


  (Lopressor Inj)  5 mg  Q5M  PRN


 IV PUSH  5/25/18 02:45


    5/25/18 03:06


 


 


  (Levemir Inj)  10 units  HS


 SQ  5/25/18 21:00


    5/28/18 21:00


 


 


  (Tessalon)  100 mg  TID  PRN


 PO  5/26/18 03:00


    5/29/18 02:00


 


 


  (Robitussin Ac


 200-20 Mg/10 ml


 Liq)  10 ml  Q6H  PRN


 PO  5/26/18 03:00


     


 


 


  (Coreg)  6.25 mg  Q12HR


 PO  5/26/18 11:30


    5/29/18 09:17


 


 


  (Entresto 49-51


 Mg)  1 tab  BID


 PO  5/26/18 21:00


    5/29/18 09:17


 


 


 Cefazolin Sodium/


 Dextrose  50 ml @ 


 100 mls/hr  Q8H


 IV  5/27/18 09:00


    5/29/18 09:18


 


 


  (Aspirin Chew)  81 mg  DAILY


 CHEW  5/28/18 12:10


    5/29/18 09:17


 


 


 Pharmacy Profile


 Note  0 ml @ 0


 mls/hr  UNSCH


 OTHER  5/28/18 11:15


     


 


 


 Heparin Sodium/


 Dextrose  250 ml @ 


 16.128 mls/


 hr  TITRATE  PRN


 IV  5/28/18 12:30


    5/29/18 03:26


 


 


 Gentamicin


 Sulfate 90 mg/


 Sodium Chloride  102.25 ml


  @ 200 mls/


 hr  Q8H


 IV  5/28/18 14:00


    5/29/18 05:26


 


 


  ([facility]


 Ordered Lab Info)  SPECIFIC


 LAB TO BE


 ...  ONCE  ONCE


 .XX  5/29/18 15:00


 5/29/18 15:01   


 


 


 Sodium Chloride  1,000 ml @ 


 100 mls/hr  Q10H


 IV  5/29/18 11:15


    5/29/18 12:51


 


 


  (Protonix)  40 mg  DAILY


 PO  5/30/18 09:00


     


 








Vital Signs / I&O





Vital Signs








  Date Time  Temp Pulse Resp B/P (MAP) Pulse Ox O2 Delivery O2 Flow Rate FiO2


 


5/29/18 11:10    102/64 (77)    


 


5/29/18 11:00 98.2 77 16 82/56 (65)    


 


5/29/18 10:40     92   21


 


5/29/18 07:00 98.3 69 16 113/75 (88) 97   


 


5/29/18 07:00  70      


 


5/29/18 06:00  82      


 


5/29/18 05:00  80      


 


5/29/18 04:00  68      


 


5/29/18 03:29 98.1 68 20 124/82 (96) 95   


 


5/29/18 03:00  75      


 


5/29/18 02:00  72      


 


5/29/18 01:00  76      


 


5/29/18 00:00  76      


 


5/28/18 23:10 98.8 72 18 118/67 (84) 97   


 


5/28/18 23:00  75      


 


5/28/18 22:00  84      


 


5/28/18 21:00  74      


 


5/28/18 20:34 98.8 73 19 123/77 (92) 98   


 


5/28/18 20:00  80      


 


5/28/18 19:00  74      


 


5/28/18 18:00  78      


 


5/28/18 17:24   16     


 


5/28/18 17:00  82      


 


5/28/18 16:00 98.2 81 16 132/78 (96) 96   


 


5/28/18 16:00  83      


 


5/28/18 15:00  84      














I/O      


 


 5/28/18 5/28/18 5/28/18 5/29/18 5/29/18 5/29/18





 07:00 15:00 23:00 07:00 15:00 23:00


 


Intake Total 240 ml  1140 ml 902 ml  


 


Output Total 500 ml  950 ml 1100 ml  


 


Balance -260 ml  190 ml -198 ml  


 


      


 


Intake Oral 240 ml  620 ml 500 ml  


 


IV Total   520 ml 402 ml  


 


Output Urine Total 500 ml  950 ml 1100 ml  


 


# Bowel Movements   1   








Physical Exam


GENERAL: 


SKIN: Warm and dry.


HEAD: Normocephalic.


EYES: No scleral icterus. No injection or drainage. 


NECK: Supple, trachea midline. No JVD or lymphadenopathy.


CARDIOVASCULAR: Regular rate and rhythm without murmurs, gallops, or rubs. 


RESPIRATORY: Breath sounds equal bilaterally. No accessory muscle use.


GASTROINTESTINAL: Abdomen soft, non-tender, nondistended. 


MUSCULOSKELETAL: No cyanosis, or edema. 


BACK: Nontender without obvious deformity. No CVA tenderness.





Laboratory





Laboratory Tests








Test


  5/28/18


14:25 5/28/18


18:47 5/29/18


03:23 5/29/18


10:15


 


White Blood Count 12.1 TH/MM3   11.3 TH/MM3  


 


Red Blood Count 5.36 MIL/MM3   4.95 MIL/MM3  


 


Hemoglobin 14.6 GM/DL   13.4 GM/DL  


 


Hematocrit 43.6 %   39.5 %  


 


Mean Corpuscular Volume 81.3 FL   79.8 FL  


 


Mean Corpuscular Hemoglobin 27.3 PG   27.1 PG  


 


Mean Corpuscular Hemoglobin


Concent 33.6 % 


  


  34.0 % 


  


 


 


Red Cell Distribution Width 17.5 %   17.1 %  


 


Platelet Count 102 TH/MM3   98 TH/MM3  


 


Mean Platelet Volume 10.1 FL   9.4 FL  


 


Prothrombin Time 11.4 SEC    


 


Prothromb Time International


Ratio 1.1 RATIO 


  


  


  


 


 


Activated Partial


Thromboplast Time 24.1 SEC 


  32.2 SEC 


  37.3 SEC 


  42.0 SEC 


 


 


Neutrophils (%) (Auto)   64.6 %  


 


Lymphocytes (%) (Auto)   19.1 %  


 


Monocytes (%) (Auto)   13.3 %  


 


Eosinophils (%) (Auto)   1.7 %  


 


Basophils (%) (Auto)   1.3 %  


 


Neutrophils # (Auto)   7.3 TH/MM3  


 


Lymphocytes # (Auto)   2.2 TH/MM3  


 


Monocytes # (Auto)   1.5 TH/MM3  


 


Eosinophils # (Auto)   0.2 TH/MM3  


 


Basophils # (Auto)   0.2 TH/MM3  


 


CBC Comment   DIFF FINAL  


 


Differential Comment     


 


Creatinine   0.73 MG/DL  


 


Estimat Glomerular Filtration


Rate 


  


  110 ML/MIN 


  


 











Assessment and Plan


Problem List:  


(1) ARF (acute renal failure)


ICD Codes:  N17.9 - Acute kidney failure, unspecified


(2) NSTEMI (non-ST elevated myocardial infarction)


ICD Codes:  I21.4 - Non-ST elevation (NSTEMI) myocardial infarction


(3) CAD (coronary artery disease)


ICD Codes:  I25.10 - Atherosclerotic heart disease of native coronary artery 

without angina pectoris


(4) Decreased cardiac ejection fraction


ICD Codes:  R93.1 - Abnormal findings on diagnostic imaging of heart and 

coronary circulation


(5) Elevated troponin


ICD Codes:  R74.8 - Abnormal levels of other serum enzymes


(6) Severe sepsis


ICD Codes:  A41.9 - Sepsis, unspecified organism; R65.20 - Severe sepsis 

without septic shock


(7) Diabetes mellitus


ICD Codes:  E11.9 - Type 2 diabetes mellitus without complications


(8) Acute renal failure


ICD Codes:  N17.9 - Acute kidney failure, unspecified


(9) Cardiomyopathy


ICD Codes:  I42.9 - Cardiomyopathy, unspecified


(10) gangrene of the right second toe


(11) Dyspnea and respiratory abnormalities


ICD Codes:  R06.00 - Dyspnea, unspecified; R06.89 - Other abnormalities of 

breathing


(12) Multi-vessel coronary artery stenosis


ICD Codes:  I25.10 - Atherosclerotic heart disease of native coronary artery 

without angina pectoris


(13) Hx of CABG


ICD Codes:  Z95.1 - Presence of aortocoronary bypass graft


(14) Thrombocytopenia


ICD Codes:  D69.6 - Thrombocytopenia, unspecified


Assessment and Plan


1.) Ischemic cardiomyopathy - nstemi, chf excacerbation and arf due to severe 

unrevascularizable cad, cardiomyopathy and high metabolic demand from sepsis, 

off iv heparin and aspirin due to thrombocytopenia, hematology consult placed, 

on antibiotics, noncardiac procedure is high risk due to multiple unstable 

comorbidities and nstemi


2.) VT - replete electrolytes, on coreg; d/w case management further attempts 

to insure and obtain eligibility for referral to heart transplant center











Abdiel Purvis MD May 29, 2018 14:08

## 2018-05-29 NOTE — HHI.IDPN
Subjective


Subjective


Remarks


Patient seen and examined on behalf of Dr. Brandon





 is a 56 y/o CM with PMHx of CHF, Ischemic Cardiomyopathy with last 

reported EF 20-25%, he has prior h/o CABG x 3, defibrillator and pacemaker, DM, 

HTN and hyperlipidemia. CT surgery evaluated and found him not to be a 

revascularization candidate and recommended referral to a tertiary center for 

transplant consideration.  As the patient has no  insurance this option has not 

been found feasible.  He underwent placement of a single-chamber St Trav ICD in 

March 2018 for sudden cardiac death prevention. 


With this background patient presents to the ED at [facility] with 4 day history 

of progressive dyspnea described as severe, worsening with exertion, and 

improved with rest. He has had associated symptoms of diaphoresis, nausea and 

vomiting over the weekend.  He additionally has noted painful changes in his 

right second toe with color changes suspicious for infection.  


In ED patient was noted to have WBC 9.6, hemoglobin 15.0, hematocrit 43.9, 

platelets 133, sodium 130, potassium 4.4, BUN 36, creatinine 1.84, random 

glucose 407 alkaline phosphatase 114, AST 37, ALT 25, total bilirubin 0.9, 

troponin 3.04, C-reactive protein 26.8, B natruretic peptide 3148, lactic acid 

5.5. Ax Xray of Rt toe showed erosion. MRI with no osteomyelitis. Chest x-ray 

showed no acute abnormality or significant interval change.


At baseline prior to this infectious process patient was able to work around 

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  He states that he is only able to be active 

for 5-10 minutes before becoming very dyspneic and exhausted.  He was 

previously an over the road  but has been unable to work since 

March of this year.  He is now attempting to get disability as he has a nearly 

homebound existence and requires assistance to get out to doctor's appointments.


Sepsis workup on admission positive for Staph bacteremia and ID consulted for 

evaluation and Mment of the same.





Notes reviewed


scheduled for OR at 1700


no fever


+mild cough with small amount of whitish sputum production


no sob lying in bed


no rash


no dysuria


No diarrhea


afebrile


WBC 11.3


All BC with MSSA persistent while on Ancef IV.


Podiatry and vascular following for gangrene of his R 2nd toe


CTA runoff with atherosclerotic changes no sig stenosis BLE


+ cardiomyopathy, likely CHF exacerbation with BNP of 1210


S/P ICD placement March 2018


Has not had any problem with his ICD until in hospital


Antibiotics


IV Gent


IV Ancef


Lines


PIV no evidence of infection


Past Medical History


Severe ischemic cardiomyopathy EF 20-25%


Hyperlipidemia


Diabetes


Coronary artery disease status post MI 3/13


COPD


Past Surgical History


CABG 10 years ago


Pacemaker/Defibrillator insertion


Appendectomy


Skin graft to right foot as a child


Back surgeries 3 has hardware in back per report.


 (Merlyn Rey)


Allergies:  


Coded Allergies:  


     potassium iodide (Unverified  Allergy, Severe, Swelling, 3/21/18)


     povidone-iodine (Unverified  Allergy, Severe, Swelling, 3/21/18)


     shellfish derived (Unverified  Allergy, Severe, 3/21/18)


     sodium iodide (Unverified  Allergy, Severe, Swelling, 3/21/18)


     sodium iodide (Unverified  Allergy, Severe, Swelling, 3/21/18)





Objective


.





Vital Signs








  Date Time  Temp Pulse Resp B/P (MAP) Pulse Ox O2 Delivery O2 Flow Rate FiO2


 


5/29/18 06:00  82      


 


5/29/18 05:00  80      


 


5/29/18 04:00  68      


 


5/29/18 03:29 98.1 68 20 124/82 (96) 95   


 


5/29/18 03:00  75      


 


5/29/18 02:00  72      


 


5/29/18 01:00  76      


 


5/29/18 00:00  76      


 


5/28/18 23:10 98.8 72 18 118/67 (84) 97   


 


5/28/18 23:00  75      


 


5/28/18 22:00  84      


 


5/28/18 21:00  74      


 


5/28/18 20:34 98.8 73 19 123/77 (92) 98   


 


5/28/18 20:00  80      


 


5/28/18 19:00  74      


 


5/28/18 18:00  78      


 


5/28/18 17:24   16     


 


5/28/18 17:00  82      


 


5/28/18 16:00 98.2 81 16 132/78 (96) 96   


 


5/28/18 16:00  83      


 


5/28/18 15:00  84      


 


5/28/18 13:00  76      


 


5/28/18 12:00  83      


 


5/28/18 12:00 98.4 82 16 119/72 (88) 100   


 


5/28/18 11:00  74      


 


5/28/18 10:00  84      


 


5/28/18 09:00  84      


 


5/28/18 08:00  76      


 


5/28/18 08:00 98.2 84 16 110/65 (80) 97   








.





Laboratory Tests








Test


  5/28/18


04:05 5/28/18


14:25 5/29/18


03:23


 


White Blood Count 12.4 TH/MM3  12.1 TH/MM3  11.3 TH/MM3 


 


Red Blood Count 5.20 MIL/MM3  5.36 MIL/MM3  4.95 MIL/MM3 


 


Hemoglobin 14.2 GM/DL  14.6 GM/DL  13.4 GM/DL 


 


Hematocrit 42.0 %  43.6 %  39.5 % 


 


Mean Corpuscular Volume 80.7 FL  81.3 FL  79.8 FL 


 


Mean Corpuscular Hemoglobin 27.3 PG  27.3 PG  27.1 PG 


 


Mean Corpuscular Hemoglobin


Concent 33.8 % 


  33.6 % 


  34.0 % 


 


 


Red Cell Distribution Width 17.0 %  17.5 %  17.1 % 


 


Platelet Count 80 TH/MM3  102 TH/MM3  98 TH/MM3 


 


Mean Platelet Volume 10.2 FL  10.1 FL  9.4 FL 


 


Neutrophils (%) (Auto)   64.6 % 


 


Lymphocytes (%) (Auto)   19.1 % 


 


Monocytes (%) (Auto)   13.3 % 


 


Eosinophils (%) (Auto)   1.7 % 


 


Basophils (%) (Auto)   1.3 % 


 


Neutrophils # (Auto)   7.3 TH/MM3 


 


Lymphocytes # (Auto)   2.2 TH/MM3 


 


Monocytes # (Auto)   1.5 TH/MM3 


 


Eosinophils # (Auto)   0.2 TH/MM3 


 


Basophils # (Auto)   0.2 TH/MM3 


 


CBC Comment   DIFF FINAL 


 


Differential Comment    








Laboratory Tests








Test


  5/28/18


04:05 5/29/18


03:23


 


Blood Urea Nitrogen 28 MG/DL  


 


Creatinine 0.77 MG/DL  0.73 MG/DL 


 


Random Glucose 232 MG/DL  


 


Calcium Level 8.9 MG/DL  


 


Sodium Level 138 MEQ/L  


 


Potassium Level 4.1 MEQ/L  


 


Chloride Level 103 MEQ/L  


 


Carbon Dioxide Level 26.8 MEQ/L  


 


Anion Gap 8 MEQ/L  


 


Estimat Glomerular Filtration


Rate 103 ML/MIN 


  110 ML/MIN 


 


 


B-Type Natriuretic Peptide 847 PG/ML  








Microbiology








 Date/Time


Source Procedure


Growth Status


 


 


 5/27/18 05:10


Blood Peripheral Aerobic Blood Culture - Preliminary


Gram Positive Cocci Resulted


 


 5/27/18 05:10


Blood Peripheral Anaerobic Blood Culture - Preliminary


NO GROWTH IN 1 DAY Resulted





 5/26/18 12:40


Blood Peripheral Aerobic Blood Culture - Preliminary


Gram Positive Cocci Resulted


 


 5/26/18 12:40


Blood Peripheral Anaerobic Blood Culture - Preliminary


NO GROWTH IN 2 DAYS Resulted





 5/26/18 12:20


Blood Peripheral Aerobic Blood Culture - Preliminary


Gram Positive Cocci Resulted


 


 5/26/18 12:20


Blood Peripheral Anaerobic Blood Culture - Preliminary


NO GROWTH IN 2 DAYS Resulted








Imaging





Last Impressions








Aorta w/Runoff CTA 5/28/18 0000 Signed





Impressions: 





 CONCLUSION:





 1.  Atherosclerotic changes without significant stenosis bilateral lower extrem





 ities.





  





 


 


Chest X-Ray 5/24/18 0955 Signed





Impressions: 





 CONCLUSION: 





 1.  No acute abnormality or significant interval change.





  





 


 


Toe X-Ray 5/24/18 0000 Signed





Impressions: 





 CONCLUSION: 





 Questionable small erosion distal phalanx right second toe. This could be infec





 tious in nature if there is clinical evidence for soft tissue infection.





  





 





  





  





 





  





 





  





 


 


Renal Ultrasound 5/24/18 0000 Signed





Impressions: 





 CONCLUSION: 





 1.  No hydronephrosis.





 2.  Increased echogenicity most characteristic of medical renal disease.





  





 


 


Foot MRI 5/24/18 0000 Signed





Impressions: 





 CONCLUSION:





 1.  No evidence for osteomyelitis. Soft tissue swelling at the second toe.





  





 








Physical Exam


GENERAL:  WDWN  male patient, INAD.  Asleep but easily awakens to 

voice.  Appears comfortable.


SKIN:  No rashes or lesions. Cool and dry.


HEAD: Atraumatic. Normocephalic. 


EYES: Pupils equal round and reactive. Extraocular motions intact. No scleral 

icterus. No injection or drainage. 


ENT: Nose without bleeding or purulent drainage. Airway patent.  MMM.


NECK: Trachea midline. Supple, nontender, no meningeal signs.


CARDIOVASCULAR: HS audible.  ICD, has mild pink color on his L chest, not 

indurated,  seems swollen, not tender, has well healed incision


RESPIRATORY: Nonlabored.  Clear to auscultation. Breath sounds equal 

bilaterally. No wheezes, rales, or rhonchi.  


GASTROINTESTINAL: Abdomen soft, non-tender, nondistended. 


MUSCULOSKELETAL:  Has wet gangrene Right 2nd toe with erythema on dorsum of his 

foot. 


NEUROLOGICAL:   Grossly non-focal.


Psych calm and cooperative


IV line sites with no e.o infection.


 (Merlyn Rey)





Assessment & Plan


Remarks


High grade MSSA Sepsis (leucocytosis, tachycardia, lactic acidemia)


   -  source ?2nd toe,  concern with ICD


Right 2nd toe wet gangrene, osteomyelitis.  On Heparin gtt


   -CTA runoff with atherosclerotic changes no sig stenosis BLE


   -scheduled for sx today at 1700


Diabetes


Hepatitis C antibody positive


AICD 


CAD with severe ischemic cardiomyopathy. ?CHF exacerbation, BNP 1210, dropped 

to 847


Thrombocytopenia likely due to severe infection


HIT neg


   -platelets low but appear stable


   -Hematology consult placed





Recs


Continue IV Ancef


Continue Genta IV for synergy pending BCX turning negative and source control.


for surgical intervention today


Concern for AICD infection due to persistent bacteremia.


Repeat BC to document clearing after surgery


Echo negative. If persistent bacteremia after the surgery will need SHAYY to look 

for valve and AICD lead infection.


Follow C/S


Monitor progress


Plan for at least 6weeks IV abx management irrespective of bone path results


 (Merlyn Rey)


Remarks


The exam, history, and the medical decision-making described in the above note 

were completed with the assistance of the mid-level provider. I reviewed and 

agree with the findings presented.  I attest that I had a face-to-face 

encounter with the patient on the same day, and personally performed and 

documented my assessment and findings in the medical record.





Delayed entry


Persistent bacteremia





Toe with gangrene and erythema surrounding.





Repeat blood cultures


Continue Ancef IV


Continue Genta IV


Monitor UO and Cr


Follow repeat blood cultures once sterilized x 72 hrs will consider stopping 

genta.


 (Bev Brandon MD)











Merlyn Rey May 29, 2018 08:03


Bev Brandon MD May 29, 2018 18:03

## 2018-05-29 NOTE — HHI.PR
__________________________________________________





Immediate Post Op Note


Procedure Date:


May 29, 2018


Pre Op Diagnosis:  


gangrene with osteomyelitis right 2nd toe


Post Op Diagnosis:  


same


Surgeon:


Marc Mckeon DPM


Assistant(s):


Staff


Procedure:


amputation right 2nd toe


Findings:


Consistent with diagnosis. Right 2nd toe with necrotic tissue to entire digit. 

Foul odor slightly present. Margins clean without purulence. 


Two semi-elliptical incisions made to disarticulate digit in its entirety at 

metatarsophalangeal joint level. 2nd metatarsal head with white healthy 

cartilage cap. No purulence noted.


Culture taken prior to irrigation and primary closure with 2-0 nylon suture. 


Dressing with xeroform, 4x4, cast padding, ace.


Weightbearing as tolerated to heel in surgical shoe for transfers only.


Additional Information:


see findings


Complications:


none


Specimen(s) removed:


1. toe right 2nd


2. culture right foot


Estimated blood loss:


minimal


Anesthesia:  MAC, Local (10mL 2% lidocaine plain)


Drains:  None


Tourniquet time (min at mmHg)


n/a


Patient to:  PACU


Patient Condition:  Good


Date/Time of Procedure:  SEE SURGICAL CARE RECORD











Marc Mckeon DPM May 29, 2018 17:27

## 2018-05-29 NOTE — HHI.PR
Subjective


Remarks


Patient is scheduled to have right second toe amputated due to gangrene and 

osteomyelitis today


Has some hypotension we will give some fluids since patient is n.p.o.


Discussed with patient and RN


A.m. labs





Objective


Vitals





Vital Signs








  Date Time  Temp Pulse Resp B/P (MAP) Pulse Ox O2 Delivery O2 Flow Rate FiO2


 


5/29/18 11:10    102/64 (77)    


 


5/29/18 11:00 98.2 77 16 82/56 (65)    


 


5/29/18 07:00 98.3 69 16 113/75 (88) 97   


 


5/29/18 07:00  70      


 


5/29/18 06:00  82      


 


5/29/18 05:00  80      


 


5/29/18 04:00  68      


 


5/29/18 03:29 98.1 68 20 124/82 (96) 95   


 


5/29/18 03:00  75      


 


5/29/18 02:00  72      


 


5/29/18 01:00  76      


 


5/29/18 00:00  76      


 


5/28/18 23:10 98.8 72 18 118/67 (84) 97   


 


5/28/18 23:00  75      


 


5/28/18 22:00  84      


 


5/28/18 21:00  74      


 


5/28/18 20:34 98.8 73 19 123/77 (92) 98   


 


5/28/18 20:00  80      


 


5/28/18 19:00  74      


 


5/28/18 18:00  78      


 


5/28/18 17:24   16     


 


5/28/18 17:00  82      


 


5/28/18 16:00 98.2 81 16 132/78 (96) 96   


 


5/28/18 16:00  83      


 


5/28/18 15:00  84      


 


5/28/18 13:00  76      


 


5/28/18 12:00  83      


 


5/28/18 12:00 98.4 82 16 119/72 (88) 100   














I/O      


 


 5/28/18 5/28/18 5/28/18 5/29/18 5/29/18 5/29/18





 07:00 15:00 23:00 07:00 15:00 23:00


 


Intake Total 240 ml  1140 ml 902 ml  


 


Output Total 500 ml  950 ml 1100 ml  


 


Balance -260 ml  190 ml -198 ml  


 


      


 


Intake Oral 240 ml  620 ml 500 ml  


 


IV Total   520 ml 402 ml  


 


Output Urine Total 500 ml  950 ml 1100 ml  


 


# Bowel Movements   1   








Result Diagram:  


5/29/18 0323                                                                   

             5/29/18 0323





Other Results





 Laboratory Tests








Test


  5/26/18


15:47 5/26/18


22:09 5/27/18


05:10 5/27/18


10:54


 


Activated Partial


Thromboplast Time 36.6 SEC 


  39.4 SEC 


  


  


 


 


White Blood Count   11.0 TH/MM3  


 


Red Blood Count   5.00 MIL/MM3  


 


Hemoglobin   13.6 GM/DL  


 


Hematocrit   40.3 %  


 


Mean Corpuscular Volume   80.5 FL  


 


Mean Corpuscular Hemoglobin   27.2 PG  


 


Mean Corpuscular Hemoglobin


Concent 


  


  33.7 % 


  


 


 


Red Cell Distribution Width   17.4 %  


 


Platelet Count   64 TH/MM3  


 


Mean Platelet Volume   10.7 FL  


 


Blood Urea Nitrogen   32 MG/DL  


 


Creatinine   0.82 MG/DL  


 


Random Glucose   180 MG/DL  


 


Calcium Level   8.2 MG/DL  


 


Magnesium Level   2.7 MG/DL  


 


Sodium Level   137 MEQ/L  


 


Potassium Level   3.9 MEQ/L  


 


Chloride Level   103 MEQ/L  


 


Carbon Dioxide Level   25.5 MEQ/L  


 


Anion Gap   9 MEQ/L  


 


Estimat Glomerular Filtration


Rate 


  


  96 ML/MIN 


  


 


 


Hemoglobin A1c   9.6 %  


 


B-Type Natriuretic Peptide   1210 PG/ML  


 


Heparin-Induced Platelet Ab


(Chey) 


  


  NEGATIVE 


  


 


 


HIPA Patient Optical Density   0.041 O.D.  


 


Fibrinogen    392 mg/dL 


 


D-Dimer Quantitative (PE/DVT)    1.19 MG/L FEU 


 


Test


  5/28/18


04:05 5/28/18


14:25 5/28/18


18:47 5/29/18


03:23


 


White Blood Count 12.4 TH/MM3  12.1 TH/MM3   11.3 TH/MM3 


 


Red Blood Count 5.20 MIL/MM3  5.36 MIL/MM3   4.95 MIL/MM3 


 


Hemoglobin 14.2 GM/DL  14.6 GM/DL   13.4 GM/DL 


 


Hematocrit 42.0 %  43.6 %   39.5 % 


 


Mean Corpuscular Volume 80.7 FL  81.3 FL   79.8 FL 


 


Mean Corpuscular Hemoglobin 27.3 PG  27.3 PG   27.1 PG 


 


Mean Corpuscular Hemoglobin


Concent 33.8 % 


  33.6 % 


  


  34.0 % 


 


 


Red Cell Distribution Width 17.0 %  17.5 %   17.1 % 


 


Platelet Count 80 TH/MM3  102 TH/MM3   98 TH/MM3 


 


Mean Platelet Volume 10.2 FL  10.1 FL   9.4 FL 


 


Blood Urea Nitrogen 28 MG/DL    


 


Creatinine 0.77 MG/DL    0.73 MG/DL 


 


Random Glucose 232 MG/DL    


 


Calcium Level 8.9 MG/DL    


 


Sodium Level 138 MEQ/L    


 


Potassium Level 4.1 MEQ/L    


 


Chloride Level 103 MEQ/L    


 


Carbon Dioxide Level 26.8 MEQ/L    


 


Anion Gap 8 MEQ/L    


 


Estimat Glomerular Filtration


Rate 103 ML/MIN 


  


  


  110 ML/MIN 


 


 


B-Type Natriuretic Peptide 847 PG/ML    


 


Prothrombin Time  11.4 SEC   


 


Prothromb Time International


Ratio 


  1.1 RATIO 


  


  


 


 


Activated Partial


Thromboplast Time 


  24.1 SEC 


  32.2 SEC 


  37.3 SEC 


 


 


Neutrophils (%) (Auto)    64.6 % 


 


Lymphocytes (%) (Auto)    19.1 % 


 


Monocytes (%) (Auto)    13.3 % 


 


Eosinophils (%) (Auto)    1.7 % 


 


Basophils (%) (Auto)    1.3 % 


 


Neutrophils # (Auto)    7.3 TH/MM3 


 


Lymphocytes # (Auto)    2.2 TH/MM3 


 


Monocytes # (Auto)    1.5 TH/MM3 


 


Eosinophils # (Auto)    0.2 TH/MM3 


 


Basophils # (Auto)    0.2 TH/MM3 


 


CBC Comment    DIFF FINAL 


 


Differential Comment     


 


Test


  5/29/18


10:15 


  


  


 


 


Activated Partial


Thromboplast Time 42.0 SEC 


  


  


  


 








Imaging





Last Impressions








Aorta w/Runoff CTA 5/28/18 0000 Signed





Impressions: 





 CONCLUSION:





 1.  Atherosclerotic changes without significant stenosis bilateral lower extrem





 ities.





  





 


 


Chest X-Ray 5/24/18 0955 Signed





Impressions: 





 CONCLUSION: 





 1.  No acute abnormality or significant interval change.





  





 


 


Toe X-Ray 5/24/18 0000 Signed





Impressions: 





 CONCLUSION: 





 Questionable small erosion distal phalanx right second toe. This could be infec





 tious in nature if there is clinical evidence for soft tissue infection.





  





 





  





  





 





  





 





  





 


 


Renal Ultrasound 5/24/18 0000 Signed





Impressions: 





 CONCLUSION: 





 1.  No hydronephrosis.





 2.  Increased echogenicity most characteristic of medical renal disease.





  





 


 


Foot MRI 5/24/18 0000 Signed





Impressions: 





 CONCLUSION:





 1.  No evidence for osteomyelitis. Soft tissue swelling at the second toe.





  





 








Objective Remarks


GENERAL: Awake alert and oriented 3 talkative and cooperative


SKIN: Warm and dry.  Right second toe is gangrenous and black 


HEAD: Atraumatic. Normocephalic. 


EYES: Pupils equal and round. No scleral icterus. No injection or drainage. 

extraocular muscles intact


ENT: No nasal bleeding or discharge.  Mucous membranes pink and moist.  Tongue 

is midline


NECK: Trachea midline. No JVD.  Supple


CARDIOVASCULAR: Regular rate and rhythm.  S1-S2 no S3 or S4


RESPIRATORY: No accessory muscle use. Clear to auscultation. Breath sounds 

equal bilaterally. 


GASTROINTESTINAL: Abdomen soft, non-tender, nondistended. Hepatic and splenic 

margins not palpable. 


MUSCULOSKELETAL: Extremities without clubbing, cyanosis, or edema. No obvious 

deformities.  Right second toe is black and gangrenous


NEUROLOGICAL: Awake and alert. No obvious cranial nerve deficits.  Motor 

grossly within normal limits. Five out of 5 muscle strength in the arms and 

legs.  Normal speech.


PSYCHIATRIC: Appropriate mood and affect; insight and judgment normal.


Medications and IVs





Current Medications


Sodium Chloride (NS Flush) 2 ml UNSCH  PRN IVF FLUSH AFTER USING IV ACCESS Last 

administered on 5/28/18at 08:31;  Start 5/24/18 at 10:00


Piperacillin Sod/ Tazobactam Sod 50 ml @  100 mls/hr ONCE  ONCE IV  Last 

administered on 5/24/18at 12:07;  Start 5/24/18 at 11:30;  Stop 5/24/18 at 11:59

;  Status DC


Vancomycin HCl 1350 mg/Sodium Chloride 513.5 ml @  250 mls/hr ONCE  ONCE IV  

Last administered on 5/24/18at 13:27;  Start 5/24/18 at 11:30;  Stop 5/24/18 at 

13:33;  Status DC


Aspirin (Aspirin Chew) 162 mg ONCE  ONCE CHEW  Last administered on 5/24/18at 12

:02;  Start 5/24/18 at 11:30;  Stop 5/24/18 at 11:31;  Status DC


Heparin Sodium (Porcine) (Heparin Inj) 4,000 units ONCE  ONCE IV PUSH  Last 

administered on 5/24/18at 12:03;  Start 5/24/18 at 11:45;  Stop 5/24/18 at 11:46

;  Status DC


Heparin Sodium/ Dextrose 250 ml @  10 mls/hr TITRATE  PRN IV Coagulation 

Management Last administered on 5/25/18at 16:19;  Start 5/24/18 at 11:45;  

Status Future Hold


Dextrose (D50w (Vial) Inj) 50 ml UNSCH  PRN IV PUSH HYPOGLYCEMIA-SEE COMMENTS;  

Start 5/24/18 at 13:30


Glucagon (Glucagon Inj) 1 mg UNSCH  PRN OTHER HYPOGLYCEMIA-SEE COMMENTS;  Start 

5/24/18 at 13:30


Insulin Aspart (NovoLOG SUPPLEMENTAL SCALE) 1 ACHS SLIDING  SCALE SQ  Last 

administered on 5/29/18at 09:17;  Start 5/24/18 at 17:00


Sodium Chloride 1,000 ml @  60 mls/hr [id number] IV  Last administered on 5/24/ 18at 18:23;  Start 5/24/18 at 13:30;  Stop 5/27/18 at 10:00;  Status DC


Piperacillin Sod/ Tazobactam Sod 50 ml @  100 mls/hr Q6H IV  Last administered 

on 5/26/18at 17:02;  Start 5/24/18 at 18:00;  Stop 5/27/18 at 09:02;  Status DC


Pharmacy Profile Note 0 ml @ 0 mls/hr UNSCH OTHER ;  Start 5/24/18 at 13:30;  

Stop 5/26/18 at 11:26;  Status DC


Aspirin (Ecotrin Ec) 162 mg DAILY PO  Last administered on 5/26/18at 09:05;  

Start 5/25/18 at 09:00;  Stop 5/28/18 at 12:04;  Status DC


Famotidine (Pepcid) 20 mg BID PO  Last administered on 5/27/18at 08:52;  Start 5 /24/18 at 21:00;  Stop 5/27/18 at 10:16;  Status DC


Pravastatin Sodium (Pravachol) 40 mg DAILY PO  Last administered on 5/29/18at 09

:18;  Start 5/25/18 at 09:00


Albuterol/ Ipratropium (Duoneb Neb) 1 ampule Q4HR NEB  PRN NEB SHORTNESS OF 

BREATH Last administered on 5/24/18at 15:38;  Start 5/24/18 at 13:45


Vancomycin HCl 1500 mg/Sodium Chloride 515 ml @  250 mls/hr Q24H IV  Last 

administered on 5/25/18at 14:18;  Start 5/25/18 at 14:00;  Stop 5/25/18 at 16:40

;  Status DC


Acetaminophen (Tylenol) 650 mg Q4H  PRN PO FEVER/PAIN 1-5 Last administered on 5 /27/18at 11:45;  Start 5/24/18 at 14:30


Acetaminophen/ Hydrocodone Bitart (Norco  5-325 Mg) 1 tab Q4H  PRN PO PAIN 6-10 

Last administered on 5/28/18at 16:28;  Start 5/24/18 at 14:30


Gadobenate Dimeglumine (Multihance Pf Inj) 18 ml STK-MED ONCE IV  Last 

administered on 5/24/18at 17:21;  Start 5/24/18 at 17:21;  Stop 5/24/18 at 17:22

;  Status DC


Metoclopramide HCl (Reglan Inj) 10 mg ONCE  ONCE IV PUSH  Last administered on 5 /25/18at 02:05;  Start 5/25/18 at 02:15;  Stop 5/25/18 at 02:16;  Status DC


Metoclopramide HCl (Reglan Inj) 5 mg Q6H  PRN IV PUSH NAUSEA OR VOMITING Last 

administered on 5/25/18at 20:10;  Start 5/25/18 at 02:30


Ondansetron HCl (Zofran  Odt) 4 mg Q6H  PRN PO NAUSEA OR VOMITING Last 

administered on 5/28/18at 11:50;  Start 5/25/18 at 02:30


Metoprolol Tartrate (Lopressor Inj) 5 mg ONCE  ONCE IV PUSH  Last administered 

on 5/25/18at 02:39;  Start 5/25/18 at 02:45;  Stop 5/25/18 at 02:46;  Status DC


Metoprolol Tartrate (Lopressor Inj) 5 mg Q5M  PRN IV PUSH ATRIAL FLUTTER Last 

administered on 5/25/18at 03:06;  Start 5/25/18 at 02:45


Miscellaneous Information ([facility] [place] [facility] Ordered Lab Info) SPECIFIC LAB TO BE 

TYLOR... ONCE  ONCE .XX ;  Start 5/27/18 at 12:45;  Stop 5/27/18 at 12:46;  

Status Cancel


Insulin Detemir (Levemir Inj) 10 units HS SQ  Last administered on 5/28/18at 21:

00;  Start 5/25/18 at 21:00


Vancomycin HCl 1000 mg/Sodium Chloride 250 ml @  250 mls/hr Q12H IV  Last 

administered on 5/26/18at 01:06;  Start 5/26/18 at 02:00;  Stop 5/26/18 at 11:26

;  Status DC


Miscellaneous Information ([facility] [place] [facility] Ordered Lab Info) SPECIFIC LAB TO BE 

TYLOR... ONCE  ONCE .XX ;  Start 5/27/18 at 13:45;  Stop 5/27/18 at 13:46;  

Status DC


Benzonatate (Tessalon) 100 mg TID  PRN PO COUGH Last administered on 5/29/18at 

02:00;  Start 5/26/18 at 03:00


Guaifenesin/ Codeine Phosphate (Robitussin Ac 200-20 Mg/10 ml Liq) 10 ml Q6H  

PRN PO COUGH;  Start 5/26/18 at 03:00


Magnesium Sulfate/ Dextrose 100 ml @  100 mls/hr Q1H IV  Last administered on 5/ 26/18at 09:45;  Start 5/26/18 at 08:45;  Stop 5/26/18 at 10:44;  Status DC


Cefazolin Sodium/ Dextrose 50 ml @  100 mls/hr Q8H IV  Last administered on 5/26 /18at 21:32;  Start 5/26/18 at 13:00;  Stop 5/26/18 at 23:30;  Status DC


Carvedilol (Coreg) 6.25 mg Q12HR PO  Last administered on 5/29/18at 09:17;  

Start 5/26/18 at 11:30


Sacubitril/ Valsartan (Entresto 49-51 Mg) 1 tab BID PO  Last administered on 5/ 29/18at 09:17;  Start 5/26/18 at 21:00


Cefepime HCl 1000 mg/Sodium Chloride 100 ml @  200 mls/hr Q8H IV  Last 

administered on 5/27/18at 08:49;  Start 5/27/18 at 00:00;  Stop 5/27/18 at 09:01

;  Status DC


Piperacillin Sod/ Tazobactam Sod 50 ml @  100 mls/hr Q6H IV  Last administered 

on 5/28/18at 10:00;  Start 5/27/18 at 10:00;  Stop 5/28/18 at 11:16;  Status DC


Cefazolin Sodium/ Dextrose 50 ml @  100 mls/hr Q8H IV  Last administered on 5/29 /18at 09:18;  Start 5/27/18 at 09:00


Aspirin (Aspirin Chew) 81 mg DAILY CHEW  Last administered on 5/29/18at 09:17;  

Start 5/28/18 at 12:10


Pharmacy Profile Note 0 ml @ 0 mls/hr UNSCH OTHER ;  Start 5/28/18 at 11:15


Heparin Sodium/ Dextrose 250 ml @  16.128 mls/ hr TITRATE  PRN IV Coagulation 

Management Last administered on 5/29/18at 03:26;  Start 5/28/18 at 12:30


Gentamicin Sulfate 90 mg/ Sodium Chloride 102.25 ml  @ 200 mls/ hr Q8H IV  Last 

administered on 5/29/18at 05:26;  Start 5/28/18 at 14:00


Miscellaneous Information ([facility] [place] [facility] Ordered Lab Info) SPECIFIC LAB TO BE 

TYLOR... ONCE  ONCE .XX ;  Start 5/29/18 at 13:45;  Stop 5/29/18 at 13:46


Miscellaneous Information ([facility] [place] [facility] Ordered Lab Info) SPECIFIC LAB TO BE 

TYLOR... ONCE  ONCE .XX ;  Start 5/29/18 at 15:00;  Stop 5/29/18 at 15:01


Iohexol (Omnipaque 350 Inj) 100 ml STK-MED ONCE IVCONTRAST  Last administered 

on 5/28/18at 13:57;  Start 5/28/18 at 13:57;  Stop 5/28/18 at 13:58;  Status DC





A/P


Problem List:  


(1) Severe sepsis


ICD Code:  A41.9 - Sepsis, unspecified organism; R65.20 - Severe sepsis without 

septic shock


(2) Elevated troponin


ICD Code:  R74.8 - Abnormal levels of other serum enzymes


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


Assessment and Plan


Severe sepsis due to gangrene/ infection of the right second toe 


Staph aureus bacteremia-- persistent bacteremia- 


check echo- may need SHAYY


ID ff  - on zosyn and  Ancef


Repeat BC to document clearing in next few days 


May need SHAYY


Podiatry following


Vascular surgery ff- requested for CTA runoff- DONE


FOR SURGERY ON RIGHT 2ND TOE 5-29 WITH PODIATRY





Ischemic cardiomyopathy EF 25% - nstemi, 


Debrillator fired evening 5.25


NSTEMI with history of CAD- had cardiac cath two months ago with severe three 

vessel disease- was evaluated by CT surgery at the time with poor targets for 

redo sternotomy


high metabolic demand from sepsis


- clinically feeling better 


- continue electrolyte monitoring


- ASA, Heparin drip- DC due to thrombocytopenia- Hematology consutled for 

recommendation


- continue  Entresto, Coreg


- will start ASA





Acute kidney injury; improved- started on gentle IV hydration in light of 

cardiomyopathy- monitor renal function





-thrombocytopenia- likely due to sepsis


-  continue ASA


- FF CBC


-seen by Dr. Skelton. HIT ab negative- restart heparin drip- will d/w Dr. Skelton


- will also DC Pepcid- due to thrombocytopenia





Diabetes mellitus- sliding scale 





-COPD with no exacerbation; neb treatment as needed.


-diabetes mellitus; uncontrolled- hold oral hypoglycemics- start on accu-check 

with SSI. continue Levemir and adjust 





-DVT prophylaxis;encoruage increase activity





palliative care  ff


CM ff - assisting with transfer to a tertiary center for transplant-- no 

insurance, assisting us with Medicaid application











Reece Yousif DO May 29, 2018 11:16

## 2018-05-30 VITALS — HEART RATE: 77 BPM

## 2018-05-30 VITALS
OXYGEN SATURATION: 96 % | HEART RATE: 77 BPM | SYSTOLIC BLOOD PRESSURE: 121 MMHG | TEMPERATURE: 98.9 F | RESPIRATION RATE: 20 BRPM | DIASTOLIC BLOOD PRESSURE: 63 MMHG

## 2018-05-30 VITALS
HEART RATE: 69 BPM | TEMPERATURE: 98.6 F | RESPIRATION RATE: 18 BRPM | SYSTOLIC BLOOD PRESSURE: 105 MMHG | OXYGEN SATURATION: 96 % | DIASTOLIC BLOOD PRESSURE: 73 MMHG

## 2018-05-30 VITALS
OXYGEN SATURATION: 96 % | RESPIRATION RATE: 20 BRPM | TEMPERATURE: 98.3 F | DIASTOLIC BLOOD PRESSURE: 69 MMHG | HEART RATE: 74 BPM | SYSTOLIC BLOOD PRESSURE: 122 MMHG

## 2018-05-30 VITALS
RESPIRATION RATE: 14 BRPM | TEMPERATURE: 98.6 F | DIASTOLIC BLOOD PRESSURE: 71 MMHG | HEART RATE: 73 BPM | OXYGEN SATURATION: 96 % | SYSTOLIC BLOOD PRESSURE: 129 MMHG

## 2018-05-30 VITALS
DIASTOLIC BLOOD PRESSURE: 70 MMHG | HEART RATE: 73 BPM | SYSTOLIC BLOOD PRESSURE: 102 MMHG | OXYGEN SATURATION: 96 % | RESPIRATION RATE: 18 BRPM | TEMPERATURE: 97.7 F

## 2018-05-30 VITALS — HEART RATE: 64 BPM

## 2018-05-30 VITALS
DIASTOLIC BLOOD PRESSURE: 64 MMHG | RESPIRATION RATE: 14 BRPM | TEMPERATURE: 98.2 F | SYSTOLIC BLOOD PRESSURE: 151 MMHG | OXYGEN SATURATION: 97 % | HEART RATE: 68 BPM

## 2018-05-30 VITALS
SYSTOLIC BLOOD PRESSURE: 118 MMHG | TEMPERATURE: 98.4 F | DIASTOLIC BLOOD PRESSURE: 63 MMHG | RESPIRATION RATE: 15 BRPM | HEART RATE: 65 BPM | OXYGEN SATURATION: 94 %

## 2018-05-30 VITALS — HEART RATE: 75 BPM

## 2018-05-30 VITALS — HEART RATE: 73 BPM

## 2018-05-30 VITALS — HEART RATE: 66 BPM

## 2018-05-30 VITALS — HEART RATE: 74 BPM

## 2018-05-30 VITALS — HEART RATE: 68 BPM

## 2018-05-30 VITALS — HEART RATE: 78 BPM

## 2018-05-30 VITALS — OXYGEN SATURATION: 98 %

## 2018-05-30 VITALS — HEART RATE: 76 BPM

## 2018-05-30 VITALS — HEART RATE: 72 BPM

## 2018-05-30 LAB
ALBUMIN SERPL-MCNC: 2.2 GM/DL (ref 3.4–5)
ALP SERPL-CCNC: 85 U/L (ref 45–117)
ALT SERPL-CCNC: 19 U/L (ref 12–78)
AST SERPL-CCNC: 17 U/L (ref 15–37)
BASOPHILS # BLD AUTO: 0.1 TH/MM3 (ref 0–0.2)
BASOPHILS NFR BLD: 0.5 % (ref 0–2)
BILIRUB SERPL-MCNC: 0.3 MG/DL (ref 0.2–1)
BUN SERPL-MCNC: 19 MG/DL (ref 7–18)
CALCIUM SERPL-MCNC: 8.4 MG/DL (ref 8.5–10.1)
CHLORIDE SERPL-SCNC: 101 MEQ/L (ref 98–107)
CREAT SERPL-MCNC: 0.93 MG/DL (ref 0.6–1.3)
EOSINOPHIL # BLD: 0.2 TH/MM3 (ref 0–0.4)
EOSINOPHIL NFR BLD: 1.5 % (ref 0–4)
ERYTHROCYTE [DISTWIDTH] IN BLOOD BY AUTOMATED COUNT: 17.2 % (ref 11.6–17.2)
GFR SERPLBLD BASED ON 1.73 SQ M-ARVRAT: 83 ML/MIN (ref 89–?)
GLUCOSE SERPL-MCNC: 283 MG/DL (ref 74–106)
HBA1C MFR BLD: 9.8 % (ref 4.3–6)
HCO3 BLD-SCNC: 28.9 MEQ/L (ref 21–32)
HCT VFR BLD CALC: 37.7 % (ref 39–51)
HGB BLD-MCNC: 12.7 GM/DL (ref 13–17)
LYMPHOCYTES # BLD AUTO: 1.9 TH/MM3 (ref 1–4.8)
LYMPHOCYTES NFR BLD AUTO: 17.3 % (ref 9–44)
LYMPHOCYTES: 13 % (ref 9–44)
MAGNESIUM SERPL-MCNC: 2 MG/DL (ref 1.5–2.5)
MCH RBC QN AUTO: 27.4 PG (ref 27–34)
MCHC RBC AUTO-ENTMCNC: 33.8 % (ref 32–36)
MCV RBC AUTO: 81.1 FL (ref 80–100)
METAMYELOCYTES NFR BLD: 2 % (ref 0–1)
MONOCYTE #: 1.1 TH/MM3 (ref 0–0.9)
MONOCYTES NFR BLD: 10.3 % (ref 0–8)
MONOCYTES: 9 % (ref 0–8)
NEUTROPHILS # BLD AUTO: 7.6 TH/MM3 (ref 1.8–7.7)
NEUTROPHILS NFR BLD AUTO: 70.4 % (ref 16–70)
NEUTS BAND # BLD MANUAL: 8.3 TH/MM3 (ref 1.8–7.7)
NEUTS BAND NFR BLD: 3 % (ref 0–6)
NEUTS SEG NFR BLD MANUAL: 71 % (ref 16–70)
OVALOCYTES BLD QL SMEAR: (no result)
PHOSPHATE SERPL-MCNC: 3.2 MG/DL (ref 2.5–4.9)
PLATELET # BLD: 172 TH/MM3 (ref 150–450)
PMV BLD AUTO: 9 FL (ref 7–11)
PROT SERPL-MCNC: 6.4 GM/DL (ref 6.4–8.2)
RBC # BLD AUTO: 4.64 MIL/MM3 (ref 4.5–5.9)
SODIUM SERPL-SCNC: 137 MEQ/L (ref 136–145)
T4 FREE SERPL-MCNC: 1.34 NG/DL (ref 0.76–1.46)
TOXIC GRANULES BLD QL SMEAR: (no result)
WBC # BLD AUTO: 10.9 TH/MM3 (ref 4–11)

## 2018-05-30 RX ADMIN — CARVEDILOL SCH MG: 6.25 TABLET, FILM COATED ORAL at 22:19

## 2018-05-30 RX ADMIN — CEFAZOLIN SODIUM SCH MLS/HR: 2 SOLUTION INTRAVENOUS at 17:39

## 2018-05-30 RX ADMIN — HEPARIN SODIUM PRN MLS/HR: 10000 INJECTION, SOLUTION INTRAVENOUS at 22:35

## 2018-05-30 RX ADMIN — HEPARIN SODIUM PRN MLS/HR: 10000 INJECTION, SOLUTION INTRAVENOUS at 10:18

## 2018-05-30 RX ADMIN — SACUBITRIL AND VALSARTAN SCH TAB: 49; 51 TABLET, FILM COATED ORAL at 08:09

## 2018-05-30 RX ADMIN — INSULIN ASPART SCH: 100 INJECTION, SOLUTION INTRAVENOUS; SUBCUTANEOUS at 08:00

## 2018-05-30 RX ADMIN — PRAVASTATIN SODIUM SCH MG: 40 TABLET ORAL at 08:09

## 2018-05-30 RX ADMIN — CEFAZOLIN SODIUM SCH MLS/HR: 2 SOLUTION INTRAVENOUS at 00:13

## 2018-05-30 RX ADMIN — CLINDAMYCIN PHOSPHATE SCH MLS/HR: 150 INJECTION, SOLUTION INTRAMUSCULAR; INTRAVENOUS at 15:23

## 2018-05-30 RX ADMIN — CLINDAMYCIN PHOSPHATE SCH MLS/HR: 150 INJECTION, SOLUTION INTRAMUSCULAR; INTRAVENOUS at 22:20

## 2018-05-30 RX ADMIN — INSULIN ASPART SCH: 100 INJECTION, SOLUTION INTRAVENOUS; SUBCUTANEOUS at 17:00

## 2018-05-30 RX ADMIN — INSULIN ASPART SCH: 100 INJECTION, SOLUTION INTRAVENOUS; SUBCUTANEOUS at 12:00

## 2018-05-30 RX ADMIN — ACYCLOVIR SCH UNITS: 800 TABLET ORAL at 22:19

## 2018-05-30 RX ADMIN — CLINDAMYCIN PHOSPHATE SCH MLS/HR: 150 INJECTION, SOLUTION INTRAMUSCULAR; INTRAVENOUS at 05:59

## 2018-05-30 RX ADMIN — CARVEDILOL SCH MG: 6.25 TABLET, FILM COATED ORAL at 08:09

## 2018-05-30 RX ADMIN — PANTOPRAZOLE SCH MG: 40 TABLET, DELAYED RELEASE ORAL at 08:09

## 2018-05-30 RX ADMIN — INSULIN ASPART SCH: 100 INJECTION, SOLUTION INTRAVENOUS; SUBCUTANEOUS at 22:18

## 2018-05-30 RX ADMIN — SACUBITRIL AND VALSARTAN SCH TAB: 49; 51 TABLET, FILM COATED ORAL at 22:19

## 2018-05-30 RX ADMIN — CEFAZOLIN SODIUM SCH MLS/HR: 2 SOLUTION INTRAVENOUS at 08:10

## 2018-05-30 RX ADMIN — PHENYTOIN SODIUM SCH MLS/HR: 50 INJECTION INTRAMUSCULAR; INTRAVENOUS at 10:17

## 2018-05-30 RX ADMIN — ASPIRIN 81 MG SCH MG: 81 TABLET ORAL at 08:09

## 2018-05-30 NOTE — HHI.PR
Subjective


Remarks


5-29 Patient is scheduled to have right second toe amputated due to gangrene 

and osteomyelitis today


Has some hypotension we will give some fluids since patient is n.p.o.


Discussed with patient and RN


Carla. labs





5-30 HAD 2ND RIGHT TOE AMPUTATION ON MAY 29


NO NEW COMPLAINTS


DW RN AND PT AND CM





Objective


Vitals





Vital Signs








  Date Time  Temp Pulse Resp B/P (MAP) Pulse Ox O2 Delivery O2 Flow Rate FiO2


 


5/30/18 12:23 98.4 65 15 118/63 (81) 94   


 


5/30/18 10:00  75      


 


5/30/18 09:00  72      


 


5/30/18 08:20 98.6 73 14 129/71 (90) 96   


 


5/30/18 08:00  64      


 


5/30/18 07:00  76      


 


5/30/18 06:00  68      


 


5/30/18 05:00  64      


 


5/30/18 04:50 98.6 69 18 105/73 (84) 96   


 


5/30/18 04:00  76      


 


5/30/18 03:00  74      


 


5/30/18 02:00  68      


 


5/30/18 01:00  68      


 


5/30/18 00:23  76      


 


5/30/18 00:10 97.7 73 18 102/70 (81) 96   


 


5/29/18 23:00  70      


 


5/29/18 22:00  76      


 


5/29/18 21:27     95   


 


5/29/18 21:00  82      


 


5/29/18 20:03  78      


 


5/29/18 19:52 98.0 74 16 106/60 (75) 98   


 


5/29/18 19:00  78      


 


5/29/18 18:00  70      


 


5/29/18 17:40  62 16  99 Nasal Cannula 2 


 


5/29/18 17:30 97.8 60 16 112/63 (79) 98 Nasal Cannula 2 


 


5/29/18 17:15  65 15 109/60 (76) 97 Nasal Cannula 2 


 


5/29/18 17:10 97.6 67 15 113/66 (82) 99 Nasal Cannula 3 














I/O      


 


 5/29/18 5/29/18 5/29/18 5/30/18 5/30/18 5/30/18





 07:00 15:00 23:00 07:00 15:00 23:00


 


Intake Total 902 ml  370 ml 342.25 ml  


 


Output Total 1100 ml  1610 ml 1475 ml  


 


Balance -198 ml  -1240 ml -1132.75 ml  


 


      


 


Intake Oral 500 ml  120 ml 240 ml  


 


IV Total 402 ml   102.25 ml  


 


Other   250 ml   


 


Output Urine Total 1100 ml  1610 ml 1475 ml  


 


Estimated Blood Loss   0 ml   


 


# Voids   0   








Result Diagram:  


5/30/18 0250                                                                   

             5/30/18 0250





Other Results





 Laboratory Tests








Test


  5/28/18


04:05 5/28/18


14:25 5/28/18


18:47 5/29/18


03:23


 


White Blood Count 12.4 TH/MM3  12.1 TH/MM3   11.3 TH/MM3 


 


Red Blood Count 5.20 MIL/MM3  5.36 MIL/MM3   4.95 MIL/MM3 


 


Hemoglobin 14.2 GM/DL  14.6 GM/DL   13.4 GM/DL 


 


Hematocrit 42.0 %  43.6 %   39.5 % 


 


Mean Corpuscular Volume 80.7 FL  81.3 FL   79.8 FL 


 


Mean Corpuscular Hemoglobin 27.3 PG  27.3 PG   27.1 PG 


 


Mean Corpuscular Hemoglobin


Concent 33.8 % 


  33.6 % 


  


  34.0 % 


 


 


Red Cell Distribution Width 17.0 %  17.5 %   17.1 % 


 


Platelet Count 80 TH/MM3  102 TH/MM3   98 TH/MM3 


 


Mean Platelet Volume 10.2 FL  10.1 FL   9.4 FL 


 


Blood Urea Nitrogen 28 MG/DL    


 


Creatinine 0.77 MG/DL    0.73 MG/DL 


 


Random Glucose 232 MG/DL    


 


Calcium Level 8.9 MG/DL    


 


Sodium Level 138 MEQ/L    


 


Potassium Level 4.1 MEQ/L    


 


Chloride Level 103 MEQ/L    


 


Carbon Dioxide Level 26.8 MEQ/L    


 


Anion Gap 8 MEQ/L    


 


Estimat Glomerular Filtration


Rate 103 ML/MIN 


  


  


  110 ML/MIN 


 


 


B-Type Natriuretic Peptide 847 PG/ML    


 


Prothrombin Time  11.4 SEC   


 


Prothromb Time International


Ratio 


  1.1 RATIO 


  


  


 


 


Activated Partial


Thromboplast Time 


  24.1 SEC 


  32.2 SEC 


  37.3 SEC 


 


 


Neutrophils (%) (Auto)    64.6 % 


 


Lymphocytes (%) (Auto)    19.1 % 


 


Monocytes (%) (Auto)    13.3 % 


 


Eosinophils (%) (Auto)    1.7 % 


 


Basophils (%) (Auto)    1.3 % 


 


Neutrophils # (Auto)    7.3 TH/MM3 


 


Lymphocytes # (Auto)    2.2 TH/MM3 


 


Monocytes # (Auto)    1.5 TH/MM3 


 


Eosinophils # (Auto)    0.2 TH/MM3 


 


Basophils # (Auto)    0.2 TH/MM3 


 


CBC Comment    DIFF FINAL 


 


Differential Comment     


 


Test


  5/29/18


10:15 5/29/18


14:27 5/29/18


18:09 5/29/18


21:31


 


Activated Partial


Thromboplast Time 42.0 SEC 


  


  39.5 SEC 


  


 


 


Gentamicin Level Trough  0.7 MCG/ML   


 


Gentamicin Level Peak    1.1 MCG/ML 


 


Test


  5/30/18


02:50 


  


  


 


 


White Blood Count 10.9 TH/MM3    


 


Red Blood Count 4.64 MIL/MM3    


 


Hemoglobin 12.7 GM/DL    


 


Hematocrit 37.7 %    


 


Mean Corpuscular Volume 81.1 FL    


 


Mean Corpuscular Hemoglobin 27.4 PG    


 


Mean Corpuscular Hemoglobin


Concent 33.8 % 


  


  


  


 


 


Red Cell Distribution Width 17.2 %    


 


Platelet Count 172 TH/MM3    


 


Mean Platelet Volume 9.0 FL    


 


Neutrophils (%) (Auto) 70.4 %    


 


Lymphocytes (%) (Auto) 17.3 %    


 


Monocytes (%) (Auto) 10.3 %    


 


Eosinophils (%) (Auto) 1.5 %    


 


Basophils (%) (Auto) 0.5 %    


 


Neutrophils # (Auto) 7.6 TH/MM3    


 


Lymphocytes # (Auto) 1.9 TH/MM3    


 


Monocytes # (Auto) 1.1 TH/MM3    


 


Eosinophils # (Auto) 0.2 TH/MM3    


 


Basophils # (Auto) 0.1 TH/MM3    


 


CBC Comment AUTO DIFF    


 


Differential Total Cells


Counted 100 


  


  


  


 


 


Neutrophils % (Manual) 71 %    


 


Band Neutrophils % 3 %    


 


Lymphocytes % 13 %    


 


Monocytes % 9 %    


 


Eosinophils % 2 %    


 


Neutrophils # (Manual) 8.3 TH/MM3    


 


Metamyelocytes 2 %    


 


Differential Comment


  FINAL DIFF


MANUAL 


  


  


 


 


Toxic Granulation 1+    


 


Platelet Estimate NORMAL    


 


Platelet Morphology Comment NORMAL    


 


Ovalocytes 1+    


 


Activated Partial


Thromboplast Time 44.9 SEC 


  


  


  


 


 


Blood Urea Nitrogen 19 MG/DL    


 


Creatinine 0.93 MG/DL    


 


Random Glucose 283 MG/DL    


 


Total Protein 6.4 GM/DL    


 


Albumin 2.2 GM/DL    


 


Calcium Level 8.4 MG/DL    


 


Phosphorus Level 3.2 MG/DL    


 


Magnesium Level 2.0 MG/DL    


 


Alkaline Phosphatase 85 U/L    


 


Aspartate Amino Transf


(AST/SGOT) 17 U/L 


  


  


  


 


 


Alanine Aminotransferase


(ALT/SGPT) 19 U/L 


  


  


  


 


 


Total Bilirubin 0.3 MG/DL    


 


Sodium Level 137 MEQ/L    


 


Potassium Level 4.5 MEQ/L    


 


Chloride Level 101 MEQ/L    


 


Carbon Dioxide Level 28.9 MEQ/L    


 


Anion Gap 7 MEQ/L    


 


Estimat Glomerular Filtration


Rate 83 ML/MIN 


  


  


  


 


 


B-Type Natriuretic Peptide 704 PG/ML    


 


Free Thyroxine 1.34 NG/DL    


 


Thyroid Stimulating Hormone


3rd Gen 2.420 uIU/ML 


  


  


  


 








Imaging





Last Impressions








Aorta w/Runoff CTA 5/28/18 0000 Signed





Impressions: 





 CONCLUSION:





 1.  Atherosclerotic changes without significant stenosis bilateral lower extrem





 ities.





  





 


 


Chest X-Ray 5/24/18 0972 Signed





Impressions: 





 CONCLUSION: 





 1.  No acute abnormality or significant interval change.





  





 


 


Toe X-Ray 5/24/18 0000 Signed





Impressions: 





 CONCLUSION: 





 Questionable small erosion distal phalanx right second toe. This could be infec





 tious in nature if there is clinical evidence for soft tissue infection.





  





 





  





  





 





  





 





  





 


 


Renal Ultrasound 5/24/18 0000 Signed





Impressions: 





 CONCLUSION: 





 1.  No hydronephrosis.





 2.  Increased echogenicity most characteristic of medical renal disease.





  





 


 


Foot MRI 5/24/18 0000 Signed





Impressions: 





 CONCLUSION:





 1.  No evidence for osteomyelitis. Soft tissue swelling at the second toe.





  





 








Objective Remarks


GENERAL: Awake alert and oriented 3 talkative and cooperative


SKIN: Warm and dry.  Right second toe is gangrenous and black 


HEAD: Atraumatic. Normocephalic. 


EYES: Pupils equal and round. No scleral icterus. No injection or drainage. 

extraocular muscles intact


ENT: No nasal bleeding or discharge.  Mucous membranes pink and moist.  Tongue 

is midline


NECK: Trachea midline. No JVD.  Supple


CARDIOVASCULAR: Regular rate and rhythm.  S1-S2 no S3 or S4


RESPIRATORY: No accessory muscle use. Clear to auscultation. Breath sounds 

equal bilaterally. 


GASTROINTESTINAL: Abdomen soft, non-tender, nondistended. Hepatic and splenic 

margins not palpable. 


MUSCULOSKELETAL: Extremities without clubbing, cyanosis, or edema. No obvious 

deformities.  Right second toe is black and gangrenous


NEUROLOGICAL: Awake and alert. No obvious cranial nerve deficits.  Motor 

grossly within normal limits. Five out of 5 muscle strength in the arms and 

legs.  Normal speech.


PSYCHIATRIC: Appropriate mood and affect; insight and judgment normal.


Procedures


May 29, 2018


Pre Op Diagnosis:  


gangrene with osteomyelitis right 2nd toe


Post Op Diagnosis:  


same


Surgeon:


Marc Mckeon DPM


Assistant(s):


Staff


Procedure:


amputation right 2nd toe


Findings:


Consistent with diagnosis. Right 2nd toe with necrotic tissue to entire digit. 

Foul odor slightly present. Margins clean without purulence. 


Two semi-elliptical incisions made to disarticulate digit in its entirety at 

metatarsophalangeal joint level. 2nd metatarsal head with white healthy 

cartilage cap. No purulence noted.


Culture taken prior to irrigation and primary closure with 2-0 nylon suture. 


Dressing with xeroform, 4x4, cast padding, ace.


Weightbearing as tolerated to heel in surgical shoe for transfers only.


Additional Information:


see findings


Complications:


none


Specimen(s) removed:


1. toe right 2nd


2. culture right foot


Estimated blood loss:


minimal


Anesthesia:  MAC, Local (10mL 2% lidocaine plain)


Drains:  None


Tourniquet time (min at mmHg)


n/a


Patient to:  PACU


Patient Condition:  Good


Date/Time of Procedure:  SEE SURGICAL CARE RECORD











Marc Mckeon DPM


Medications and IVs





Current Medications


Sodium Chloride (NS Flush) 2 ml UNSCH  PRN IVF FLUSH AFTER USING IV ACCESS Last 

administered on 5/28/18at 08:31;  Start 5/24/18 at 10:00


Piperacillin Sod/ Tazobactam Sod 50 ml @  100 mls/hr ONCE  ONCE IV  Last 

administered on 5/24/18at 12:07;  Start 5/24/18 at 11:30;  Stop 5/24/18 at 11:59

;  Status DC


Vancomycin HCl 1350 mg/Sodium Chloride 513.5 ml @  250 mls/hr ONCE  ONCE IV  

Last administered on 5/24/18at 13:27;  Start 5/24/18 at 11:30;  Stop 5/24/18 at 

13:33;  Status DC


Aspirin (Aspirin Chew) 162 mg ONCE  ONCE CHEW  Last administered on 5/24/18at 12

:02;  Start 5/24/18 at 11:30;  Stop 5/24/18 at 11:31;  Status DC


Heparin Sodium (Porcine) (Heparin Inj) 4,000 units ONCE  ONCE IV PUSH  Last 

administered on 5/24/18at 12:03;  Start 5/24/18 at 11:45;  Stop 5/24/18 at 11:46

;  Status DC


Heparin Sodium/ Dextrose 250 ml @  10 mls/hr TITRATE  PRN IV Coagulation 

Management Last administered on 5/25/18at 16:19;  Start 5/24/18 at 11:45;  Stop 

5/29/18 at 21:48;  Status DC


Dextrose (D50w (Vial) Inj) 50 ml UNSCH  PRN IV PUSH HYPOGLYCEMIA-SEE COMMENTS;  

Start 5/24/18 at 13:30


Glucagon (Glucagon Inj) 1 mg UNSCH  PRN OTHER HYPOGLYCEMIA-SEE COMMENTS;  Start 

5/24/18 at 13:30


Insulin Aspart (NovoLOG SUPPLEMENTAL SCALE) 1 ACHS SLIDING  SCALE SQ  Last 

administered on 5/30/18at 12:00;  Start 5/24/18 at 17:00


Sodium Chloride 1,000 ml @  60 mls/hr [id number] IV  Last administered on 5/24/ 18at 18:23;  Start 5/24/18 at 13:30;  Stop 5/27/18 at 10:00;  Status DC


Piperacillin Sod/ Tazobactam Sod 50 ml @  100 mls/hr Q6H IV  Last administered 

on 5/26/18at 17:02;  Start 5/24/18 at 18:00;  Stop 5/27/18 at 09:02;  Status DC


Pharmacy Profile Note 0 ml @ 0 mls/hr UNSCH OTHER ;  Start 5/24/18 at 13:30;  

Stop 5/26/18 at 11:26;  Status DC


Aspirin (Ecotrin Ec) 162 mg DAILY PO  Last administered on 5/26/18at 09:05;  

Start 5/25/18 at 09:00;  Stop 5/28/18 at 12:04;  Status DC


Famotidine (Pepcid) 20 mg BID PO  Last administered on 5/27/18at 08:52;  Start 5 /24/18 at 21:00;  Stop 5/27/18 at 10:16;  Status DC


Pravastatin Sodium (Pravachol) 40 mg DAILY PO  Last administered on 5/30/18at 08

:09;  Start 5/25/18 at 09:00


Albuterol/ Ipratropium (Duoneb Neb) 1 ampule Q4HR NEB  PRN NEB SHORTNESS OF 

BREATH Last administered on 5/24/18at 15:38;  Start 5/24/18 at 13:45


Vancomycin HCl 1500 mg/Sodium Chloride 515 ml @  250 mls/hr Q24H IV  Last 

administered on 5/25/18at 14:18;  Start 5/25/18 at 14:00;  Stop 5/25/18 at 16:40

;  Status DC


Acetaminophen (Tylenol) 650 mg Q4H  PRN PO FEVER/PAIN 1-5 Last administered on 5 /27/18at 11:45;  Start 5/24/18 at 14:30


Acetaminophen/ Hydrocodone Bitart (Norco  5-325 Mg) 1 tab Q4H  PRN PO PAIN 6-10 

Last administered on 5/28/18at 16:28;  Start 5/24/18 at 14:30


Gadobenate Dimeglumine (Multihance Pf Inj) 18 ml STK-MED ONCE IV  Last 

administered on 5/24/18at 17:21;  Start 5/24/18 at 17:21;  Stop 5/24/18 at 17:22

;  Status DC


Metoclopramide HCl (Reglan Inj) 10 mg ONCE  ONCE IV PUSH  Last administered on 5 /25/18at 02:05;  Start 5/25/18 at 02:15;  Stop 5/25/18 at 02:16;  Status DC


Metoclopramide HCl (Reglan Inj) 5 mg Q6H  PRN IV PUSH NAUSEA OR VOMITING Last 

administered on 5/25/18at 20:10;  Start 5/25/18 at 02:30


Ondansetron HCl (Zofran  Odt) 4 mg Q6H  PRN PO NAUSEA OR VOMITING Last 

administered on 5/28/18at 11:50;  Start 5/25/18 at 02:30


Metoprolol Tartrate (Lopressor Inj) 5 mg ONCE  ONCE IV PUSH  Last administered 

on 5/25/18at 02:39;  Start 5/25/18 at 02:45;  Stop 5/25/18 at 02:46;  Status DC


Metoprolol Tartrate (Lopressor Inj) 5 mg Q5M  PRN IV PUSH ATRIAL FLUTTER Last 

administered on 5/25/18at 03:06;  Start 5/25/18 at 02:45


Miscellaneous Information ([facility] Ordered Lab Info) SPECIFIC LAB TO BE 

TYLOR... ONCE  ONCE .XX ;  Start 5/27/18 at 12:45;  Stop 5/27/18 at 12:46;  

Status Cancel


Insulin Detemir (Levemir Inj) 10 units HS SQ  Last administered on 5/29/18at 20:

00;  Start 5/25/18 at 21:00


Vancomycin HCl 1000 mg/Sodium Chloride 250 ml @  250 mls/hr Q12H IV  Last 

administered on 5/26/18at 01:06;  Start 5/26/18 at 02:00;  Stop 5/26/18 at 11:26

;  Status DC


Miscellaneous Information ([facility] Ordered Lab Info) SPECIFIC LAB TO BE 

... ONCE  ONCE .XX ;  Start 5/27/18 at 13:45;  Stop 5/27/18 at 13:46;  

Status DC


Benzonatate (Tessalon) 100 mg TID  PRN PO COUGH Last administered on 5/29/18at 

02:00;  Start 5/26/18 at 03:00


Guaifenesin/ Codeine Phosphate (Robitussin Ac 200-20 Mg/10 ml Liq) 10 ml Q6H  

PRN PO COUGH;  Start 5/26/18 at 03:00


Magnesium Sulfate/ Dextrose 100 ml @  100 mls/hr Q1H IV  Last administered on 5/ 26/18at 09:45;  Start 5/26/18 at 08:45;  Stop 5/26/18 at 10:44;  Status DC


Cefazolin Sodium/ Dextrose 50 ml @  100 mls/hr Q8H IV  Last administered on 5/26 /18at 21:32;  Start 5/26/18 at 13:00;  Stop 5/26/18 at 23:30;  Status DC


Carvedilol (Coreg) 6.25 mg Q12HR PO  Last administered on 5/30/18at 08:09;  

Start 5/26/18 at 11:30


Sacubitril/ Valsartan (Entresto 49-51 Mg) 1 tab BID PO  Last administered on 5/ 30/18at 08:09;  Start 5/26/18 at 21:00


Cefepime HCl 1000 mg/Sodium Chloride 100 ml @  200 mls/hr Q8H IV  Last 

administered on 5/27/18at 08:49;  Start 5/27/18 at 00:00;  Stop 5/27/18 at 09:01

;  Status DC


Piperacillin Sod/ Tazobactam Sod 50 ml @  100 mls/hr Q6H IV  Last administered 

on 5/28/18at 10:00;  Start 5/27/18 at 10:00;  Stop 5/28/18 at 11:16;  Status DC


Cefazolin Sodium/ Dextrose 50 ml @  100 mls/hr Q8H IV  Last administered on 5/30 /18at 08:10;  Start 5/27/18 at 09:00


Aspirin (Aspirin Chew) 81 mg DAILY CHEW  Last administered on 5/30/18at 08:09;  

Start 5/28/18 at 12:10


Pharmacy Profile Note 0 ml @ 0 mls/hr UNSCH OTHER ;  Start 5/28/18 at 11:15


Heparin Sodium/ Dextrose 250 ml @  16.128 mls/ hr TITRATE  PRN IV Coagulation 

Management Last administered on 5/30/18at 10:18;  Start 5/28/18 at 12:30


Gentamicin Sulfate 90 mg/ Sodium Chloride 102.25 ml  @ 200 mls/ hr Q8H IV  Last 

administered on 5/30/18at 15:23;  Start 5/28/18 at 14:00


Miscellaneous Information ([facility] Ordered Lab Info) SPECIFIC LAB TO BE 

TYLOR... ONCE  ONCE .XX ;  Start 5/29/18 at 13:45;  Stop 5/29/18 at 13:46;  

Status DC


Miscellaneous Information ([facility] Ordered Lab Info) SPECIFIC LAB TO BE 

TYLOR... ONCE  ONCE .XX  Last administered on 5/29/18at 15:00;  Start 5/29/18 at 

15:00;  Stop 5/29/18 at 15:01;  Status DC


Iohexol (Omnipaque 350 Inj) 100 ml STK-MED ONCE IVCONTRAST  Last administered 

on 5/28/18at 13:57;  Start 5/28/18 at 13:57;  Stop 5/28/18 at 13:58;  Status DC


Sodium Chloride 1,000 ml @  100 mls/hr Q10H IV  Last administered on 5/30/18at 

10:17;  Start 5/29/18 at 11:15


Pantoprazole Sodium (Protonix Inj) 40 mg ONCE  ONCE IV PUSH  Last administered 

on 5/29/18at 12:55;  Start 5/29/18 at 12:45;  Stop 5/29/18 at 12:50;  Status DC


Pantoprazole Sodium (Protonix) 40 mg DAILY PO  Last administered on 5/30/18at 08

:09;  Start 5/30/18 at 09:00


Ketamine HCl (Ketalar Inj) 50 mg STK-MED ONCE .ROUTE ;  Start 5/29/18 at 15:41;

  Stop 5/29/18 at 15:42;  Status DC


Lidocaine HCl (Xylocaine-Mpf 2% Inj) 20 ml STK-MED ONCE .ROUTE  Last 

administered on 5/29/18at 16:28;  Start 5/29/18 at 16:24;  Stop 5/29/18 at 16:25

;  Status DC


Midazolam HCl (Versed Inj) 2 mg STK-MED ONCE .ROUTE ;  Start 5/29/18 at 17:16;  

Stop 5/29/18 at 17:17;  Status DC


Miscellaneous Information (Misc Nursing Information) ALL NURSING DEPARTME... 

UNSCH  PRN .XX SEE LABEL COMMENTS;  Start 5/29/18 at 17:07;  Stop 5/30/18 at 17:

06


Miscellaneous Information ([facility] Ordered Lab Info) SPECIFIC LAB TO BE 

DRAWN:GENTAMICIN TROUGH DATE TO... ONCE  ONCE .XX ;  Start 5/30/18 at 21:45;  

Stop 5/30/18 at 21:46


Miscellaneous Information ([facility] Ordered Lab Info) SPECIFIC LAB TO BE 

DRAWN:GENTAMICIN PEAK DATE TO... ONCE  ONCE .XX ;  Start 5/30/18 at 23:00;  

Stop 5/30/18 at 23:01





A/P


Problem List:  


(1) Severe sepsis


ICD Code:  A41.9 - Sepsis, unspecified organism; R65.20 - Severe sepsis without 

septic shock


(2) Elevated troponin


ICD Code:  R74.8 - Abnormal levels of other serum enzymes


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


Assessment and Plan


Severe sepsis due to gangrene/ infection of the right second toe 


Staph aureus bacteremia-- persistent bacteremia- 


check echo- may need SHAYY


ID [id number]  - on zosyn and  Ancef


Repeat BC to document clearing in next few days 


May need SHAYY


Podiatry following


Vascular surgery ff- requested for CTA runoff- DONE


FOR SURGERY ON RIGHT 2ND TOE 5-29 WITH PODIATRY


May 29, 2018


Pre Op Diagnosis:  


gangrene with osteomyelitis right 2nd toe


Surgeon:


Marc Mckeon DPRY


Procedure:


amputation right 2nd toe


Findings:


Consistent with diagnosis. Right 2nd toe with necrotic tissue to entire digit. 

Foul odor slightly present. Margins clean without purulence. 


Two semi-elliptical incisions made to disarticulate digit in its entirety at 

metatarsophalangeal joint level. 2nd metatarsal head with white healthy 

cartilage cap. No purulence noted.


Culture taken prior to irrigation and primary closure with 2-0 nylon suture. 


Dressing with xeroform, 4x4, cast padding, ace.


Weightbearing as tolerated to heel in surgical shoe for transfers only.


Specimen(s) removed:


1. toe right 2nd


2. culture right foot





Ischemic cardiomyopathy EF 25% - nstemi, 


Debrillator fired evening 5.25


NSTEMI with history of CAD- had cardiac cath two months ago with severe three 

vessel disease- was evaluated by CT surgery at the time with poor targets for 

redo sternotomy


high metabolic demand from sepsis


- clinically feeling better 


- continue electrolyte monitoring


- ASA, Heparin drip- DC due to thrombocytopenia- Hematology consutled for 

recommendation


- continue  Entresto, Coreg


- will start ASA





Acute kidney injury; improved- started on gentle IV hydration in light of 

cardiomyopathy- monitor renal function





-thrombocytopenia- likely due to sepsis


-  continue ASA


- FF CBC


-seen by Dr. Skelton. HIT ab negative- restart heparin drip- will d/w Dr. Skelton


- will also DC Pepcid- due to thrombocytopenia





Diabetes mellitus- sliding scale 





-COPD with no exacerbation; neb treatment as needed.


-diabetes mellitus; uncontrolled- hold oral hypoglycemics- start on accu-check 

with SSI. continue Levemir and adjust 





-DVT prophylaxis;encoruage increase activity





palliative care  ff


CM ff - assisting with transfer to a tertiary center for transplant-- no 

insurance, assisting us with Medicaid application


Discharge Planning


PENDING ID CLEARANCE











Reece Yousif DO May 30, 2018 15:52

## 2018-05-30 NOTE — PD.CARD.PN
Subjective


Subjective Remarks


appears comfortable in nad





Objective


Medications





Current Medications








 Medications


  (Trade)  Dose


 Ordered  Sig/Ilia


 Route  Start Time


 Stop Time Status Last Admin


 


  (NS Flush)  2 ml  UNSCH  PRN


 IVF  5/24/18 10:00


    5/28/18 08:31


 


 


  (D50w (Vial) Inj)  50 ml  UNSCH  PRN


 IV PUSH  5/24/18 13:30


     


 


 


  (Glucagon Inj)  1 mg  UNSCH  PRN


 OTHER  5/24/18 13:30


     


 


 


  (NovoLOG


 SUPPLEMENTAL


 SCALE)  1  ACHS SLIDING  SCALE


 SQ  5/24/18 17:00


    5/30/18 17:00


 


 


  (Pravachol)  40 mg  DAILY


 PO  5/25/18 09:00


    5/30/18 08:09


 


 


  (Duoneb Neb)  1 ampule  Q4HR NEB  PRN


 NEB  5/24/18 13:45


    5/24/18 15:38


 


 


  (Tylenol)  650 mg  Q4H  PRN


 PO  5/24/18 14:30


    5/27/18 11:45


 


 


  (Norco  5-325 Mg)  1 tab  Q4H  PRN


 PO  5/24/18 14:30


    5/28/18 16:28


 


 


  (Reglan Inj)  5 mg  Q6H  PRN


 IV PUSH  5/25/18 02:30


    5/25/18 20:10


 


 


  (Zofran  Odt)  4 mg  Q6H  PRN


 PO  5/25/18 02:30


    5/28/18 11:50


 


 


  (Lopressor Inj)  5 mg  Q5M  PRN


 IV PUSH  5/25/18 02:45


    5/25/18 03:06


 


 


  (Levemir Inj)  10 units  HS


 SQ  5/25/18 21:00


    5/29/18 20:00


 


 


  (Tessalon)  100 mg  TID  PRN


 PO  5/26/18 03:00


    5/29/18 02:00


 


 


  (Robitussin Ac


 200-20 Mg/10 ml


 Liq)  10 ml  Q6H  PRN


 PO  5/26/18 03:00


     


 


 


  (Coreg)  6.25 mg  Q12HR


 PO  5/26/18 11:30


    5/30/18 08:09


 


 


  (Entresto 49-51


 Mg)  1 tab  BID


 PO  5/26/18 21:00


    5/30/18 08:09


 


 


 Cefazolin Sodium/


 Dextrose  50 ml @ 


 100 mls/hr  Q8H


 IV  5/27/18 09:00


    5/30/18 17:39


 


 


  (Aspirin Chew)  81 mg  DAILY


 CHEW  5/28/18 12:10


    5/30/18 08:09


 


 


 Pharmacy Profile


 Note  0 ml @ 0


 mls/hr  UNSCH


 OTHER  5/28/18 11:15


     


 


 


 Heparin Sodium/


 Dextrose  250 ml @ 


 16.128 mls/


 hr  TITRATE  PRN


 IV  5/28/18 12:30


    5/30/18 10:18


 


 


 Gentamicin


 Sulfate 90 mg/


 Sodium Chloride  102.25 ml


  @ 200 mls/


 hr  Q8H


 IV  5/28/18 14:00


    5/30/18 15:23


 


 


 Sodium Chloride  1,000 ml @ 


 100 mls/hr  Q10H


 IV  5/29/18 11:15


   Future Hold 5/30/18 10:17


 


 


  (Protonix)  40 mg  DAILY


 PO  5/30/18 09:00


    5/30/18 08:09


 


 


  ([facility]


 Ordered Lab Info)  SPECIFIC LAB TO BE


 DRAWN:GENTAMICIN


 TROUGH DATE TO...  ONCE  ONCE


 .XX  5/30/18 21:45


 5/30/18 21:46   


 


 


  ([facility]


 Ordered Lab Info)  SPECIFIC LAB TO BE


 DRAWN:GENTAMICIN


 PEAK DATE TO...  ONCE  ONCE


 .XX  5/30/18 23:00


 5/30/18 23:01   


 








Vital Signs / I&O





Vital Signs








  Date Time  Temp Pulse Resp B/P (MAP) Pulse Ox O2 Delivery O2 Flow Rate FiO2


 


5/30/18 18:00  74      


 


5/30/18 17:00  66      


 


5/30/18 16:00  78      


 


5/30/18 15:30 98.2 68 14 151/64 (93) 97   


 


5/30/18 15:00  75      


 


5/30/18 14:00  78      


 


5/30/18 13:00  68      


 


5/30/18 12:23 98.4 65 15 118/63 (81) 94   


 


5/30/18 12:00  68      


 


5/30/18 11:00  74      


 


5/30/18 10:00  75      


 


5/30/18 09:00  72      


 


5/30/18 08:20 98.6 73 14 129/71 (90) 96   


 


5/30/18 08:00  64      


 


5/30/18 07:00  76      


 


5/30/18 06:00  68      


 


5/30/18 05:00  64      


 


5/30/18 04:50 98.6 69 18 105/73 (84) 96   


 


5/30/18 04:00  76      


 


5/30/18 03:00  74      


 


5/30/18 02:00  68      


 


5/30/18 01:00  68      


 


5/30/18 00:23  76      


 


5/30/18 00:10 97.7 73 18 102/70 (81) 96   


 


5/29/18 23:00  70      


 


5/29/18 22:00  76      


 


5/29/18 21:27     95   


 


5/29/18 21:00  82      


 


5/29/18 20:03  78      


 


5/29/18 19:52 98.0 74 16 106/60 (75) 98   














I/O      


 


 5/29/18 5/29/18 5/29/18 5/30/18 5/30/18 5/30/18





 07:00 15:00 23:00 07:00 15:00 23:00


 


Intake Total 902 ml  370 ml 342.25 ml  1200 ml


 


Output Total 1100 ml  1610 ml 1475 ml  1800 ml


 


Balance -198 ml  -1240 ml -1132.75 ml  -600 ml


 


      


 


Intake Oral 500 ml  120 ml 240 ml  1200 ml


 


IV Total 402 ml   102.25 ml  


 


Other   250 ml   


 


Output Urine Total 1100 ml  1610 ml 1475 ml  1800 ml


 


Estimated Blood Loss   0 ml   


 


# Voids   0   








Physical Exam


GENERAL: 


SKIN: Warm and dry.


HEAD: Normocephalic.


EYES: No scleral icterus. No injection or drainage. 


NECK: Supple, trachea midline. No JVD or lymphadenopathy.


CARDIOVASCULAR: Regular rate and rhythm without murmurs, gallops, or rubs. 


RESPIRATORY: Breath sounds equal bilaterally. No accessory muscle use.


GASTROINTESTINAL: Abdomen soft, non-tender, nondistended. 


MUSCULOSKELETAL: No cyanosis, or edema. 


BACK: Nontender without obvious deformity. No CVA tenderness.





Laboratory





Laboratory Tests








Test


  5/29/18


21:31 5/30/18


02:50


 


Gentamicin Level Peak 1.1 MCG/ML  


 


White Blood Count  10.9 TH/MM3 


 


Red Blood Count  4.64 MIL/MM3 


 


Hemoglobin  12.7 GM/DL 


 


Hematocrit  37.7 % 


 


Mean Corpuscular Volume  81.1 FL 


 


Mean Corpuscular Hemoglobin  27.4 PG 


 


Mean Corpuscular Hemoglobin


Concent 


  33.8 % 


 


 


Red Cell Distribution Width  17.2 % 


 


Platelet Count  172 TH/MM3 


 


Mean Platelet Volume  9.0 FL 


 


Neutrophils (%) (Auto)  70.4 % 


 


Lymphocytes (%) (Auto)  17.3 % 


 


Monocytes (%) (Auto)  10.3 % 


 


Eosinophils (%) (Auto)  1.5 % 


 


Basophils (%) (Auto)  0.5 % 


 


Neutrophils # (Auto)  7.6 TH/MM3 


 


Lymphocytes # (Auto)  1.9 TH/MM3 


 


Monocytes # (Auto)  1.1 TH/MM3 


 


Eosinophils # (Auto)  0.2 TH/MM3 


 


Basophils # (Auto)  0.1 TH/MM3 


 


CBC Comment  AUTO DIFF 


 


Differential Total Cells


Counted 


  100 


 


 


Neutrophils % (Manual)  71 % 


 


Band Neutrophils %  3 % 


 


Lymphocytes %  13 % 


 


Monocytes %  9 % 


 


Eosinophils %  2 % 


 


Neutrophils # (Manual)  8.3 TH/MM3 


 


Metamyelocytes  2 % 


 


Differential Comment


  


  FINAL DIFF


MANUAL


 


Toxic Granulation  1+ 


 


Platelet Estimate  NORMAL 


 


Platelet Morphology Comment  NORMAL 


 


Ovalocytes  1+ 


 


Activated Partial


Thromboplast Time 


  44.9 SEC 


 


 


Blood Urea Nitrogen  19 MG/DL 


 


Creatinine  0.93 MG/DL 


 


Random Glucose  283 MG/DL 


 


Total Protein  6.4 GM/DL 


 


Albumin  2.2 GM/DL 


 


Calcium Level  8.4 MG/DL 


 


Phosphorus Level  3.2 MG/DL 


 


Magnesium Level  2.0 MG/DL 


 


Alkaline Phosphatase  85 U/L 


 


Aspartate Amino Transf


(AST/SGOT) 


  17 U/L 


 


 


Alanine Aminotransferase


(ALT/SGPT) 


  19 U/L 


 


 


Total Bilirubin  0.3 MG/DL 


 


Sodium Level  137 MEQ/L 


 


Potassium Level  4.5 MEQ/L 


 


Chloride Level  101 MEQ/L 


 


Carbon Dioxide Level  28.9 MEQ/L 


 


Anion Gap  7 MEQ/L 


 


Estimat Glomerular Filtration


Rate 


  83 ML/MIN 


 


 


B-Type Natriuretic Peptide  704 PG/ML 


 


Free Thyroxine  1.34 NG/DL 


 


Thyroid Stimulating Hormone


3rd Gen 


  2.420 uIU/ML 


 











Assessment and Plan


Problem List:  


(1) ARF (acute renal failure)


ICD Codes:  N17.9 - Acute kidney failure, unspecified


(2) NSTEMI (non-ST elevated myocardial infarction)


ICD Codes:  I21.4 - Non-ST elevation (NSTEMI) myocardial infarction


(3) CAD (coronary artery disease)


ICD Codes:  I25.10 - Atherosclerotic heart disease of native coronary artery 

without angina pectoris


(4) Decreased cardiac ejection fraction


ICD Codes:  R93.1 - Abnormal findings on diagnostic imaging of heart and 

coronary circulation


(5) Elevated troponin


ICD Codes:  R74.8 - Abnormal levels of other serum enzymes


(6) Severe sepsis


ICD Codes:  A41.9 - Sepsis, unspecified organism; R65.20 - Severe sepsis 

without septic shock


(7) Diabetes mellitus


ICD Codes:  E11.9 - Type 2 diabetes mellitus without complications


(8) Acute renal failure


ICD Codes:  N17.9 - Acute kidney failure, unspecified


(9) Cardiomyopathy


ICD Codes:  I42.9 - Cardiomyopathy, unspecified


(10) gangrene of the right second toe


(11) Dyspnea and respiratory abnormalities


ICD Codes:  R06.00 - Dyspnea, unspecified; R06.89 - Other abnormalities of 

breathing


(12) Multi-vessel coronary artery stenosis


ICD Codes:  I25.10 - Atherosclerotic heart disease of native coronary artery 

without angina pectoris


(13) Hx of CABG


ICD Codes:  Z95.1 - Presence of aortocoronary bypass graft


(14) Thrombocytopenia


ICD Codes:  D69.6 - Thrombocytopenia, unspecified


Assessment and Plan


1.) Ischemic cardiomyopathy - nstemi, chf excacerbation and arf due to severe 

unrevascularizable cad, cardiomyopathy, on aspirin and heparin per hematology, 

on antibiotics, pod #1 2nd toe amputation, stable


2.) VT - replete electrolytes, on coreg; d/w case management further attempts 

to insure and obtain eligibility for referral to heart transplant center











Abdiel Purvis MD May 30, 2018 19:13

## 2018-05-30 NOTE — PD.POD
Subjective


Podiatric Problems


Status Post right 2nd toe amp with  on 05/29/18. He denies any pain/n

/v/f/h/c/sob.


Pain score:  0





Past Med/Surg/Social History


Social History


Smoking Status:  Former Smoker





Objective


Vital Signs





Vital Signs








  Date Time  Temp Pulse Resp B/P (MAP) Pulse Ox O2 Delivery O2 Flow Rate FiO2


 


5/30/18 10:00  75      


 


5/30/18 09:00  72      


 


5/30/18 08:20 98.6 73 14 129/71 (90) 96   


 


5/30/18 08:00  64      


 


5/30/18 07:00  76      


 


5/30/18 06:00  68      


 


5/30/18 05:00  64      


 


5/30/18 04:50 98.6 69 18 105/73 (84) 96   


 


5/30/18 04:00  76      


 


5/30/18 03:00  74      


 


5/30/18 02:00  68      


 


5/30/18 01:00  68      


 


5/30/18 00:23  76      


 


5/30/18 00:10 97.7 73 18 102/70 (81) 96   


 


5/29/18 23:00  70      


 


5/29/18 22:00  76      


 


5/29/18 21:27     95   


 


5/29/18 21:00  82      


 


5/29/18 20:03  78      


 


5/29/18 19:52 98.0 74 16 106/60 (75) 98   


 


5/29/18 19:00  78      


 


5/29/18 18:00  70      


 


5/29/18 17:40  62 16  99 Nasal Cannula 2 


 


5/29/18 17:30 97.8 60 16 112/63 (79) 98 Nasal Cannula 2 


 


5/29/18 17:15  65 15 109/60 (76) 97 Nasal Cannula 2 


 


5/29/18 17:10 97.6 67 15 113/66 (82) 99 Nasal Cannula 3 


 


5/29/18 15:00 98.3 70 16 99/53 (68) 97   


 


5/29/18 15:00  74      


 


5/29/18 14:00  68      


 


5/29/18 13:00  70      


 


5/29/18 12:00  68      


 


5/29/18 11:10    102/64 (77)    


 


5/29/18 11:00 98.2 77 16 82/56 (65)    


 


5/29/18 11:00  75      


 


5/29/18 10:40     92   21








Coded Allergies:  


     potassium iodide (Unverified  Allergy, Severe, Swelling, 3/21/18)


     povidone-iodine (Unverified  Allergy, Severe, Swelling, 3/21/18)


     shellfish derived (Unverified  Allergy, Severe, 3/21/18)


     sodium iodide (Unverified  Allergy, Severe, Swelling, 3/21/18)


     sodium iodide (Unverified  Allergy, Severe, Swelling, 3/21/18)





Physical Exam


Remarks


Limited exam due to bandaging. Bandages were clean and dry with dried bloody 

strikethrough. CFT < 3 secs to remaining digits. ROM WNL to remaining digits. 

Calf is supple and non tender to compression.





Assessment & Plan


A/P


1)Right 2nd toe amputation 5/29/18


-plan for bandage change tomorrow with 


-wear surgical shoe when WBing


-cont iv abx











Krista Church DPM May 30, 2018 10:35

## 2018-05-30 NOTE — HHI.IDPN
Subjective


Subjective


Remarks


 is a 58 y/o CM with PMHx of CHF, Ischemic Cardiomyopathy with last 

reported EF 20-25%, he has prior h/o CABG x 3, defibrillator and pacemaker, DM, 

HTN and hyperlipidemia. CT surgery evaluated and found him not to be a 

revascularization candidate and recommended referral to a tertiary center for 

transplant consideration.  As the patient has no  insurance this option has not 

been found feasible.  He underwent placement of a single-chamber St Trav ICD in 

March 2018 for sudden cardiac death prevention. 


With this background patient presents to the ED at [place] with 4 day history 

of progressive dyspnea described as severe, worsening with exertion, and 

improved with rest. He has had associated symptoms of diaphoresis, nausea and 

vomiting over the weekend.  He additionally has noted painful changes in his 

right second toe with color changes suspicious for infection.  


In ED patient was noted to have WBC 9.6, hemoglobin 15.0, hematocrit 43.9, 

platelets 133, sodium 130, potassium 4.4, BUN 36, creatinine 1.84, random 

glucose 407 alkaline phosphatase 114, AST 37, ALT 25, total bilirubin 0.9, 

troponin 3.04, C-reactive protein 26.8, B natruretic peptide 3148, lactic acid 

5.5. Ax Xray of Rt toe showed erosion. MRI with no osteomyelitis. Chest x-ray 

showed no acute abnormality or significant interval change.


At baseline prior to this infectious process patient was able to work around 

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  He states that he is only able to be active 

for 5-10 minutes before becoming very dyspneic and exhausted.  He was 

previously an over the road  but has been unable to work since 

March of this year.  He is now attempting to get disability as he has a nearly 

homebound existence and requires assistance to get out to doctor's appointments.


Sepsis workup on admission positive for Staph bacteremia and ID consulted for 

evaluation and Mment of the same.





Notes reviewed 


dw RN


no fever


no rash


No diarrhea


Feels better slightly but still achy


RN reports 800 cc of urine.


S/P ICD placement March 2018


Antibiotics


IV Gent


IV Ancef


Teflaro IV


Lines


PIV no evidence of infection


Past Medical History


Severe ischemic cardiomyopathy EF 20-25%


Hyperlipidemia


Diabetes


Coronary artery disease status post MI 3/13


COPD


Past Surgical History


CABG 10 years ago


Pacemaker/Defibrillator insertion


Appendectomy


Skin graft to right foot as a child


Back surgeries 3 has hardware in back per report.


Allergies:  


Coded Allergies:  


     potassium iodide (Unverified  Allergy, Severe, Swelling, 3/21/18)


     povidone-iodine (Unverified  Allergy, Severe, Swelling, 3/21/18)


     shellfish derived (Unverified  Allergy, Severe, 3/21/18)


     sodium iodide (Unverified  Allergy, Severe, Swelling, 3/21/18)


     sodium iodide (Unverified  Allergy, Severe, Swelling, 3/21/18)





Objective


.





Vital Signs








  Date Time  Temp Pulse Resp B/P (MAP) Pulse Ox O2 Delivery O2 Flow Rate FiO2


 


5/30/18 12:23 98.4 65 15 118/63 (81) 94   


 


5/30/18 10:00  75      


 


5/30/18 09:00  72      


 


5/30/18 08:20 98.6 73 14 129/71 (90) 96   


 


5/30/18 08:00  64      


 


5/30/18 07:00  76      


 


5/30/18 06:00  68      


 


5/30/18 05:00  64      


 


5/30/18 04:50 98.6 69 18 105/73 (84) 96   


 


5/30/18 04:00  76      


 


5/30/18 03:00  74      


 


5/30/18 02:00  68      


 


5/30/18 01:00  68      


 


5/30/18 00:23  76      


 


5/30/18 00:10 97.7 73 18 102/70 (81) 96   


 


5/29/18 23:00  70      


 


5/29/18 22:00  76      


 


5/29/18 21:27     95   


 


5/29/18 21:00  82      


 


5/29/18 20:03  78      


 


5/29/18 19:52 98.0 74 16 106/60 (75) 98   


 


5/29/18 19:00  78      


 


5/29/18 18:00  70      


 


5/29/18 17:40  62 16  99 Nasal Cannula 2 


 


5/29/18 17:30 97.8 60 16 112/63 (79) 98 Nasal Cannula 2 


 


5/29/18 17:15  65 15 109/60 (76) 97 Nasal Cannula 2 


 


5/29/18 17:10 97.6 67 15 113/66 (82) 99 Nasal Cannula 3 


 


5/29/18 15:00 98.3 70 16 99/53 (68) 97   


 


5/29/18 15:00  74      


 


5/29/18 14:00  68      


 


5/29/18 13:00  70      








.





Laboratory Tests








Test


  5/28/18


14:25 5/29/18


03:23 5/30/18


02:50


 


White Blood Count 12.1 TH/MM3  11.3 TH/MM3  10.9 TH/MM3 


 


Red Blood Count 5.36 MIL/MM3  4.95 MIL/MM3  4.64 MIL/MM3 


 


Hemoglobin 14.6 GM/DL  13.4 GM/DL  12.7 GM/DL 


 


Hematocrit 43.6 %  39.5 %  37.7 % 


 


Mean Corpuscular Volume 81.3 FL  79.8 FL  81.1 FL 


 


Mean Corpuscular Hemoglobin 27.3 PG  27.1 PG  27.4 PG 


 


Mean Corpuscular Hemoglobin


Concent 33.6 % 


  34.0 % 


  33.8 % 


 


 


Red Cell Distribution Width 17.5 %  17.1 %  17.2 % 


 


Platelet Count 102 TH/MM3  98 TH/MM3  172 TH/MM3 


 


Mean Platelet Volume 10.1 FL  9.4 FL  9.0 FL 


 


Neutrophils (%) (Auto)  64.6 %  70.4 % 


 


Lymphocytes (%) (Auto)  19.1 %  17.3 % 


 


Monocytes (%) (Auto)  13.3 %  10.3 % 


 


Eosinophils (%) (Auto)  1.7 %  1.5 % 


 


Basophils (%) (Auto)  1.3 %  0.5 % 


 


Neutrophils # (Auto)  7.3 TH/MM3  7.6 TH/MM3 


 


Lymphocytes # (Auto)  2.2 TH/MM3  1.9 TH/MM3 


 


Monocytes # (Auto)  1.5 TH/MM3  1.1 TH/MM3 


 


Eosinophils # (Auto)  0.2 TH/MM3  0.2 TH/MM3 


 


Basophils # (Auto)  0.2 TH/MM3  0.1 TH/MM3 


 


CBC Comment  DIFF FINAL  AUTO DIFF 


 


Differential Comment


  


   


  FINAL DIFF


MANUAL


 


Differential Total Cells


Counted 


  


  100 


 


 


Neutrophils % (Manual)   71 % 


 


Band Neutrophils %   3 % 


 


Lymphocytes %   13 % 


 


Monocytes %   9 % 


 


Eosinophils %   2 % 


 


Neutrophils # (Manual)   8.3 TH/MM3 


 


Metamyelocytes   2 % 


 


Toxic Granulation   1+ 


 


Platelet Estimate   NORMAL 


 


Platelet Morphology Comment   NORMAL 


 


Ovalocytes   1+ 








Laboratory Tests








Test


  5/29/18


03:23 5/30/18


02:50


 


Creatinine 0.73 MG/DL  0.93 MG/DL 


 


Estimat Glomerular Filtration


Rate 110 ML/MIN 


  83 ML/MIN 


 


 


Blood Urea Nitrogen  19 MG/DL 


 


Random Glucose  283 MG/DL 


 


Total Protein  6.4 GM/DL 


 


Albumin  2.2 GM/DL 


 


Calcium Level  8.4 MG/DL 


 


Phosphorus Level  3.2 MG/DL 


 


Magnesium Level  2.0 MG/DL 


 


Alkaline Phosphatase  85 U/L 


 


Aspartate Amino Transf


(AST/SGOT) 


  17 U/L 


 


 


Alanine Aminotransferase


(ALT/SGPT) 


  19 U/L 


 


 


Total Bilirubin  0.3 MG/DL 


 


Sodium Level  137 MEQ/L 


 


Potassium Level  4.5 MEQ/L 


 


Chloride Level  101 MEQ/L 


 


Carbon Dioxide Level  28.9 MEQ/L 


 


Anion Gap  7 MEQ/L 


 


B-Type Natriuretic Peptide  704 PG/ML 


 


Free Thyroxine  1.34 NG/DL 


 


Thyroid Stimulating Hormone


3rd Gen 


  2.420 uIU/ML 


 








Microbiology








 Date/Time


Source Procedure


Growth Status


 


 


 5/30/18 02:50


Blood Peripheral Aerobic Blood Culture


Pending Received


 


 5/30/18 02:50


Blood Peripheral Anaerobic Blood Culture


Pending Received





 5/30/18 01:43


Blood Peripheral Aerobic Blood Culture


Pending Received


 


 5/30/18 01:43


Blood Peripheral Anaerobic Blood Culture


Pending Received





 5/29/18 16:24


Wound Toe Fungal Smear - Final


NO FUNGAL ELEMENTS SEEN. Resulted


 


 5/29/18 16:24


Wound Toe Fungal Culture


Pending Resulted





 5/29/18 16:24


Wound Toe Acid Fast Stain


Pending Received


 


 5/29/18 16:24


Wound Toe Mycobacterial Culture


Pending Received





 5/29/18 16:24


Wound Toe Gram Stain - Final Resulted


 


 5/29/18 16:24


Wound Toe Wound Culture


Pending Resulted








Imaging





Last Impressions








Aorta w/Runoff CTA 5/28/18 0000 Signed





Impressions: 





 CONCLUSION:





 1.  Atherosclerotic changes without significant stenosis bilateral lower extrem





 ities.





  





 


 


Chest X-Ray 5/24/18 0955 Signed





Impressions: 





 CONCLUSION: 





 1.  No acute abnormality or significant interval change.





  





 


 


Toe X-Ray 5/24/18 0000 Signed





Impressions: 





 CONCLUSION: 





 Questionable small erosion distal phalanx right second toe. This could be infec





 tious in nature if there is clinical evidence for soft tissue infection.





  





 





  





  





 





  





 





  





 


 


Renal Ultrasound 5/24/18 0000 Signed





Impressions: 





 CONCLUSION: 





 1.  No hydronephrosis.





 2.  Increased echogenicity most characteristic of medical renal disease.





  





 


 


Foot MRI 5/24/18 0000 Signed





Impressions: 





 CONCLUSION:





 1.  No evidence for osteomyelitis. Soft tissue swelling at the second toe.





  





 








Physical Exam


GENERAL:  WDWN  male patient, INAD.  Asleep but easily awakens to 

voice.  Appears comfortable.


SKIN:  No rashes or lesions. Cool and dry.


HEAD: Atraumatic. Normocephalic. 


EYES: Pupils equal round and reactive. Extraocular motions intact. No scleral 

icterus. No injection or drainage. 


ENT: Nose without bleeding or purulent drainage. Airway patent.  MMM.


NECK: Trachea midline. Supple, nontender, no meningeal signs.


CARDIOVASCULAR: HS audible.  ICD, has mild pink color on his L chest, not 

indurated,  seems swollen, not tender, has well healed incision


RESPIRATORY: Nonlabored.  Clear to auscultation. Breath sounds equal 

bilaterally. No wheezes, rales, or rhonchi.  


GASTROINTESTINAL: Abdomen soft, non-tender, nondistended. 


MUSCULOSKELETAL:  Has wet gangrene Right 2nd toe with erythema on dorsum of his 

foot. 


NEUROLOGICAL:   Grossly non-focal.


Psych calm and cooperative


IV line sites with no e.o infection.





Assessment & Plan


Remarks


Sepsis


High grade MSSA bacteremia 


   -  source ?2nd toe,  concern with ICD


Right 2nd toe wet gangrene, osteomyelitis.


Hepatitis C antibody positive.


AICD status.


CAD with severe ischemic cardiomyopathy.


Thrombocytopenia likely due to severe infection, ?DIC





Recs


Continue IV Ancef


Continue Genta IV for synergy pending BCX turning negative and source control.


Continue Teflaro IV 


Follow cultures to assess ongoing need of Genta IV and double coverage.


Follow path


Follow clinically.


Needs IV antibiotics on discharge.











Bev Brandon MD May 30, 2018 12:27

## 2018-05-31 VITALS
SYSTOLIC BLOOD PRESSURE: 115 MMHG | OXYGEN SATURATION: 95 % | TEMPERATURE: 99.1 F | RESPIRATION RATE: 16 BRPM | DIASTOLIC BLOOD PRESSURE: 64 MMHG | HEART RATE: 73 BPM

## 2018-05-31 VITALS — HEART RATE: 68 BPM

## 2018-05-31 VITALS
HEART RATE: 60 BPM | TEMPERATURE: 98.5 F | SYSTOLIC BLOOD PRESSURE: 112 MMHG | DIASTOLIC BLOOD PRESSURE: 60 MMHG | RESPIRATION RATE: 18 BRPM | OXYGEN SATURATION: 96 %

## 2018-05-31 VITALS
RESPIRATION RATE: 14 BRPM | TEMPERATURE: 98.4 F | DIASTOLIC BLOOD PRESSURE: 64 MMHG | OXYGEN SATURATION: 92 % | SYSTOLIC BLOOD PRESSURE: 106 MMHG | HEART RATE: 64 BPM

## 2018-05-31 VITALS
HEART RATE: 66 BPM | RESPIRATION RATE: 24 BRPM | SYSTOLIC BLOOD PRESSURE: 109 MMHG | DIASTOLIC BLOOD PRESSURE: 57 MMHG | TEMPERATURE: 98 F | OXYGEN SATURATION: 95 %

## 2018-05-31 VITALS
DIASTOLIC BLOOD PRESSURE: 64 MMHG | HEART RATE: 59 BPM | TEMPERATURE: 98.1 F | RESPIRATION RATE: 16 BRPM | SYSTOLIC BLOOD PRESSURE: 129 MMHG | OXYGEN SATURATION: 97 %

## 2018-05-31 VITALS — HEART RATE: 62 BPM

## 2018-05-31 VITALS — HEART RATE: 78 BPM

## 2018-05-31 VITALS — HEART RATE: 72 BPM

## 2018-05-31 VITALS — HEART RATE: 58 BPM

## 2018-05-31 VITALS — HEART RATE: 60 BPM

## 2018-05-31 VITALS — HEART RATE: 74 BPM

## 2018-05-31 VITALS — HEART RATE: 64 BPM

## 2018-05-31 VITALS — HEART RATE: 67 BPM

## 2018-05-31 VITALS — HEART RATE: 71 BPM

## 2018-05-31 VITALS — OXYGEN SATURATION: 94 %

## 2018-05-31 VITALS — HEART RATE: 65 BPM

## 2018-05-31 LAB
ALBUMIN SERPL-MCNC: 2.2 GM/DL (ref 3.4–5)
ALP SERPL-CCNC: 84 U/L (ref 45–117)
ALT SERPL-CCNC: 19 U/L (ref 12–78)
AST SERPL-CCNC: 9 U/L (ref 15–37)
BASOPHILS # BLD AUTO: 0.1 TH/MM3 (ref 0–0.2)
BASOPHILS NFR BLD: 0.7 % (ref 0–2)
BILIRUB SERPL-MCNC: 0.3 MG/DL (ref 0.2–1)
BUN SERPL-MCNC: 17 MG/DL (ref 7–18)
CALCIUM SERPL-MCNC: 8.5 MG/DL (ref 8.5–10.1)
CHLORIDE SERPL-SCNC: 101 MEQ/L (ref 98–107)
CREAT SERPL-MCNC: 0.82 MG/DL (ref 0.6–1.3)
CREAT SERPL-MCNC: 0.83 MG/DL (ref 0.6–1.3)
EOSINOPHIL # BLD: 0.2 TH/MM3 (ref 0–0.4)
EOSINOPHIL NFR BLD: 1.8 % (ref 0–4)
ERYTHROCYTE [DISTWIDTH] IN BLOOD BY AUTOMATED COUNT: 17.2 % (ref 11.6–17.2)
GENTAMICIN PEAK SERPL-MCNC: 4.6 MCG/ML (ref 4–8)
GFR SERPLBLD BASED ON 1.73 SQ M-ARVRAT: 95 ML/MIN (ref 89–?)
GFR SERPLBLD BASED ON 1.73 SQ M-ARVRAT: 96 ML/MIN (ref 89–?)
GLUCOSE SERPL-MCNC: 299 MG/DL (ref 74–106)
HCO3 BLD-SCNC: 22.4 MEQ/L (ref 21–32)
HCT VFR BLD CALC: 36.8 % (ref 39–51)
HGB BLD-MCNC: 12.5 GM/DL (ref 13–17)
LYMPHOCYTES # BLD AUTO: 1.8 TH/MM3 (ref 1–4.8)
LYMPHOCYTES NFR BLD AUTO: 15.8 % (ref 9–44)
LYMPHOCYTES: 9 % (ref 9–44)
MAGNESIUM SERPL-MCNC: 2 MG/DL (ref 1.5–2.5)
MCH RBC QN AUTO: 27.7 PG (ref 27–34)
MCHC RBC AUTO-ENTMCNC: 33.9 % (ref 32–36)
MCV RBC AUTO: 81.7 FL (ref 80–100)
MONOCYTE #: 1 TH/MM3 (ref 0–0.9)
MONOCYTES NFR BLD: 8.9 % (ref 0–8)
MONOCYTES: 7 % (ref 0–8)
NEUTROPHILS # BLD AUTO: 8.4 TH/MM3 (ref 1.8–7.7)
NEUTROPHILS NFR BLD AUTO: 72.8 % (ref 16–70)
NEUTS BAND # BLD MANUAL: 9.4 TH/MM3 (ref 1.8–7.7)
NEUTS BAND NFR BLD: 2 % (ref 0–6)
NEUTS SEG NFR BLD MANUAL: 80 % (ref 16–70)
OVALOCYTES BLD QL SMEAR: (no result)
PHOSPHATE SERPL-MCNC: 2.8 MG/DL (ref 2.5–4.9)
PLASMA CELLS NFR BLD: 1 % (ref 0–0)
PLATELET # BLD: 205 TH/MM3 (ref 150–450)
PMV BLD AUTO: 8.7 FL (ref 7–11)
PROT SERPL-MCNC: 6.5 GM/DL (ref 6.4–8.2)
RBC # BLD AUTO: 4.51 MIL/MM3 (ref 4.5–5.9)
SODIUM SERPL-SCNC: 134 MEQ/L (ref 136–145)
WBC # BLD AUTO: 11.5 TH/MM3 (ref 4–11)

## 2018-05-31 RX ADMIN — ASPIRIN 81 MG SCH MG: 81 TABLET ORAL at 08:18

## 2018-05-31 RX ADMIN — CEFAZOLIN SODIUM SCH MLS/HR: 2 SOLUTION INTRAVENOUS at 17:17

## 2018-05-31 RX ADMIN — INSULIN ASPART SCH: 100 INJECTION, SOLUTION INTRAVENOUS; SUBCUTANEOUS at 17:00

## 2018-05-31 RX ADMIN — SACUBITRIL AND VALSARTAN SCH TAB: 49; 51 TABLET, FILM COATED ORAL at 22:57

## 2018-05-31 RX ADMIN — CLINDAMYCIN PHOSPHATE SCH MLS/HR: 150 INJECTION, SOLUTION INTRAMUSCULAR; INTRAVENOUS at 06:16

## 2018-05-31 RX ADMIN — CARVEDILOL SCH MG: 6.25 TABLET, FILM COATED ORAL at 22:57

## 2018-05-31 RX ADMIN — SACUBITRIL AND VALSARTAN SCH TAB: 49; 51 TABLET, FILM COATED ORAL at 08:18

## 2018-05-31 RX ADMIN — HYDROCODONE BITARTRATE AND ACETAMINOPHEN PRN TAB: 5; 325 TABLET ORAL at 06:35

## 2018-05-31 RX ADMIN — ACYCLOVIR SCH UNITS: 800 TABLET ORAL at 22:58

## 2018-05-31 RX ADMIN — PRAVASTATIN SODIUM SCH MG: 40 TABLET ORAL at 08:18

## 2018-05-31 RX ADMIN — CEFAZOLIN SODIUM SCH MLS/HR: 2 SOLUTION INTRAVENOUS at 01:06

## 2018-05-31 RX ADMIN — CARVEDILOL SCH MG: 6.25 TABLET, FILM COATED ORAL at 08:18

## 2018-05-31 RX ADMIN — INSULIN ASPART SCH: 100 INJECTION, SOLUTION INTRAVENOUS; SUBCUTANEOUS at 22:58

## 2018-05-31 RX ADMIN — HYDROCODONE BITARTRATE AND ACETAMINOPHEN PRN TAB: 5; 325 TABLET ORAL at 12:50

## 2018-05-31 RX ADMIN — INSULIN ASPART SCH: 100 INJECTION, SOLUTION INTRAVENOUS; SUBCUTANEOUS at 08:00

## 2018-05-31 RX ADMIN — INSULIN ASPART SCH: 100 INJECTION, SOLUTION INTRAVENOUS; SUBCUTANEOUS at 12:00

## 2018-05-31 RX ADMIN — CEFAZOLIN SODIUM SCH MLS/HR: 2 SOLUTION INTRAVENOUS at 08:17

## 2018-05-31 RX ADMIN — HEPARIN SODIUM PRN MLS/HR: 10000 INJECTION, SOLUTION INTRAVENOUS at 12:00

## 2018-05-31 RX ADMIN — PANTOPRAZOLE SCH MG: 40 TABLET, DELAYED RELEASE ORAL at 08:18

## 2018-05-31 NOTE — PD.CARD.PN
Subjective


Subjective Remarks


appears comfortable in nad





Objective


Medications





Current Medications








 Medications


  (Trade)  Dose


 Ordered  Sig/Ilia


 Route  Start Time


 Stop Time Status Last Admin


 


  (NS Flush)  2 ml  UNSCH  PRN


 IVF  5/24/18 10:00


    5/28/18 08:31


 


 


  (D50w (Vial) Inj)  50 ml  UNSCH  PRN


 IV PUSH  5/24/18 13:30


     


 


 


  (Glucagon Inj)  1 mg  UNSCH  PRN


 OTHER  5/24/18 13:30


     


 


 


  (NovoLOG


 SUPPLEMENTAL


 SCALE)  1  ACHS SLIDING  SCALE


 SQ  5/24/18 17:00


    5/31/18 12:00


 


 


  (Pravachol)  40 mg  DAILY


 PO  5/25/18 09:00


    5/31/18 08:18


 


 


  (Duoneb Neb)  1 ampule  Q4HR NEB  PRN


 NEB  5/24/18 13:45


    5/24/18 15:38


 


 


  (Tylenol)  650 mg  Q4H  PRN


 PO  5/24/18 14:30


    5/27/18 11:45


 


 


  (Norco  5-325 Mg)  1 tab  Q4H  PRN


 PO  5/24/18 14:30


    5/31/18 12:50


 


 


  (Reglan Inj)  5 mg  Q6H  PRN


 IV PUSH  5/25/18 02:30


    5/25/18 20:10


 


 


  (Zofran  Odt)  4 mg  Q6H  PRN


 PO  5/25/18 02:30


    5/28/18 11:50


 


 


  (Lopressor Inj)  5 mg  Q5M  PRN


 IV PUSH  5/25/18 02:45


    5/25/18 03:06


 


 


  (Levemir Inj)  10 units  HS


 SQ  5/25/18 21:00


    5/30/18 22:19


 


 


  (Tessalon)  100 mg  TID  PRN


 PO  5/26/18 03:00


    5/29/18 02:00


 


 


  (Robitussin Ac


 200-20 Mg/10 ml


 Liq)  10 ml  Q6H  PRN


 PO  5/26/18 03:00


     


 


 


  (Coreg)  6.25 mg  Q12HR


 PO  5/26/18 11:30


    5/31/18 08:18


 


 


  (Entresto 49-51


 Mg)  1 tab  BID


 PO  5/26/18 21:00


    5/31/18 08:18


 


 


 Cefazolin Sodium/


 Dextrose  50 ml @ 


 100 mls/hr  Q8H


 IV  5/27/18 09:00


    5/31/18 08:17


 


 


  (Aspirin Chew)  81 mg  DAILY


 CHEW  5/28/18 12:10


    5/31/18 08:18


 


 


 Pharmacy Profile


 Note  0 ml @ 0


 mls/hr  UNSCH


 OTHER  5/28/18 11:15


     


 


 


 Heparin Sodium/


 Dextrose  250 ml @ 


 16.128 mls/


 hr  TITRATE  PRN


 IV  5/28/18 12:30


    5/30/18 22:35


 


 


 Gentamicin


 Sulfate 90 mg/


 Sodium Chloride  102.25 ml


  @ 200 mls/


 hr  Q8H


 IV  5/28/18 14:00


    5/31/18 06:16


 


 


 Sodium Chloride  1,000 ml @ 


 100 mls/hr  Q10H


 IV  5/29/18 11:15


   Future Hold 5/30/18 10:17


 


 


  (Protonix)  40 mg  DAILY


 PO  5/30/18 09:00


    5/31/18 08:18


 








Vital Signs / I&O





Vital Signs








  Date Time  Temp Pulse Resp B/P (MAP) Pulse Ox O2 Delivery O2 Flow Rate FiO2


 


5/31/18 11:50 98.1 59 16 129/64 (85) 97   


 


5/31/18 10:00  60      


 


5/31/18 09:00  68      


 


5/31/18 08:10 98.4 64 14 106/64 (78) 92   


 


5/31/18 08:06     94   


 


5/31/18 08:00  64      


 


5/31/18 07:38   20     


 


5/31/18 07:00  67      


 


5/31/18 06:00  78      


 


5/31/18 05:00  65      


 


5/31/18 04:00  67      


 


5/31/18 03:42 98.0 66 24 109/57 (74) 95   


 


5/31/18 03:00  68      


 


5/31/18 02:00  71      


 


5/31/18 01:00  71      


 


5/31/18 00:00  72      


 


5/30/18 23:32 98.9 77 20 121/63 (82) 96   


 


5/30/18 23:00  73      


 


5/30/18 22:00  77      


 


5/30/18 21:00 98.3 74 20 122/69 (86) 96   


 


5/30/18 21:00  75      


 


5/30/18 20:00  74      


 


5/30/18 19:37     98   21


 


5/30/18 19:00  75      


 


5/30/18 18:00  74      


 


5/30/18 17:00  66      


 


5/30/18 16:00  78      


 


5/30/18 15:30 98.2 68 14 151/64 (93) 97   


 


5/30/18 15:00  75      














I/O      


 


 5/30/18 5/30/18 5/30/18 5/31/18 5/31/18 5/31/18





 06:59 14:59 22:59 06:59 14:59 22:59


 


Intake Total 342.25 ml  1300 ml 530 ml 150 ml 


 


Output Total 1475 ml  1800 ml 2500 ml  


 


Balance -1132.75 ml  -500 ml -1970 ml 150 ml 


 


      


 


Intake Oral 240 ml  1200 ml 480 ml  


 


IV Total 102.25 ml  100 ml 50 ml 150 ml 


 


Output Urine Total 1475 ml  1800 ml 2500 ml  








Physical Exam


GENERAL: 


SKIN: Warm and dry.


HEAD: Normocephalic.


EYES: No scleral icterus. No injection or drainage. 


NECK: Supple, trachea midline. No JVD or lymphadenopathy.


CARDIOVASCULAR: Regular rate and rhythm without murmurs, gallops, or rubs. 


RESPIRATORY: Breath sounds equal bilaterally. No accessory muscle use.


GASTROINTESTINAL: Abdomen soft, non-tender, nondistended. 


MUSCULOSKELETAL: No cyanosis, or edema. 


BACK: Nontender without obvious deformity. No CVA tenderness.





Laboratory





Laboratory Tests








Test


  5/30/18


21:45 5/30/18


23:27 5/31/18


06:05


 


Gentamicin Level Trough 1.0 MCG/ML   


 


Blood Urea Nitrogen  17 MG/DL  


 


Creatinine  0.83 MG/DL  0.82 MG/DL 


 


Random Glucose  299 MG/DL  


 


Total Protein  6.5 GM/DL  


 


Albumin  2.2 GM/DL  


 


Calcium Level  8.5 MG/DL  


 


Phosphorus Level  2.8 MG/DL  


 


Magnesium Level  2.0 MG/DL  


 


Alkaline Phosphatase  84 U/L  


 


Aspartate Amino Transf


(AST/SGOT) 


  9 U/L 


  


 


 


Alanine Aminotransferase


(ALT/SGPT) 


  19 U/L 


  


 


 


Total Bilirubin  0.3 MG/DL  


 


Sodium Level  134 MEQ/L  


 


Potassium Level  4.6 MEQ/L  


 


Chloride Level  101 MEQ/L  


 


Carbon Dioxide Level  22.4 MEQ/L  


 


Anion Gap  11 MEQ/L  


 


Estimat Glomerular Filtration


Rate 


  95 ML/MIN 


  96 ML/MIN 


 


 


Gentamicin Level Peak  4.6 MCG/ML  


 


White Blood Count   11.5 TH/MM3 


 


Red Blood Count   4.51 MIL/MM3 


 


Hemoglobin   12.5 GM/DL 


 


Hematocrit   36.8 % 


 


Mean Corpuscular Volume   81.7 FL 


 


Mean Corpuscular Hemoglobin   27.7 PG 


 


Mean Corpuscular Hemoglobin


Concent 


  


  33.9 % 


 


 


Red Cell Distribution Width   17.2 % 


 


Platelet Count   205 TH/MM3 


 


Mean Platelet Volume   8.7 FL 


 


Neutrophils (%) (Auto)   72.8 % 


 


Lymphocytes (%) (Auto)   15.8 % 


 


Monocytes (%) (Auto)   8.9 % 


 


Eosinophils (%) (Auto)   1.8 % 


 


Basophils (%) (Auto)   0.7 % 


 


Neutrophils # (Auto)   8.4 TH/MM3 


 


Lymphocytes # (Auto)   1.8 TH/MM3 


 


Monocytes # (Auto)   1.0 TH/MM3 


 


Eosinophils # (Auto)   0.2 TH/MM3 


 


Basophils # (Auto)   0.1 TH/MM3 


 


CBC Comment   AUTO DIFF 


 


Differential Total Cells


Counted 


  


  100 


 


 


Neutrophils % (Manual)   80 % 


 


Band Neutrophils %   2 % 


 


Lymphocytes %   9 % 


 


Monocytes %   7 % 


 


Eosinophils %   1 % 


 


Neutrophils # (Manual)   9.4 TH/MM3 


 


Differential Comment


  


  


  FINAL DIFF


MANUAL


 


Plasma Cells   1 % 


 


Platelet Estimate   NORMAL 


 


Platelet Morphology Comment   NORMAL 


 


Ovalocytes   1+ 


 


Activated Partial


Thromboplast Time 


  


  53.7 SEC 


 











Assessment and Plan


Problem List:  


(1) ARF (acute renal failure)


ICD Codes:  N17.9 - Acute kidney failure, unspecified


(2) NSTEMI (non-ST elevated myocardial infarction)


ICD Codes:  I21.4 - Non-ST elevation (NSTEMI) myocardial infarction


(3) CAD (coronary artery disease)


ICD Codes:  I25.10 - Atherosclerotic heart disease of native coronary artery 

without angina pectoris


(4) Decreased cardiac ejection fraction


ICD Codes:  R93.1 - Abnormal findings on diagnostic imaging of heart and 

coronary circulation


(5) Elevated troponin


ICD Codes:  R74.8 - Abnormal levels of other serum enzymes


(6) Severe sepsis


ICD Codes:  A41.9 - Sepsis, unspecified organism; R65.20 - Severe sepsis 

without septic shock


(7) Diabetes mellitus


ICD Codes:  E11.9 - Type 2 diabetes mellitus without complications


(8) Acute renal failure


ICD Codes:  N17.9 - Acute kidney failure, unspecified


(9) Cardiomyopathy


ICD Codes:  I42.9 - Cardiomyopathy, unspecified


(10) gangrene of the right second toe


(11) Dyspnea and respiratory abnormalities


ICD Codes:  R06.00 - Dyspnea, unspecified; R06.89 - Other abnormalities of 

breathing


(12) Multi-vessel coronary artery stenosis


ICD Codes:  I25.10 - Atherosclerotic heart disease of native coronary artery 

without angina pectoris


(13) Hx of CABG


ICD Codes:  Z95.1 - Presence of aortocoronary bypass graft


(14) Thrombocytopenia


ICD Codes:  D69.6 - Thrombocytopenia, unspecified


Assessment and Plan


1.) Ischemic cardiomyopathy - nstemi, chf excacerbation and arf due to severe 

unrevascularizable cad, cardiomyopathy, on aspirin and heparin per hematology, 

on antibiotics, pod #2,  2nd toe amputation, stable


2.) VT - replete electrolytes, on coreg; d/w case management further attempts 

to insure and obtain eligibility for referral to heart transplant center











Abdiel Purvis MD May 31, 2018 14:15

## 2018-05-31 NOTE — PD.POD
Subjective


Podiatric Problems


Gangrene right 2nd toe


Pain score:  0





Past Med/Surg/Social History


Social History


Smoking Status:  Former Smoker





Objective


Vital Signs





Vital Signs








  Date Time  Temp Pulse Resp B/P (MAP) Pulse Ox O2 Delivery O2 Flow Rate FiO2


 


5/31/18 18:00  74      


 


5/31/18 17:00  60      


 


5/31/18 16:00  62      


 


5/31/18 15:00  61      


 


5/31/18 15:00 98.5 60 18 112/60 (77) 96   


 


5/31/18 14:00  64      


 


5/31/18 13:00  68      


 


5/31/18 12:00  58      


 


5/31/18 11:50 98.1 59 16 129/64 (85) 97   


 


5/31/18 11:00  60      


 


5/31/18 10:00  60      


 


5/31/18 09:00  68      


 


5/31/18 08:10 98.4 64 14 106/64 (78) 92   


 


5/31/18 08:06     94   


 


5/31/18 08:00  64      


 


5/31/18 07:38   20     


 


5/31/18 07:00  67      


 


5/31/18 06:00  78      


 


5/31/18 05:00  65      


 


5/31/18 04:00  67      


 


5/31/18 03:42 98.0 66 24 109/57 (74) 95   


 


5/31/18 03:00  68      


 


5/31/18 02:00  71      


 


5/31/18 01:00  71      


 


5/31/18 00:00  72      


 


5/30/18 23:32 98.9 77 20 121/63 (82) 96   


 


5/30/18 23:00  73      


 


5/30/18 22:00  77      








Coded Allergies:  


     potassium iodide (Unverified  Allergy, Severe, Swelling, 3/21/18)


     povidone-iodine (Unverified  Allergy, Severe, Swelling, 3/21/18)


     shellfish derived (Unverified  Allergy, Severe, 3/21/18)


     sodium iodide (Unverified  Allergy, Severe, Swelling, 3/21/18)


     sodium iodide (Unverified  Allergy, Severe, Swelling, 3/21/18)





Exam-Podiatry


Remarks


Right foot incision to amputation site well approximated with nylon suture. No 

active drainage. No sign of infection.





Assessment & Plan


A/P


Gangrene right 2nd toe, s/p amputation R 2nd toe 5/29/18 Dr Mckeon   





   Changed bandage today. Keep clean, dry, intact


   Orders for nursing to change every 5 days with dry sterile dressing


   Weightbearing as tolerated in surgical shoe right foot. OK to remove shoe 

when in bed.


   Follow up in clinic 1 week after discharge for bandage change. Keep bandage 

upon discharge intact until then.











Marc Mckeon DPM May 31, 2018 21:17

## 2018-05-31 NOTE — HHI.PR
Subjective


Remarks


5-29 Patient is scheduled to have right second toe amputated due to gangrene 

and osteomyelitis today


Has some hypotension we will give some fluids since patient is n.p.o.


Discussed with patient and RN


Carla. labs





5-30 HAD 2ND RIGHT TOE AMPUTATION ON MAY 29


NO NEW COMPLAINTS


DW RN AND PT AND CM





5-31 ANTIBIOTICS PER INFECTIOUS DISEASE


DW RN AND PT AND FAMILY AT BEDSIDE


WOUND CARE PER PODIATRY


AM LABS





Objective


Vitals





Vital Signs








  Date Time  Temp Pulse Resp B/P (MAP) Pulse Ox O2 Delivery O2 Flow Rate FiO2


 


5/31/18 10:00  60      


 


5/31/18 09:00  68      


 


5/31/18 08:10 98.4 64 14 106/64 (78) 92   


 


5/31/18 08:06     94   


 


5/31/18 08:00  64      


 


5/31/18 07:38   20     


 


5/31/18 07:00  67      


 


5/31/18 06:00  78      


 


5/31/18 05:00  65      


 


5/31/18 04:00  67      


 


5/31/18 03:42 98.0 66 24 109/57 (74) 95   


 


5/31/18 03:00  68      


 


5/31/18 02:00  71      


 


5/31/18 01:00  71      


 


5/31/18 00:00  72      


 


5/30/18 23:32 98.9 77 20 121/63 (82) 96   


 


5/30/18 23:00  73      


 


5/30/18 22:00  77      


 


5/30/18 21:00 98.3 74 20 122/69 (86) 96   


 


5/30/18 21:00  75      


 


5/30/18 20:00  74      


 


5/30/18 19:37     98   21


 


5/30/18 19:00  75      


 


5/30/18 18:00  74      


 


5/30/18 17:00  66      


 


5/30/18 16:00  78      


 


5/30/18 15:30 98.2 68 14 151/64 (93) 97   


 


5/30/18 15:00  75      


 


5/30/18 14:00  78      


 


5/30/18 13:00  68      


 


5/30/18 12:23 98.4 65 15 118/63 (81) 94   


 


5/30/18 12:00  68      


 


5/30/18 11:00  74      














I/O      


 


 5/30/18 5/30/18 5/30/18 5/31/18 5/31/18 5/31/18





 07:00 15:00 23:00 07:00 15:00 23:00


 


Intake Total 342.25 ml  1300 ml 530 ml 150 ml 


 


Output Total 1475 ml  1800 ml 2500 ml  


 


Balance -1132.75 ml  -500 ml -1970 ml 150 ml 


 


      


 


Intake Oral 240 ml  1200 ml 480 ml  


 


IV Total 102.25 ml  100 ml 50 ml 150 ml 


 


Output Urine Total 1475 ml  1800 ml 2500 ml  








Result Diagram:  


5/31/18 0605 5/31/18 0605





Other Results





 Laboratory Tests








Test


  5/28/18


14:25 5/28/18


18:47 5/29/18


03:23 5/29/18


10:15


 


White Blood Count 12.1 TH/MM3   11.3 TH/MM3  


 


Red Blood Count 5.36 MIL/MM3   4.95 MIL/MM3  


 


Hemoglobin 14.6 GM/DL   13.4 GM/DL  


 


Hematocrit 43.6 %   39.5 %  


 


Mean Corpuscular Volume 81.3 FL   79.8 FL  


 


Mean Corpuscular Hemoglobin 27.3 PG   27.1 PG  


 


Mean Corpuscular Hemoglobin


Concent 33.6 % 


  


  34.0 % 


  


 


 


Red Cell Distribution Width 17.5 %   17.1 %  


 


Platelet Count 102 TH/MM3   98 TH/MM3  


 


Mean Platelet Volume 10.1 FL   9.4 FL  


 


Prothrombin Time 11.4 SEC    


 


Prothromb Time International


Ratio 1.1 RATIO 


  


  


  


 


 


Activated Partial


Thromboplast Time 24.1 SEC 


  32.2 SEC 


  37.3 SEC 


  42.0 SEC 


 


 


Neutrophils (%) (Auto)   64.6 %  


 


Lymphocytes (%) (Auto)   19.1 %  


 


Monocytes (%) (Auto)   13.3 %  


 


Eosinophils (%) (Auto)   1.7 %  


 


Basophils (%) (Auto)   1.3 %  


 


Neutrophils # (Auto)   7.3 TH/MM3  


 


Lymphocytes # (Auto)   2.2 TH/MM3  


 


Monocytes # (Auto)   1.5 TH/MM3  


 


Eosinophils # (Auto)   0.2 TH/MM3  


 


Basophils # (Auto)   0.2 TH/MM3  


 


CBC Comment   DIFF FINAL  


 


Differential Comment     


 


Creatinine   0.73 MG/DL  


 


Estimat Glomerular Filtration


Rate 


  


  110 ML/MIN 


  


 


 


Test


  5/29/18


14:27 5/29/18


18:09 5/29/18


21:31 5/30/18


02:50


 


Gentamicin Level Trough 0.7 MCG/ML    


 


Activated Partial


Thromboplast Time 


  39.5 SEC 


  


  44.9 SEC 


 


 


Gentamicin Level Peak   1.1 MCG/ML  


 


White Blood Count    10.9 TH/MM3 


 


Red Blood Count    4.64 MIL/MM3 


 


Hemoglobin    12.7 GM/DL 


 


Hematocrit    37.7 % 


 


Mean Corpuscular Volume    81.1 FL 


 


Mean Corpuscular Hemoglobin    27.4 PG 


 


Mean Corpuscular Hemoglobin


Concent 


  


  


  33.8 % 


 


 


Red Cell Distribution Width    17.2 % 


 


Platelet Count    172 TH/MM3 


 


Mean Platelet Volume    9.0 FL 


 


Neutrophils (%) (Auto)    70.4 % 


 


Lymphocytes (%) (Auto)    17.3 % 


 


Monocytes (%) (Auto)    10.3 % 


 


Eosinophils (%) (Auto)    1.5 % 


 


Basophils (%) (Auto)    0.5 % 


 


Neutrophils # (Auto)    7.6 TH/MM3 


 


Lymphocytes # (Auto)    1.9 TH/MM3 


 


Monocytes # (Auto)    1.1 TH/MM3 


 


Eosinophils # (Auto)    0.2 TH/MM3 


 


Basophils # (Auto)    0.1 TH/MM3 


 


CBC Comment    AUTO DIFF 


 


Differential Total Cells


Counted 


  


  


  100 


 


 


Neutrophils % (Manual)    71 % 


 


Band Neutrophils %    3 % 


 


Lymphocytes %    13 % 


 


Monocytes %    9 % 


 


Eosinophils %    2 % 


 


Neutrophils # (Manual)    8.3 TH/MM3 


 


Metamyelocytes    2 % 


 


Differential Comment


  


  


  


  FINAL DIFF


MANUAL


 


Toxic Granulation    1+ 


 


Platelet Estimate    NORMAL 


 


Platelet Morphology Comment    NORMAL 


 


Ovalocytes    1+ 


 


Blood Urea Nitrogen    19 MG/DL 


 


Creatinine    0.93 MG/DL 


 


Random Glucose    283 MG/DL 


 


Total Protein    6.4 GM/DL 


 


Albumin    2.2 GM/DL 


 


Calcium Level    8.4 MG/DL 


 


Phosphorus Level    3.2 MG/DL 


 


Magnesium Level    2.0 MG/DL 


 


Alkaline Phosphatase    85 U/L 


 


Aspartate Amino Transf


(AST/SGOT) 


  


  


  17 U/L 


 


 


Alanine Aminotransferase


(ALT/SGPT) 


  


  


  19 U/L 


 


 


Total Bilirubin    0.3 MG/DL 


 


Sodium Level    137 MEQ/L 


 


Potassium Level    4.5 MEQ/L 


 


Chloride Level    101 MEQ/L 


 


Carbon Dioxide Level    28.9 MEQ/L 


 


Anion Gap    7 MEQ/L 


 


Estimat Glomerular Filtration


Rate 


  


  


  83 ML/MIN 


 


 


Hemoglobin A1c    9.8 % 


 


B-Type Natriuretic Peptide    704 PG/ML 


 


Free Thyroxine    1.34 NG/DL 


 


Thyroid Stimulating Hormone


3rd Gen 


  


  


  2.420 uIU/ML 


 


 


Test


  5/30/18


21:45 5/30/18


23:27 5/31/18


06:05 


 


 


Gentamicin Level Trough 1.0 MCG/ML    


 


Blood Urea Nitrogen  17 MG/DL   


 


Creatinine  0.83 MG/DL  0.82 MG/DL  


 


Random Glucose  299 MG/DL   


 


Total Protein  6.5 GM/DL   


 


Albumin  2.2 GM/DL   


 


Calcium Level  8.5 MG/DL   


 


Phosphorus Level  2.8 MG/DL   


 


Magnesium Level  2.0 MG/DL   


 


Alkaline Phosphatase  84 U/L   


 


Aspartate Amino Transf


(AST/SGOT) 


  9 U/L 


  


  


 


 


Alanine Aminotransferase


(ALT/SGPT) 


  19 U/L 


  


  


 


 


Total Bilirubin  0.3 MG/DL   


 


Sodium Level  134 MEQ/L   


 


Potassium Level  4.6 MEQ/L   


 


Chloride Level  101 MEQ/L   


 


Carbon Dioxide Level  22.4 MEQ/L   


 


Anion Gap  11 MEQ/L   


 


Estimat Glomerular Filtration


Rate 


  95 ML/MIN 


  96 ML/MIN 


  


 


 


Gentamicin Level Peak  4.6 MCG/ML   


 


White Blood Count   11.5 TH/MM3  


 


Red Blood Count   4.51 MIL/MM3  


 


Hemoglobin   12.5 GM/DL  


 


Hematocrit   36.8 %  


 


Mean Corpuscular Volume   81.7 FL  


 


Mean Corpuscular Hemoglobin   27.7 PG  


 


Mean Corpuscular Hemoglobin


Concent 


  


  33.9 % 


  


 


 


Red Cell Distribution Width   17.2 %  


 


Platelet Count   205 TH/MM3  


 


Mean Platelet Volume   8.7 FL  


 


Neutrophils (%) (Auto)   72.8 %  


 


Lymphocytes (%) (Auto)   15.8 %  


 


Monocytes (%) (Auto)   8.9 %  


 


Eosinophils (%) (Auto)   1.8 %  


 


Basophils (%) (Auto)   0.7 %  


 


Neutrophils # (Auto)   8.4 TH/MM3  


 


Lymphocytes # (Auto)   1.8 TH/MM3  


 


Monocytes # (Auto)   1.0 TH/MM3  


 


Eosinophils # (Auto)   0.2 TH/MM3  


 


Basophils # (Auto)   0.1 TH/MM3  


 


CBC Comment   AUTO DIFF  


 


Differential Total Cells


Counted 


  


  100 


  


 


 


Neutrophils % (Manual)   80 %  


 


Band Neutrophils %   2 %  


 


Lymphocytes %   9 %  


 


Monocytes %   7 %  


 


Eosinophils %   1 %  


 


Neutrophils # (Manual)   9.4 TH/MM3  


 


Differential Comment


  


  


  FINAL DIFF


MANUAL 


 


 


Plasma Cells   1 %  


 


Platelet Estimate   NORMAL  


 


Platelet Morphology Comment   NORMAL  


 


Ovalocytes   1+  


 


Activated Partial


Thromboplast Time 


  


  53.7 SEC 


  


 








Imaging





Last Impressions








Aorta w/Runoff CTA 5/28/18 0000 Signed





Impressions: 





 CONCLUSION:





 1.  Atherosclerotic changes without significant stenosis bilateral lower extrem





 ities.





  





 


 


Chest X-Ray 5/24/18 0955 Signed





Impressions: 





 CONCLUSION: 





 1.  No acute abnormality or significant interval change.





  





 


 


Toe X-Ray 5/24/18 0000 Signed





Impressions: 





 CONCLUSION: 





 Questionable small erosion distal phalanx right second toe. This could be infec





 tious in nature if there is clinical evidence for soft tissue infection.





  





 





  





  





 





  





 





  





 


 


Renal Ultrasound 5/24/18 0000 Signed





Impressions: 





 CONCLUSION: 





 1.  No hydronephrosis.





 2.  Increased echogenicity most characteristic of medical renal disease.





  





 


 


Foot MRI 5/24/18 0000 Signed





Impressions: 





 CONCLUSION:





 1.  No evidence for osteomyelitis. Soft tissue swelling at the second toe.





  





 








Objective Remarks


GENERAL: Awake alert and oriented 3 talkative and cooperative


SKIN: Warm and dry.  Right second toe is AMPUTATED--FOOT IS DRESSED


HEAD: Atraumatic. Normocephalic. 


EYES: Pupils equal and round. No scleral icterus. No injection or drainage. 

extraocular muscles intact


ENT: No nasal bleeding or discharge.  Mucous membranes pink and moist.  Tongue 

is midline


NECK: Trachea midline. No JVD.  Supple


CARDIOVASCULAR: Regular rate and rhythm.  S1-S2 no S3 or S4


RESPIRATORY: No accessory muscle use. Clear to auscultation. Breath sounds 

equal bilaterally. 


GASTROINTESTINAL: Abdomen soft, non-tender, nondistended. Hepatic and splenic 

margins not palpable. 


MUSCULOSKELETAL: Extremities without clubbing, cyanosis, or edema. No obvious 

deformities.  Right second toe is AMPUTATED- FOOT IS DRESSED


NEUROLOGICAL: Awake and alert. No obvious cranial nerve deficits.  Motor 

grossly within normal limits. Five out of 5 muscle strength in the arms and 

legs.  Normal speech.


PSYCHIATRIC: Appropriate mood and affect; insight and judgment normal.


Procedures


May 29, 2018


Pre Op Diagnosis:  


gangrene with osteomyelitis right 2nd toe


Post Op Diagnosis:  


same


Surgeon:


Marc Mckeon DPM


Assistant(s):


Staff


Procedure:


amputation right 2nd toe


Findings:


Consistent with diagnosis. Right 2nd toe with necrotic tissue to entire digit. 

Foul odor slightly present. Margins clean without purulence. 


Two semi-elliptical incisions made to disarticulate digit in its entirety at 

metatarsophalangeal joint level. 2nd metatarsal head with white healthy 

cartilage cap. No purulence noted.


Culture taken prior to irrigation and primary closure with 2-0 nylon suture. 


Dressing with xeroform, 4x4, cast padding, ace.


Weightbearing as tolerated to heel in surgical shoe for transfers only.


Additional Information:


see findings


Complications:


none


Specimen(s) removed:


1. toe right 2nd


2. culture right foot


Estimated blood loss:


minimal


Anesthesia:  MAC, Local (10mL 2% lidocaine plain)


Drains:  None


Tourniquet time (min at mmHg)


n/a


Patient to:  PACU


Patient Condition:  Good


Date/Time of Procedure:  SEE SURGICAL CARE RECORD











Marc Mckeon DPRY


Medications and IVs





Current Medications


Sodium Chloride (NS Flush) 2 ml UNSCH  PRN IVF FLUSH AFTER USING IV ACCESS Last 

administered on 5/28/18at 08:31;  Start 5/24/18 at 10:00


Piperacillin Sod/ Tazobactam Sod 50 ml @  100 mls/hr ONCE  ONCE IV  Last 

administered on 5/24/18at 12:07;  Start 5/24/18 at 11:30;  Stop 5/24/18 at 11:59

;  Status DC


Vancomycin HCl 1350 mg/Sodium Chloride 513.5 ml @  250 mls/hr ONCE  ONCE IV  

Last administered on 5/24/18at 13:27;  Start 5/24/18 at 11:30;  Stop 5/24/18 at 

13:33;  Status DC


Aspirin (Aspirin Chew) 162 mg ONCE  ONCE CHEW  Last administered on 5/24/18at 12

:02;  Start 5/24/18 at 11:30;  Stop 5/24/18 at 11:31;  Status DC


Heparin Sodium (Porcine) (Heparin Inj) 4,000 units ONCE  ONCE IV PUSH  Last 

administered on 5/24/18at 12:03;  Start 5/24/18 at 11:45;  Stop 5/24/18 at 11:46

;  Status DC


Heparin Sodium/ Dextrose 250 ml @  10 mls/hr TITRATE  PRN IV Coagulation 

Management Last administered on 5/25/18at 16:19;  Start 5/24/18 at 11:45;  Stop 

5/29/18 at 21:48;  Status DC


Dextrose (D50w (Vial) Inj) 50 ml UNSCH  PRN IV PUSH HYPOGLYCEMIA-SEE COMMENTS;  

Start 5/24/18 at 13:30


Glucagon (Glucagon Inj) 1 mg UNSCH  PRN OTHER HYPOGLYCEMIA-SEE COMMENTS;  Start 

5/24/18 at 13:30


Insulin Aspart (NovoLOG SUPPLEMENTAL SCALE) 1 ACHS SLIDING  SCALE SQ  Last 

administered on 5/31/18at 08:00;  Start 5/24/18 at 17:00


Sodium Chloride 1,000 ml @  60 mls/hr [id number] IV  Last administered on 5/24/ 18at 18:23;  Start 5/24/18 at 13:30;  Stop 5/27/18 at 10:00;  Status DC


Piperacillin Sod/ Tazobactam Sod 50 ml @  100 mls/hr Q6H IV  Last administered 

on 5/26/18at 17:02;  Start 5/24/18 at 18:00;  Stop 5/27/18 at 09:02;  Status DC


Pharmacy Profile Note 0 ml @ 0 mls/hr UNSCH OTHER ;  Start 5/24/18 at 13:30;  

Stop 5/26/18 at 11:26;  Status DC


Aspirin (Ecotrin Ec) 162 mg DAILY PO  Last administered on 5/26/18at 09:05;  

Start 5/25/18 at 09:00;  Stop 5/28/18 at 12:04;  Status DC


Famotidine (Pepcid) 20 mg BID PO  Last administered on 5/27/18at 08:52;  Start 5 /24/18 at 21:00;  Stop 5/27/18 at 10:16;  Status DC


Pravastatin Sodium (Pravachol) 40 mg DAILY PO  Last administered on 5/31/18at 08

:18;  Start 5/25/18 at 09:00


Albuterol/ Ipratropium (Duoneb Neb) 1 ampule Q4HR NEB  PRN NEB SHORTNESS OF 

BREATH Last administered on 5/24/18at 15:38;  Start 5/24/18 at 13:45


Vancomycin HCl 1500 mg/Sodium Chloride 515 ml @  250 mls/hr Q24H IV  Last 

administered on 5/25/18at 14:18;  Start 5/25/18 at 14:00;  Stop 5/25/18 at 16:40

;  Status DC


Acetaminophen (Tylenol) 650 mg Q4H  PRN PO FEVER/PAIN 1-5 Last administered on 5 /27/18at 11:45;  Start 5/24/18 at 14:30


Acetaminophen/ Hydrocodone Bitart (Norco  5-325 Mg) 1 tab Q4H  PRN PO PAIN 6-10 

Last administered on 5/31/18at 06:35;  Start 5/24/18 at 14:30


Gadobenate Dimeglumine (Multihance Pf Inj) 18 ml STK-MED ONCE IV  Last 

administered on 5/24/18at 17:21;  Start 5/24/18 at 17:21;  Stop 5/24/18 at 17:22

;  Status DC


Metoclopramide HCl (Reglan Inj) 10 mg ONCE  ONCE IV PUSH  Last administered on 5 /25/18at 02:05;  Start 5/25/18 at 02:15;  Stop 5/25/18 at 02:16;  Status DC


Metoclopramide HCl (Reglan Inj) 5 mg Q6H  PRN IV PUSH NAUSEA OR VOMITING Last 

administered on 5/25/18at 20:10;  Start 5/25/18 at 02:30


Ondansetron HCl (Zofran  Odt) 4 mg Q6H  PRN PO NAUSEA OR VOMITING Last 

administered on 5/28/18at 11:50;  Start 5/25/18 at 02:30


Metoprolol Tartrate (Lopressor Inj) 5 mg ONCE  ONCE IV PUSH  Last administered 

on 5/25/18at 02:39;  Start 5/25/18 at 02:45;  Stop 5/25/18 at 02:46;  Status DC


Metoprolol Tartrate (Lopressor Inj) 5 mg Q5M  PRN IV PUSH ATRIAL FLUTTER Last 

administered on 5/25/18at 03:06;  Start 5/25/18 at 02:45


Miscellaneous Information ([facility] Ordered Lab Info) SPECIFIC LAB TO BE 

TYLOR..Vj ONCE  ONCE .XX ;  Start 5/27/18 at 12:45;  Stop 5/27/18 at 12:46;  

Status Cancel


Insulin Detemir (Levemir Inj) 10 units HS SQ  Last administered on 5/30/18at 22:

19;  Start 5/25/18 at 21:00


Vancomycin HCl 1000 mg/Sodium Chloride 250 ml @  250 mls/hr Q12H IV  Last 

administered on 5/26/18at 01:06;  Start 5/26/18 at 02:00;  Stop 5/26/18 at 11:26

;  Status DC


Miscellaneous Information ([facility] Ordered Lab Info) SPECIFIC LAB TO BE 

TYLORALEKSEY ONCE  ONCE .XX ;  Start 5/27/18 at 13:45;  Stop 5/27/18 at 13:46;  

Status DC


Benzonatate (Tessalon) 100 mg TID  PRN PO COUGH Last administered on 5/29/18at 

02:00;  Start 5/26/18 at 03:00


Guaifenesin/ Codeine Phosphate (Robitussin Ac 200-20 Mg/10 ml Liq) 10 ml Q6H  

PRN PO COUGH;  Start 5/26/18 at 03:00


Magnesium Sulfate/ Dextrose 100 ml @  100 mls/hr Q1H IV  Last administered on 5/ 26/18at 09:45;  Start 5/26/18 at 08:45;  Stop 5/26/18 at 10:44;  Status DC


Cefazolin Sodium/ Dextrose 50 ml @  100 mls/hr Q8H IV  Last administered on 5/26 /18at 21:32;  Start 5/26/18 at 13:00;  Stop 5/26/18 at 23:30;  Status DC


Carvedilol (Coreg) 6.25 mg Q12HR PO  Last administered on 5/31/18at 08:18;  

Start 5/26/18 at 11:30


Sacubitril/ Valsartan (Entresto 49-51 Mg) 1 tab BID PO  Last administered on 5/ 31/18at 08:18;  Start 5/26/18 at 21:00


Cefepime HCl 1000 mg/Sodium Chloride 100 ml @  200 mls/hr Q8H IV  Last 

administered on 5/27/18at 08:49;  Start 5/27/18 at 00:00;  Stop 5/27/18 at 09:01

;  Status DC


Piperacillin Sod/ Tazobactam Sod 50 ml @  100 mls/hr Q6H IV  Last administered 

on 5/28/18at 10:00;  Start 5/27/18 at 10:00;  Stop 5/28/18 at 11:16;  Status DC


Cefazolin Sodium/ Dextrose 50 ml @  100 mls/hr Q8H IV  Last administered on 5/31 /18at 08:17;  Start 5/27/18 at 09:00


Aspirin (Aspirin Chew) 81 mg DAILY CHEW  Last administered on 5/31/18at 08:18;  

Start 5/28/18 at 12:10


Pharmacy Profile Note 0 ml @ 0 mls/hr UNSCH OTHER ;  Start 5/28/18 at 11:15


Heparin Sodium/ Dextrose 250 ml @  16.128 mls/ hr TITRATE  PRN IV Coagulation 

Management Last administered on 5/30/18at 22:35;  Start 5/28/18 at 12:30


Gentamicin Sulfate 90 mg/ Sodium Chloride 102.25 ml  @ 200 mls/ hr Q8H IV  Last 

administered on 5/31/18at 06:16;  Start 5/28/18 at 14:00


Miscellaneous Information ([facility] Ordered Lab Info) SPECIFIC LAB TO BE 

TYLOR... ONCE  ONCE .XX ;  Start 5/29/18 at 13:45;  Stop 5/29/18 at 13:46;  

Status DC


Miscellaneous Information ([facility] Ordered Lab Info) SPECIFIC LAB TO BE 

TYLOR... ONCE  ONCE .XX  Last administered on 5/29/18at 15:00;  Start 5/29/18 at 

15:00;  Stop 5/29/18 at 15:01;  Status DC


Iohexol (Omnipaque 350 Inj) 100 ml STK-MED ONCE IVCONTRAST  Last administered 

on 5/28/18at 13:57;  Start 5/28/18 at 13:57;  Stop 5/28/18 at 13:58;  Status DC


Sodium Chloride 1,000 ml @  100 mls/hr Q10H IV  Last administered on 5/30/18at 

10:17;  Start 5/29/18 at 11:15;  Status Future Hold


Pantoprazole Sodium (Protonix Inj) 40 mg ONCE  ONCE IV PUSH  Last administered 

on 5/29/18at 12:55;  Start 5/29/18 at 12:45;  Stop 5/29/18 at 12:50;  Status DC


Pantoprazole Sodium (Protonix) 40 mg DAILY PO  Last administered on 5/31/18at 08

:18;  Start 5/30/18 at 09:00


Ketamine HCl (Ketalar Inj) 50 mg STK-MED ONCE .ROUTE ;  Start 5/29/18 at 15:41;

  Stop 5/29/18 at 15:42;  Status DC


Lidocaine HCl (Xylocaine-Mpf 2% Inj) 20 ml STK-MED ONCE .ROUTE  Last 

administered on 5/29/18at 16:28;  Start 5/29/18 at 16:24;  Stop 5/29/18 at 16:25

;  Status DC


Midazolam HCl (Versed Inj) 2 mg STK-MED ONCE .ROUTE ;  Start 5/29/18 at 17:16;  

Stop 5/29/18 at 17:17;  Status DC


Miscellaneous Information (Misc Nursing Information) ALL NURSING DEPARTME... 

UNSCH  PRN .XX SEE LABEL COMMENTS;  Start 5/29/18 at 17:07;  Stop 5/30/18 at 17:

06;  Status DC


Miscellaneous Information ([facility] Ordered Lab Info) SPECIFIC LAB TO BE 

DRAWN:GENTAMICIN TROUGH DATE TO... ONCE  ONCE .XX  Last administered on 5/30/ 18at 21:45;  Start 5/30/18 at 21:45;  Stop 5/30/18 at 21:46;  Status DC


Miscellaneous Information ([facility] Ordered Lab Info) SPECIFIC LAB TO BE 

DRAWN:GENTAMICIN PEAK DATE TO... ONCE  ONCE .XX  Last administered on 5/30/18at 

23:25;  Start 5/30/18 at 23:00;  Stop 5/30/18 at 23:01;  Status DC





A/P


Problem List:  


(1) Severe sepsis


ICD Code:  A41.9 - Sepsis, unspecified organism; R65.20 - Severe sepsis without 

septic shock


(2) Elevated troponin


ICD Code:  R74.8 - Abnormal levels of other serum enzymes


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


Assessment and Plan


Severe sepsis due to gangrene/ infection of the right second toe 


Staph aureus bacteremia-- persistent bacteremia- 


check echo- may need SHAYY


ID ff  - on zosyn and  Ancef


Repeat BC to document clearing in next few days 


May need SHAYY


Podiatry following


Vascular surgery ff- requested for CTA runoff- DONE


FOR SURGERY ON RIGHT 2ND TOE 5-29 WITH PODIATRY


May 29, 2018


Pre Op Diagnosis:  


gangrene with osteomyelitis right 2nd toe


Surgeon:


Marc Mckeon DPRY


Procedure:


amputation right 2nd toe


Findings:


Consistent with diagnosis. Right 2nd toe with necrotic tissue to entire digit. 

Foul odor slightly present. Margins clean without purulence. 


Two semi-elliptical incisions made to disarticulate digit in its entirety at 

metatarsophalangeal joint level. 2nd metatarsal head with white healthy 

cartilage cap. No purulence noted.


Culture taken prior to irrigation and primary closure with 2-0 nylon suture. 


Dressing with xeroform, 4x4, cast padding, ace.


Weightbearing as tolerated to heel in surgical shoe for transfers only.


Specimen(s) removed:


1. toe right 2nd


2. culture right foot





Ischemic cardiomyopathy EF 25% - nstemi, 


Debrillator fired evening 5.25


NSTEMI with history of CAD- had cardiac cath two months ago with severe three 

vessel disease- was evaluated by CT surgery at the time with poor targets for 

redo sternotomy


high metabolic demand from sepsis


- clinically feeling better 


- continue electrolyte monitoring


- ASA, Heparin drip- DC due to thrombocytopenia- Hematology consutled for 

recommendation


- continue  Entresto, Coreg


- will start ASA





Acute kidney injury; improved- started on gentle IV hydration in light of 

cardiomyopathy- monitor renal function





-thrombocytopenia- likely due to sepsis


-  continue ASA


- FF CBC


-seen by Dr. Skelton. HIT ab negative- restart heparin drip- will d/w Dr. Skelton


- will also DC Pepcid- due to thrombocytopenia





Diabetes mellitus- sliding scale 





-COPD with no exacerbation; neb treatment as needed.


-diabetes mellitus; uncontrolled- hold oral hypoglycemics- start on accu-check 

with SSI. continue Levemir and adjust 





-DVT prophylaxis;encoruage increase activity





palliative care  ff


CM ff - assisting with transfer to a tertiary center for transplant-- no 

insurance, assisting us with Medicaid application





CONTINUE ANTIBIOTICS PER ID


Discharge Planning


PENDING ID CLEARANCE











Reece Yousif DO May 31, 2018 11:02

## 2018-05-31 NOTE — RADRPT
EXAM DATE:  5/25/2018 11:08 AM EDT

AGE/SEX:        57 years / Male



INDICATIONS:  Severe Sepsis, Elevated Troponin



CLINICAL DATA:  This is the patient's initial encounter. Patient reports that signs and symptoms have
 been present for 3 days and indicates a pain score of 7/10. 

                                                                                                     
        

MEDICAL/SURGICAL HISTORY:       . CHF, Diabetes, hx of MI, Defribrillator, hx of CAGB  . CABG X3, Def
ib placement, Appendectomy, Skin graft to right foot as a child, Back surgery X3 



COMPARISON:      No prior [facility] exams available for comparison.



TECHNIQUE:  Five-station segmental examination of the lower extremities was performed. Pulsed-cuff wa
veform tracings and pressures were recorded. Ankle-brachial indices and toe-brachial indices were josué
culated.



PRESSURES (mmHg):

Brachial (arm) : RIGHT:  IV SITE, LEFT:  103

Lower Thigh : RIGHT:  112, LEFT:  113

Calf : RIGHT:  100, LEFT:  74

Ankle : RIGHT:  92, LEFT:  93

Toe : RIGHT:  58, LEFT:  24

JUDITH : RIGHT:  0.89, LEFT:  0.90

TBI : RIGHT:  0.56, LEFT:  0.23

RIGHT: , LEFT:



PULSED CUFF WAVEFORMS:  Demonstrate significantly dampened amplitude in the left toe and mildly decre
ased amplitude in the left ankle.



There is a sufficient systolic gradient across the left thigh and in the left foot.



CONCLUSION:

1.  Findings consistent with left outflow disease as well as small vessel disease in the left foot.

2.  Near normal range right ankle-brachial indices and segmental pressures.



Electronically signed by: Isaac Cao MD  5/25/2018 11:17 AM EDT

## 2018-05-31 NOTE — HHI.IDPN
Subjective


Subjective


Remarks


Patient seen and examined on behalf of Dr. Brandon





 is a 58 y/o CM with PMHx of CHF, Ischemic Cardiomyopathy with last 

reported EF 20-25%, he has prior h/o CABG x 3, defibrillator and pacemaker, DM, 

HTN and hyperlipidemia. CT surgery evaluated and found him not to be a 

revascularization candidate and recommended referral to a tertiary center for 

transplant consideration.  As the patient has no  insurance this option has not 

been found feasible.  He underwent placement of a single-chamber St Trav ICD in 

March 2018 for sudden cardiac death prevention. 


With this background patient presents to the ED at [place] with 4 day history 

of progressive dyspnea described as severe, worsening with exertion, and 

improved with rest. He has had associated symptoms of diaphoresis, nausea and 

vomiting over the weekend.  He additionally has noted painful changes in his 

right second toe with color changes suspicious for infection.  


In ED patient was noted to have WBC 9.6, hemoglobin 15.0, hematocrit 43.9, 

platelets 133, sodium 130, potassium 4.4, BUN 36, creatinine 1.84, random 

glucose 407 alkaline phosphatase 114, AST 37, ALT 25, total bilirubin 0.9, 

troponin 3.04, C-reactive protein 26.8, B natruretic peptide 3148, lactic acid 

5.5. Ax Xray of Rt toe showed erosion. MRI with no osteomyelitis. Chest x-ray 

showed no acute abnormality or significant interval change.


At baseline prior to this infectious process patient was able to work around 

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  He states that he is only able to be active 

for 5-10 minutes before becoming very dyspneic and exhausted.  He was 

previously an over the road  but has been unable to work since 

March of this year.  He is now attempting to get disability as he has a nearly 

homebound existence and requires assistance to get out to doctor's appointments.


Sepsis workup on admission positive for Staph bacteremia and ID consulted for 

evaluation and Mment of the same.





Notes reviewed 


c/o nausea and headache


+persistent abdominal pain but better since admission per patient report


no fever


no rash


no dysuria - reports good UOP


no diarrhea


afebrile


VSS


S/P ICD placement March 2018


WBC 11.5


Repeat BCX pending


Wound cx + staph


Pathology pending


Antibiotics


IV Gent


IV Ancef


Teflaro IV








Current Medications








 Medications


  (Trade)  Dose


 Ordered  Sig/Ilia


 Route  Start Time


 Stop Time Status Last Admin


 


  (NS Flush)  2 ml  UNSCH  PRN


 IVF  5/24/18 10:00


    5/28/18 08:31


 


 


  (D50w (Vial) Inj)  50 ml  UNSCH  PRN


 IV PUSH  5/24/18 13:30


     


 


 


  (Glucagon Inj)  1 mg  UNSCH  PRN


 OTHER  5/24/18 13:30


     


 


 


  (NovoLOG


 SUPPLEMENTAL


 SCALE)  1  ACHS SLIDING  SCALE


 SQ  5/24/18 17:00


    5/30/18 22:18


 


 


  (Pravachol)  40 mg  DAILY


 PO  5/25/18 09:00


    5/30/18 08:09


 


 


  (Duoneb Neb)  1 ampule  Q4HR NEB  PRN


 NEB  5/24/18 13:45


    5/24/18 15:38


 


 


  (Tylenol)  650 mg  Q4H  PRN


 PO  5/24/18 14:30


    5/27/18 11:45


 


 


  (Norco  5-325 Mg)  1 tab  Q4H  PRN


 PO  5/24/18 14:30


    5/31/18 06:35


 


 


  (Reglan Inj)  5 mg  Q6H  PRN


 IV PUSH  5/25/18 02:30


    5/25/18 20:10


 


 


  (Zofran  Odt)  4 mg  Q6H  PRN


 PO  5/25/18 02:30


    5/28/18 11:50


 


 


  (Lopressor Inj)  5 mg  Q5M  PRN


 IV PUSH  5/25/18 02:45


    5/25/18 03:06


 


 


  (Levemir Inj)  10 units  HS


 SQ  5/25/18 21:00


    5/30/18 22:19


 


 


  (Tessalon)  100 mg  TID  PRN


 PO  5/26/18 03:00


    5/29/18 02:00


 


 


  (Robitussin Ac


 200-20 Mg/10 ml


 Liq)  10 ml  Q6H  PRN


 PO  5/26/18 03:00


     


 


 


  (Coreg)  6.25 mg  Q12HR


 PO  5/26/18 11:30


    5/30/18 22:19


 


 


  (Entresto 49-51


 Mg)  1 tab  BID


 PO  5/26/18 21:00


    5/30/18 22:19


 


 


 Cefazolin Sodium/


 Dextrose  50 ml @ 


 100 mls/hr  Q8H


 IV  5/27/18 09:00


    5/31/18 01:06


 


 


  (Aspirin Chew)  81 mg  DAILY


 CHEW  5/28/18 12:10


    5/30/18 08:09


 


 


 Pharmacy Profile


 Note  0 ml @ 0


 mls/hr  UNSCH


 OTHER  5/28/18 11:15


     


 


 


 Heparin Sodium/


 Dextrose  250 ml @ 


 16.128 mls/


 hr  TITRATE  PRN


 IV  5/28/18 12:30


    5/30/18 22:35


 


 


 Gentamicin


 Sulfate 90 mg/


 Sodium Chloride  102.25 ml


  @ 200 mls/


 hr  Q8H


 IV  5/28/18 14:00


    5/31/18 06:16


 


 


 Sodium Chloride  1,000 ml @ 


 100 mls/hr  Q10H


 IV  5/29/18 11:15


   Future Hold 5/30/18 10:17


 


 


  (Protonix)  40 mg  DAILY


 PO  5/30/18 09:00


    5/30/18 08:09


 








Lines


PIV no evidence of infection


Past Medical History


Severe ischemic cardiomyopathy EF 20-25%


Hyperlipidemia


Diabetes


Coronary artery disease status post MI 3/13


COPD


Past Surgical History


CABG 10 years ago


Pacemaker/Defibrillator insertion


Appendectomy


Skin graft to right foot as a child


Back surgeries 3 has hardware in back per report.


 (Merlyn Rey)


Allergies:  


Coded Allergies:  


     potassium iodide (Unverified  Allergy, Severe, Swelling, 3/21/18)


     povidone-iodine (Unverified  Allergy, Severe, Swelling, 3/21/18)


     shellfish derived (Unverified  Allergy, Severe, 3/21/18)


     sodium iodide (Unverified  Allergy, Severe, Swelling, 3/21/18)


     sodium iodide (Unverified  Allergy, Severe, Swelling, 3/21/18)





Objective


.





Vital Signs








  Date Time  Temp Pulse Resp B/P (MAP) Pulse Ox O2 Delivery O2 Flow Rate FiO2


 


5/31/18 06:00  78      


 


5/31/18 05:00  65      


 


5/31/18 04:00  67      


 


5/31/18 03:42 98.0 66 24 109/57 (74) 95   


 


5/31/18 03:00  68      


 


5/31/18 02:00  71      


 


5/31/18 01:00  71      


 


5/31/18 00:00  72      


 


5/30/18 23:32 98.9 77 20 121/63 (82) 96   


 


5/30/18 23:00  73      


 


5/30/18 22:00  77      


 


5/30/18 21:00 98.3 74 20 122/69 (86) 96   


 


5/30/18 21:00  75      


 


5/30/18 20:00  74      


 


5/30/18 19:37     98   21


 


5/30/18 19:00  75      


 


5/30/18 18:00  74      


 


5/30/18 17:00  66      


 


5/30/18 16:00  78      


 


5/30/18 15:30 98.2 68 14 151/64 (93) 97   


 


5/30/18 15:00  75      


 


5/30/18 14:00  78      


 


5/30/18 13:00  68      


 


5/30/18 12:23 98.4 65 15 118/63 (81) 94   


 


5/30/18 12:00  68      


 


5/30/18 11:00  74      


 


5/30/18 10:00  75      


 


5/30/18 09:00  72      


 


5/30/18 08:20 98.6 73 14 129/71 (90) 96   


 


5/30/18 08:00  64      








.





Laboratory Tests








Test


  5/30/18


02:50 5/31/18


06:05


 


White Blood Count 10.9 TH/MM3  11.5 TH/MM3 


 


Red Blood Count 4.64 MIL/MM3  4.51 MIL/MM3 


 


Hemoglobin 12.7 GM/DL  12.5 GM/DL 


 


Hematocrit 37.7 %  36.8 % 


 


Mean Corpuscular Volume 81.1 FL  81.7 FL 


 


Mean Corpuscular Hemoglobin 27.4 PG  27.7 PG 


 


Mean Corpuscular Hemoglobin


Concent 33.8 % 


  33.9 % 


 


 


Red Cell Distribution Width 17.2 %  17.2 % 


 


Platelet Count 172 TH/MM3  205 TH/MM3 


 


Mean Platelet Volume 9.0 FL  8.7 FL 


 


Neutrophils (%) (Auto) 70.4 %  72.8 % 


 


Lymphocytes (%) (Auto) 17.3 %  15.8 % 


 


Monocytes (%) (Auto) 10.3 %  8.9 % 


 


Eosinophils (%) (Auto) 1.5 %  1.8 % 


 


Basophils (%) (Auto) 0.5 %  0.7 % 


 


Neutrophils # (Auto) 7.6 TH/MM3  8.4 TH/MM3 


 


Lymphocytes # (Auto) 1.9 TH/MM3  1.8 TH/MM3 


 


Monocytes # (Auto) 1.1 TH/MM3  1.0 TH/MM3 


 


Eosinophils # (Auto) 0.2 TH/MM3  0.2 TH/MM3 


 


Basophils # (Auto) 0.1 TH/MM3  0.1 TH/MM3 


 


CBC Comment AUTO DIFF  AUTO DIFF 


 


Differential Total Cells


Counted 100 


  


 


 


Neutrophils % (Manual) 71 %  


 


Band Neutrophils % 3 %  


 


Lymphocytes % 13 %  


 


Monocytes % 9 %  


 


Eosinophils % 2 %  


 


Neutrophils # (Manual) 8.3 TH/MM3  


 


Metamyelocytes 2 %  


 


Differential Comment


  FINAL DIFF


MANUAL 


 


 


Toxic Granulation 1+  


 


Platelet Estimate NORMAL  


 


Platelet Morphology Comment NORMAL  


 


Ovalocytes 1+  








Laboratory Tests








Test


  5/30/18


02:50 5/30/18


23:27 5/31/18


06:05


 


Blood Urea Nitrogen 19 MG/DL  17 MG/DL  


 


Creatinine 0.93 MG/DL  0.83 MG/DL  0.82 MG/DL 


 


Random Glucose 283 MG/DL  299 MG/DL  


 


Total Protein 6.4 GM/DL  6.5 GM/DL  


 


Albumin 2.2 GM/DL  2.2 GM/DL  


 


Calcium Level 8.4 MG/DL  8.5 MG/DL  


 


Phosphorus Level 3.2 MG/DL  2.8 MG/DL  


 


Magnesium Level 2.0 MG/DL  2.0 MG/DL  


 


Alkaline Phosphatase 85 U/L  84 U/L  


 


Aspartate Amino Transf


(AST/SGOT) 17 U/L 


  9 U/L 


  


 


 


Alanine Aminotransferase


(ALT/SGPT) 19 U/L 


  19 U/L 


  


 


 


Total Bilirubin 0.3 MG/DL  0.3 MG/DL  


 


Sodium Level 137 MEQ/L  134 MEQ/L  


 


Potassium Level 4.5 MEQ/L  4.6 MEQ/L  


 


Chloride Level 101 MEQ/L  101 MEQ/L  


 


Carbon Dioxide Level 28.9 MEQ/L  22.4 MEQ/L  


 


Anion Gap 7 MEQ/L  11 MEQ/L  


 


Estimat Glomerular Filtration


Rate 83 ML/MIN 


  95 ML/MIN 


  96 ML/MIN 


 


 


Hemoglobin A1c 9.8 %   


 


B-Type Natriuretic Peptide 704 PG/ML   


 


Free Thyroxine 1.34 NG/DL   


 


Thyroid Stimulating Hormone


3rd Gen 2.420 uIU/ML 


  


  


 








Microbiology








 Date/Time


Source Procedure


Growth Status


 


 


 5/30/18 02:50


Blood Peripheral Aerobic Blood Culture


Pending Received


 


 5/30/18 02:50


Blood Peripheral Anaerobic Blood Culture


Pending Received





 5/30/18 01:43


Blood Peripheral Aerobic Blood Culture


Pending Received


 


 5/30/18 01:43


Blood Peripheral Anaerobic Blood Culture


Pending Received





 5/29/18 16:24


Wound Toe Fungal Smear - Final


NO FUNGAL ELEMENTS SEEN. Resulted


 


 5/29/18 16:24


Wound Toe Fungal Culture


Pending Resulted





 5/29/18 16:24


Wound Toe Acid Fast Stain


Pending Received


 


 5/29/18 16:24


Wound Toe Mycobacterial Culture


Pending Received





 5/29/18 16:24


Wound Toe Gram Stain - Final Resulted


 


 5/29/18 16:24 Wound Culture - Preliminary


Staphylococcus Aureus Resulted








Imaging





Last Impressions








Aorta w/Runoff CTA 5/28/18 0000 Signed





Impressions: 





 CONCLUSION:





 1.  Atherosclerotic changes without significant stenosis bilateral lower extrem





 ities.





  





 


 


Chest X-Ray 5/24/18 0955 Signed





Impressions: 





 CONCLUSION: 





 1.  No acute abnormality or significant interval change.





  





 


 


Toe X-Ray 5/24/18 0000 Signed





Impressions: 





 CONCLUSION: 





 Questionable small erosion distal phalanx right second toe. This could be infec





 tious in nature if there is clinical evidence for soft tissue infection.





  





 





  





  





 





  





 





  





 


 


Renal Ultrasound 5/24/18 0000 Signed





Impressions: 





 CONCLUSION: 





 1.  No hydronephrosis.





 2.  Increased echogenicity most characteristic of medical renal disease.





  





 


 


Foot MRI 5/24/18 0000 Signed





Impressions: 





 CONCLUSION:





 1.  No evidence for osteomyelitis. Soft tissue swelling at the second toe.





  





 








Physical Exam


GENERAL:  WDWN  male patient, INAD.  Awake and alert.  Appears 

comfortable.


SKIN:  No rashes or lesions. Cool and dry.


HEAD: Atraumatic. Normocephalic. 


EYES: Pupils equal round and reactive. Extraocular motions intact. No scleral 

icterus. No injection or drainage. 


ENT: Nose without bleeding or purulent drainage. Airway patent.  MMM.


NECK: Trachea midline. Supple, nontender, no meningeal signs.


CARDIOVASCULAR: HS audible.  ICD, has mild pink color on his L chest, not 

indurated,  seems swollen, not tender, has well healed incision


RESPIRATORY: Nonlabored.  Clear to auscultation. Breath sounds equal 

bilaterally. No wheezes, rales, or rhonchi.  


GASTROINTESTINAL: Abdomen soft, +mildly distended, diffusely tender to 

palpation.


MUSCULOSKELETAL:  No BLE edema appreciated.  Right foot in postop dressing and 

boot, able to wiggles toes slightly on right foot.  Decreased sensation but 

able to feel light touch on toes.


NEUROLOGICAL:   Grossly non-focal.


Psych calm and cooperative


IV line sites with no e.o infection.


 (Merlyn Rey)





Assessment & Plan


Remarks


Sepsis


High grade MSSA bacteremia 


   -  source ?2nd toe,  concern with ICD


Right 2nd toe wet gangrene, osteomyelitis.


Hepatitis C antibody positive.


AICD status.


CAD with severe ischemic cardiomyopathy.


Thrombocytopenia likely due to severe infection, ?DIC





Recs


Continue IV Ancef


Continue Genta IV for synergy pending BCX turning negative and source control.


Follow cultures until finalized


Follow path


Follow clinically.


Will need IV antibiotics on discharge.


 (Merlyn Rey)


Remarks


The exam, history, and the medical decision-making described in the above note 

were completed with the assistance of the mid-level provider. I reviewed and 

agree with the findings presented.  I attest that I had a face-to-face 

encounter with the patient on the same day, and personally performed and 

documented my assessment and findings in the medical record.





Patient seen and examined.


No fevers


No rash


No diarrhea





CTA BL.


Foot still in post op dressing.Dried old blood.





Recs


Continue ancef IV


Discontinue genta IV


Will need 6 weeks IV Ancef irrespective of bone path due to high grade 

bacteremia and AICD in place.


No PICC till cleared by ID.


 (Bev Brandon MD)











Merlyn Rey May 31, 2018 07:43


Bev Brandon MD May 31, 2018 15:09

## 2018-06-01 VITALS
RESPIRATION RATE: 16 BRPM | SYSTOLIC BLOOD PRESSURE: 110 MMHG | TEMPERATURE: 98.8 F | DIASTOLIC BLOOD PRESSURE: 70 MMHG | HEART RATE: 70 BPM | OXYGEN SATURATION: 98 %

## 2018-06-01 VITALS — HEART RATE: 70 BPM

## 2018-06-01 VITALS
SYSTOLIC BLOOD PRESSURE: 103 MMHG | RESPIRATION RATE: 18 BRPM | TEMPERATURE: 98.6 F | OXYGEN SATURATION: 96 % | DIASTOLIC BLOOD PRESSURE: 57 MMHG | HEART RATE: 77 BPM

## 2018-06-01 VITALS — HEART RATE: 76 BPM

## 2018-06-01 VITALS — HEART RATE: 78 BPM

## 2018-06-01 VITALS
OXYGEN SATURATION: 98 % | TEMPERATURE: 98.7 F | DIASTOLIC BLOOD PRESSURE: 78 MMHG | HEART RATE: 73 BPM | RESPIRATION RATE: 16 BRPM | SYSTOLIC BLOOD PRESSURE: 106 MMHG

## 2018-06-01 VITALS
SYSTOLIC BLOOD PRESSURE: 126 MMHG | TEMPERATURE: 98.6 F | RESPIRATION RATE: 20 BRPM | HEART RATE: 73 BPM | OXYGEN SATURATION: 96 % | DIASTOLIC BLOOD PRESSURE: 88 MMHG

## 2018-06-01 VITALS — HEART RATE: 79 BPM

## 2018-06-01 VITALS
HEART RATE: 71 BPM | TEMPERATURE: 98.1 F | RESPIRATION RATE: 16 BRPM | SYSTOLIC BLOOD PRESSURE: 98 MMHG | DIASTOLIC BLOOD PRESSURE: 60 MMHG | OXYGEN SATURATION: 98 %

## 2018-06-01 VITALS — HEART RATE: 75 BPM

## 2018-06-01 VITALS — HEART RATE: 66 BPM

## 2018-06-01 VITALS — HEART RATE: 68 BPM

## 2018-06-01 VITALS — HEART RATE: 74 BPM

## 2018-06-01 VITALS — OXYGEN SATURATION: 93 %

## 2018-06-01 LAB
ALBUMIN SERPL-MCNC: 2.3 GM/DL (ref 3.4–5)
ALP SERPL-CCNC: 84 U/L (ref 45–117)
ALT SERPL-CCNC: 19 U/L (ref 12–78)
AST SERPL-CCNC: 11 U/L (ref 15–37)
BASOPHILS # BLD AUTO: 0.1 TH/MM3 (ref 0–0.2)
BASOPHILS NFR BLD: 0.6 % (ref 0–2)
BILIRUB SERPL-MCNC: 0.4 MG/DL (ref 0.2–1)
BUN SERPL-MCNC: 18 MG/DL (ref 7–18)
CALCIUM SERPL-MCNC: 9 MG/DL (ref 8.5–10.1)
CHLORIDE SERPL-SCNC: 98 MEQ/L (ref 98–107)
CREAT SERPL-MCNC: 0.88 MG/DL (ref 0.6–1.3)
EOSINOPHIL # BLD: 0.1 TH/MM3 (ref 0–0.4)
EOSINOPHIL NFR BLD: 1.1 % (ref 0–4)
ERYTHROCYTE [DISTWIDTH] IN BLOOD BY AUTOMATED COUNT: 17.1 % (ref 11.6–17.2)
GFR SERPLBLD BASED ON 1.73 SQ M-ARVRAT: 89 ML/MIN (ref 89–?)
GLUCOSE SERPL-MCNC: 236 MG/DL (ref 74–106)
HCO3 BLD-SCNC: 25.8 MEQ/L (ref 21–32)
HCT VFR BLD CALC: 36.8 % (ref 39–51)
HGB BLD-MCNC: 12.5 GM/DL (ref 13–17)
LYMPHOCYTES # BLD AUTO: 1.9 TH/MM3 (ref 1–4.8)
LYMPHOCYTES NFR BLD AUTO: 15.7 % (ref 9–44)
MAGNESIUM SERPL-MCNC: 2.1 MG/DL (ref 1.5–2.5)
MCH RBC QN AUTO: 27.4 PG (ref 27–34)
MCHC RBC AUTO-ENTMCNC: 34 % (ref 32–36)
MCV RBC AUTO: 80.7 FL (ref 80–100)
MONOCYTE #: 1 TH/MM3 (ref 0–0.9)
MONOCYTES NFR BLD: 7.9 % (ref 0–8)
NEUTROPHILS # BLD AUTO: 9.2 TH/MM3 (ref 1.8–7.7)
NEUTROPHILS NFR BLD AUTO: 74.7 % (ref 16–70)
PHOSPHATE SERPL-MCNC: 3.7 MG/DL (ref 2.5–4.9)
PLATELET # BLD: 243 TH/MM3 (ref 150–450)
PMV BLD AUTO: 9.4 FL (ref 7–11)
PROT SERPL-MCNC: 7 GM/DL (ref 6.4–8.2)
RBC # BLD AUTO: 4.56 MIL/MM3 (ref 4.5–5.9)
SODIUM SERPL-SCNC: 133 MEQ/L (ref 136–145)
WBC # BLD AUTO: 12.3 TH/MM3 (ref 4–11)

## 2018-06-01 RX ADMIN — INSULIN ASPART SCH: 100 INJECTION, SOLUTION INTRAVENOUS; SUBCUTANEOUS at 17:00

## 2018-06-01 RX ADMIN — CEFAZOLIN SODIUM SCH MLS/HR: 2 SOLUTION INTRAVENOUS at 01:17

## 2018-06-01 RX ADMIN — INSULIN ASPART SCH: 100 INJECTION, SOLUTION INTRAVENOUS; SUBCUTANEOUS at 12:00

## 2018-06-01 RX ADMIN — HEPARIN SODIUM PRN MLS/HR: 10000 INJECTION, SOLUTION INTRAVENOUS at 16:57

## 2018-06-01 RX ADMIN — PRAVASTATIN SODIUM SCH MG: 40 TABLET ORAL at 09:47

## 2018-06-01 RX ADMIN — CARVEDILOL SCH MG: 6.25 TABLET, FILM COATED ORAL at 09:46

## 2018-06-01 RX ADMIN — INSULIN ASPART SCH: 100 INJECTION, SOLUTION INTRAVENOUS; SUBCUTANEOUS at 08:00

## 2018-06-01 RX ADMIN — SACUBITRIL AND VALSARTAN SCH TAB: 49; 51 TABLET, FILM COATED ORAL at 09:46

## 2018-06-01 RX ADMIN — CEFAZOLIN SODIUM SCH MLS/HR: 2 SOLUTION INTRAVENOUS at 16:48

## 2018-06-01 RX ADMIN — CEFAZOLIN SODIUM SCH MLS/HR: 2 SOLUTION INTRAVENOUS at 09:46

## 2018-06-01 RX ADMIN — ACYCLOVIR SCH UNITS: 800 TABLET ORAL at 20:17

## 2018-06-01 RX ADMIN — INSULIN ASPART SCH: 100 INJECTION, SOLUTION INTRAVENOUS; SUBCUTANEOUS at 20:25

## 2018-06-01 RX ADMIN — SACUBITRIL AND VALSARTAN SCH TAB: 49; 51 TABLET, FILM COATED ORAL at 20:15

## 2018-06-01 RX ADMIN — ASPIRIN 81 MG SCH MG: 81 TABLET ORAL at 09:47

## 2018-06-01 RX ADMIN — HYDROCODONE BITARTRATE AND ACETAMINOPHEN PRN TAB: 5; 325 TABLET ORAL at 20:16

## 2018-06-01 RX ADMIN — PANTOPRAZOLE SCH MG: 40 TABLET, DELAYED RELEASE ORAL at 09:47

## 2018-06-01 RX ADMIN — CARVEDILOL SCH MG: 6.25 TABLET, FILM COATED ORAL at 20:15

## 2018-06-01 NOTE — PD.CARD.PN
Subjective


Subjective Remarks


appears comfortable in nad





Objective


Medications





Current Medications








 Medications


  (Trade)  Dose


 Ordered  Sig/Ilia


 Route  Start Time


 Stop Time Status Last Admin


 


  (NS Flush)  2 ml  UNSCH  PRN


 IVF  5/24/18 10:00


    5/28/18 08:31


 


 


  (D50w (Vial) Inj)  50 ml  UNSCH  PRN


 IV PUSH  5/24/18 13:30


     


 


 


  (Glucagon Inj)  1 mg  UNSCH  PRN


 OTHER  5/24/18 13:30


     


 


 


  (NovoLOG


 SUPPLEMENTAL


 SCALE)  1  ACHS SLIDING  SCALE


 SQ  5/24/18 17:00


    6/1/18 08:00


 


 


  (Pravachol)  40 mg  DAILY


 PO  5/25/18 09:00


    6/1/18 09:47


 


 


  (Duoneb Neb)  1 ampule  Q4HR NEB  PRN


 NEB  5/24/18 13:45


    5/24/18 15:38


 


 


  (Tylenol)  650 mg  Q4H  PRN


 PO  5/24/18 14:30


    5/27/18 11:45


 


 


  (Norco  5-325 Mg)  1 tab  Q4H  PRN


 PO  5/24/18 14:30


    5/31/18 12:50


 


 


  (Reglan Inj)  5 mg  Q6H  PRN


 IV PUSH  5/25/18 02:30


    5/25/18 20:10


 


 


  (Zofran  Odt)  4 mg  Q6H  PRN


 PO  5/25/18 02:30


    5/28/18 11:50


 


 


  (Lopressor Inj)  5 mg  Q5M  PRN


 IV PUSH  5/25/18 02:45


    5/25/18 03:06


 


 


  (Levemir Inj)  10 units  HS


 SQ  5/25/18 21:00


    5/31/18 22:58


 


 


  (Tessalon)  100 mg  TID  PRN


 PO  5/26/18 03:00


    5/29/18 02:00


 


 


  (Robitussin Ac


 200-20 Mg/10 ml


 Liq)  10 ml  Q6H  PRN


 PO  5/26/18 03:00


     


 


 


  (Coreg)  6.25 mg  Q12HR


 PO  5/26/18 11:30


    6/1/18 09:46


 


 


  (Entresto 49-51


 Mg)  1 tab  BID


 PO  5/26/18 21:00


    6/1/18 09:46


 


 


 Cefazolin Sodium/


 Dextrose  50 ml @ 


 100 mls/hr  Q8H


 IV  5/27/18 09:00


    6/1/18 09:46


 


 


  (Aspirin Chew)  81 mg  DAILY


 CHEW  5/28/18 12:10


    6/1/18 09:47


 


 


 Heparin Sodium/


 Dextrose  250 ml @ 


 16.128 mls/


 hr  TITRATE  PRN


 IV  5/28/18 12:30


    5/31/18 12:00


 


 


 Sodium Chloride  1,000 ml @ 


 100 mls/hr  Q10H


 IV  5/29/18 11:15


   Future Hold 5/30/18 10:17


 


 


  (Protonix)  40 mg  DAILY


 PO  5/30/18 09:00


    6/1/18 09:47


 








Vital Signs / I&O





Vital Signs








  Date Time  Temp Pulse Resp B/P (MAP) Pulse Ox O2 Delivery O2 Flow Rate FiO2


 


6/1/18 08:00 98.7 73 16 106/78 (87) 98   


 


6/1/18 07:00  78      


 


6/1/18 04:00  75      


 


6/1/18 00:00 98.6 77 18 103/57 (72) 96   


 


5/31/18 21:00  73      


 


5/31/18 21:00 99.1 78 16 115/64 (81) 95   


 


5/31/18 18:00  74      


 


5/31/18 17:00  60      


 


5/31/18 16:00  62      


 


5/31/18 15:00  61      


 


5/31/18 15:00 98.5 60 18 112/60 (77) 96   


 


5/31/18 14:00  64      


 


5/31/18 13:00  68      


 


5/31/18 12:00  58      


 


5/31/18 11:50 98.1 59 16 129/64 (85) 97   


 


5/31/18 11:00  60      














I/O      


 


 5/31/18 5/31/18 5/31/18 6/1/18 6/1/18 6/1/18





 07:00 15:00 23:00 07:00 15:00 23:00


 


Intake Total 530 ml 150 ml 960 ml 240 ml  


 


Output Total 2500 ml  1800 ml 1125 ml  


 


Balance -1970 ml 150 ml -840 ml -885 ml  


 


      


 


Intake Oral 480 ml  960 ml 240 ml  


 


IV Total 50 ml 150 ml    


 


Output Urine Total 2500 ml  1800 ml 1125 ml  








Physical Exam


GENERAL: 


SKIN: Warm and dry.


HEAD: Normocephalic.


EYES: No scleral icterus. No injection or drainage. 


NECK: Supple, trachea midline. No JVD or lymphadenopathy.


CARDIOVASCULAR: Regular rate and rhythm without murmurs, gallops, or rubs. 


RESPIRATORY: Breath sounds equal bilaterally. No accessory muscle use.


GASTROINTESTINAL: Abdomen soft, non-tender, nondistended. 


MUSCULOSKELETAL: No cyanosis, or edema. 


BACK: Nontender without obvious deformity. No CVA tenderness.





Laboratory





Laboratory Tests








Test


  6/1/18


05:19


 


White Blood Count 12.3 TH/MM3 


 


Red Blood Count 4.56 MIL/MM3 


 


Hemoglobin 12.5 GM/DL 


 


Hematocrit 36.8 % 


 


Mean Corpuscular Volume 80.7 FL 


 


Mean Corpuscular Hemoglobin 27.4 PG 


 


Mean Corpuscular Hemoglobin


Concent 34.0 % 


 


 


Red Cell Distribution Width 17.1 % 


 


Platelet Count 243 TH/MM3 


 


Mean Platelet Volume 9.4 FL 


 


Neutrophils (%) (Auto) 74.7 % 


 


Lymphocytes (%) (Auto) 15.7 % 


 


Monocytes (%) (Auto) 7.9 % 


 


Eosinophils (%) (Auto) 1.1 % 


 


Basophils (%) (Auto) 0.6 % 


 


Neutrophils # (Auto) 9.2 TH/MM3 


 


Lymphocytes # (Auto) 1.9 TH/MM3 


 


Monocytes # (Auto) 1.0 TH/MM3 


 


Eosinophils # (Auto) 0.1 TH/MM3 


 


Basophils # (Auto) 0.1 TH/MM3 


 


CBC Comment DIFF FINAL 


 


Differential Comment  


 


Blood Urea Nitrogen 18 MG/DL 


 


Creatinine 0.88 MG/DL 


 


Random Glucose 236 MG/DL 


 


Total Protein 7.0 GM/DL 


 


Albumin 2.3 GM/DL 


 


Calcium Level 9.0 MG/DL 


 


Phosphorus Level 3.7 MG/DL 


 


Magnesium Level 2.1 MG/DL 


 


Alkaline Phosphatase 84 U/L 


 


Aspartate Amino Transf


(AST/SGOT) 11 U/L 


 


 


Alanine Aminotransferase


(ALT/SGPT) 19 U/L 


 


 


Total Bilirubin 0.4 MG/DL 


 


Sodium Level 133 MEQ/L 


 


Potassium Level 5.1 MEQ/L 


 


Chloride Level 98 MEQ/L 


 


Carbon Dioxide Level 25.8 MEQ/L 


 


Anion Gap 9 MEQ/L 


 


Estimat Glomerular Filtration


Rate 89 ML/MIN 


 











Assessment and Plan


Problem List:  


(1) ARF (acute renal failure)


ICD Codes:  N17.9 - Acute kidney failure, unspecified


(2) NSTEMI (non-ST elevated myocardial infarction)


ICD Codes:  I21.4 - Non-ST elevation (NSTEMI) myocardial infarction


(3) CAD (coronary artery disease)


ICD Codes:  I25.10 - Atherosclerotic heart disease of native coronary artery 

without angina pectoris


(4) Decreased cardiac ejection fraction


ICD Codes:  R93.1 - Abnormal findings on diagnostic imaging of heart and 

coronary circulation


(5) Elevated troponin


ICD Codes:  R74.8 - Abnormal levels of other serum enzymes


(6) Severe sepsis


ICD Codes:  A41.9 - Sepsis, unspecified organism; R65.20 - Severe sepsis 

without septic shock


(7) Diabetes mellitus


ICD Codes:  E11.9 - Type 2 diabetes mellitus without complications


(8) Acute renal failure


ICD Codes:  N17.9 - Acute kidney failure, unspecified


(9) Cardiomyopathy


ICD Codes:  I42.9 - Cardiomyopathy, unspecified


(10) gangrene of the right second toe


(11) Dyspnea and respiratory abnormalities


ICD Codes:  R06.00 - Dyspnea, unspecified; R06.89 - Other abnormalities of 

breathing


(12) Multi-vessel coronary artery stenosis


ICD Codes:  I25.10 - Atherosclerotic heart disease of native coronary artery 

without angina pectoris


(13) Hx of CABG


ICD Codes:  Z95.1 - Presence of aortocoronary bypass graft


(14) Thrombocytopenia


ICD Codes:  D69.6 - Thrombocytopenia, unspecified


Assessment and Plan


1.) Ischemic cardiomyopathy - nstemi, chf excacerbation and arf due to severe 

unrevascularizable cad, cardiomyopathy, on aspirin and heparin per hematology, 

on antibiotics, pod # 3,  2nd toe amputation, stable; 


2.) VT - replete electrolytes, on coreg; d/w case management further attempts 

to insure and obtain eligibility for referral to heart transplant center











Abdiel Purvis MD Jun 1, 2018 10:48

## 2018-06-01 NOTE — RADRPT
EXAM DATE:  6/1/2018 1:39 PM EDT

AGE/SEX:        57 years / Male



INDICATIONS:  Shortness of breath. Evaluate for possible pneumonia.



CLINICAL DATA:  This is the patient's initial encounter. Patient reports that signs and symptoms have
 been present for 1 day and indicates a pain score of 0/10. 

                                                                          

MEDICAL/SURGICAL HISTORY:       Diabetes mellitus type II. heart attack  CABG. defibrillator



COMPARISON:      [facility] – [place], CHEST SINGLE AP, 5/24/2018.  .



FINDINGS:  

A single AP view of the chest demonstrates the lungs to be symmetrically aerated without evidence of 
mass, infiltrate or effusion.  The cardiomediastinal contours are unremarkable.  Osseous structures a
re intact.  The patient is again noted to be status post median sternotomy. The left subclavian trans
venous pacer remains in place.





CONCLUSION: 

Stable appearance with no acute cardiopulmonary disease. There is no evidence of pneumonia.



Electronically signed by: John Garcia MD  6/1/2018 4:49 PM EDT

## 2018-06-01 NOTE — HHI.PR
Subjective


Remarks


5-29 Patient is scheduled to have right second toe amputated due to gangrene 

and osteomyelitis today


Has some hypotension we will give some fluids since patient is n.p.o.


Discussed with patient and FIGUEROA Aguirre. labs





5-30 HAD 2ND RIGHT TOE AMPUTATION ON MAY 29


NO NEW COMPLAINTS


DW RN AND PT AND CM





5-31 ANTIBIOTICS PER INFECTIOUS DISEASE


DW RN AND PT AND FAMILY AT BEDSIDE


WOUND CARE PER PODIATRY


AM LABS





6-1 WILL NEED 6 WEEKS OF ANCEF TOTAL


DW RN AND PT AND ID AND CM


AM LABS





Objective


Vitals





Vital Signs








  Date Time  Temp Pulse Resp B/P (MAP) Pulse Ox O2 Delivery O2 Flow Rate FiO2


 


6/1/18 15:17 98.1 71 16 98/60 (73) 98   


 


6/1/18 15:00  79      


 


6/1/18 12:00  70      


 


6/1/18 11:00 98.8 73 16 110/70 (83) 98   


 


6/1/18 11:00  70      


 


6/1/18 10:00  68      


 


6/1/18 09:00  74      


 


6/1/18 08:00 98.7 73 16 106/78 (87) 98   


 


6/1/18 08:00  78      


 


6/1/18 07:00  78      


 


6/1/18 04:00  75      


 


6/1/18 00:00 98.6 77 18 103/57 (72) 96   


 


5/31/18 21:00  73      


 


5/31/18 21:00 99.1 78 16 115/64 (81) 95   


 


5/31/18 18:00  74      


 


5/31/18 17:00  60      


 


5/31/18 16:00  62      














I/O      


 


 5/31/18 5/31/18 5/31/18 6/1/18 6/1/18 6/1/18





 06:59 14:59 22:59 06:59 14:59 22:59


 


Intake Total 530 ml 150 ml 960 ml 240 ml  


 


Output Total 2500 ml  1800 ml 1125 ml  


 


Balance -1970 ml 150 ml -840 ml -885 ml  


 


      


 


Intake Oral 480 ml  960 ml 240 ml  


 


IV Total 50 ml 150 ml    


 


Output Urine Total 2500 ml  1800 ml 1125 ml  








Result Diagram:  


6/1/18 0519 6/1/18 0519





Other Results





 Laboratory Tests








Test


  5/29/18


18:09 5/29/18


21:31 5/30/18


02:50 5/30/18


21:45


 


Activated Partial


Thromboplast Time 39.5 SEC 


  


  44.9 SEC 


  


 


 


Gentamicin Level Peak  1.1 MCG/ML   


 


White Blood Count   10.9 TH/MM3  


 


Red Blood Count   4.64 MIL/MM3  


 


Hemoglobin   12.7 GM/DL  


 


Hematocrit   37.7 %  


 


Mean Corpuscular Volume   81.1 FL  


 


Mean Corpuscular Hemoglobin   27.4 PG  


 


Mean Corpuscular Hemoglobin


Concent 


  


  33.8 % 


  


 


 


Red Cell Distribution Width   17.2 %  


 


Platelet Count   172 TH/MM3  


 


Mean Platelet Volume   9.0 FL  


 


Neutrophils (%) (Auto)   70.4 %  


 


Lymphocytes (%) (Auto)   17.3 %  


 


Monocytes (%) (Auto)   10.3 %  


 


Eosinophils (%) (Auto)   1.5 %  


 


Basophils (%) (Auto)   0.5 %  


 


Neutrophils # (Auto)   7.6 TH/MM3  


 


Lymphocytes # (Auto)   1.9 TH/MM3  


 


Monocytes # (Auto)   1.1 TH/MM3  


 


Eosinophils # (Auto)   0.2 TH/MM3  


 


Basophils # (Auto)   0.1 TH/MM3  


 


CBC Comment   AUTO DIFF  


 


Differential Total Cells


Counted 


  


  100 


  


 


 


Neutrophils % (Manual)   71 %  


 


Band Neutrophils %   3 %  


 


Lymphocytes %   13 %  


 


Monocytes %   9 %  


 


Eosinophils %   2 %  


 


Neutrophils # (Manual)   8.3 TH/MM3  


 


Metamyelocytes   2 %  


 


Differential Comment


  


  


  FINAL DIFF


MANUAL 


 


 


Toxic Granulation   1+  


 


Platelet Estimate   NORMAL  


 


Platelet Morphology Comment   NORMAL  


 


Ovalocytes   1+  


 


Blood Urea Nitrogen   19 MG/DL  


 


Creatinine   0.93 MG/DL  


 


Random Glucose   283 MG/DL  


 


Total Protein   6.4 GM/DL  


 


Albumin   2.2 GM/DL  


 


Calcium Level   8.4 MG/DL  


 


Phosphorus Level   3.2 MG/DL  


 


Magnesium Level   2.0 MG/DL  


 


Alkaline Phosphatase   85 U/L  


 


Aspartate Amino Transf


(AST/SGOT) 


  


  17 U/L 


  


 


 


Alanine Aminotransferase


(ALT/SGPT) 


  


  19 U/L 


  


 


 


Total Bilirubin   0.3 MG/DL  


 


Sodium Level   137 MEQ/L  


 


Potassium Level   4.5 MEQ/L  


 


Chloride Level   101 MEQ/L  


 


Carbon Dioxide Level   28.9 MEQ/L  


 


Anion Gap   7 MEQ/L  


 


Estimat Glomerular Filtration


Rate 


  


  83 ML/MIN 


  


 


 


Hemoglobin A1c   9.8 %  


 


B-Type Natriuretic Peptide   704 PG/ML  


 


Free Thyroxine   1.34 NG/DL  


 


Thyroid Stimulating Hormone


3rd Gen 


  


  2.420 uIU/ML 


  


 


 


Gentamicin Level Trough    1.0 MCG/ML 


 


Test


  5/30/18


23:27 5/31/18


06:05 6/1/18


05:19 6/1/18


11:06


 


Blood Urea Nitrogen 17 MG/DL   18 MG/DL  


 


Creatinine 0.83 MG/DL  0.82 MG/DL  0.88 MG/DL  


 


Random Glucose 299 MG/DL   236 MG/DL  


 


Total Protein 6.5 GM/DL   7.0 GM/DL  


 


Albumin 2.2 GM/DL   2.3 GM/DL  


 


Calcium Level 8.5 MG/DL   9.0 MG/DL  


 


Phosphorus Level 2.8 MG/DL   3.7 MG/DL  


 


Magnesium Level 2.0 MG/DL   2.1 MG/DL  


 


Alkaline Phosphatase 84 U/L   84 U/L  


 


Aspartate Amino Transf


(AST/SGOT) 9 U/L 


  


  11 U/L 


  


 


 


Alanine Aminotransferase


(ALT/SGPT) 19 U/L 


  


  19 U/L 


  


 


 


Total Bilirubin 0.3 MG/DL   0.4 MG/DL  


 


Sodium Level 134 MEQ/L   133 MEQ/L  


 


Potassium Level 4.6 MEQ/L   5.1 MEQ/L  


 


Chloride Level 101 MEQ/L   98 MEQ/L  


 


Carbon Dioxide Level 22.4 MEQ/L   25.8 MEQ/L  


 


Anion Gap 11 MEQ/L   9 MEQ/L  


 


Estimat Glomerular Filtration


Rate 95 ML/MIN 


  96 ML/MIN 


  89 ML/MIN 


  


 


 


Gentamicin Level Peak 4.6 MCG/ML    


 


White Blood Count  11.5 TH/MM3  12.3 TH/MM3  


 


Red Blood Count  4.51 MIL/MM3  4.56 MIL/MM3  


 


Hemoglobin  12.5 GM/DL  12.5 GM/DL  


 


Hematocrit  36.8 %  36.8 %  


 


Mean Corpuscular Volume  81.7 FL  80.7 FL  


 


Mean Corpuscular Hemoglobin  27.7 PG  27.4 PG  


 


Mean Corpuscular Hemoglobin


Concent 


  33.9 % 


  34.0 % 


  


 


 


Red Cell Distribution Width  17.2 %  17.1 %  


 


Platelet Count  205 TH/MM3  243 TH/MM3  


 


Mean Platelet Volume  8.7 FL  9.4 FL  


 


Neutrophils (%) (Auto)  72.8 %  74.7 %  


 


Lymphocytes (%) (Auto)  15.8 %  15.7 %  


 


Monocytes (%) (Auto)  8.9 %  7.9 %  


 


Eosinophils (%) (Auto)  1.8 %  1.1 %  


 


Basophils (%) (Auto)  0.7 %  0.6 %  


 


Neutrophils # (Auto)  8.4 TH/MM3  9.2 TH/MM3  


 


Lymphocytes # (Auto)  1.8 TH/MM3  1.9 TH/MM3  


 


Monocytes # (Auto)  1.0 TH/MM3  1.0 TH/MM3  


 


Eosinophils # (Auto)  0.2 TH/MM3  0.1 TH/MM3  


 


Basophils # (Auto)  0.1 TH/MM3  0.1 TH/MM3  


 


CBC Comment  AUTO DIFF  DIFF FINAL  


 


Differential Total Cells


Counted 


  100 


  


  


 


 


Neutrophils % (Manual)  80 %   


 


Band Neutrophils %  2 %   


 


Lymphocytes %  9 %   


 


Monocytes %  7 %   


 


Eosinophils %  1 %   


 


Neutrophils # (Manual)  9.4 TH/MM3   


 


Differential Comment


  


  FINAL DIFF


MANUAL  


  


 


 


Plasma Cells  1 %   


 


Platelet Estimate  NORMAL   


 


Platelet Morphology Comment  NORMAL   


 


Ovalocytes  1+   


 


Activated Partial


Thromboplast Time 


  53.7 SEC 


  


  44.5 SEC 


 








Imaging





Last Impressions








Aorta w/Runoff CTA 5/28/18 0000 Signed





Impressions: 





 CONCLUSION:





 1.  Atherosclerotic changes without significant stenosis bilateral lower extrem





 ities.





  





 


 


Chest X-Ray 5/24/18 0955 Signed





Impressions: 





 CONCLUSION: 





 1.  No acute abnormality or significant interval change.





  





 


 


Toe X-Ray 5/24/18 0000 Signed





Impressions: 





 CONCLUSION: 





 Questionable small erosion distal phalanx right second toe. This could be infec





 tious in nature if there is clinical evidence for soft tissue infection.





  





 





  





  





 





  





 





  





 


 


Renal Ultrasound 5/24/18 0000 Signed





Impressions: 





 CONCLUSION: 





 1.  No hydronephrosis.





 2.  Increased echogenicity most characteristic of medical renal disease.





  





 


 


Foot MRI 5/24/18 0000 Signed





Impressions: 





 CONCLUSION:





 1.  No evidence for osteomyelitis. Soft tissue swelling at the second toe.





  





 


 


Extremity Arterial Study 5/24/18 0000 Signed





Impressions: 





 CONCLUSION:





 1.  Findings consistent with left outflow disease as well as small vessel disea





 se in the left foot.





 2.  Near normal range right ankle-brachial indices and segmental pressures.





  





 








Objective Remarks


GENERAL: Awake alert and oriented 3 talkative and cooperative


SKIN: Warm and dry.  Right second toe is AMPUTATED--FOOT IS DRESSED


HEAD: Atraumatic. Normocephalic. 


EYES: Pupils equal and round. No scleral icterus. No injection or drainage. 

extraocular muscles intact


ENT: No nasal bleeding or discharge.  Mucous membranes pink and moist.  Tongue 

is midline


NECK: Trachea midline. No JVD.  Supple


CARDIOVASCULAR: Regular rate and rhythm.  S1-S2 no S3 or S4


RESPIRATORY: No accessory muscle use. Clear to auscultation. Breath sounds 

equal bilaterally. 


GASTROINTESTINAL: Abdomen soft, non-tender, nondistended. Hepatic and splenic 

margins not palpable. 


MUSCULOSKELETAL: Extremities without clubbing, cyanosis, or edema. No obvious 

deformities.  Right second toe is AMPUTATED- FOOT IS DRESSED


NEUROLOGICAL: Awake and alert. No obvious cranial nerve deficits.  Motor 

grossly within normal limits. Five out of 5 muscle strength in the arms and 

legs.  Normal speech.


PSYCHIATRIC: Appropriate mood and affect; insight and judgment normal.


Procedures


May 29, 2018


Pre Op Diagnosis:  


gangrene with osteomyelitis right 2nd toe


Post Op Diagnosis:  


same


Surgeon:


Marc Mckeon DPM


Assistant(s):


Staff


Procedure:


amputation right 2nd toe


Findings:


Consistent with diagnosis. Right 2nd toe with necrotic tissue to entire digit. 

Foul odor slightly present. Margins clean without purulence. 


Two semi-elliptical incisions made to disarticulate digit in its entirety at 

metatarsophalangeal joint level. 2nd metatarsal head with white healthy 

cartilage cap. No purulence noted.


Culture taken prior to irrigation and primary closure with 2-0 nylon suture. 


Dressing with xeroform, 4x4, cast padding, ace.


Weightbearing as tolerated to heel in surgical shoe for transfers only.


Additional Information:


see findings


Complications:


none


Specimen(s) removed:


1. toe right 2nd


2. culture right foot


Estimated blood loss:


minimal


Anesthesia:  MAC, Local (10mL 2% lidocaine plain)


Drains:  None


Tourniquet time (min at mmHg)


n/a


Patient to:  PACU


Patient Condition:  Good


Date/Time of Procedure:  SEE SURGICAL CARE RECORD











Marc Mckeon DPM


Medications and IVs





Current Medications


Sodium Chloride (NS Flush) 2 ml UNSCH  PRN IVF FLUSH AFTER USING IV ACCESS Last 

administered on 5/28/18at 08:31;  Start 5/24/18 at 10:00


Piperacillin Sod/ Tazobactam Sod 50 ml @  100 mls/hr ONCE  ONCE IV  Last 

administered on 5/24/18at 12:07;  Start 5/24/18 at 11:30;  Stop 5/24/18 at 11:59

;  Status DC


Vancomycin HCl 1350 mg/Sodium Chloride 513.5 ml @  250 mls/hr ONCE  ONCE IV  

Last administered on 5/24/18at 13:27;  Start 5/24/18 at 11:30;  Stop 5/24/18 at 

13:33;  Status DC


Aspirin (Aspirin Chew) 162 mg ONCE  ONCE CHEW  Last administered on 5/24/18at 12

:02;  Start 5/24/18 at 11:30;  Stop 5/24/18 at 11:31;  Status DC


Heparin Sodium (Porcine) (Heparin Inj) 4,000 units ONCE  ONCE IV PUSH  Last 

administered on 5/24/18at 12:03;  Start 5/24/18 at 11:45;  Stop 5/24/18 at 11:46

;  Status DC


Heparin Sodium/ Dextrose 250 ml @  10 mls/hr TITRATE  PRN IV Coagulation 

Management Last administered on 5/25/18at 16:19;  Start 5/24/18 at 11:45;  Stop 

5/29/18 at 21:48;  Status DC


Dextrose (D50w (Vial) Inj) 50 ml UNSCH  PRN IV PUSH HYPOGLYCEMIA-SEE COMMENTS;  

Start 5/24/18 at 13:30


Glucagon (Glucagon Inj) 1 mg UNSCH  PRN OTHER HYPOGLYCEMIA-SEE COMMENTS;  Start 

5/24/18 at 13:30


Insulin Aspart (NovoLOG SUPPLEMENTAL SCALE) 1 ACHS SLIDING  SCALE SQ  Last 

administered on 6/1/18at 12:00;  Start 5/24/18 at 17:00


Sodium Chloride 1,000 ml @  60 mls/hr [id number] IV  Last administered on 5/24/ 18at 18:23;  Start 5/24/18 at 13:30;  Stop 5/27/18 at 10:00;  Status DC


Piperacillin Sod/ Tazobactam Sod 50 ml @  100 mls/hr Q6H IV  Last administered 

on 5/26/18at 17:02;  Start 5/24/18 at 18:00;  Stop 5/27/18 at 09:02;  Status DC


Pharmacy Profile Note 0 ml @ 0 mls/hr UNSCH OTHER ;  Start 5/24/18 at 13:30;  

Stop 5/26/18 at 11:26;  Status DC


Aspirin (Ecotrin Ec) 162 mg DAILY PO  Last administered on 5/26/18at 09:05;  

Start 5/25/18 at 09:00;  Stop 5/28/18 at 12:04;  Status DC


Famotidine (Pepcid) 20 mg BID PO  Last administered on 5/27/18at 08:52;  Start 5 /24/18 at 21:00;  Stop 5/27/18 at 10:16;  Status DC


Pravastatin Sodium (Pravachol) 40 mg DAILY PO  Last administered on 6/1/18at 09:

47;  Start 5/25/18 at 09:00


Albuterol/ Ipratropium (Duoneb Neb) 1 ampule Q4HR NEB  PRN NEB SHORTNESS OF 

BREATH Last administered on 5/24/18at 15:38;  Start 5/24/18 at 13:45


Vancomycin HCl 1500 mg/Sodium Chloride 515 ml @  250 mls/hr Q24H IV  Last 

administered on 5/25/18at 14:18;  Start 5/25/18 at 14:00;  Stop 5/25/18 at 16:40

;  Status DC


Acetaminophen (Tylenol) 650 mg Q4H  PRN PO FEVER/PAIN 1-5 Last administered on 5 /27/18at 11:45;  Start 5/24/18 at 14:30


Acetaminophen/ Hydrocodone Bitart (Norco  5-325 Mg) 1 tab Q4H  PRN PO PAIN 6-10 

Last administered on 5/31/18at 12:50;  Start 5/24/18 at 14:30


Gadobenate Dimeglumine (Multihance Pf Inj) 18 ml STK-MED ONCE IV  Last 

administered on 5/24/18at 17:21;  Start 5/24/18 at 17:21;  Stop 5/24/18 at 17:22

;  Status DC


Metoclopramide HCl (Reglan Inj) 10 mg ONCE  ONCE IV PUSH  Last administered on 5 /25/18at 02:05;  Start 5/25/18 at 02:15;  Stop 5/25/18 at 02:16;  Status DC


Metoclopramide HCl (Reglan Inj) 5 mg Q6H  PRN IV PUSH NAUSEA OR VOMITING Last 

administered on 5/25/18at 20:10;  Start 5/25/18 at 02:30


Ondansetron HCl (Zofran  Odt) 4 mg Q6H  PRN PO NAUSEA OR VOMITING Last 

administered on 5/28/18at 11:50;  Start 5/25/18 at 02:30


Metoprolol Tartrate (Lopressor Inj) 5 mg ONCE  ONCE IV PUSH  Last administered 

on 5/25/18at 02:39;  Start 5/25/18 at 02:45;  Stop 5/25/18 at 02:46;  Status DC


Metoprolol Tartrate (Lopressor Inj) 5 mg Q5M  PRN IV PUSH ATRIAL FLUTTER Last 

administered on 5/25/18at 03:06;  Start 5/25/18 at 02:45


Miscellaneous Information ([facility]dered [place] Info) SPECIFIC LAB TO BE 

TYLOR..Vj ONCE  ONCE .XX ;  Start 5/27/18 at 12:45;  Stop 5/27/18 at 12:46;  

Status Cancel


Insulin Detemir (Levemir Inj) 10 units HS SQ  Last administered on 5/31/18at 22:

58;  Start 5/25/18 at 21:00


Vancomycin HCl 1000 mg/Sodium Chloride 250 ml @  250 mls/hr Q12H IV  Last 

administered on 5/26/18at 01:06;  Start 5/26/18 at 02:00;  Stop 5/26/18 at 11:26

;  Status DC


Miscellaneous Information ([facility] Ordered Lab Info) SPECIFIC LAB TO BE 

TYLOR..Vj ONCE  ONCE .XX ;  Start 5/27/18 at 13:45;  Stop 5/27/18 at 13:46;  

Status DC


Benzonatate (Tessalon) 100 mg TID  PRN PO COUGH Last administered on 5/29/18at 

02:00;  Start 5/26/18 at 03:00


Guaifenesin/ Codeine Phosphate (Robitussin Ac 200-20 Mg/10 ml Liq) 10 ml Q6H  

PRN PO COUGH;  Start 5/26/18 at 03:00


Magnesium Sulfate/ Dextrose 100 ml @  100 mls/hr Q1H IV  Last administered on 5/ 26/18at 09:45;  Start 5/26/18 at 08:45;  Stop 5/26/18 at 10:44;  Status DC


Cefazolin Sodium/ Dextrose 50 ml @  100 mls/hr Q8H IV  Last administered on 5/26 /18at 21:32;  Start 5/26/18 at 13:00;  Stop 5/26/18 at 23:30;  Status DC


Carvedilol (Coreg) 6.25 mg Q12HR PO  Last administered on 6/1/18at 09:46;  

Start 5/26/18 at 11:30


Sacubitril/ Valsartan (Entresto 49-51 Mg) 1 tab BID PO  Last administered on 6/1 /18at 09:46;  Start 5/26/18 at 21:00


Cefepime HCl 1000 mg/Sodium Chloride 100 ml @  200 mls/hr Q8H IV  Last 

administered on 5/27/18at 08:49;  Start 5/27/18 at 00:00;  Stop 5/27/18 at 09:01

;  Status DC


Piperacillin Sod/ Tazobactam Sod 50 ml @  100 mls/hr Q6H IV  Last administered 

on 5/28/18at 10:00;  Start 5/27/18 at 10:00;  Stop 5/28/18 at 11:16;  Status DC


Cefazolin Sodium/ Dextrose 50 ml @  100 mls/hr Q8H IV  Last administered on 6/1/ 18at 09:46;  Start 5/27/18 at 09:00


Aspirin (Aspirin Chew) 81 mg DAILY CHEW  Last administered on 6/1/18at 09:47;  

Start 5/28/18 at 12:10


Pharmacy Profile Note 0 ml @ 0 mls/hr UNSCH OTHER ;  Start 5/28/18 at 11:15;  

Stop 5/31/18 at 15:06;  Status DC


Heparin Sodium/ Dextrose 250 ml @  16.128 mls/ hr TITRATE  PRN IV Coagulation 

Management Last administered on 5/31/18at 12:00;  Start 5/28/18 at 12:30


Gentamicin Sulfate 90 mg/ Sodium Chloride 102.25 ml  @ 200 mls/ hr Q8H IV  Last 

administered on 5/31/18at 06:16;  Start 5/28/18 at 14:00;  Stop 5/31/18 at 15:06

;  Status DC


Miscellaneous Information ([facility]dered [place] Info) SPECIFIC LAB TO BE 

TYLOR... ONCE  ONCE .XX ;  Start 5/29/18 at 13:45;  Stop 5/29/18 at 13:46;  

Status DC


Miscellaneous Information ([facility]dered Lab Info) SPECIFIC LAB TO BE 

TYLOR... ONCE  ONCE .XX  Last administered on 5/29/18at 15:00;  Start 5/29/18 at 

15:00;  Stop 5/29/18 at 15:01;  Status DC


Iohexol (Omnipaque 350 Inj) 100 ml STK-MED ONCE IVCONTRAST  Last administered 

on 5/28/18at 13:57;  Start 5/28/18 at 13:57;  Stop 5/28/18 at 13:58;  Status DC


Sodium Chloride 1,000 ml @  100 mls/hr Q10H IV  Last administered on 5/30/18at 

10:17;  Start 5/29/18 at 11:15;  Status Future Hold


Pantoprazole Sodium (Protonix Inj) 40 mg ONCE  ONCE IV PUSH  Last administered 

on 5/29/18at 12:55;  Start 5/29/18 at 12:45;  Stop 5/29/18 at 12:50;  Status DC


Pantoprazole Sodium (Protonix) 40 mg DAILY PO  Last administered on 6/1/18at 09:

47;  Start 5/30/18 at 09:00


Ketamine HCl (Ketalar Inj) 50 mg STK-MED ONCE .ROUTE ;  Start 5/29/18 at 15:41;

  Stop 5/29/18 at 15:42;  Status DC


Lidocaine HCl (Xylocaine-Mpf 2% Inj) 20 ml STK-MED ONCE .ROUTE  Last 

administered on 5/29/18at 16:28;  Start 5/29/18 at 16:24;  Stop 5/29/18 at 16:25

;  Status DC


Midazolam HCl (Versed Inj) 2 mg STK-MED ONCE .ROUTE ;  Start 5/29/18 at 17:16;  

Stop 5/29/18 at 17:17;  Status DC


Miscellaneous Information (Misc Nursing Information) ALL NURSING DEPARTME... 

UNSCH  PRN .XX SEE LABEL COMMENTS;  Start 5/29/18 at 17:07;  Stop 5/30/18 at 17:

06;  Status DC


Miscellaneous Information ([facility]dered [facility] Info) SPECIFIC LAB TO BE 

DRAWN:GENTAMICIN TROUGH DATE TO... ONCE  ONCE .XX  Last administered on 5/30/ 18at 21:45;  Start 5/30/18 at 21:45;  Stop 5/30/18 at 21:46;  Status DC


Miscellaneous Information ([facility]dered Lab Info) SPECIFIC LAB TO BE 

DRAWN:GENTAMICIN PEAK DATE TO... ONCE  ONCE .XX  Last administered on 5/30/18at 

23:25;  Start 5/30/18 at 23:00;  Stop 5/30/18 at 23:01;  Status DC


Propofol (Diprivan  200 Mg/20 ml Inj) 200 mg STK-MED ONCE IV  Last administered 

on 5/29/18at 12:00;  Start 5/29/18 at 12:00;  Stop 5/31/18 at 13:49;  Status DC





A/P


Problem List:  


(1) Severe sepsis


ICD Code:  A41.9 - Sepsis, unspecified organism; R65.20 - Severe sepsis without 

septic shock


(2) Elevated troponin


ICD Code:  R74.8 - Abnormal levels of other serum enzymes


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


Assessment and Plan


Severe sepsis due to gangrene/ infection of the right second toe 


Staph aureus bacteremia-- persistent bacteremia- 


check echo- may need SHAYY


ID [id number]  - on zosyn and  Ancef


Repeat BC to document clearing in next few days 


May need SHAYY


Podiatry following


Vascular surgery ff- requested for CTA runoff- DONE


FOR SURGERY ON RIGHT 2ND TOE 5-29 WITH PODIATRY


May 29, 2018


Pre Op Diagnosis:  


gangrene with osteomyelitis right 2nd toe


Surgeon:


Marc Mckeon DPM


Procedure:


amputation right 2nd toe


Findings:


Consistent with diagnosis. Right 2nd toe with necrotic tissue to entire digit. 

Foul odor slightly present. Margins clean without purulence. 


Two semi-elliptical incisions made to disarticulate digit in its entirety at 

metatarsophalangeal joint level. 2nd metatarsal head with white healthy 

cartilage cap. No purulence noted.


Culture taken prior to irrigation and primary closure with 2-0 nylon suture. 


Dressing with xeroform, 4x4, cast padding, ace.


Weightbearing as tolerated to heel in surgical shoe for transfers only.


Specimen(s) removed:


1. toe right 2nd


2. culture right foot





Ischemic cardiomyopathy EF 25% - nstemi, 


Debrillator fired evening 5.25


NSTEMI with history of CAD- had cardiac cath two months ago with severe three 

vessel disease- was evaluated by CT surgery at the time with poor targets for 

redo sternotomy


high metabolic demand from sepsis


- clinically feeling better 


- continue electrolyte monitoring


- ASA, Heparin drip- DC due to thrombocytopenia- Hematology consulted for 

recommendation


- continue  Entresto, Coreg


- will start ASA





Acute kidney injury; improved- started on gentle IV hydration in light of 

cardiomyopathy- monitor renal function





-thrombocytopenia- likely due to sepsis


-  continue ASA


- FF CBC


-seen by Dr. Skelton. HIT ab negative- restart heparin drip- will d/w Dr. Skelton


- will also DC Pepcid- due to thrombocytopenia





Diabetes mellitus- sliding scale 





-COPD with no exacerbation; neb treatment as needed.


-diabetes mellitus; uncontrolled- hold oral hypoglycemics- start on accu-check 

with SSI. continue Levemir and adjust 





-DVT prophylaxis;encoruage increase activity





palliative care  ff


CM ff - assisting with transfer to a tertiary center for transplant-- no 

insurance, assisting us with Medicaid application





CONTINUE ANTIBIOTICS PER ID


Discharge Planning


PENDING ID CLEARANCE











Reece Yousif DO Jun 1, 2018 15:50

## 2018-06-01 NOTE — HHI.IDPN
Subjective


Subjective


Remarks


Patient seen and examined on behalf of Dr. Brandon





 is a 58 y/o CM with PMHx of CHF, Ischemic Cardiomyopathy with last 

reported EF 20-25%, he has prior h/o CABG x 3, defibrillator and pacemaker, DM, 

HTN and hyperlipidemia. CT surgery evaluated and found him not to be a 

revascularization candidate and recommended referral to a tertiary center for 

transplant consideration.  As the patient has no  insurance this option has not 

been found feasible.  He underwent placement of a single-chamber St Trav ICD in 

March 2018 for sudden cardiac death prevention. 


With this background patient presents to the ED at [facility] with 4 day history 

of progressive dyspnea described as severe, worsening with exertion, and 

improved with rest. He has had associated symptoms of diaphoresis, nausea and 

vomiting over the weekend.  He additionally has noted painful changes in his 

right second toe with color changes suspicious for infection.  


In ED patient was noted to have WBC 9.6, hemoglobin 15.0, hematocrit 43.9, 

platelets 133, sodium 130, potassium 4.4, BUN 36, creatinine 1.84, random 

glucose 407 alkaline phosphatase 114, AST 37, ALT 25, total bilirubin 0.9, 

troponin 3.04, C-reactive protein 26.8, B natruretic peptide 3148, lactic acid 

5.5. Ax Xray of Rt toe showed erosion. MRI with no osteomyelitis. Chest x-ray 

showed no acute abnormality or significant interval change.


At baseline prior to this infectious process patient was able to work around 

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  He states that he is only able to be active 

for 5-10 minutes before becoming very dyspneic and exhausted.  He was 

previously an over the road  but has been unable to work since 

March of this year.  He is now attempting to get disability as he has a nearly 

homebound existence and requires assistance to get out to doctor's appointments.


Sepsis workup on admission positive for Staph bacteremia and ID consulted for 

evaluation and Mment of the same.





Notes reviewed 


patient denies any new medical complaints


no fever


no rash


no N/V


no abd pain


no dysuria 


no diarrhea


afebrile


VSS


S/P ICD placement March 2018


WBC 11.5 5/31, todays WBC pending


Repeat BCX neg x 1 day


Wound cx + staph


Pathology pending


Antibiotics


IV Ancef








Current Medications








 Medications


  (Trade)  Dose


 Ordered  Sig/Ilia


 Route  Start Time


 Stop Time Status Last Admin


 


  (NS Flush)  2 ml  UNSCH  PRN


 IVF  5/24/18 10:00


    5/28/18 08:31


 


 


  (D50w (Vial) Inj)  50 ml  UNSCH  PRN


 IV PUSH  5/24/18 13:30


     


 


 


  (Glucagon Inj)  1 mg  UNSCH  PRN


 OTHER  5/24/18 13:30


     


 


 


  (NovoLOG


 SUPPLEMENTAL


 SCALE)  1  ACHS SLIDING  SCALE


 SQ  5/24/18 17:00


    5/31/18 22:58


 


 


  (Pravachol)  40 mg  DAILY


 PO  5/25/18 09:00


    5/31/18 08:18


 


 


  (Duoneb Neb)  1 ampule  Q4HR NEB  PRN


 NEB  5/24/18 13:45


    5/24/18 15:38


 


 


  (Tylenol)  650 mg  Q4H  PRN


 PO  5/24/18 14:30


    5/27/18 11:45


 


 


  (Norco  5-325 Mg)  1 tab  Q4H  PRN


 PO  5/24/18 14:30


    5/31/18 12:50


 


 


  (Reglan Inj)  5 mg  Q6H  PRN


 IV PUSH  5/25/18 02:30


    5/25/18 20:10


 


 


  (Zofran  Odt)  4 mg  Q6H  PRN


 PO  5/25/18 02:30


    5/28/18 11:50


 


 


  (Lopressor Inj)  5 mg  Q5M  PRN


 IV PUSH  5/25/18 02:45


    5/25/18 03:06


 


 


  (Levemir Inj)  10 units  HS


 SQ  5/25/18 21:00


    5/31/18 22:58


 


 


  (Tessalon)  100 mg  TID  PRN


 PO  5/26/18 03:00


    5/29/18 02:00


 


 


  (Robitussin Ac


 200-20 Mg/10 ml


 Liq)  10 ml  Q6H  PRN


 PO  5/26/18 03:00


     


 


 


  (Coreg)  6.25 mg  Q12HR


 PO  5/26/18 11:30


    5/31/18 22:57


 


 


  (Entresto 49-51


 Mg)  1 tab  BID


 PO  5/26/18 21:00


    5/31/18 22:57


 


 


 Cefazolin Sodium/


 Dextrose  50 ml @ 


 100 mls/hr  Q8H


 IV  5/27/18 09:00


    6/1/18 01:17


 


 


  (Aspirin Chew)  81 mg  DAILY


 CHEW  5/28/18 12:10


    5/31/18 08:18


 


 


 Heparin Sodium/


 Dextrose  250 ml @ 


 16.128 mls/


 hr  TITRATE  PRN


 IV  5/28/18 12:30


    5/31/18 12:00


 


 


 Sodium Chloride  1,000 ml @ 


 100 mls/hr  Q10H


 IV  5/29/18 11:15


   Future Hold 5/30/18 10:17


 


 


  (Protonix)  40 mg  DAILY


 PO  5/30/18 09:00


    5/31/18 08:18


 








Lines


PIV no evidence of infection


Past Medical History


Severe ischemic cardiomyopathy EF 20-25%


Hyperlipidemia


Diabetes


Coronary artery disease status post MI 3/13


COPD


Past Surgical History


CABG 10 years ago


Pacemaker/Defibrillator insertion


Appendectomy


Skin graft to right foot as a child


Back surgeries 3 has hardware in back per report.


 (Merlyn Rey)


Allergies:  


Coded Allergies:  


     potassium iodide (Unverified  Allergy, Severe, Swelling, 3/21/18)


     povidone-iodine (Unverified  Allergy, Severe, Swelling, 3/21/18)


     shellfish derived (Unverified  Allergy, Severe, 3/21/18)


     sodium iodide (Unverified  Allergy, Severe, Swelling, 3/21/18)


     sodium iodide (Unverified  Allergy, Severe, Swelling, 3/21/18)





Objective


.





Vital Signs








  Date Time  Temp Pulse Resp B/P (MAP) Pulse Ox O2 Delivery O2 Flow Rate FiO2


 


5/31/18 18:00  74      


 


5/31/18 17:00  60      


 


5/31/18 16:00  62      


 


5/31/18 15:00  61      


 


5/31/18 15:00 98.5 60 18 112/60 (77) 96   


 


5/31/18 14:00  64      


 


5/31/18 13:00  68      


 


5/31/18 12:00  58      


 


5/31/18 11:50 98.1 59 16 129/64 (85) 97   


 


5/31/18 11:00  60      


 


5/31/18 10:00  60      


 


5/31/18 09:00  68      


 


5/31/18 08:10 98.4 64 14 106/64 (78) 92   


 


5/31/18 08:06     94   


 


5/31/18 08:00  64      


 


5/31/18 07:38   20     


 


5/31/18 07:00  67      








.





Laboratory Tests








Test


  5/31/18


06:05 6/1/18


05:19


 


White Blood Count 11.5 TH/MM3  


 


Red Blood Count 4.51 MIL/MM3  


 


Hemoglobin 12.5 GM/DL  


 


Hematocrit 36.8 %  


 


Mean Corpuscular Volume 81.7 FL  


 


Mean Corpuscular Hemoglobin 27.7 PG  


 


Mean Corpuscular Hemoglobin


Concent 33.9 % 


  


 


 


Red Cell Distribution Width 17.2 %  


 


Platelet Count 205 TH/MM3  


 


Mean Platelet Volume 8.7 FL  


 


Neutrophils (%) (Auto) 72.8 %  


 


Lymphocytes (%) (Auto) 15.8 %  


 


Monocytes (%) (Auto) 8.9 %  


 


Eosinophils (%) (Auto) 1.8 %  


 


Basophils (%) (Auto) 0.7 %  


 


Neutrophils # (Auto) 8.4 TH/MM3  


 


Lymphocytes # (Auto) 1.8 TH/MM3  


 


Monocytes # (Auto) 1.0 TH/MM3  


 


Eosinophils # (Auto) 0.2 TH/MM3  


 


Basophils # (Auto) 0.1 TH/MM3  


 


CBC Comment AUTO DIFF  


 


Differential Total Cells


Counted 100 


  


 


 


Neutrophils % (Manual) 80 %  


 


Band Neutrophils % 2 %  


 


Lymphocytes % 9 %  


 


Monocytes % 7 %  


 


Eosinophils % 1 %  


 


Neutrophils # (Manual) 9.4 TH/MM3  


 


Differential Comment


  FINAL DIFF


MANUAL 


 


 


Plasma Cells 1 %  


 


Platelet Estimate NORMAL  


 


Platelet Morphology Comment NORMAL  


 


Ovalocytes 1+  








Laboratory Tests








Test


  5/30/18


23:27 5/31/18


06:05 6/1/18


05:19


 


Blood Urea Nitrogen 17 MG/DL   


 


Creatinine 0.83 MG/DL  0.82 MG/DL  


 


Random Glucose 299 MG/DL   


 


Total Protein 6.5 GM/DL   


 


Albumin 2.2 GM/DL   


 


Calcium Level 8.5 MG/DL   


 


Phosphorus Level 2.8 MG/DL   


 


Magnesium Level 2.0 MG/DL   


 


Alkaline Phosphatase 84 U/L   


 


Aspartate Amino Transf


(AST/SGOT) 9 U/L 


  


  


 


 


Alanine Aminotransferase


(ALT/SGPT) 19 U/L 


  


  


 


 


Total Bilirubin 0.3 MG/DL   


 


Sodium Level 134 MEQ/L   


 


Potassium Level 4.6 MEQ/L   


 


Chloride Level 101 MEQ/L   


 


Carbon Dioxide Level 22.4 MEQ/L   


 


Anion Gap 11 MEQ/L   


 


Estimat Glomerular Filtration


Rate 95 ML/MIN 


  96 ML/MIN 


  


 








Microbiology








 Date/Time


Source Procedure


Growth Status


 


 


 5/30/18 02:50


Blood Peripheral Aerobic Blood Culture - Preliminary


NO GROWTH IN 1 DAY Resulted


 


 5/30/18 02:50


Blood Peripheral Anaerobic Blood Culture - Preliminary


NO GROWTH IN 1 DAY Resulted





 5/30/18 01:43


Blood Peripheral Aerobic Blood Culture - Preliminary


NO GROWTH IN 1 DAY Resulted


 


 5/30/18 01:43


Blood Peripheral Anaerobic Blood Culture - Final


QNS - SEE AEROBE REPORT Resulted





 5/29/18 16:24


Wound Toe Fungal Smear - Final


NO FUNGAL ELEMENTS SEEN. Resulted


 


 5/29/18 16:24


Wound Toe Fungal Culture


Pending Resulted





 5/29/18 16:24


Wound Toe Acid Fast Stain - Final


NO ACID FAST BACILLI SEEN Resulted


 


 5/29/18 16:24


Wound Toe Mycobacterial Culture


Pending Resulted





 5/29/18 16:24


Wound Toe Gram Stain - Final Complete


 


 5/29/18 16:24 Wound Culture - Final


Staphylococcus Aureus Complete








Imaging





Last Impressions








Aorta w/Runoff CTA 5/28/18 0000 Signed





Impressions: 





 CONCLUSION:





 1.  Atherosclerotic changes without significant stenosis bilateral lower extrem





 ities.





  





 


 


Chest X-Ray 5/24/18 0955 Signed





Impressions: 





 CONCLUSION: 





 1.  No acute abnormality or significant interval change.





  





 


 


Toe X-Ray 5/24/18 0000 Signed





Impressions: 





 CONCLUSION: 





 Questionable small erosion distal phalanx right second toe. This could be infec





 tious in nature if there is clinical evidence for soft tissue infection.





  





 





  





  





 





  





 





  





 


 


Renal Ultrasound 5/24/18 0000 Signed





Impressions: 





 CONCLUSION: 





 1.  No hydronephrosis.





 2.  Increased echogenicity most characteristic of medical renal disease.





  





 


 


Foot MRI 5/24/18 0000 Signed





Impressions: 





 CONCLUSION:





 1.  No evidence for osteomyelitis. Soft tissue swelling at the second toe.





  





 


 


Extremity Arterial Study 5/24/18 0000 Signed





Impressions: 





 CONCLUSION:





 1.  Findings consistent with left outflow disease as well as small vessel disea





 se in the left foot.





 2.  Near normal range right ankle-brachial indices and segmental pressures.





  





 








Physical Exam


GENERAL:  WDWN  male patient, INAD.  Asleep but easily awakens to 

voice.  Appears comfortable.


SKIN:  No rashes or lesions. Warm and dry.


HEAD: Atraumatic. Normocephalic. 


EYES: Pupils equal round and reactive. Extraocular motions intact. No scleral 

icterus. No injection or drainage. 


ENT: Nose without bleeding or purulent drainage. Airway patent.  MMM.


NECK: Trachea midline. Supple, nontender, no meningeal signs.


CARDIOVASCULAR: HS audible.  + ICD.


RESPIRATORY: Nonlabored.  Clear to auscultation. Breath sounds equal 

bilaterally. No wheezes, rales, or rhonchi.  


GASTROINTESTINAL: Abdomen soft, nontender to palpation.


MUSCULOSKELETAL:  No BLE edema appreciated.  Right foot dressed, C/D/I, able to 

wiggles toes slightly on right foot.  Decreased sensation but able to feel 

light touch on toes.


NEUROLOGICAL:   Awake and alert.  Able to move all extremities spontaneously.  

Grossly non-focal.  Normal speech.


Psych calm and cooperative


IV line sites with no e.o infection.


 (Merlyn Rey)





Assessment & Plan


Remarks


Sepsis


High grade MSSA bacteremia 


   -  source ?2nd toe,  concern with ICD


Right 2nd toe wet gangrene, osteomyelitis.


Hepatitis C antibody positive.


AICD status.


CAD with severe ischemic cardiomyopathy.


Thrombocytopenia likely due to severe infection





Recs


Continue IV Ancef


Follow cultures until finalized


Follow path


Follow clinically


Will need 6 weeks IV Ancef irrespective of bone path due to high grade 

bacteremia and AICD in place.


No PICC till cleared by ID.


 (Merlyn Rey)


Remarks


The exam, history, and the medical decision-making described in the above note 

were completed with the assistance of the mid-level provider. I reviewed and 

agree with the findings presented.  I attest that I had a face-to-face 

encounter with the patient on the same day, and personally performed and 

documented my assessment and findings in the medical record.





Complains of shortness of breath.


Denies any chest pain.


No fevers


No rash


No diarrhea





Decreased AE BL. Occ creps





Recs


CXR today possible due to excess fluid from multiple antibiotics.


Consider gentle diuresis if e/o fluid overload on CXR and clinically.


Continue Ancef IV


Follow Cr was on Genta and may receive lasix if need be.


Follow clinically.


Expect discharge early next week hopefully


SANJU BILLS RN


 (Bev Brandon MD)











Merlyn Rey Jun 1, 2018 06:47


Bev Brandon MD Jun 1, 2018 12:53

## 2018-06-02 VITALS
SYSTOLIC BLOOD PRESSURE: 133 MMHG | HEART RATE: 76 BPM | OXYGEN SATURATION: 98 % | RESPIRATION RATE: 16 BRPM | DIASTOLIC BLOOD PRESSURE: 71 MMHG | TEMPERATURE: 98.2 F

## 2018-06-02 VITALS
TEMPERATURE: 98 F | DIASTOLIC BLOOD PRESSURE: 56 MMHG | OXYGEN SATURATION: 98 % | SYSTOLIC BLOOD PRESSURE: 101 MMHG | RESPIRATION RATE: 16 BRPM | HEART RATE: 78 BPM

## 2018-06-02 VITALS — HEART RATE: 71 BPM

## 2018-06-02 VITALS
SYSTOLIC BLOOD PRESSURE: 120 MMHG | HEART RATE: 83 BPM | TEMPERATURE: 98.4 F | OXYGEN SATURATION: 97 % | DIASTOLIC BLOOD PRESSURE: 72 MMHG | RESPIRATION RATE: 18 BRPM

## 2018-06-02 VITALS — OXYGEN SATURATION: 95 %

## 2018-06-02 VITALS — OXYGEN SATURATION: 98 %

## 2018-06-02 VITALS — HEART RATE: 66 BPM

## 2018-06-02 VITALS — HEART RATE: 72 BPM

## 2018-06-02 VITALS
TEMPERATURE: 98.2 F | RESPIRATION RATE: 18 BRPM | SYSTOLIC BLOOD PRESSURE: 136 MMHG | OXYGEN SATURATION: 97 % | DIASTOLIC BLOOD PRESSURE: 82 MMHG | HEART RATE: 70 BPM

## 2018-06-02 VITALS
HEART RATE: 72 BPM | TEMPERATURE: 98.7 F | DIASTOLIC BLOOD PRESSURE: 57 MMHG | OXYGEN SATURATION: 98 % | RESPIRATION RATE: 16 BRPM | SYSTOLIC BLOOD PRESSURE: 91 MMHG

## 2018-06-02 VITALS — HEART RATE: 68 BPM

## 2018-06-02 VITALS — HEART RATE: 75 BPM

## 2018-06-02 VITALS
SYSTOLIC BLOOD PRESSURE: 119 MMHG | TEMPERATURE: 98.8 F | DIASTOLIC BLOOD PRESSURE: 73 MMHG | HEART RATE: 82 BPM | RESPIRATION RATE: 20 BRPM | OXYGEN SATURATION: 98 %

## 2018-06-02 VITALS — HEART RATE: 76 BPM

## 2018-06-02 VITALS — HEART RATE: 70 BPM

## 2018-06-02 VITALS — HEART RATE: 90 BPM

## 2018-06-02 VITALS — HEART RATE: 56 BPM

## 2018-06-02 VITALS — HEART RATE: 74 BPM

## 2018-06-02 LAB
ALBUMIN SERPL-MCNC: 2.3 GM/DL (ref 3.4–5)
ALP SERPL-CCNC: 87 U/L (ref 45–117)
ALT SERPL-CCNC: 22 U/L (ref 12–78)
AST SERPL-CCNC: 17 U/L (ref 15–37)
BASOPHILS # BLD AUTO: 0.1 TH/MM3 (ref 0–0.2)
BASOPHILS NFR BLD: 1 % (ref 0–2)
BILIRUB SERPL-MCNC: 0.3 MG/DL (ref 0.2–1)
BUN SERPL-MCNC: 22 MG/DL (ref 7–18)
CALCIUM SERPL-MCNC: 8.8 MG/DL (ref 8.5–10.1)
CHLORIDE SERPL-SCNC: 98 MEQ/L (ref 98–107)
CREAT SERPL-MCNC: 0.91 MG/DL (ref 0.6–1.3)
EOSINOPHIL # BLD: 0.1 TH/MM3 (ref 0–0.4)
EOSINOPHIL NFR BLD: 1.1 % (ref 0–4)
ERYTHROCYTE [DISTWIDTH] IN BLOOD BY AUTOMATED COUNT: 17.1 % (ref 11.6–17.2)
GFR SERPLBLD BASED ON 1.73 SQ M-ARVRAT: 85 ML/MIN (ref 89–?)
GLUCOSE SERPL-MCNC: 279 MG/DL (ref 74–106)
HCO3 BLD-SCNC: 25.2 MEQ/L (ref 21–32)
HCT VFR BLD CALC: 36.5 % (ref 39–51)
HGB BLD-MCNC: 12.5 GM/DL (ref 13–17)
LYMPHOCYTES # BLD AUTO: 1.8 TH/MM3 (ref 1–4.8)
LYMPHOCYTES NFR BLD AUTO: 17.6 % (ref 9–44)
LYMPHOCYTES: 15 % (ref 9–44)
MAGNESIUM SERPL-MCNC: 2.1 MG/DL (ref 1.5–2.5)
MCH RBC QN AUTO: 27.8 PG (ref 27–34)
MCHC RBC AUTO-ENTMCNC: 34.4 % (ref 32–36)
MCV RBC AUTO: 80.9 FL (ref 80–100)
METAMYELOCYTES NFR BLD: 3 % (ref 0–1)
MONOCYTE #: 0.8 TH/MM3 (ref 0–0.9)
MONOCYTES NFR BLD: 8.1 % (ref 0–8)
MONOCYTES: 6 % (ref 0–8)
MYELOCYTES NFR BLD: 1 % (ref 0–0)
NEUTROPHILS # BLD AUTO: 7.6 TH/MM3 (ref 1.8–7.7)
NEUTROPHILS NFR BLD AUTO: 72.2 % (ref 16–70)
NEUTS BAND # BLD MANUAL: 8.2 TH/MM3 (ref 1.8–7.7)
NEUTS BAND NFR BLD: 5 % (ref 0–6)
NEUTS SEG NFR BLD MANUAL: 69 % (ref 16–70)
NUCLEATED RED BLOOD CELL: 1 (ref 0–0)
PHOSPHATE SERPL-MCNC: 3.6 MG/DL (ref 2.5–4.9)
PLATELET # BLD: 242 TH/MM3 (ref 150–450)
PMV BLD AUTO: 8.3 FL (ref 7–11)
PROT SERPL-MCNC: 7.2 GM/DL (ref 6.4–8.2)
RBC # BLD AUTO: 4.51 MIL/MM3 (ref 4.5–5.9)
SODIUM SERPL-SCNC: 131 MEQ/L (ref 136–145)
WBC # BLD AUTO: 10.5 TH/MM3 (ref 4–11)
WBC NRBC COR # BLD: 1 /100 WBC (ref 0–0)

## 2018-06-02 RX ADMIN — ASPIRIN 81 MG SCH MG: 81 TABLET ORAL at 08:54

## 2018-06-02 RX ADMIN — CARVEDILOL SCH MG: 6.25 TABLET, FILM COATED ORAL at 20:22

## 2018-06-02 RX ADMIN — HEPARIN SODIUM PRN MLS/HR: 10000 INJECTION, SOLUTION INTRAVENOUS at 05:12

## 2018-06-02 RX ADMIN — PANTOPRAZOLE SCH MG: 40 TABLET, DELAYED RELEASE ORAL at 08:55

## 2018-06-02 RX ADMIN — INSULIN ASPART SCH: 100 INJECTION, SOLUTION INTRAVENOUS; SUBCUTANEOUS at 11:34

## 2018-06-02 RX ADMIN — SACUBITRIL AND VALSARTAN SCH TAB: 49; 51 TABLET, FILM COATED ORAL at 08:55

## 2018-06-02 RX ADMIN — SACUBITRIL AND VALSARTAN SCH TAB: 49; 51 TABLET, FILM COATED ORAL at 20:22

## 2018-06-02 RX ADMIN — HYDROCODONE BITARTRATE AND ACETAMINOPHEN PRN TAB: 5; 325 TABLET ORAL at 03:54

## 2018-06-02 RX ADMIN — CARVEDILOL SCH MG: 6.25 TABLET, FILM COATED ORAL at 08:55

## 2018-06-02 RX ADMIN — INSULIN ASPART SCH: 100 INJECTION, SOLUTION INTRAVENOUS; SUBCUTANEOUS at 20:33

## 2018-06-02 RX ADMIN — HYDROCODONE BITARTRATE AND ACETAMINOPHEN PRN TAB: 5; 325 TABLET ORAL at 20:25

## 2018-06-02 RX ADMIN — CEFAZOLIN SODIUM SCH MLS/HR: 2 SOLUTION INTRAVENOUS at 01:02

## 2018-06-02 RX ADMIN — PRAVASTATIN SODIUM SCH MG: 40 TABLET ORAL at 08:54

## 2018-06-02 RX ADMIN — ACYCLOVIR SCH UNITS: 800 TABLET ORAL at 20:33

## 2018-06-02 RX ADMIN — INSULIN ASPART SCH: 100 INJECTION, SOLUTION INTRAVENOUS; SUBCUTANEOUS at 08:00

## 2018-06-02 RX ADMIN — CEFAZOLIN SODIUM SCH MLS/HR: 2 SOLUTION INTRAVENOUS at 08:55

## 2018-06-02 RX ADMIN — INSULIN ASPART SCH: 100 INJECTION, SOLUTION INTRAVENOUS; SUBCUTANEOUS at 17:00

## 2018-06-02 RX ADMIN — CEFAZOLIN SODIUM SCH MLS/HR: 2 SOLUTION INTRAVENOUS at 17:00

## 2018-06-02 NOTE — PD.POD
Subjective


Podiatric Problems


Gangrene right 2nd toe


Pain score:  0





Past Med/Surg/Social History


Social History


Smoking Status:  Former Smoker





Objective


Vital Signs





Vital Signs








  Date Time  Temp Pulse Resp B/P (MAP) Pulse Ox O2 Delivery O2 Flow Rate FiO2


 


6/2/18 21:52     98 Nasal Cannula 2.00 


 


6/2/18 20:00      Nasal Cannula 2.00 


 


6/2/18 18:00  76      


 


6/2/18 17:00  66      


 


6/2/18 16:00  70      


 


6/2/18 15:00 98.2 66 16 133/71 (91) 98   


 


6/2/18 15:00  76      


 


6/2/18 14:00  70      


 


6/2/18 13:00  90      


 


6/2/18 12:00  68      


 


6/2/18 11:16 98.0 78 16 101/56 (71) 98   


 


6/2/18 11:00  76      


 


6/2/18 10:00  68      


 


6/2/18 09:00  68      


 


6/2/18 08:00  56      


 


6/2/18 07:43     95 Nasal Cannula 2.00 


 


6/2/18 07:30      Room Air  


 


6/2/18 07:30  68      


 


6/2/18 07:30 98.7 72 16 91/57 (68) 98   


 


6/2/18 06:00  71      


 


6/2/18 05:00  68      


 


6/2/18 04:00      Nasal Cannula 2.00 


 


6/2/18 04:00 98.2 70 18 136/82 (100) 97   


 


6/2/18 04:00  70      


 


6/2/18 03:00  72      


 


6/2/18 02:00  75      


 


6/2/18 01:00  72      


 


6/2/18 00:00 98.8 82 20 119/73 (88) 98   


 


6/2/18 00:00      Nasal Cannula 2.00 


 


6/2/18 00:00  82      


 


6/1/18 23:00  76      








Coded Allergies:  


     potassium iodide (Unverified  Allergy, Severe, Swelling, 3/21/18)


     povidone-iodine (Unverified  Allergy, Severe, Swelling, 3/21/18)


     shellfish derived (Unverified  Allergy, Severe, 3/21/18)


     sodium iodide (Unverified  Allergy, Severe, Swelling, 3/21/18)


     sodium iodide (Unverified  Allergy, Severe, Swelling, 3/21/18)





Assessment & Plan


A/P


Gangrene right 2nd toe, s/p amputation R 2nd toe 5/29/18 Dr Mckeon   





   Clear for discharge from podiatry standpoint. Keep clean, dry, intact


   Orders for nursing to change every 5 days with dry sterile dressing. 


   nursing to change bandage just prior to discharge.


   Weightbearing as tolerated in surgical shoe right foot. OK to remove shoe 

when in bed.


   Follow up in clinic 1 week after discharge for bandage change. Keep bandage 

he is discharged with intact until follow up. No home health dressings needed 

for foot.











Marc Mckeon DPM Jun 2, 2018 22:13

## 2018-06-02 NOTE — HHI.PR
Subjective


Remarks


Pt seen and examined. AFVSS. No acute events overnight. Denies any acute 

concerns. Reports pain in his toe is controlled. Denies CP, abdominal pain, 

fever, chills. Has persistent shortness of breath.





Objective





Vital Signs








  Date Time  Temp Pulse Resp B/P (MAP) Pulse Ox O2 Delivery O2 Flow Rate FiO2


 


6/2/18 12:00  76      


 


6/2/18 11:16 98.0 78 16 101/56 (71) 98   


 


6/2/18 11:00  76      


 


6/2/18 10:00  68      


 


6/2/18 09:00  68      


 


6/2/18 08:00  56      


 


6/2/18 07:43     95 Nasal Cannula 2.00 


 


6/2/18 07:30      Room Air  


 


6/2/18 07:30  68      


 


6/2/18 07:30 98.7 72 16 91/57 (68) 98   


 


6/2/18 06:00  71      


 


6/2/18 05:00  68      


 


6/2/18 04:00      Nasal Cannula 2.00 


 


6/2/18 04:00 98.2 70 18 136/82 (100) 97   


 


6/2/18 04:00  70      


 


6/2/18 03:00  72      


 


6/2/18 02:00  75      


 


6/2/18 01:00  72      


 


6/2/18 00:00 98.8 82 20 119/73 (88) 98   


 


6/2/18 00:00      Nasal Cannula 2.00 


 


6/2/18 00:00  82      


 


6/1/18 23:00  76      


 


6/1/18 22:00  79      


 


6/1/18 21:34     93 Nasal Cannula 2.00 


 


6/1/18 21:00  70      


 


6/1/18 20:00 98.6 73 20 126/88 (101) 96   


 


6/1/18 20:00  73      


 


6/1/18 20:00      Nasal Cannula 2.00 


 


6/1/18 18:00  70      


 


6/1/18 17:00  70      


 


6/1/18 16:00  66      


 


6/1/18 15:17 98.1 71 16 98/60 (73) 98   


 


6/1/18 15:00  79      














I/O      


 


 6/1/18 6/1/18 6/1/18 6/2/18 6/2/18 6/2/18





 07:00 15:00 23:00 07:00 15:00 23:00


 


Intake Total 240 ml  720 ml 240 ml 50 ml 


 


Output Total 1125 ml  1350 ml 900 ml  


 


Balance -885 ml  -630 ml -660 ml 50 ml 


 


      


 


Intake Oral 240 ml  720 ml 240 ml  


 


IV Total     50 ml 


 


Output Urine Total 1125 ml  1350 ml 900 ml  


 


# Bowel Movements    0  








Result Diagram:  


6/2/18 0504                                                                    

            6/2/18 0504





Imaging











Chest X-Ray 6/1/18 0000 Signed





Impressions: 





 CONCLUSION: 





 Stable appearance with no acute cardiopulmonary disease. There is no evidence o





 f pneumonia.





  





 








Objective Remarks


GENERAL: WN, WD  male resting in bed in NAD.


SKIN: Warm and dry.


HEENT: AT/NC. Pupils equal and round. MMM.


HEART: RRR no m/r/g.


LUNGS: CTAB without wheezes or crackles.


ABDOMEN: +BS, soft, NT, ND.


EXTREMITIES: No LE edema. 


NEURO: Awake and alert. Nonfocal.


PSYCH: Appropriate mood and affect.





A/P


Assessment and Plan


57 year old  male with ischemic cardiomyopathy with AICD, CAD with 3V 

CABG, HTN, DM, and COPD admitted on 5/24 for severe, progressive exertional 

dyspnea as well as right second toe pain. He was found to have severe sepsis 

secondary to gangrene/infection of R second toe, NSTEMI with troponin 3.04, and 

CHF exacerbation with BNP >3000.





Sepsis


R second toe osteomyelitis, gangrene


MSSA bacteremia


- Acute sepsis component resolved


- Afebrile, WBC down to 10.5 this AM


- Blood culturse from 5/24-5/27 all growing MSSA


- Blood cultures since 5/30 NG


- Wound culture growing MSSA


- ID following; recommend continuing IV Ancef 2 g IV Q8H x 6 weeks


- No PICC until cleared by IV


- Podiatry following; s/p 2nd toe amputation 5/29. WB as tolerated





Ischemic cardiomyopathy


CHF exacerbation


NSTEMI


AICD firing for VTACH (5/25)


CAD with 3V CABG


- Continue ASA, Entresto, Coreg


- Continue statin


- On heparin drip


- Cardiology following





Acute kidney injury


- Improving


- Continue to monitor


- Avoid nephrotoxic agents





Thrombocytopenia


- Resolved


- Likely secondary to infectious process


- Heme/onc consulted earlier in course; HIT ab negative





Diabetes mellitus, uncontrolled


- A1c 9.6


- SSI per protocol; required 23 units yesterday


- Increase Levemir to 10 units BID





COPD


- Supplemental O2 and nebs PRN





DVT prophylaxis: on heparin gtt


Discharge Planning


Possibly D/C 6/4 if cleared by podiatry. ID working on OP antibiotic forms











Ciera Davidson MD Jun 2, 2018 14:43

## 2018-06-02 NOTE — PD.CARD.PN
Subjective


Subjective Remarks


appears comfortable in nad





Objective


Medications





Current Medications








 Medications


  (Trade)  Dose


 Ordered  Sig/Ilia


 Route  Start Time


 Stop Time Status Last Admin


 


  (NS Flush)  2 ml  UNSCH  PRN


 IVF  5/24/18 10:00


    5/28/18 08:31


 


 


  (D50w (Vial) Inj)  50 ml  UNSCH  PRN


 IV PUSH  5/24/18 13:30


     


 


 


  (Glucagon Inj)  1 mg  UNSCH  PRN


 OTHER  5/24/18 13:30


     


 


 


  (NovoLOG


 SUPPLEMENTAL


 SCALE)  1  ACHS SLIDING  SCALE


 SQ  5/24/18 17:00


    6/2/18 11:34


 


 


  (Pravachol)  40 mg  DAILY


 PO  5/25/18 09:00


    6/2/18 08:54


 


 


  (Duoneb Neb)  1 ampule  Q4HR NEB  PRN


 NEB  5/24/18 13:45


    5/24/18 15:38


 


 


  (Tylenol)  650 mg  Q4H  PRN


 PO  5/24/18 14:30


    5/27/18 11:45


 


 


  (Norco  5-325 Mg)  1 tab  Q4H  PRN


 PO  5/24/18 14:30


    6/2/18 03:54


 


 


  (Reglan Inj)  5 mg  Q6H  PRN


 IV PUSH  5/25/18 02:30


    5/25/18 20:10


 


 


  (Zofran  Odt)  4 mg  Q6H  PRN


 PO  5/25/18 02:30


    5/28/18 11:50


 


 


  (Lopressor Inj)  5 mg  Q5M  PRN


 IV PUSH  5/25/18 02:45


    5/25/18 03:06


 


 


  (Levemir Inj)  10 units  HS


 SQ  5/25/18 21:00


    6/1/18 20:17


 


 


  (Tessalon)  100 mg  TID  PRN


 PO  5/26/18 03:00


    5/29/18 02:00


 


 


  (Robitussin Ac


 200-20 Mg/10 ml


 Liq)  10 ml  Q6H  PRN


 PO  5/26/18 03:00


     


 


 


  (Coreg)  6.25 mg  Q12HR


 PO  5/26/18 11:30


    6/2/18 08:55


 


 


  (Entresto 49-51


 Mg)  1 tab  BID


 PO  5/26/18 21:00


    6/2/18 08:55


 


 


 Cefazolin Sodium/


 Dextrose  50 ml @ 


 100 mls/hr  Q8H


 IV  5/27/18 09:00


    6/2/18 08:55


 


 


  (Aspirin Chew)  81 mg  DAILY


 CHEW  5/28/18 12:10


    6/2/18 08:54


 


 


 Heparin Sodium/


 Dextrose  250 ml @ 


 16.128 mls/


 hr  TITRATE  PRN


 IV  5/28/18 12:30


    6/2/18 05:12


 


 


 Sodium Chloride  1,000 ml @ 


 100 mls/hr  Q10H


 IV  5/29/18 11:15


   Future Hold 5/30/18 10:17


 


 


  (Protonix)  40 mg  DAILY


 PO  5/30/18 09:00


    6/2/18 08:55


 








Vital Signs / I&O





Vital Signs








  Date Time  Temp Pulse Resp B/P (MAP) Pulse Ox O2 Delivery O2 Flow Rate FiO2


 


6/2/18 11:16 98.0 78 16 101/56 (71) 98   


 


6/2/18 11:00  76      


 


6/2/18 10:00  68      


 


6/2/18 09:00  68      


 


6/2/18 08:00  56      


 


6/2/18 07:30      Room Air  


 


6/2/18 07:30  68      


 


6/2/18 07:30 98.7 72 16 91/57 (68) 98   


 


6/2/18 06:00  71      


 


6/2/18 05:00  68      


 


6/2/18 04:00      Nasal Cannula 2.00 


 


6/2/18 04:00 98.2 70 18 136/82 (100) 97   


 


6/2/18 04:00  70      


 


6/2/18 03:00  72      


 


6/2/18 02:00  75      


 


6/2/18 01:00  72      


 


6/2/18 00:00 98.8 82 20 119/73 (88) 98   


 


6/2/18 00:00      Nasal Cannula 2.00 


 


6/2/18 00:00  82      


 


6/1/18 23:00  76      


 


6/1/18 22:00  79      


 


6/1/18 21:34     93 Nasal Cannula 2.00 


 


6/1/18 21:00  70      


 


6/1/18 20:00 98.6 73 20 126/88 (101) 96   


 


6/1/18 20:00  73      


 


6/1/18 20:00      Nasal Cannula 2.00 


 


6/1/18 18:00  70      


 


6/1/18 17:00  70      


 


6/1/18 16:00  66      


 


6/1/18 15:17 98.1 71 16 98/60 (73) 98   


 


6/1/18 15:00  79      


 


6/1/18 14:05        21


 


6/1/18 12:00  70      














I/O      


 


 6/1/18 6/1/18 6/1/18 6/2/18 6/2/18 6/2/18





 07:00 15:00 23:00 07:00 15:00 23:00


 


Intake Total 240 ml  720 ml 240 ml 50 ml 


 


Output Total 1125 ml  1350 ml 900 ml  


 


Balance -885 ml  -630 ml -660 ml 50 ml 


 


      


 


Intake Oral 240 ml  720 ml 240 ml  


 


IV Total     50 ml 


 


Output Urine Total 1125 ml  1350 ml 900 ml  


 


# Bowel Movements    0  








Physical Exam


GENERAL: 


SKIN: Warm and dry.


HEAD: Normocephalic.


EYES: No scleral icterus. No injection or drainage. 


NECK: Supple, trachea midline. No JVD or lymphadenopathy.


CARDIOVASCULAR: Regular rate and rhythm without murmurs, gallops, or rubs. 


RESPIRATORY: Breath sounds equal bilaterally. No accessory muscle use.


GASTROINTESTINAL: Abdomen soft, non-tender, nondistended. 


MUSCULOSKELETAL: No cyanosis, or edema. 


BACK: Nontender without obvious deformity. No CVA tenderness.





Laboratory





Laboratory Tests








Test


  6/2/18


05:04


 


White Blood Count 10.5 TH/MM3 


 


Red Blood Count 4.51 MIL/MM3 


 


Hemoglobin 12.5 GM/DL 


 


Hematocrit 36.5 % 


 


Mean Corpuscular Volume 80.9 FL 


 


Mean Corpuscular Hemoglobin 27.8 PG 


 


Mean Corpuscular Hemoglobin


Concent 34.4 % 


 


 


Red Cell Distribution Width 17.1 % 


 


Platelet Count 242 TH/MM3 


 


Mean Platelet Volume 8.3 FL 


 


Neutrophils (%) (Auto) 72.2 % 


 


Lymphocytes (%) (Auto) 17.6 % 


 


Monocytes (%) (Auto) 8.1 % 


 


Eosinophils (%) (Auto) 1.1 % 


 


Basophils (%) (Auto) 1.0 % 


 


Neutrophils # (Auto) 7.6 TH/MM3 


 


Lymphocytes # (Auto) 1.8 TH/MM3 


 


Monocytes # (Auto) 0.8 TH/MM3 


 


Eosinophils # (Auto) 0.1 TH/MM3 


 


Basophils # (Auto) 0.1 TH/MM3 


 


CBC Comment AUTO DIFF 


 


Differential Total Cells


Counted 100 


 


 


Neutrophils % (Manual) 69 % 


 


Band Neutrophils % 5 % 


 


Lymphocytes % 15 % 


 


Monocytes % 6 % 


 


Eosinophils % 1 % 


 


Neutrophils # (Manual) 8.2 TH/MM3 


 


Metamyelocytes 3 % 


 


Myelocytes 1 % 


 


Nucleated Red Blood Cells 1 /100 WBC 


 


Differential Comment


  FINAL DIFF


MANUAL


 


Platelet Estimate NORMAL 


 


Platelet Morphology Comment NORMAL 


 


Red Cell Morphology Comment NORMAL 


 


Activated Partial


Thromboplast Time 51.0 SEC 


 


 


Blood Urea Nitrogen 22 MG/DL 


 


Creatinine 0.91 MG/DL 


 


Random Glucose 279 MG/DL 


 


Total Protein 7.2 GM/DL 


 


Albumin 2.3 GM/DL 


 


Calcium Level 8.8 MG/DL 


 


Phosphorus Level 3.6 MG/DL 


 


Magnesium Level 2.1 MG/DL 


 


Alkaline Phosphatase 87 U/L 


 


Aspartate Amino Transf


(AST/SGOT) 17 U/L 


 


 


Alanine Aminotransferase


(ALT/SGPT) 22 U/L 


 


 


Total Bilirubin 0.3 MG/DL 


 


Sodium Level 131 MEQ/L 


 


Potassium Level 4.8 MEQ/L 


 


Chloride Level 98 MEQ/L 


 


Carbon Dioxide Level 25.2 MEQ/L 


 


Anion Gap 8 MEQ/L 


 


Estimat Glomerular Filtration


Rate 85 ML/MIN 


 











Assessment and Plan


Problem List:  


(1) ARF (acute renal failure)


ICD Codes:  N17.9 - Acute kidney failure, unspecified


(2) NSTEMI (non-ST elevated myocardial infarction)


ICD Codes:  I21.4 - Non-ST elevation (NSTEMI) myocardial infarction


(3) CAD (coronary artery disease)


ICD Codes:  I25.10 - Atherosclerotic heart disease of native coronary artery 

without angina pectoris


(4) Decreased cardiac ejection fraction


ICD Codes:  R93.1 - Abnormal findings on diagnostic imaging of heart and 

coronary circulation


(5) Elevated troponin


ICD Codes:  R74.8 - Abnormal levels of other serum enzymes


(6) Severe sepsis


ICD Codes:  A41.9 - Sepsis, unspecified organism; R65.20 - Severe sepsis 

without septic shock


(7) Diabetes mellitus


ICD Codes:  E11.9 - Type 2 diabetes mellitus without complications


(8) Acute renal failure


ICD Codes:  N17.9 - Acute kidney failure, unspecified


(9) Cardiomyopathy


ICD Codes:  I42.9 - Cardiomyopathy, unspecified


(10) gangrene of the right second toe


(11) Dyspnea and respiratory abnormalities


ICD Codes:  R06.00 - Dyspnea, unspecified; R06.89 - Other abnormalities of 

breathing


(12) Multi-vessel coronary artery stenosis


ICD Codes:  I25.10 - Atherosclerotic heart disease of native coronary artery 

without angina pectoris


(13) Hx of CABG


ICD Codes:  Z95.1 - Presence of aortocoronary bypass graft


(14) Thrombocytopenia


ICD Codes:  D69.6 - Thrombocytopenia, unspecified


Assessment and Plan


1.) Ischemic cardiomyopathy - nstemi, chf excacerbation and arf due to severe 

unrevascularizable cad, cardiomyopathy, on aspirin and heparin per hematology, 

on antibiotics, pod # 4,  2nd toe amputation, stable; entresto added, f/u bnp/

bmp


2.) VT - replete electrolytes, on coreg; d/w case management further attempts 

to insure and obtain eligibility for referral to heart transplant center











Abdiel Purvis MD Jun 2, 2018 11:57

## 2018-06-02 NOTE — HHI.IDPN
Subjective


Subjective


Remarks


Patient seen and examined on behalf of Dr. Brandon





 is a 58 y/o CM with PMHx of CHF, Ischemic Cardiomyopathy with last 

reported EF 20-25%, he has prior h/o CABG x 3, defibrillator and pacemaker, DM, 

HTN and hyperlipidemia. CT surgery evaluated and found him not to be a 

revascularization candidate and recommended referral to a tertiary center for 

transplant consideration.  As the patient has no  insurance this option has not 

been found feasible.  He underwent placement of a single-chamber St Trav ICD in 

March 2018 for sudden cardiac death prevention. 


With this background patient presents to the ED at [facility] with 4 day history 

of progressive dyspnea described as severe, worsening with exertion, and 

improved with rest. He has had associated symptoms of diaphoresis, nausea and 

vomiting over the weekend.  He additionally has noted painful changes in his 

right second toe with color changes suspicious for infection.  


In ED patient was noted to have WBC 9.6, hemoglobin 15.0, hematocrit 43.9, 

platelets 133, sodium 130, potassium 4.4, BUN 36, creatinine 1.84, random 

glucose 407 alkaline phosphatase 114, AST 37, ALT 25, total bilirubin 0.9, 

troponin 3.04, C-reactive protein 26.8, B natruretic peptide 3148, lactic acid 

5.5. Ax Xray of Rt toe showed erosion. MRI with no osteomyelitis. Chest x-ray 

showed no acute abnormality or significant interval change.


At baseline prior to this infectious process patient was able to work around 

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  He states that he is only able to be active 

for 5-10 minutes before becoming very dyspneic and exhausted.  He was 

previously an over the road  but has been unable to work since 

March of this year.  He is now attempting to get disability as he has a nearly 

homebound existence and requires assistance to get out to doctor's appointments.


Sepsis workup on admission positive for Staph bacteremia and ID consulted for 

evaluation and Mment of the same.





Notes reviewed 


no fever


no rash


no diarrhea


S/P ICD placement March 2018


denies SOB today.


Antibiotics


IV Ancef








Current Medications








 Medications


  (Trade)  Dose


 Ordered  Sig/Ilia


 Route  Start Time


 Stop Time Status Last Admin


 


  (NS Flush)  2 ml  UNSCH  PRN


 IVF  5/24/18 10:00


    5/28/18 08:31


 


 


  (D50w (Vial) Inj)  50 ml  UNSCH  PRN


 IV PUSH  5/24/18 13:30


     


 


 


  (Glucagon Inj)  1 mg  UNSCH  PRN


 OTHER  5/24/18 13:30


     


 


 


  (NovoLOG


 SUPPLEMENTAL


 SCALE)  1  ACHS SLIDING  SCALE


 SQ  5/24/18 17:00


    5/31/18 22:58


 


 


  (Pravachol)  40 mg  DAILY


 PO  5/25/18 09:00


    5/31/18 08:18


 


 


  (Duoneb Neb)  1 ampule  Q4HR NEB  PRN


 NEB  5/24/18 13:45


    5/24/18 15:38


 


 


  (Tylenol)  650 mg  Q4H  PRN


 PO  5/24/18 14:30


    5/27/18 11:45


 


 


  (Norco  5-325 Mg)  1 tab  Q4H  PRN


 PO  5/24/18 14:30


    5/31/18 12:50


 


 


  (Reglan Inj)  5 mg  Q6H  PRN


 IV PUSH  5/25/18 02:30


    5/25/18 20:10


 


 


  (Zofran  Odt)  4 mg  Q6H  PRN


 PO  5/25/18 02:30


    5/28/18 11:50


 


 


  (Lopressor Inj)  5 mg  Q5M  PRN


 IV PUSH  5/25/18 02:45


    5/25/18 03:06


 


 


  (Levemir Inj)  10 units  HS


 SQ  5/25/18 21:00


    5/31/18 22:58


 


 


  (Tessalon)  100 mg  TID  PRN


 PO  5/26/18 03:00


    5/29/18 02:00


 


 


  (Robitussin Ac


 200-20 Mg/10 ml


 Liq)  10 ml  Q6H  PRN


 PO  5/26/18 03:00


     


 


 


  (Coreg)  6.25 mg  Q12HR


 PO  5/26/18 11:30


    5/31/18 22:57


 


 


  (Entresto 49-51


 Mg)  1 tab  BID


 PO  5/26/18 21:00


    5/31/18 22:57


 


 


 Cefazolin Sodium/


 Dextrose  50 ml @ 


 100 mls/hr  Q8H


 IV  5/27/18 09:00


    6/1/18 01:17


 


 


  (Aspirin Chew)  81 mg  DAILY


 CHEW  5/28/18 12:10


    5/31/18 08:18


 


 


 Heparin Sodium/


 Dextrose  250 ml @ 


 16.128 mls/


 hr  TITRATE  PRN


 IV  5/28/18 12:30


    5/31/18 12:00


 


 


 Sodium Chloride  1,000 ml @ 


 100 mls/hr  Q10H


 IV  5/29/18 11:15


   Future Hold 5/30/18 10:17


 


 


  (Protonix)  40 mg  DAILY


 PO  5/30/18 09:00


    5/31/18 08:18


 








Lines


PIV no evidence of infection


Past Medical History


Severe ischemic cardiomyopathy EF 20-25%


Hyperlipidemia


Diabetes


Coronary artery disease status post MI 3/13


COPD


Past Surgical History


CABG 10 years ago


Pacemaker/Defibrillator insertion


Appendectomy


Skin graft to right foot as a child


Back surgeries 3 has hardware in back per report.


Allergies:  


Coded Allergies:  


     potassium iodide (Unverified  Allergy, Severe, Swelling, 3/21/18)


     povidone-iodine (Unverified  Allergy, Severe, Swelling, 3/21/18)


     shellfish derived (Unverified  Allergy, Severe, 3/21/18)


     sodium iodide (Unverified  Allergy, Severe, Swelling, 3/21/18)


     sodium iodide (Unverified  Allergy, Severe, Swelling, 3/21/18)





Objective


.





Vital Signs








  Date Time  Temp Pulse Resp B/P (MAP) Pulse Ox O2 Delivery O2 Flow Rate FiO2


 


6/2/18 07:30      Room Air  


 


6/2/18 07:30  68      


 


6/2/18 07:30 98.7 72 16 91/57 (68) 98   


 


6/2/18 06:00  71      


 


6/2/18 05:00  68      


 


6/2/18 04:00      Nasal Cannula 2.00 


 


6/2/18 04:00 98.2 70 18 136/82 (100) 97   


 


6/2/18 04:00  70      


 


6/2/18 03:00  72      


 


6/2/18 02:00  75      


 


6/2/18 01:00  72      


 


6/2/18 00:00 98.8 82 20 119/73 (88) 98   


 


6/2/18 00:00      Nasal Cannula 2.00 


 


6/2/18 00:00  82      


 


6/1/18 23:00  76      


 


6/1/18 22:00  79      


 


6/1/18 21:34     93 Nasal Cannula 2.00 


 


6/1/18 21:00  70      


 


6/1/18 20:00 98.6 73 20 126/88 (101) 96   


 


6/1/18 20:00  73      


 


6/1/18 20:00      Nasal Cannula 2.00 


 


6/1/18 18:00  70      


 


6/1/18 17:00  70      


 


6/1/18 16:00  66      


 


6/1/18 15:17 98.1 71 16 98/60 (73) 98   


 


6/1/18 15:00  79      


 


6/1/18 14:05        21


 


6/1/18 12:00  70      


 


6/1/18 11:00 98.8 73 16 110/70 (83) 98   


 


6/1/18 11:00  70      


 


6/1/18 10:00  68      














 6/2/18 6/2/18 6/3/18





 15:00 23:00 07:00


 


Intake Total 50 ml  


 


Balance 50 ml  


 


   


 


IV Total 50 ml  








.





Laboratory Tests








Test


  6/1/18


05:19 6/2/18


05:04


 


White Blood Count 12.3 TH/MM3  10.5 TH/MM3 


 


Red Blood Count 4.56 MIL/MM3  4.51 MIL/MM3 


 


Hemoglobin 12.5 GM/DL  12.5 GM/DL 


 


Hematocrit 36.8 %  36.5 % 


 


Mean Corpuscular Volume 80.7 FL  80.9 FL 


 


Mean Corpuscular Hemoglobin 27.4 PG  27.8 PG 


 


Mean Corpuscular Hemoglobin


Concent 34.0 % 


  34.4 % 


 


 


Red Cell Distribution Width 17.1 %  17.1 % 


 


Platelet Count 243 TH/MM3  242 TH/MM3 


 


Mean Platelet Volume 9.4 FL  8.3 FL 


 


Neutrophils (%) (Auto) 74.7 %  72.2 % 


 


Lymphocytes (%) (Auto) 15.7 %  17.6 % 


 


Monocytes (%) (Auto) 7.9 %  8.1 % 


 


Eosinophils (%) (Auto) 1.1 %  1.1 % 


 


Basophils (%) (Auto) 0.6 %  1.0 % 


 


Neutrophils # (Auto) 9.2 TH/MM3  7.6 TH/MM3 


 


Lymphocytes # (Auto) 1.9 TH/MM3  1.8 TH/MM3 


 


Monocytes # (Auto) 1.0 TH/MM3  0.8 TH/MM3 


 


Eosinophils # (Auto) 0.1 TH/MM3  0.1 TH/MM3 


 


Basophils # (Auto) 0.1 TH/MM3  0.1 TH/MM3 


 


CBC Comment DIFF FINAL  AUTO DIFF 


 


Differential Comment   








Laboratory Tests








Test


  6/1/18


05:19 6/2/18


05:04


 


Blood Urea Nitrogen 18 MG/DL  22 MG/DL 


 


Creatinine 0.88 MG/DL  0.91 MG/DL 


 


Random Glucose 236 MG/DL  279 MG/DL 


 


Total Protein 7.0 GM/DL  7.2 GM/DL 


 


Albumin 2.3 GM/DL  2.3 GM/DL 


 


Calcium Level 9.0 MG/DL  8.8 MG/DL 


 


Phosphorus Level 3.7 MG/DL  3.6 MG/DL 


 


Magnesium Level 2.1 MG/DL  2.1 MG/DL 


 


Alkaline Phosphatase 84 U/L  87 U/L 


 


Aspartate Amino Transf


(AST/SGOT) 11 U/L 


  17 U/L 


 


 


Alanine Aminotransferase


(ALT/SGPT) 19 U/L 


  22 U/L 


 


 


Total Bilirubin 0.4 MG/DL  0.3 MG/DL 


 


Sodium Level 133 MEQ/L  131 MEQ/L 


 


Potassium Level 5.1 MEQ/L  4.8 MEQ/L 


 


Chloride Level 98 MEQ/L  98 MEQ/L 


 


Carbon Dioxide Level 25.8 MEQ/L  25.2 MEQ/L 


 


Anion Gap 9 MEQ/L  8 MEQ/L 


 


Estimat Glomerular Filtration


Rate 89 ML/MIN 


  85 ML/MIN 


 








Imaging





Last Impressions








Aorta w/Runoff CTA 5/28/18 0000 Signed





Impressions: 





 CONCLUSION:





 1.  Atherosclerotic changes without significant stenosis bilateral lower extrem





 ities.





  





 


 


Chest X-Ray 5/24/18 0955 Signed





Impressions: 





 CONCLUSION: 





 1.  No acute abnormality or significant interval change.





  





 


 


Toe X-Ray 5/24/18 0000 Signed





Impressions: 





 CONCLUSION: 





 Questionable small erosion distal phalanx right second toe. This could be infec





 tious in nature if there is clinical evidence for soft tissue infection.





  





 





  





  





 





  





 





  





 


 


Renal Ultrasound 5/24/18 0000 Signed





Impressions: 





 CONCLUSION: 





 1.  No hydronephrosis.





 2.  Increased echogenicity most characteristic of medical renal disease.





  





 


 


Foot MRI 5/24/18 0000 Signed





Impressions: 





 CONCLUSION:





 1.  No evidence for osteomyelitis. Soft tissue swelling at the second toe.





  





 


 


Extremity Arterial Study 5/24/18 0000 Signed





Impressions: 





 CONCLUSION:





 1.  Findings consistent with left outflow disease as well as small vessel disea





 se in the left foot.





 2.  Near normal range right ankle-brachial indices and segmental pressures.





  





 








Physical Exam


GENERAL:  WDWN  male patient, INAD.  Asleep but easily awakens to 

voice.  Appears comfortable.


SKIN:  No rashes or lesions. Warm and dry.


HEAD: Atraumatic. Normocephalic. 


EYES: Pupils equal round and reactive. Extraocular motions intact. No scleral 

icterus. No injection or drainage. 


ENT: Nose without bleeding or purulent drainage. Airway patent.  MMM.


NECK: Trachea midline. Supple, nontender, no meningeal signs.


CARDIOVASCULAR: HS audible.  + ICD.


RESPIRATORY: Nonlabored.  Clear to auscultation. Breath sounds equal 

bilaterally. No wheezes, rales, or rhonchi.  


GASTROINTESTINAL: Abdomen soft, nontender to palpation.


MUSCULOSKELETAL:  No BLE edema appreciated.  Right foot dressed, C/D/I, able to 

wiggles toes slightly on right foot.  Decreased sensation but able to feel 

light touch on toes.


NEUROLOGICAL:   Awake and alert.  Able to move all extremities spontaneously.  

Grossly non-focal.  Normal speech.


Psych calm and cooperative


IV line sites with no e.o infection.





Assessment & Plan


Remarks


Sepsis


High grade MSSA bacteremia 


   -  source ?2nd toe,  concern with ICD


Right 2nd toe wet gangrene, osteomyelitis.


Hepatitis C antibody positive.


AICD status.


CAD with severe ischemic cardiomyopathy.


Thrombocytopenia likely due to severe infection





Recs


Continue IV Ancef


Follow cultures until finalized


Follow path


Follow clinically


CXR reviewed by me and compared to days prior.


dw patient path results and need for IV antibiotics.


Will need 6 weeks IV Ancef irrespective of bone path due to high grade 

bacteremia and AICD in place.


No PICC till cleared by ID.


Hopefully DC monday if CM able to arrange outpatient antibiotics (Ancef 2 gm IV 

q8hrs on pump preferred). Needs wound care. Needs clearance from Podiatry.











Bev Brandon MD Jun 2, 2018 10:01

## 2018-06-03 VITALS — OXYGEN SATURATION: 98 %

## 2018-06-03 VITALS
TEMPERATURE: 98.4 F | RESPIRATION RATE: 16 BRPM | OXYGEN SATURATION: 100 % | HEART RATE: 76 BPM | SYSTOLIC BLOOD PRESSURE: 103 MMHG | DIASTOLIC BLOOD PRESSURE: 57 MMHG

## 2018-06-03 VITALS
SYSTOLIC BLOOD PRESSURE: 111 MMHG | HEART RATE: 77 BPM | TEMPERATURE: 98 F | OXYGEN SATURATION: 96 % | DIASTOLIC BLOOD PRESSURE: 72 MMHG | RESPIRATION RATE: 18 BRPM

## 2018-06-03 VITALS
RESPIRATION RATE: 18 BRPM | OXYGEN SATURATION: 97 % | HEART RATE: 76 BPM | SYSTOLIC BLOOD PRESSURE: 106 MMHG | DIASTOLIC BLOOD PRESSURE: 58 MMHG | TEMPERATURE: 98.6 F

## 2018-06-03 VITALS
RESPIRATION RATE: 20 BRPM | HEART RATE: 91 BPM | DIASTOLIC BLOOD PRESSURE: 62 MMHG | OXYGEN SATURATION: 96 % | SYSTOLIC BLOOD PRESSURE: 111 MMHG | TEMPERATURE: 98.4 F

## 2018-06-03 VITALS
OXYGEN SATURATION: 95 % | RESPIRATION RATE: 18 BRPM | HEART RATE: 69 BPM | TEMPERATURE: 98.1 F | SYSTOLIC BLOOD PRESSURE: 123 MMHG | DIASTOLIC BLOOD PRESSURE: 67 MMHG

## 2018-06-03 VITALS — HEART RATE: 72 BPM

## 2018-06-03 VITALS
HEART RATE: 82 BPM | SYSTOLIC BLOOD PRESSURE: 109 MMHG | RESPIRATION RATE: 18 BRPM | OXYGEN SATURATION: 98 % | DIASTOLIC BLOOD PRESSURE: 62 MMHG | TEMPERATURE: 98.7 F

## 2018-06-03 VITALS — HEART RATE: 87 BPM

## 2018-06-03 VITALS — HEART RATE: 76 BPM

## 2018-06-03 VITALS — HEART RATE: 80 BPM

## 2018-06-03 VITALS — HEART RATE: 88 BPM

## 2018-06-03 VITALS — HEART RATE: 85 BPM

## 2018-06-03 VITALS — HEART RATE: 70 BPM

## 2018-06-03 VITALS — OXYGEN SATURATION: 94 %

## 2018-06-03 VITALS — HEART RATE: 86 BPM

## 2018-06-03 VITALS — HEART RATE: 71 BPM

## 2018-06-03 VITALS — HEART RATE: 77 BPM

## 2018-06-03 VITALS — HEART RATE: 74 BPM

## 2018-06-03 VITALS — HEART RATE: 75 BPM

## 2018-06-03 VITALS — HEART RATE: 79 BPM

## 2018-06-03 LAB
BUN SERPL-MCNC: 21 MG/DL (ref 7–18)
CALCIUM SERPL-MCNC: 8.8 MG/DL (ref 8.5–10.1)
CHLORIDE SERPL-SCNC: 97 MEQ/L (ref 98–107)
CHOLEST SERPL-MCNC: 99 MG/DL (ref 120–200)
CHOLESTEROL/ HDL RATIO: 3.8 RATIO
CREAT SERPL-MCNC: 0.9 MG/DL (ref 0.6–1.3)
GFR SERPLBLD BASED ON 1.73 SQ M-ARVRAT: 86 ML/MIN (ref 89–?)
GLUCOSE SERPL-MCNC: 269 MG/DL (ref 74–106)
HCO3 BLD-SCNC: 24.4 MEQ/L (ref 21–32)
HDLC SERPL-MCNC: 26 MG/DL (ref 40–60)
LDLC SERPL-MCNC: 49 MG/DL (ref 0–99)
SODIUM SERPL-SCNC: 132 MEQ/L (ref 136–145)
TRIGL SERPL-MCNC: 122 MG/DL (ref 42–150)

## 2018-06-03 RX ADMIN — CARVEDILOL SCH MG: 6.25 TABLET, FILM COATED ORAL at 19:54

## 2018-06-03 RX ADMIN — ASPIRIN 81 MG SCH MG: 81 TABLET ORAL at 09:10

## 2018-06-03 RX ADMIN — CEFAZOLIN SODIUM SCH MLS/HR: 2 SOLUTION INTRAVENOUS at 17:00

## 2018-06-03 RX ADMIN — INSULIN ASPART SCH: 100 INJECTION, SOLUTION INTRAVENOUS; SUBCUTANEOUS at 11:40

## 2018-06-03 RX ADMIN — Medication PRN ML: at 19:53

## 2018-06-03 RX ADMIN — HEPARIN SODIUM PRN MLS/HR: 10000 INJECTION, SOLUTION INTRAVENOUS at 09:14

## 2018-06-03 RX ADMIN — CEFAZOLIN SODIUM SCH MLS/HR: 2 SOLUTION INTRAVENOUS at 00:09

## 2018-06-03 RX ADMIN — SACUBITRIL AND VALSARTAN SCH TAB: 49; 51 TABLET, FILM COATED ORAL at 09:10

## 2018-06-03 RX ADMIN — INSULIN ASPART SCH: 100 INJECTION, SOLUTION INTRAVENOUS; SUBCUTANEOUS at 17:00

## 2018-06-03 RX ADMIN — PRAVASTATIN SODIUM SCH MG: 40 TABLET ORAL at 09:10

## 2018-06-03 RX ADMIN — INSULIN ASPART SCH: 100 INJECTION, SOLUTION INTRAVENOUS; SUBCUTANEOUS at 20:01

## 2018-06-03 RX ADMIN — SACUBITRIL AND VALSARTAN SCH TAB: 49; 51 TABLET, FILM COATED ORAL at 19:53

## 2018-06-03 RX ADMIN — CEFAZOLIN SODIUM SCH MLS/HR: 2 SOLUTION INTRAVENOUS at 09:10

## 2018-06-03 RX ADMIN — INSULIN ASPART SCH: 100 INJECTION, SOLUTION INTRAVENOUS; SUBCUTANEOUS at 08:00

## 2018-06-03 RX ADMIN — HYDROCODONE BITARTRATE AND ACETAMINOPHEN PRN TAB: 5; 325 TABLET ORAL at 19:53

## 2018-06-03 RX ADMIN — CARVEDILOL SCH MG: 6.25 TABLET, FILM COATED ORAL at 09:10

## 2018-06-03 RX ADMIN — ACYCLOVIR SCH UNITS: 800 TABLET ORAL at 09:00

## 2018-06-03 RX ADMIN — PANTOPRAZOLE SCH MG: 40 TABLET, DELAYED RELEASE ORAL at 09:10

## 2018-06-03 RX ADMIN — ACYCLOVIR SCH UNITS: 800 TABLET ORAL at 20:00

## 2018-06-03 RX ADMIN — HEPARIN SODIUM PRN MLS/HR: 10000 INJECTION, SOLUTION INTRAVENOUS at 22:10

## 2018-06-03 NOTE — PD.CARD.PN
Subjective


Subjective Remarks


appears comfortable in nad





Objective


Medications





Current Medications








 Medications


  (Trade)  Dose


 Ordered  Sig/Ilia


 Route  Start Time


 Stop Time Status Last Admin


 


  (NS Flush)  2 ml  UNSCH  PRN


 IVF  5/24/18 10:00


    5/28/18 08:31


 


 


  (D50w (Vial) Inj)  50 ml  UNSCH  PRN


 IV PUSH  5/24/18 13:30


     


 


 


  (Glucagon Inj)  1 mg  UNSCH  PRN


 OTHER  5/24/18 13:30


     


 


 


  (NovoLOG


 SUPPLEMENTAL


 SCALE)  1  ACHS SLIDING  SCALE


 SQ  5/24/18 17:00


    6/3/18 11:40


 


 


  (Pravachol)  40 mg  DAILY


 PO  5/25/18 09:00


    6/3/18 09:10


 


 


  (Duoneb Neb)  1 ampule  Q4HR NEB  PRN


 NEB  5/24/18 13:45


    5/24/18 15:38


 


 


  (Tylenol)  650 mg  Q4H  PRN


 PO  5/24/18 14:30


    5/27/18 11:45


 


 


  (Norco  5-325 Mg)  1 tab  Q4H  PRN


 PO  5/24/18 14:30


    6/2/18 20:25


 


 


  (Reglan Inj)  5 mg  Q6H  PRN


 IV PUSH  5/25/18 02:30


    5/25/18 20:10


 


 


  (Zofran  Odt)  4 mg  Q6H  PRN


 PO  5/25/18 02:30


    5/28/18 11:50


 


 


  (Lopressor Inj)  5 mg  Q5M  PRN


 IV PUSH  5/25/18 02:45


    5/25/18 03:06


 


 


  (Tessalon)  100 mg  TID  PRN


 PO  5/26/18 03:00


    5/29/18 02:00


 


 


  (Robitussin Ac


 200-20 Mg/10 ml


 Liq)  10 ml  Q6H  PRN


 PO  5/26/18 03:00


     


 


 


  (Coreg)  6.25 mg  Q12HR


 PO  5/26/18 11:30


    6/3/18 09:10


 


 


  (Entresto 49-51


 Mg)  1 tab  BID


 PO  5/26/18 21:00


    6/3/18 09:10


 


 


 Cefazolin Sodium/


 Dextrose  50 ml @ 


 100 mls/hr  Q8H


 IV  5/27/18 09:00


    6/3/18 09:10


 


 


  (Aspirin Chew)  81 mg  DAILY


 CHEW  5/28/18 12:10


    6/3/18 09:10


 


 


 Heparin Sodium/


 Dextrose  250 ml @ 


 16.128 mls/


 hr  TITRATE  PRN


 IV  5/28/18 12:30


    6/3/18 09:14


 


 


 Sodium Chloride  1,000 ml @ 


 100 mls/hr  Q10H


 IV  5/29/18 11:15


   Future Hold 5/30/18 10:17


 


 


  (Protonix)  40 mg  DAILY


 PO  5/30/18 09:00


    6/3/18 09:10


 


 


  (Levemir Inj)  10 units  Q12HR


 SQ  6/2/18 21:00


    6/3/18 09:00


 








Vital Signs / I&O





Vital Signs








  Date Time  Temp Pulse Resp B/P (MAP) Pulse Ox O2 Delivery O2 Flow Rate FiO2


 


6/3/18 10:26     94 Nasal Cannula 2.00 


 


6/3/18 07:30     98 Nasal Cannula 2.00 


 


6/3/18 07:30 98.7 82 18 109/62 (78) 98   


 


6/3/18 07:00  76      


 


6/3/18 06:00  72      


 


6/3/18 05:00  76      


 


6/3/18 04:00  77      


 


6/3/18 03:54 98.0 77 18 111/72 (85) 96   


 


6/3/18 03:54      Nasal Cannula 2.00 


 


6/3/18 03:00  75      


 


6/3/18 02:00  71      


 


6/3/18 01:00  70      


 


6/3/18 00:00  69      


 


6/3/18 00:00      Nasal Cannula 2.00 


 


6/3/18 00:00 98.1 69 18 123/67 (85) 95   


 


6/2/18 23:00  72      


 


6/2/18 22:00  71      


 


6/2/18 21:52     98 Nasal Cannula 2.00 


 


6/2/18 21:00  74      


 


6/2/18 20:00  83      


 


6/2/18 20:00 98.4 83 18 120/72 (88) 97   


 


6/2/18 20:00      Nasal Cannula 2.00 


 


6/2/18 18:00  76      


 


6/2/18 17:00  66      


 


6/2/18 16:00  70      


 


6/2/18 15:00 98.2 66 16 133/71 (91) 98   


 


6/2/18 15:00  76      


 


6/2/18 14:00  70      


 


6/2/18 13:00  90      


 


6/2/18 12:00  68      














I/O      


 


 6/2/18 6/2/18 6/2/18 6/3/18 6/3/18 6/3/18





 07:00 15:00 23:00 07:00 15:00 23:00


 


Intake Total 240 ml 230 ml 860 ml 470 ml  


 


Output Total 900 ml  1300 ml 1900 ml  


 


Balance -660 ml 230 ml -440 ml -1430 ml  


 


      


 


Intake Oral 240 ml  860 ml 240 ml  


 


IV Total  230 ml  230 ml  


 


Output Urine Total 900 ml  1300 ml 1900 ml  


 


# Bowel Movements 0   0  








Physical Exam


GENERAL: 


SKIN: Warm and dry.


HEAD: Normocephalic.


EYES: No scleral icterus. No injection or drainage. 


NECK: Supple, trachea midline. No JVD or lymphadenopathy.


CARDIOVASCULAR: Regular rate and rhythm without murmurs, gallops, or rubs. 


RESPIRATORY: Breath sounds equal bilaterally. No accessory muscle use.


GASTROINTESTINAL: Abdomen soft, non-tender, nondistended. 


MUSCULOSKELETAL: No cyanosis, or edema. 


BACK: Nontender without obvious deformity. No CVA tenderness.





Laboratory





Laboratory Tests








Test


  6/3/18


04:03


 


Activated Partial


Thromboplast Time 52.2 SEC 


 


 


Blood Urea Nitrogen 21 MG/DL 


 


Creatinine 0.90 MG/DL 


 


Random Glucose 269 MG/DL 


 


Calcium Level 8.8 MG/DL 


 


Sodium Level 132 MEQ/L 


 


Potassium Level 4.9 MEQ/L 


 


Chloride Level 97 MEQ/L 


 


Carbon Dioxide Level 24.4 MEQ/L 


 


Anion Gap 11 MEQ/L 


 


Estimat Glomerular Filtration


Rate 86 ML/MIN 


 


 


B-Type Natriuretic Peptide 254 PG/ML 


 


Triglycerides Level 122 MG/DL 


 


Cholesterol Level 99 MG/DL 


 


LDL Cholesterol 49 MG/DL 


 


HDL Cholesterol 26.0 MG/DL 


 


Cholesterol/HDL Ratio 3.80 RATIO 











Assessment and Plan


Problem List:  


(1) ARF (acute renal failure)


ICD Codes:  N17.9 - Acute kidney failure, unspecified


(2) NSTEMI (non-ST elevated myocardial infarction)


ICD Codes:  I21.4 - Non-ST elevation (NSTEMI) myocardial infarction


(3) CAD (coronary artery disease)


ICD Codes:  I25.10 - Atherosclerotic heart disease of native coronary artery 

without angina pectoris


(4) Decreased cardiac ejection fraction


ICD Codes:  R93.1 - Abnormal findings on diagnostic imaging of heart and 

coronary circulation


(5) Elevated troponin


ICD Codes:  R74.8 - Abnormal levels of other serum enzymes


(6) Severe sepsis


ICD Codes:  A41.9 - Sepsis, unspecified organism; R65.20 - Severe sepsis 

without septic shock


(7) Diabetes mellitus


ICD Codes:  E11.9 - Type 2 diabetes mellitus without complications


(8) Acute renal failure


ICD Codes:  N17.9 - Acute kidney failure, unspecified


(9) Cardiomyopathy


ICD Codes:  I42.9 - Cardiomyopathy, unspecified


(10) gangrene of the right second toe


(11) Dyspnea and respiratory abnormalities


ICD Codes:  R06.00 - Dyspnea, unspecified; R06.89 - Other abnormalities of 

breathing


(12) Multi-vessel coronary artery stenosis


ICD Codes:  I25.10 - Atherosclerotic heart disease of native coronary artery 

without angina pectoris


(13) Hx of CABG


ICD Codes:  Z95.1 - Presence of aortocoronary bypass graft


(14) Thrombocytopenia


ICD Codes:  D69.6 - Thrombocytopenia, unspecified


Assessment and Plan


1.) Ischemic cardiomyopathy - nstemi, chf excacerbation and arf due to severe 

unrevascularizable cad, cardiomyopathy, on aspirin and heparin per hematology, 

on antibiotics, pod # 5,  2nd toe amputation, stable; entresto added, f/u bnp/

bmp


2.) VT - replete electrolytes, on coreg; d/w case management further attempts 

to insure and obtain eligibility for referral to heart transplant center











Abdiel Purvis MD Owen 3, 2018 11:42

## 2018-06-03 NOTE — HHI.PR
Subjective


Remarks


Pt seen and examined. AFVSS. No acute events overnight. Denies any acute 

concerns except for occasional leg cramps. Denies CP, abdominal pain, fever, 

chills. Feels like he is breathing better though still with HUSTON.





Objective





Vital Signs








  Date Time  Temp Pulse Resp B/P (MAP) Pulse Ox O2 Delivery O2 Flow Rate FiO2


 


6/3/18 12:00  80      


 


6/3/18 11:36 98.4 76 16 103/57 (72) 100   


 


6/3/18 11:00  74      


 


6/3/18 10:26     94 Nasal Cannula 2.00 


 


6/3/18 10:00  76      


 


6/3/18 09:00  86      


 


6/3/18 08:00  76      


 


6/3/18 07:30     98 Nasal Cannula 2.00 


 


6/3/18 07:30 98.7 82 18 109/62 (78) 98   


 


6/3/18 07:00  76      


 


6/3/18 06:00  72      


 


6/3/18 05:00  76      


 


6/3/18 04:00  77      


 


6/3/18 03:54 98.0 77 18 111/72 (85) 96   


 


6/3/18 03:54      Nasal Cannula 2.00 


 


6/3/18 03:00  75      


 


6/3/18 02:00  71      


 


6/3/18 01:00  70      


 


6/3/18 00:00  69      


 


6/3/18 00:00      Nasal Cannula 2.00 


 


6/3/18 00:00 98.1 69 18 123/67 (85) 95   


 


6/2/18 23:00  72      


 


6/2/18 22:00  71      


 


6/2/18 21:52     98 Nasal Cannula 2.00 


 


6/2/18 21:00  74      


 


6/2/18 20:00  83      


 


6/2/18 20:00 98.4 83 18 120/72 (88) 97   


 


6/2/18 20:00      Nasal Cannula 2.00 


 


6/2/18 18:00  76      


 


6/2/18 17:00  66      


 


6/2/18 16:00  70      


 


6/2/18 15:00 98.2 66 16 133/71 (91) 98   


 


6/2/18 15:00  76      


 


6/2/18 14:00  70      


 


6/2/18 13:00  90      














I/O      


 


 6/2/18 6/2/18 6/2/18 6/3/18 6/3/18 6/3/18





 07:00 15:00 23:00 07:00 15:00 23:00


 


Intake Total 240 ml 230 ml 860 ml 470 ml  


 


Output Total 900 ml  1300 ml 1900 ml  


 


Balance -660 ml 230 ml -440 ml -1430 ml  


 


      


 


Intake Oral 240 ml  860 ml 240 ml  


 


IV Total  230 ml  230 ml  


 


Output Urine Total 900 ml  1300 ml 1900 ml  


 


# Bowel Movements 0   0  








Result Diagram:  


6/2/18 0504                                                                    

            6/3/18 0403





Objective Remarks


GENERAL: WN, WD  male resting in bed in NAD.


SKIN: Warm and dry. Multiple tattoos over extremities.


HEENT: AT/NC. Pupils equal and round. MMM.


HEART: RRR no m/r/g.


LUNGS: CTAB without wheezes or crackles.


ABDOMEN: +BS, soft, NT, ND.


EXTREMITIES: No LE edema. R foot in post-op boot. Wiggles all toes.


NEURO: Awake and alert. Nonfocal.


PSYCH: Appropriate mood and affect.





A/P


Assessment and Plan


57 year old  male with ischemic cardiomyopathy with AICD, CAD with 3V 

CABG, HTN, DM, and COPD admitted on 5/24 for severe, progressive exertional 

dyspnea as well as right second toe pain. He was found to have severe sepsis 

secondary to gangrene/infection of R second toe, NSTEMI with troponin 3.04, and 

CHF exacerbation with BNP >3000.





Sepsis


R second toe osteomyelitis, gangrene


MSSA bacteremia


- Acute sepsis component resolved


- Afebrile, no leukocytosis


- Blood culture from 5/24-5/27 all growing MSSA


- Blood cultures since 5/30 NG


- Wound culture growing MSSA


- ID following; recommend continuing IV Ancef 2 g IV Q8H x 6 weeks


- No PICC until cleared by ID


- Podiatry following; s/p 2nd toe amputation 5/29. WB as tolerated. Cleared per 

podiatry





Ischemic cardiomyopathy


CHF exacerbation


NSTEMI


AICD firing for VTACH (5/25)


CAD with 3V CABG


- Continue ASA, Entresto, Coreg


- Continue statin


- Still on heparin drip, will need to see if this needs to be converted to OAC 

or if this can be discontinued 


- Cardiology following





Acute kidney injury


- Resolved


- Avoid nephrotoxic agents





Thrombocytopenia


- Resolved


- Likely secondary to infectious process


- Heme/onc consulted earlier in course; HIT ab negative





Diabetes mellitus, uncontrolled


- A1c 9.6


- SSI per protocol; required 28 units yesterday


- Increase Levemir to 15 units BID





COPD


- Supplemental O2 and nebs PRN





DVT prophylaxis: on heparin gtt


Discharge Planning


Cleared by podiatry, possibly tomorrow if IV antibiotics can be arranged











Ciera Davidson MD [place] 3, 2018 12:54

## 2018-06-03 NOTE — MP
cc:

Marc Mckeon DPM

****

 

 

DATE OF OPERATION:

05/29/2018

 

INDICATIONS FOR PROCEDURE:

The patient presented with gangrene and osteomyelitis of the right 

second toe.  He was noted to have necrotic tissue of the entire digit,

with foul odor noted.  However, the margins were noted to be clean, 

without purulent drainage.  I discussed with the patient the risks, 

benefits and potential complications of surgery.  He agreed to undergo

amputation of right second toe.

 

DESCRIPTION OF PROCEDURE:

He was seen in preop holding by myself, nursing staff and anesthesia, 

where the correct patient, side and site were all confirmed to be 

correct.  He was then taken to the surgical suite in supine position. 

The right foot was prepped and draped in normal sterile fashion 

followed by timeout as per facility protocol.  Following this, 

attention was directed to the right second toe, where there was noted 

to be necrotic tissue to the entire digit, with foul odor and clean 

margins.  Two semi-elliptical incisions were made in order to 

disarticulate the digit in its entirety at the metatarsophalangeal 

joint level.  The second metatarsal head was noted to be healthy and 

white, with minimal bleeding noted within the wound.  No purulence was

noted.  A culture was taken, however, prior to irrigation with normal 

saline and primary closure with 2-0 nylon suture.  Dressing consisting

of Xeroform, 4 x 4's, cast padding and Ace bandage was applied to the 

right lower extremity.  He tolerated the procedure and anesthesia well

without complications and will be weightbearing as tolerated to the 

right heel in a surgical shoe for transfers only.

 

SHORT OPERATIVE NOTE

 

SURGEON:

Marc Mckeon DPM

 

ASSISTANT:

Staff.

 

PREOPERATIVE DIAGNOSIS:

Gangrene with osteomyelitis, right second toe.

 

POSTOPERATIVE DIAGNOSIS:

Gangrene with osteomyelitis, right second toe.

 

PROCEDURE PERFORMED:

Amputation, right second toe.

 

PATHOLOGY:

 

1.  Toe, right second, to pathology.

2.  Culture, right foot.

 

ESTIMATED BLOOD LOSS:

Minimal.

 

ANESTHESIA:

MAC with local consisting of 10 mL of 2% lidocaine plain.

 

TOURNIQUET TIME:

No tourniquet utilized.

 

CONDITION:

Stable to PACU.

 

DISPOSITION:

Weightbearing as tolerated to the heel and surgical shoe for transfers

only.

 

ESTIMATED BLOOD LOSS:

Minimal.

 

 

__________________________________

MANUEL Berry

D: 06/03/2018, 05:47 PM

T: 06/03/2018, 07:10 PM

Visit #: [id number]

Job #: [id number]

## 2018-06-03 NOTE — HHI.FF
Face to Face Verification


Diagnosis:  


(1) Osteomyelitis of toe of right foot


(2) MSSA bacteremia


(3) Ischemic cardiomyopathy


Home Health Nursing








Order: Signs/symptoms of disease process





 Wound care and dressing changes





 Nursing assessment with vital signs

















I have seen patient Eddie Murphy on 6/3/18. My clinical findings 

support the need for the requested home health care services because:








 Infection w/ risk of complications





 Injectable med education/admin














I certify that my clinical findings support that this patient is homebound 

because:








 Poor cardiac reserve

















Ciera Davidson MD [place] 3, 2018 12:56

## 2018-06-04 VITALS — HEART RATE: 70 BPM

## 2018-06-04 VITALS
OXYGEN SATURATION: 99 % | DIASTOLIC BLOOD PRESSURE: 57 MMHG | SYSTOLIC BLOOD PRESSURE: 101 MMHG | HEART RATE: 83 BPM | RESPIRATION RATE: 20 BRPM | TEMPERATURE: 98 F

## 2018-06-04 VITALS — HEART RATE: 68 BPM

## 2018-06-04 VITALS
HEART RATE: 77 BPM | TEMPERATURE: 98.1 F | OXYGEN SATURATION: 96 % | DIASTOLIC BLOOD PRESSURE: 55 MMHG | SYSTOLIC BLOOD PRESSURE: 104 MMHG | RESPIRATION RATE: 16 BRPM

## 2018-06-04 VITALS
SYSTOLIC BLOOD PRESSURE: 114 MMHG | HEART RATE: 77 BPM | OXYGEN SATURATION: 98 % | DIASTOLIC BLOOD PRESSURE: 69 MMHG | TEMPERATURE: 98.2 F | RESPIRATION RATE: 18 BRPM

## 2018-06-04 VITALS — HEART RATE: 72 BPM

## 2018-06-04 VITALS — HEART RATE: 71 BPM

## 2018-06-04 VITALS
SYSTOLIC BLOOD PRESSURE: 128 MMHG | DIASTOLIC BLOOD PRESSURE: 73 MMHG | OXYGEN SATURATION: 98 % | RESPIRATION RATE: 18 BRPM | TEMPERATURE: 97.8 F | HEART RATE: 65 BPM

## 2018-06-04 VITALS — HEART RATE: 78 BPM

## 2018-06-04 VITALS — OXYGEN SATURATION: 97 %

## 2018-06-04 VITALS
TEMPERATURE: 98 F | HEART RATE: 80 BPM | DIASTOLIC BLOOD PRESSURE: 66 MMHG | OXYGEN SATURATION: 97 % | RESPIRATION RATE: 16 BRPM | SYSTOLIC BLOOD PRESSURE: 122 MMHG

## 2018-06-04 VITALS — HEART RATE: 66 BPM

## 2018-06-04 VITALS
TEMPERATURE: 98.4 F | RESPIRATION RATE: 20 BRPM | SYSTOLIC BLOOD PRESSURE: 100 MMHG | OXYGEN SATURATION: 97 % | HEART RATE: 71 BPM | DIASTOLIC BLOOD PRESSURE: 56 MMHG

## 2018-06-04 VITALS — HEART RATE: 82 BPM

## 2018-06-04 VITALS — HEART RATE: 80 BPM

## 2018-06-04 VITALS — HEART RATE: 74 BPM

## 2018-06-04 LAB
BUN SERPL-MCNC: 22 MG/DL (ref 7–18)
CALCIUM SERPL-MCNC: 8.6 MG/DL (ref 8.5–10.1)
CHLORIDE SERPL-SCNC: 97 MEQ/L (ref 98–107)
CREAT SERPL-MCNC: 0.79 MG/DL (ref 0.6–1.3)
GFR SERPLBLD BASED ON 1.73 SQ M-ARVRAT: 100 ML/MIN (ref 89–?)
GLUCOSE SERPL-MCNC: 211 MG/DL (ref 74–106)
HCO3 BLD-SCNC: 25.3 MEQ/L (ref 21–32)
SODIUM SERPL-SCNC: 132 MEQ/L (ref 136–145)

## 2018-06-04 RX ADMIN — INSULIN ASPART SCH: 100 INJECTION, SOLUTION INTRAVENOUS; SUBCUTANEOUS at 16:19

## 2018-06-04 RX ADMIN — SACUBITRIL AND VALSARTAN SCH TAB: 49; 51 TABLET, FILM COATED ORAL at 08:20

## 2018-06-04 RX ADMIN — CEFAZOLIN SODIUM SCH MLS/HR: 2 SOLUTION INTRAVENOUS at 00:02

## 2018-06-04 RX ADMIN — CARVEDILOL SCH MG: 6.25 TABLET, FILM COATED ORAL at 20:11

## 2018-06-04 RX ADMIN — INSULIN ASPART SCH: 100 INJECTION, SOLUTION INTRAVENOUS; SUBCUTANEOUS at 20:12

## 2018-06-04 RX ADMIN — CARVEDILOL SCH MG: 6.25 TABLET, FILM COATED ORAL at 08:20

## 2018-06-04 RX ADMIN — ASPIRIN 81 MG SCH MG: 81 TABLET ORAL at 08:20

## 2018-06-04 RX ADMIN — INSULIN ASPART SCH: 100 INJECTION, SOLUTION INTRAVENOUS; SUBCUTANEOUS at 12:04

## 2018-06-04 RX ADMIN — INSULIN ASPART SCH: 100 INJECTION, SOLUTION INTRAVENOUS; SUBCUTANEOUS at 08:19

## 2018-06-04 RX ADMIN — HYDROCODONE BITARTRATE AND ACETAMINOPHEN PRN TAB: 5; 325 TABLET ORAL at 20:14

## 2018-06-04 RX ADMIN — HYDROCODONE BITARTRATE AND ACETAMINOPHEN PRN TAB: 5; 325 TABLET ORAL at 06:13

## 2018-06-04 RX ADMIN — HYDROCODONE BITARTRATE AND ACETAMINOPHEN PRN TAB: 5; 325 TABLET ORAL at 12:03

## 2018-06-04 RX ADMIN — PANTOPRAZOLE SCH MG: 40 TABLET, DELAYED RELEASE ORAL at 08:20

## 2018-06-04 RX ADMIN — HYDROCODONE BITARTRATE AND ACETAMINOPHEN PRN TAB: 5; 325 TABLET ORAL at 16:15

## 2018-06-04 RX ADMIN — SACUBITRIL AND VALSARTAN SCH TAB: 49; 51 TABLET, FILM COATED ORAL at 20:11

## 2018-06-04 RX ADMIN — PRAVASTATIN SODIUM SCH MG: 40 TABLET ORAL at 08:20

## 2018-06-04 RX ADMIN — CEFAZOLIN SODIUM SCH MLS/HR: 2 SOLUTION INTRAVENOUS at 16:15

## 2018-06-04 RX ADMIN — ACYCLOVIR SCH UNITS: 800 TABLET ORAL at 08:19

## 2018-06-04 RX ADMIN — CEFAZOLIN SODIUM SCH MLS/HR: 2 SOLUTION INTRAVENOUS at 08:21

## 2018-06-04 RX ADMIN — ACYCLOVIR SCH UNITS: 800 TABLET ORAL at 20:12

## 2018-06-04 NOTE — HHI.IDPN
Subjective


Subjective


Remarks


 is a 58 y/o CM with PMHx of CHF, Ischemic Cardiomyopathy with last 

reported EF 20-25%, he has prior h/o CABG x 3, defibrillator and pacemaker, DM, 

HTN and hyperlipidemia. CT surgery evaluated and found him not to be a 

revascularization candidate and recommended referral to a tertiary center for 

transplant consideration.  As the patient has no  insurance this option has not 

been found feasible.  He underwent placement of a single-chamber St Trav ICD in 

March 2018 for sudden cardiac death prevention. 


With this background patient presents to the ED at [facility] with 4 day history 

of progressive dyspnea described as severe, worsening with exertion, and 

improved with rest. He has had associated symptoms of diaphoresis, nausea and 

vomiting over the weekend.  He additionally has noted painful changes in his 

right second toe with color changes suspicious for infection.  


In ED patient was noted to have WBC 9.6, hemoglobin 15.0, hematocrit 43.9, 

platelets 133, sodium 130, potassium 4.4, BUN 36, creatinine 1.84, random 

glucose 407 alkaline phosphatase 114, AST 37, ALT 25, total bilirubin 0.9, 

troponin 3.04, C-reactive protein 26.8, B natruretic peptide 3148, lactic acid 

5.5. Ax Xray of Rt toe showed erosion. MRI with no osteomyelitis. Chest x-ray 

showed no acute abnormality or significant interval change.


At baseline prior to this infectious process patient was able to work around 

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  He states that he is only able to be active 

for 5-10 minutes before becoming very dyspneic and exhausted.  He was 

previously an over the road  but has been unable to work since 

March of this year.  He is now attempting to get disability as he has a nearly 

homebound existence and requires assistance to get out to doctor's appointments.


Sepsis workup on admission positive for Staph bacteremia and ID consulted for 

evaluation and Mment of the same.





Notes reviewed 


Patient reports there was overnight some blood noticed at surgical site. 


On exam dried blood on dressing.


Notified RN to call surgeon.


no fever


no rash


no diarrhea


S/P ICD placement March 2018


Antibiotics


IV Ancef


Lines


PIV no evidence of infection


Past Medical History


Severe ischemic cardiomyopathy EF 20-25%


Hyperlipidemia


Diabetes


Coronary artery disease status post MI 3/13


COPD


Past Surgical History


CABG 10 years ago


Pacemaker/Defibrillator insertion


Appendectomy


Skin graft to right foot as a child


Back surgeries 3 has hardware in back per report.


Allergies:  


Coded Allergies:  


     potassium iodide (Unverified  Allergy, Severe, Swelling, 3/21/18)


     povidone-iodine (Unverified  Allergy, Severe, Swelling, 3/21/18)


     shellfish derived (Unverified  Allergy, Severe, 3/21/18)


     sodium iodide (Unverified  Allergy, Severe, Swelling, 3/21/18)


     sodium iodide (Unverified  Allergy, Severe, Swelling, 3/21/18)





Objective


.





Vital Signs








  Date Time  Temp Pulse Resp B/P (MAP) Pulse Ox O2 Delivery O2 Flow Rate FiO2


 


6/4/18 13:06   20     


 


6/4/18 12:00  65      


 


6/4/18 12:00 98.2 77 18 114/69 (84) 98   


 


6/4/18 11:35     97  2.00 


 


6/4/18 11:00  68      


 


6/4/18 10:00  72      


 


6/4/18 09:00  80      


 


6/4/18 08:00 98.0 68 16 122/66 (84) 97   


 


6/4/18 08:00  80      


 


6/4/18 07:44      Room Air  


 


6/4/18 07:00  78      


 


6/4/18 06:00  70      


 


6/4/18 05:00  74      


 


6/4/18 04:00  71      


 


6/4/18 04:00      Nasal Cannula 2.00 


 


6/4/18 04:00 98.4 71 20 100/56 (71) 97   


 


6/4/18 03:00  70      


 


6/4/18 02:00  71      


 


6/4/18 01:00  74      


 


6/4/18 00:00 98.1 77 16 104/55 (71) 96   


 


6/4/18 00:00      Nasal Cannula 2.00 


 


6/4/18 00:00  77      


 


6/3/18 23:00  79      


 


6/3/18 22:00  85      


 


6/3/18 21:30     98 Nasal Cannula 2.00 


 


6/3/18 21:00  87      


 


6/3/18 20:00      Nasal Cannula 2.00 


 


6/3/18 20:00 98.4 91 20 111/62 (78) 96   


 


6/3/18 20:00  91      


 


6/3/18 18:00  88      


 


6/3/18 17:00  72      


 


6/3/18 16:00  70      


 


6/3/18 15:00  76      


 


6/3/18 15:00 98.6 78 18 106/58 (74) 97   


 


6/3/18 14:00  76      








.





Laboratory Tests








Test


  6/3/18


04:03 6/4/18


05:53


 


Blood Urea Nitrogen 21 MG/DL  22 MG/DL 


 


Creatinine 0.90 MG/DL  0.79 MG/DL 


 


Random Glucose 269 MG/DL  211 MG/DL 


 


Calcium Level 8.8 MG/DL  8.6 MG/DL 


 


Sodium Level 132 MEQ/L  132 MEQ/L 


 


Potassium Level 4.9 MEQ/L  4.8 MEQ/L 


 


Chloride Level 97 MEQ/L  97 MEQ/L 


 


Carbon Dioxide Level 24.4 MEQ/L  25.3 MEQ/L 


 


Anion Gap 11 MEQ/L  10 MEQ/L 


 


Estimat Glomerular Filtration


Rate 86 ML/MIN 


  100 ML/MIN 


 


 


B-Type Natriuretic Peptide 254 PG/ML  127 PG/ML 


 


Triglycerides Level 122 MG/DL  


 


Cholesterol Level 99 MG/DL  


 


LDL Cholesterol 49 MG/DL  


 


HDL Cholesterol 26.0 MG/DL  


 


Cholesterol/HDL Ratio 3.80 RATIO  








Imaging





Last Impressions








Aorta w/Runoff CTA 5/28/18 0000 Signed





Impressions: 





 CONCLUSION:





 1.  Atherosclerotic changes without significant stenosis bilateral lower extrem





 ities.





  





 


 


Chest X-Ray 5/24/18 0955 Signed





Impressions: 





 CONCLUSION: 





 1.  No acute abnormality or significant interval change.





  





 


 


Toe X-Ray 5/24/18 0000 Signed





Impressions: 





 CONCLUSION: 





 Questionable small erosion distal phalanx right second toe. This could be infec





 tious in nature if there is clinical evidence for soft tissue infection.





  





 





  





  





 





  





 





  





 


 


Renal Ultrasound 5/24/18 0000 Signed





Impressions: 





 CONCLUSION: 





 1.  No hydronephrosis.





 2.  Increased echogenicity most characteristic of medical renal disease.





  





 


 


Foot MRI 5/24/18 0000 Signed





Impressions: 





 CONCLUSION:





 1.  No evidence for osteomyelitis. Soft tissue swelling at the second toe.





  





 


 


Extremity Arterial Study 5/24/18 0000 Signed





Impressions: 





 CONCLUSION:





 1.  Findings consistent with left outflow disease as well as small vessel disea





 se in the left foot.





 2.  Near normal range right ankle-brachial indices and segmental pressures.





  





 








Physical Exam


GENERAL:   male patient, Asleep but easily awakens to voice.  Appears 

comfortable.


SKIN:  No rashes or lesions. Warm and dry.


HEAD: Atraumatic. Normocephalic. 


EYES: Pupils equal round and reactive. Extraocular motions intact. No scleral 

icterus. No injection or drainage. 


ENT: Nose without bleeding or purulent drainage. Airway patent.  MMM.


NECK: Trachea midline. Supple, nontender, no meningeal signs.


CARDIOVASCULAR: HS audible.  + ICD.


RESPIRATORY: Nonlabored.  Clear to auscultation. Breath sounds equal 

bilaterally. No wheezes, rales, or rhonchi.  


GASTROINTESTINAL: Abdomen soft, nontender to palpation.


MUSCULOSKELETAL:  No BLE edema appreciated.  Right foot dressed, C/D/I, able to 

wiggles toes slightly on right foot.  Decreased sensation but able to feel 

light touch on toes.


NEUROLOGICAL:   Awake and alert.  Able to move all extremities spontaneously.  

Grossly non-focal.  Normal speech.


Psych calm and cooperative


IV line sites with no e.o infection.





Assessment & Plan


Remarks


Sepsis


High grade MSSA bacteremia 


   -  source ?2nd toe,  concern with ICD


Right 2nd toe wet gangrene, osteomyelitis.


Hepatitis C antibody positive.


AICD status.


CAD with severe ischemic cardiomyopathy.


Thrombocytopenia likely due to severe infection





Recs


Continue IV Ancef


Follow cultures until finalized


Follow path


Follow clinically


Will need 6 weeks IV Ancef irrespective of bone path due to high grade 

bacteremia and AICD in place.


No PICC till cleared by ID.











Bev Brandon MD Jun 4, 2018 13:47

## 2018-06-04 NOTE — HHI.PR
Subjective


Remarks


Follow up for MSSA bacteremia, right second toe osteomyelitis. Patient is doing 

well. No fever, chills.





Objective


Vitals





Vital Signs








  Date Time  Temp Pulse Resp B/P (MAP) Pulse Ox O2 Delivery O2 Flow Rate FiO2


 


6/4/18 18:00  68      


 


6/4/18 17:00  66      


 


6/4/18 16:55   20     


 


6/4/18 16:00  61      


 


6/4/18 16:00 97.8 65 18 128/73 (91) 98   


 


6/4/18 15:00  68      


 


6/4/18 14:00  66      


 


6/4/18 13:00  70      


 


6/4/18 12:00  65      


 


6/4/18 12:00 98.2 77 18 114/69 (84) 98   


 


6/4/18 11:35     97  2.00 


 


6/4/18 11:00  68      


 


6/4/18 10:00  72      


 


6/4/18 09:00  80      


 


6/4/18 08:00 98.0 68 16 122/66 (84) 97   


 


6/4/18 08:00  80      


 


6/4/18 07:44      Room Air  


 


6/4/18 07:00  78      


 


6/4/18 06:00  70      


 


6/4/18 05:00  74      


 


6/4/18 04:00  71      


 


6/4/18 04:00      Nasal Cannula 2.00 


 


6/4/18 04:00 98.4 71 20 100/56 (71) 97   


 


6/4/18 03:00  70      


 


6/4/18 02:00  71      


 


6/4/18 01:00  74      


 


6/4/18 00:00 98.1 77 16 104/55 (71) 96   


 


6/4/18 00:00      Nasal Cannula 2.00 


 


6/4/18 00:00  77      


 


6/3/18 23:00  79      


 


6/3/18 22:00  85      


 


6/3/18 21:30     98 Nasal Cannula 2.00 


 


6/3/18 21:00  87      


 


6/3/18 20:00      Nasal Cannula 2.00 


 


6/3/18 20:00 98.4 91 20 111/62 (78) 96   


 


6/3/18 20:00  91      














I/O      


 


 6/3/18 6/3/18 6/3/18 6/4/18 6/4/18 6/4/18





 07:00 15:00 23:00 07:00 15:00 23:00


 


Intake Total 470 ml  720 ml 470 ml  


 


Output Total 1900 ml  1500 ml 1300 ml  


 


Balance -1430 ml  -780 ml -830 ml  


 


      


 


Intake Oral 240 ml  720 ml 240 ml  


 


IV Total 230 ml   230 ml  


 


Output Urine Total 1900 ml  1500 ml 1300 ml  


 


# Bowel Movements 0   0  








Result Diagram:  


6/2/18 0504                                                                    

            6/4/18 0553





Imaging





Last Impressions








Chest X-Ray 6/1/18 0000 Signed





Impressions: 





 CONCLUSION: 





 Stable appearance with no acute cardiopulmonary disease. There is no evidence o





 f pneumonia.





  





 


 


Aorta w/Runoff CTA 5/28/18 0000 Signed





Impressions: 





 CONCLUSION:





 1.  Atherosclerotic changes without significant stenosis bilateral lower extrem





 ities.





  





 


 


Toe X-Ray 5/24/18 0000 Signed





Impressions: 





 CONCLUSION: 





 Questionable small erosion distal phalanx right second toe. This could be infec





 tious in nature if there is clinical evidence for soft tissue infection.





  





 





  





  





 





  





 





  





 


 


Renal Ultrasound 5/24/18 0000 Signed





Impressions: 





 CONCLUSION: 





 1.  No hydronephrosis.





 2.  Increased echogenicity most characteristic of medical renal disease.





  





 


 


Foot MRI 5/24/18 0000 Signed





Impressions: 





 CONCLUSION:





 1.  No evidence for osteomyelitis. Soft tissue swelling at the second toe.





  





 


 


Extremity Arterial Study 5/24/18 0000 Signed





Impressions: 





 CONCLUSION:





 1.  Findings consistent with left outflow disease as well as small vessel disea





 se in the left foot.





 2.  Near normal range right ankle-brachial indices and segmental pressures.





  





 








Objective Remarks


GENERAL: Alert, NAD


SKIN: Warm and dry.


HEAD: Normocephalic.


EYES: No scleral icterus. No injection or drainage. 


NECK: Supple, trachea midline. No JVD or lymphadenopathy.


CARDIOVASCULAR: Regular rate and rhythm without murmurs, gallops, or rubs. 


RESPIRATORY: Breath sounds equal bilaterally. No accessory muscle use.


GASTROINTESTINAL: Abdomen soft, non-tender, nondistended. 


MUSCULOSKELETAL: No cyanosis, or edema. right foot dressing in place. 


BACK: Nontender without obvious deformity. No CVA tenderness.





Procedures


May 29, 2018


Pre Op Diagnosis:  


gangrene with osteomyelitis right 2nd toe


Post Op Diagnosis:  


same


Surgeon:


Marc Mckeon DPM


Assistant(s):


Staff


Procedure:


amputation right 2nd toe


Findings:


Consistent with diagnosis. Right 2nd toe with necrotic tissue to entire digit. 

Foul odor slightly present. Margins clean without purulence. 


Two semi-elliptical incisions made to disarticulate digit in its entirety at 

metatarsophalangeal joint level. 2nd metatarsal head with white healthy 

cartilage cap. No purulence noted.


Culture taken prior to irrigation and primary closure with 2-0 nylon suture. 


Dressing with xeroform, 4x4, cast padding, ace.


Weightbearing as tolerated to heel in surgical shoe for transfers only.


Additional Information:


see findings


Complications:


none


Specimen(s) removed:


1. toe right 2nd


2. culture right foot


Estimated blood loss:


minimal


Anesthesia:  MAC, Local (10mL 2% lidocaine plain)


Drains:  None


Tourniquet time (min at mmHg)


n/a


Patient to:  PACU


Patient Condition:  Good


Date/Time of Procedure:  SEE SURGICAL CARE RECORD











Marc Mckeon DPRY





A/P


Problem List:  


(1) Severe sepsis


ICD Code:  A41.9 - Sepsis, unspecified organism; R65.20 - Severe sepsis without 

septic shock


(2) Elevated troponin


ICD Code:  R74.8 - Abnormal levels of other serum enzymes


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


Assessment and Plan


57 year old  male with ischemic cardiomyopathy with AICD, CAD with 3V 

CABG, HTN, DM, and COPD admitted on 5/24 for severe, progressive exertional 

dyspnea as well as right second toe pain. He was found to have severe sepsis 

secondary to gangrene/infection of R second toe, NSTEMI with troponin 3.04, and 

CHF exacerbation with BNP >3000.





Sepsis


R second toe osteomyelitis, gangrene


MSSA bacteremia


- Acute sepsis component resolved


- Afebrile, no leukocytosis


- Blood culture from 5/24-5/27 all growing MSSA


- Blood cultures since 5/30 NG


- Wound culture growing MSSA


- ID following; recommend continuing IV Ancef 2 g IV Q8H x 6 weeks


- ID cleared for discharge with IV abx recommendations. 


- Podiatry following; s/p 2nd toe amputation 5/29. WB as tolerated. Cleared per 

podiatry


- Appreciate podiatry recommendations. 





Ischemic cardiomyopathy


CHF exacerbation


NSTEMI


AICD firing for VTACH (5/25)


CAD with 3V CABG


- Continue ASA, Entresto, Coreg


- Continue statin


- Based on Vascular surgery note, no clear indication for heparin drip. Will d/

c Heparin drip. 





Acute kidney injury


- Resolved


- Avoid nephrotoxic agents





Thrombocytopenia


- Resolved


- Likely secondary to infectious process


- Heme/onc consulted earlier in course; HIT ab negative





Diabetes mellitus, uncontrolled


- A1c 9.6


- SSI per protocol; required 28 units yesterday


- Increased Levemir to 15 units BID in the hospital 


- Continue Metformin and Glyburide upon discharge. 





COPD


- Supplemental O2 and nebs PRN





Full code.











María Elena Angel DO Jun 4, 2018 18:10

## 2018-06-04 NOTE — PD.CARD.PN
Subjective


Subjective Remarks


appears comfortable in nad





Objective


Medications





Current Medications








 Medications


  (Trade)  Dose


 Ordered  Sig/Ilia


 Route  Start Time


 Stop Time Status Last Admin


 


  (NS Flush)  2 ml  UNSCH  PRN


 IVF  5/24/18 10:00


    6/3/18 19:53


 


 


  (D50w (Vial) Inj)  50 ml  UNSCH  PRN


 IV PUSH  5/24/18 13:30


     


 


 


  (Glucagon Inj)  1 mg  UNSCH  PRN


 OTHER  5/24/18 13:30


     


 


 


  (NovoLOG


 SUPPLEMENTAL


 SCALE)  1  ACHS SLIDING  SCALE


 SQ  5/24/18 17:00


    6/4/18 12:04


 


 


  (Pravachol)  40 mg  DAILY


 PO  5/25/18 09:00


    6/4/18 08:20


 


 


  (Duoneb Neb)  1 ampule  Q4HR NEB  PRN


 NEB  5/24/18 13:45


    5/24/18 15:38


 


 


  (Tylenol)  650 mg  Q4H  PRN


 PO  5/24/18 14:30


    5/27/18 11:45


 


 


  (Norco  5-325 Mg)  1 tab  Q4H  PRN


 PO  5/24/18 14:30


    6/4/18 12:03


 


 


  (Reglan Inj)  5 mg  Q6H  PRN


 IV PUSH  5/25/18 02:30


    5/25/18 20:10


 


 


  (Zofran  Odt)  4 mg  Q6H  PRN


 PO  5/25/18 02:30


    5/28/18 11:50


 


 


  (Lopressor Inj)  5 mg  Q5M  PRN


 IV PUSH  5/25/18 02:45


    5/25/18 03:06


 


 


  (Tessalon)  100 mg  TID  PRN


 PO  5/26/18 03:00


    5/29/18 02:00


 


 


  (Robitussin Ac


 200-20 Mg/10 ml


 Liq)  10 ml  Q6H  PRN


 PO  5/26/18 03:00


    6/3/18 19:52


 


 


  (Coreg)  6.25 mg  Q12HR


 PO  5/26/18 11:30


    6/4/18 08:20


 


 


  (Entresto 49-51


 Mg)  1 tab  BID


 PO  5/26/18 21:00


    6/4/18 08:20


 


 


 Cefazolin Sodium/


 Dextrose  50 ml @ 


 100 mls/hr  Q8H


 IV  5/27/18 09:00


    6/4/18 08:21


 


 


  (Aspirin Chew)  81 mg  DAILY


 CHEW  5/28/18 12:10


    6/4/18 08:20


 


 


 Heparin Sodium/


 Dextrose  250 ml @ 


 16.128 mls/


 hr  TITRATE  PRN


 IV  5/28/18 12:30


    6/3/18 22:10


 


 


 Sodium Chloride  1,000 ml @ 


 100 mls/hr  Q10H


 IV  5/29/18 11:15


   Future Hold 5/30/18 10:17


 


 


  (Protonix)  40 mg  DAILY


 PO  5/30/18 09:00


    6/4/18 08:20


 


 


  (Levemir Inj)  15 units  Q12HR


 SQ  6/3/18 21:00


    6/4/18 08:19


 








Vital Signs / I&O





Vital Signs








  Date Time  Temp Pulse Resp B/P (MAP) Pulse Ox O2 Delivery O2 Flow Rate FiO2


 


6/4/18 13:06   20     


 


6/4/18 12:00  65      


 


6/4/18 12:00 98.2 77 18 114/69 (84) 98   


 


6/4/18 11:35     97  2.00 


 


6/4/18 11:00  68      


 


6/4/18 10:00  72      


 


6/4/18 09:00  80      


 


6/4/18 08:00 98.0 68 16 122/66 (84) 97   


 


6/4/18 08:00  80      


 


6/4/18 07:44      Room Air  


 


6/4/18 07:00  78      


 


6/4/18 06:00  70      


 


6/4/18 05:00  74      


 


6/4/18 04:00  71      


 


6/4/18 04:00      Nasal Cannula 2.00 


 


6/4/18 04:00 98.4 71 20 100/56 (71) 97   


 


6/4/18 03:00  70      


 


6/4/18 02:00  71      


 


6/4/18 01:00  74      


 


6/4/18 00:00 98.1 77 16 104/55 (71) 96   


 


6/4/18 00:00      Nasal Cannula 2.00 


 


6/4/18 00:00  77      


 


6/3/18 23:00  79      


 


6/3/18 22:00  85      


 


6/3/18 21:30     98 Nasal Cannula 2.00 


 


6/3/18 21:00  87      


 


6/3/18 20:00      Nasal Cannula 2.00 


 


6/3/18 20:00 98.4 91 20 111/62 (78) 96   


 


6/3/18 20:00  91      


 


6/3/18 18:00  88      


 


6/3/18 17:00  72      


 


6/3/18 16:00  70      


 


6/3/18 15:00  76      


 


6/3/18 15:00 98.6 78 18 106/58 (74) 97   














I/O      


 


 6/3/18 6/3/18 6/3/18 6/4/18 6/4/18 6/4/18





 07:00 15:00 23:00 07:00 15:00 23:00


 


Intake Total 470 ml  720 ml 470 ml  


 


Output Total 1900 ml  1500 ml 1300 ml  


 


Balance -1430 ml  -780 ml -830 ml  


 


      


 


Intake Oral 240 ml  720 ml 240 ml  


 


IV Total 230 ml   230 ml  


 


Output Urine Total 1900 ml  1500 ml 1300 ml  


 


# Bowel Movements 0   0  








Physical Exam


GENERAL: 


SKIN: Warm and dry.


HEAD: Normocephalic.


EYES: No scleral icterus. No injection or drainage. 


NECK: Supple, trachea midline. No JVD or lymphadenopathy.


CARDIOVASCULAR: Regular rate and rhythm without murmurs, gallops, or rubs. 


RESPIRATORY: Breath sounds equal bilaterally. No accessory muscle use.


GASTROINTESTINAL: Abdomen soft, non-tender, nondistended. 


MUSCULOSKELETAL: No cyanosis, or edema. 


BACK: Nontender without obvious deformity. No CVA tenderness.





Laboratory





Laboratory Tests








Test


  6/4/18


05:53


 


Activated Partial


Thromboplast Time 50.0 SEC 


 


 


Blood Urea Nitrogen 22 MG/DL 


 


Creatinine 0.79 MG/DL 


 


Random Glucose 211 MG/DL 


 


Calcium Level 8.6 MG/DL 


 


Sodium Level 132 MEQ/L 


 


Potassium Level 4.8 MEQ/L 


 


Chloride Level 97 MEQ/L 


 


Carbon Dioxide Level 25.3 MEQ/L 


 


Anion Gap 10 MEQ/L 


 


Estimat Glomerular Filtration


Rate 100 ML/MIN 


 


 


B-Type Natriuretic Peptide 127 PG/ML 











Assessment and Plan


Problem List:  


(1) ARF (acute renal failure)


ICD Codes:  N17.9 - Acute kidney failure, unspecified


(2) NSTEMI (non-ST elevated myocardial infarction)


ICD Codes:  I21.4 - Non-ST elevation (NSTEMI) myocardial infarction


(3) CAD (coronary artery disease)


ICD Codes:  I25.10 - Atherosclerotic heart disease of native coronary artery 

without angina pectoris


(4) Decreased cardiac ejection fraction


ICD Codes:  R93.1 - Abnormal findings on diagnostic imaging of heart and 

coronary circulation


(5) Elevated troponin


ICD Codes:  R74.8 - Abnormal levels of other serum enzymes


(6) Severe sepsis


ICD Codes:  A41.9 - Sepsis, unspecified organism; R65.20 - Severe sepsis 

without septic shock


(7) Diabetes mellitus


ICD Codes:  E11.9 - Type 2 diabetes mellitus without complications


(8) Acute renal failure


ICD Codes:  N17.9 - Acute kidney failure, unspecified


(9) Cardiomyopathy


ICD Codes:  I42.9 - Cardiomyopathy, unspecified


(10) gangrene of the right second toe


(11) Dyspnea and respiratory abnormalities


ICD Codes:  R06.00 - Dyspnea, unspecified; R06.89 - Other abnormalities of 

breathing


(12) Multi-vessel coronary artery stenosis


ICD Codes:  I25.10 - Atherosclerotic heart disease of native coronary artery 

without angina pectoris


(13) Hx of CABG


ICD Codes:  Z95.1 - Presence of aortocoronary bypass graft


(14) Thrombocytopenia


ICD Codes:  D69.6 - Thrombocytopenia, unspecified


Assessment and Plan


1.) Ischemic cardiomyopathy - nstemi, chf excacerbation and arf due to severe 

unrevascularizable cad, cardiomyopathy, on aspirin and heparin per hematology, 

on antibiotics, pod # 5,  2nd toe amputation, stable; entresto added, ok to dc 

from cv standpoint, f/u with me in office next day d/w nurse and patient


2.) VT - replete electrolytes, on coreg; d/w case management further attempts 

to insure and obtain eligibility for referral to heart transplant center


3.) PVD - d/w nurse, need to find out if from Dr York if patient needs 

long term ac with coumadin for wound healing due to severe pvd, if not when to 

dc heparin











Abdiel Purvis MD Jun 4, 2018 14:45

## 2018-06-05 VITALS
HEART RATE: 87 BPM | TEMPERATURE: 97.8 F | SYSTOLIC BLOOD PRESSURE: 121 MMHG | RESPIRATION RATE: 20 BRPM | OXYGEN SATURATION: 97 % | DIASTOLIC BLOOD PRESSURE: 73 MMHG

## 2018-06-05 VITALS
RESPIRATION RATE: 18 BRPM | TEMPERATURE: 98.2 F | SYSTOLIC BLOOD PRESSURE: 120 MMHG | HEART RATE: 70 BPM | OXYGEN SATURATION: 97 % | DIASTOLIC BLOOD PRESSURE: 66 MMHG

## 2018-06-05 VITALS — HEART RATE: 80 BPM

## 2018-06-05 VITALS — HEART RATE: 71 BPM

## 2018-06-05 VITALS
RESPIRATION RATE: 18 BRPM | HEART RATE: 72 BPM | TEMPERATURE: 98.6 F | SYSTOLIC BLOOD PRESSURE: 125 MMHG | DIASTOLIC BLOOD PRESSURE: 76 MMHG | OXYGEN SATURATION: 97 %

## 2018-06-05 VITALS — HEART RATE: 76 BPM

## 2018-06-05 VITALS
HEART RATE: 74 BPM | DIASTOLIC BLOOD PRESSURE: 72 MMHG | OXYGEN SATURATION: 97 % | TEMPERATURE: 98.5 F | RESPIRATION RATE: 16 BRPM | SYSTOLIC BLOOD PRESSURE: 107 MMHG

## 2018-06-05 VITALS — HEART RATE: 74 BPM

## 2018-06-05 VITALS — HEART RATE: 61 BPM

## 2018-06-05 VITALS — HEART RATE: 65 BPM

## 2018-06-05 VITALS
HEART RATE: 68 BPM | SYSTOLIC BLOOD PRESSURE: 103 MMHG | RESPIRATION RATE: 20 BRPM | TEMPERATURE: 97.8 F | DIASTOLIC BLOOD PRESSURE: 60 MMHG | OXYGEN SATURATION: 99 %

## 2018-06-05 VITALS — HEART RATE: 78 BPM

## 2018-06-05 VITALS — HEART RATE: 72 BPM

## 2018-06-05 VITALS — HEART RATE: 68 BPM

## 2018-06-05 VITALS — OXYGEN SATURATION: 97 % | HEART RATE: 73 BPM

## 2018-06-05 VITALS — HEART RATE: 82 BPM

## 2018-06-05 VITALS — HEART RATE: 70 BPM

## 2018-06-05 LAB
BUN SERPL-MCNC: 25 MG/DL (ref 7–18)
CALCIUM SERPL-MCNC: 9 MG/DL (ref 8.5–10.1)
CHLORIDE SERPL-SCNC: 97 MEQ/L (ref 98–107)
CREAT SERPL-MCNC: 0.84 MG/DL (ref 0.6–1.3)
ERYTHROCYTE [DISTWIDTH] IN BLOOD BY AUTOMATED COUNT: 17.1 % (ref 11.6–17.2)
GFR SERPLBLD BASED ON 1.73 SQ M-ARVRAT: 94 ML/MIN (ref 89–?)
GLUCOSE SERPL-MCNC: 215 MG/DL (ref 74–106)
HCO3 BLD-SCNC: 23.7 MEQ/L (ref 21–32)
HCT VFR BLD CALC: 37.9 % (ref 39–51)
HGB BLD-MCNC: 12.8 GM/DL (ref 13–17)
MAGNESIUM SERPL-MCNC: 2.3 MG/DL (ref 1.5–2.5)
MCH RBC QN AUTO: 27.6 PG (ref 27–34)
MCHC RBC AUTO-ENTMCNC: 33.8 % (ref 32–36)
MCV RBC AUTO: 81.7 FL (ref 80–100)
PLATELET # BLD: 308 TH/MM3 (ref 150–450)
PMV BLD AUTO: 7.8 FL (ref 7–11)
RBC # BLD AUTO: 4.64 MIL/MM3 (ref 4.5–5.9)
SODIUM SERPL-SCNC: 132 MEQ/L (ref 136–145)
WBC # BLD AUTO: 8.8 TH/MM3 (ref 4–11)

## 2018-06-05 RX ADMIN — HYDROCODONE BITARTRATE AND ACETAMINOPHEN PRN TAB: 5; 325 TABLET ORAL at 04:49

## 2018-06-05 RX ADMIN — INSULIN ASPART SCH: 100 INJECTION, SOLUTION INTRAVENOUS; SUBCUTANEOUS at 12:00

## 2018-06-05 RX ADMIN — CEFAZOLIN SODIUM SCH MLS/HR: 2 SOLUTION INTRAVENOUS at 01:03

## 2018-06-05 RX ADMIN — INSULIN ASPART SCH: 100 INJECTION, SOLUTION INTRAVENOUS; SUBCUTANEOUS at 17:00

## 2018-06-05 RX ADMIN — PANTOPRAZOLE SCH MG: 40 TABLET, DELAYED RELEASE ORAL at 08:13

## 2018-06-05 RX ADMIN — CEFAZOLIN SODIUM SCH MLS/HR: 2 SOLUTION INTRAVENOUS at 16:38

## 2018-06-05 RX ADMIN — PRAVASTATIN SODIUM SCH MG: 40 TABLET ORAL at 08:13

## 2018-06-05 RX ADMIN — SACUBITRIL AND VALSARTAN SCH TAB: 49; 51 TABLET, FILM COATED ORAL at 08:13

## 2018-06-05 RX ADMIN — ASPIRIN 81 MG SCH MG: 81 TABLET ORAL at 08:14

## 2018-06-05 RX ADMIN — HYDROCODONE BITARTRATE AND ACETAMINOPHEN PRN TAB: 5; 325 TABLET ORAL at 01:03

## 2018-06-05 RX ADMIN — CEFAZOLIN SODIUM SCH MLS/HR: 2 SOLUTION INTRAVENOUS at 08:14

## 2018-06-05 RX ADMIN — INSULIN ASPART SCH: 100 INJECTION, SOLUTION INTRAVENOUS; SUBCUTANEOUS at 08:00

## 2018-06-05 RX ADMIN — HYDROCODONE BITARTRATE AND ACETAMINOPHEN PRN TAB: 5; 325 TABLET ORAL at 12:12

## 2018-06-05 RX ADMIN — ACYCLOVIR SCH UNITS: 800 TABLET ORAL at 08:12

## 2018-06-05 RX ADMIN — CARVEDILOL SCH MG: 6.25 TABLET, FILM COATED ORAL at 08:13

## 2018-06-05 RX ADMIN — HYDROCODONE BITARTRATE AND ACETAMINOPHEN PRN TAB: 5; 325 TABLET ORAL at 18:28

## 2018-06-05 NOTE — HHI.IDPN
Subjective


Subjective


Remarks


 is a 58 y/o CM with PMHx of CHF, Ischemic Cardiomyopathy with last 

reported EF 20-25%, he has prior h/o CABG x 3, defibrillator and pacemaker, DM, 

HTN and hyperlipidemia. CT surgery evaluated and found him not to be a 

revascularization candidate and recommended referral to a tertiary center for 

transplant consideration.  As the patient has no  insurance this option has not 

been found feasible.  He underwent placement of a single-chamber St Trav ICD in 

March 2018 for sudden cardiac death prevention. 


With this background patient presents to the ED at [facility] with 4 day history 

of progressive dyspnea described as severe, worsening with exertion, and 

improved with rest. He has had associated symptoms of diaphoresis, nausea and 

vomiting over the weekend.  He additionally has noted painful changes in his 

right second toe with color changes suspicious for infection.  


In ED patient was noted to have WBC 9.6, hemoglobin 15.0, hematocrit 43.9, 

platelets 133, sodium 130, potassium 4.4, BUN 36, creatinine 1.84, random 

glucose 407 alkaline phosphatase 114, AST 37, ALT 25, total bilirubin 0.9, 

troponin 3.04, C-reactive protein 26.8, B natruretic peptide 3148, lactic acid 

5.5. Ax Xray of Rt toe showed erosion. MRI with no osteomyelitis. Chest x-ray 

showed no acute abnormality or significant interval change.


At baseline prior to this infectious process patient was able to work around 

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  He states that he is only able to be active 

for 5-10 minutes before becoming very dyspneic and exhausted.  He was 

previously an over the road  but has been unable to work since 

March of this year.  He is now attempting to get disability as he has a nearly 

homebound existence and requires assistance to get out to doctor's appointments.


Sepsis workup on admission positive for Staph bacteremia and ID consulted for 

evaluation and Mment of the same.





Notes reviewed 


Patient reports no further bleeding issues overnight.


 made aware of the bleeding issue.


Bert RN and Dr.Ahmad garrett RN to call  for dressing change instructions etc.


no fever


no rash


no diarrhea


S/P ICD placement March 2018


Antibiotics


IV Ancef


Lines


PIV no evidence of infection


Past Medical History


Severe ischemic cardiomyopathy EF 20-25%


Hyperlipidemia


Diabetes


Coronary artery disease status post MI 3/13


COPD


Past Surgical History


CABG 10 years ago


Pacemaker/Defibrillator insertion


Appendectomy


Skin graft to right foot as a child


Back surgeries 3 has hardware in back per report.


Allergies:  


Coded Allergies:  


     potassium iodide (Unverified  Allergy, Severe, Swelling, 3/21/18)


     povidone-iodine (Unverified  Allergy, Severe, Swelling, 3/21/18)


     shellfish derived (Unverified  Allergy, Severe, 3/21/18)


     sodium iodide (Unverified  Allergy, Severe, Swelling, 3/21/18)


     sodium iodide (Unverified  Allergy, Severe, Swelling, 3/21/18)





Objective


.





Vital Signs








  Date Time  Temp Pulse Resp B/P (MAP) Pulse Ox O2 Delivery O2 Flow Rate FiO2


 


6/5/18 10:00  80      


 


6/5/18 09:00  73      


 


6/5/18 08:35     97 Room Air  


 


6/5/18 08:35 98.5 74 16 107/72 (84) 97   


 


6/5/18 08:00  70      


 


6/5/18 07:00  68      


 


6/5/18 06:00  80      


 


6/5/18 05:00  72      


 


6/5/18 04:00  68      


 


6/5/18 04:00 97.8 77 20 103/60 (74) 99   


 


6/5/18 03:00  74      


 


6/5/18 02:00  82      


 


6/5/18 01:00  78      


 


6/5/18 00:00  87      


 


6/5/18 00:00 97.8 77 20 121/73 (89) 97   


 


6/4/18 23:00  80      


 


6/4/18 22:00  82      


 


6/4/18 21:40        21


 


6/4/18 21:00  82      


 


6/4/18 20:00 98.0 83 20 101/57 (72) 99   


 


6/4/18 20:00  81      


 


6/4/18 20:00      Room Air  


 


6/4/18 18:00  68      


 


6/4/18 17:00  66      


 


6/4/18 16:55   20     


 


6/4/18 16:00  61      


 


6/4/18 16:00 97.8 65 18 128/73 (91) 98   


 


6/4/18 15:00  68      


 


6/4/18 14:00  66      


 


6/4/18 13:00  70      














 6/5/18 6/5/18 6/6/18





 15:00 23:00 07:00


 


Intake Total 50 ml  


 


Balance 50 ml  


 


   


 


IV Total 50 ml  








.





Laboratory Tests








Test


  6/5/18


06:10


 


White Blood Count 8.8 TH/MM3 


 


Red Blood Count 4.64 MIL/MM3 


 


Hemoglobin 12.8 GM/DL 


 


Hematocrit 37.9 % 


 


Mean Corpuscular Volume 81.7 FL 


 


Mean Corpuscular Hemoglobin 27.6 PG 


 


Mean Corpuscular Hemoglobin


Concent 33.8 % 


 


 


Red Cell Distribution Width 17.1 % 


 


Platelet Count 308 TH/MM3 


 


Mean Platelet Volume 7.8 FL 








Laboratory Tests








Test


  6/4/18


05:53 6/5/18


06:10


 


Blood Urea Nitrogen 22 MG/DL  25 MG/DL 


 


Creatinine 0.79 MG/DL  0.84 MG/DL 


 


Random Glucose 211 MG/DL  215 MG/DL 


 


Calcium Level 8.6 MG/DL  9.0 MG/DL 


 


Sodium Level 132 MEQ/L  132 MEQ/L 


 


Potassium Level 4.8 MEQ/L  4.7 MEQ/L 


 


Chloride Level 97 MEQ/L  97 MEQ/L 


 


Carbon Dioxide Level 25.3 MEQ/L  23.7 MEQ/L 


 


Anion Gap 10 MEQ/L  11 MEQ/L 


 


Estimat Glomerular Filtration


Rate 100 ML/MIN 


  94 ML/MIN 


 


 


B-Type Natriuretic Peptide 127 PG/ML  162 PG/ML 


 


Magnesium Level  2.3 MG/DL 








Imaging





Last Impressions








Aorta w/Runoff CTA 5/28/18 0000 Signed





Impressions: 





 CONCLUSION:





 1.  Atherosclerotic changes without significant stenosis bilateral lower extrem





 ities.





  





 


 


Chest X-Ray 5/24/18 0955 Signed





Impressions: 





 CONCLUSION: 





 1.  No acute abnormality or significant interval change.





  





 


 


Toe X-Ray 5/24/18 0000 Signed





Impressions: 





 CONCLUSION: 





 Questionable small erosion distal phalanx right second toe. This could be infec





 tious in nature if there is clinical evidence for soft tissue infection.





  





 





  





  





 





  





 





  





 


 


Renal Ultrasound 5/24/18 0000 Signed





Impressions: 





 CONCLUSION: 





 1.  No hydronephrosis.





 2.  Increased echogenicity most characteristic of medical renal disease.





  





 


 


Foot MRI 5/24/18 0000 Signed





Impressions: 





 CONCLUSION:





 1.  No evidence for osteomyelitis. Soft tissue swelling at the second toe.





  





 


 


Extremity Arterial Study 5/24/18 0000 Signed





Impressions: 





 CONCLUSION:





 1.  Findings consistent with left outflow disease as well as small vessel disea





 se in the left foot.





 2.  Near normal range right ankle-brachial indices and segmental pressures.





  





 








Physical Exam


GENERAL:   male patient, Asleep but easily awakens to voice.  Appears 

comfortable.


SKIN:  No rashes or lesions. Warm and dry.


HEAD: Atraumatic. Normocephalic. 


EYES: Pupils equal round and reactive. Extraocular motions intact. No scleral 

icterus. No injection or drainage. 


ENT: Nose without bleeding or purulent drainage. Airway patent.  MMM.


NECK: Trachea midline. Supple, nontender, no meningeal signs.


CARDIOVASCULAR: HS audible.  + ICD.


RESPIRATORY: Nonlabored.  Clear to auscultation. Breath sounds equal 

bilaterally. No wheezes, rales, or rhonchi.  


GASTROINTESTINAL: Abdomen soft, nontender to palpation.


MUSCULOSKELETAL:  No BLE edema appreciated.  Right foot dressed, C/D/I, able to 

wiggles toes slightly on right foot.  Decreased sensation but able to feel 

light touch on toes.


NEUROLOGICAL:   Awake and alert.  Able to move all extremities spontaneously.  

Grossly non-focal.  Normal speech.


Psych calm and cooperative


IV line sites with no e.o infection.





Assessment & Plan


Remarks


Sepsis


High grade MSSA bacteremia 


   -  source ?2nd toe,  concern with ICD


Right 2nd toe wet gangrene, osteomyelitis.


Hepatitis C antibody positive.


AICD status.


CAD with severe ischemic cardiomyopathy.


Thrombocytopenia likely due to severe infection





Recs


Continue IV Ancef


Post hospital infusion orders in chart. dw CM will go to infusion center.


Needs mandatory follow up with podiatry on discharge.


Follow cultures until finalized


Follow path


Follow clinically


Will sign off please call back if any change in clinical condition or questions.


I will be OOT from 6/6/2018 to 6/18/2018. I will be back in town 6/19/2018.











Bev Brandon MD Jun 5, 2018 12:36

## 2018-06-05 NOTE — HHI.DS
__________________________________________________





Discharge Summary


Admission Date


May 24, 2018 at 13:03


Discharge Date:  Jun 5, 2018


Admitting Diagnosis





severe sepsis, elevated troponin





(1) Severe sepsis


ICD Code:  A41.9 - Sepsis, unspecified organism; R65.20 - Severe sepsis without 

septic shock


Diagnosis:  Principal





(2) Elevated troponin


ICD Code:  R74.8 - Abnormal levels of other serum enzymes


Diagnosis:  Principal





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


Diagnosis:  Principal





Procedures


May 29, 2018


Pre Op Diagnosis:  


gangrene with osteomyelitis right 2nd toe


Post Op Diagnosis:  


same


Surgeon:


Marc Mckeon DPM


Assistant(s):


Staff


Procedure:


amputation right 2nd toe


Findings:


Consistent with diagnosis. Right 2nd toe with necrotic tissue to entire digit. 

Foul odor slightly present. Margins clean without purulence. 


Two semi-elliptical incisions made to disarticulate digit in its entirety at 

metatarsophalangeal joint level. 2nd metatarsal head with white healthy 

cartilage cap. No purulence noted.


Culture taken prior to irrigation and primary closure with 2-0 nylon suture. 


Dressing with xeroform, 4x4, cast padding, ace.


Weightbearing as tolerated to heel in surgical shoe for transfers only.


Additional Information:


see findings


Complications:


none


Specimen(s) removed:


1. toe right 2nd


2. culture right foot


Estimated blood loss:


minimal


Anesthesia:  MAC, Local (10mL 2% lidocaine plain)


Drains:  None


Tourniquet time (min at mmHg)


n/a


Patient to:  PACU


Patient Condition:  Good


Date/Time of Procedure:  SEE SURGICAL CARE RECORD











Marc Mckeon DPM


Brief History - From Admission


patient is a 58 y/o male with history of severe three vessel disease, 

cardiomyopathy, s/p defibrillator placement, hypertension, diabetes,COPD who 

presented to ER with worsening sob. he says that he started to have sob three 

days ago. it gradually got worse the past few days. he also says that he 

noticed some redness over the right second toe around the same time. he says 

that ' I'm on blood thinners and I might've bumped my foot on something'. he 

says that it started as a small redness but this got worse over the past few 

days. he denies any fever. he says that he had some occasional cough and some 

lower chest pain which is worse with the cough. of note he was admitted to this 

hospital about two months ago, underwent cardiac catheterization when he was 

found to have severe three vessel disease. he was evaluated by CT surgery -

however he was believed to have poor targets for redo sternotomy. he had 

defibrillator placement at the time.


CBC/BMP:  


6/5/18 0610                                                                    

            6/5/18 0610





Significant Findings





Laboratory Tests








Test


  6/3/18


04:03 6/4/18


05:53 6/5/18


06:10


 


Activated Partial


Thromboplast Time 52.2 SEC


(24.3-30.1) 50.0 SEC


(24.3-30.1) 


 


 


Blood Urea Nitrogen


  21 MG/DL


(7-18) 22 MG/DL


(7-18) 25 MG/DL


(7-18)


 


Random Glucose


  269 MG/DL


() 211 MG/DL


() 215 MG/DL


()


 


Sodium Level


  132 MEQ/L


(136-145) 132 MEQ/L


(136-145) 132 MEQ/L


(136-145)


 


Chloride Level


  97 MEQ/L


() 97 MEQ/L


() 97 MEQ/L


()


 


Estimat Glomerular Filtration


Rate 86 ML/MIN


(>89) 


  


 


 


B-Type Natriuretic Peptide


  254 PG/ML


(0-100) 127 PG/ML


(0-100) 162 PG/ML


(0-100)


 


Cholesterol Level


  99 MG/DL


(120-200) 


  


 


 


HDL Cholesterol


  26.0 MG/DL


(40.0-60.0) 


  


 


 


Hemoglobin


  


  


  12.8 GM/DL


(13.0-17.0)


 


Hematocrit


  


  


  37.9 %


(39.0-51.0)








Imaging





Last Impressions








Chest X-Ray 6/1/18 0000 Signed





Impressions: 





 CONCLUSION: 





 Stable appearance with no acute cardiopulmonary disease. There is no evidence o





 f pneumonia.





  





 


 


Aorta w/Runoff CTA 5/28/18 0000 Signed





Impressions: 





 CONCLUSION:





 1.  Atherosclerotic changes without significant stenosis bilateral lower extrem





 ities.





  





 


 


Toe X-Ray 5/24/18 0000 Signed





Impressions: 





 CONCLUSION: 





 Questionable small erosion distal phalanx right second toe. This could be infec





 tious in nature if there is clinical evidence for soft tissue infection.





  





 





  





  





 





  





 





  





 


 


Renal Ultrasound 5/24/18 0000 Signed





Impressions: 





 CONCLUSION: 





 1.  No hydronephrosis.





 2.  Increased echogenicity most characteristic of medical renal disease.





  





 


 


Foot MRI 5/24/18 0000 Signed





Impressions: 





 CONCLUSION:





 1.  No evidence for osteomyelitis. Soft tissue swelling at the second toe.





  





 


 


Extremity Arterial Study 5/24/18 0000 Signed





Impressions: 





 CONCLUSION:





 1.  Findings consistent with left outflow disease as well as small vessel disea





 se in the left foot.





 2.  Near normal range right ankle-brachial indices and segmental pressures.





  





 








PE at Discharge


GENERAL: Alert, Oriented x 3, NAD. 


SKIN: Warm and dry.


HEAD: Normocephalic.


EYES: No scleral icterus. No injection or drainage. 


NECK: Supple, trachea midline. No JVD or lymphadenopathy.


CARDIOVASCULAR: Regular rate and rhythm without murmurs, gallops, or rubs. 


RESPIRATORY: Breath sounds equal bilaterally. No accessory muscle use.


GASTROINTESTINAL: Abdomen soft, non-tender, nondistended. 


MUSCULOSKELETAL: No cyanosis, or edema. right foot wrapped with dressing. 


BACK: Nontender without obvious deformity. No CVA tenderness.





Pt update on day of discharge


Patient is doing well. No acute concerns. No further bleeding from right foot. 

He wants to go home.


Hospital Course


57 year old  male with ischemic cardiomyopathy with AICD, CAD with 3V 

CABG, HTN, DM, and COPD admitted on 5/24 for severe, progressive exertional 

dyspnea as well as right second toe pain. He was found to have severe sepsis 

secondary to gangrene/infection of R second toe, NSTEMI with troponin 3.04, and 

CHF exacerbation with BNP >3000.





Sepsis


R second toe osteomyelitis, gangrene


MSSA bacteremia


- Acute sepsis component resolved


- Afebrile, no leukocytosis


- Blood culture from 5/24-5/27 all growing MSSA


- Blood cultures since 5/30 NG


- Wound culture growing MSSA


- ID following; recommend continuing IV Ancef 2 g IV Q8H x 6 weeks


- ID cleared for discharge with IV abx recommendations. 


- Podiatry following; s/p 2nd toe amputation 5/29. WB as tolerated. Cleared per 

podiatry


- Appreciate podiatry recommendations. 





Ischemic cardiomyopathy


CHF exacerbation


NSTEMI


AICD firing for VTACH (5/25)


CAD with 3V CABG


- Continue ASA, Entresto, Coreg


- Continue statin


- discussed with Dr. HAYDEN (Vascular surgeon) who recommend no anti-coagulation or 

Plavix. 





Acute kidney injury


- Resolved


- Avoid nephrotoxic agents





Thrombocytopenia


- Resolved


- Likely secondary to infectious process


- Heme/onc consulted earlier in course; HIT ab negative





Diabetes mellitus, uncontrolled


- A1c 9.6


- SSI per protocol; required 28 units yesterday


- Increased Levemir to 15 units BID in the hospital 


- Continue Metformin and Glyburide upon discharge. 





COPD


- Supplemental O2 and nebs PRN


Pt Condition on Discharge:  Good


Discharge Disposition:  Disch w/ Home Health Serv


Discharge Instructions


DIET: Follow Instructions for:  Heart Healthy Diet


Activities you can perform:  Regular-No Restrictions











María Elena Angel DO Jun 5, 2018 17:52

## 2018-06-05 NOTE — HHI.FF
__________________________________________________





Infusion Therapy


Location of Infusion Therapy:  Ambulatory Infusion Therapy Order


Patient Information


Appointment Date:  Jun 5, 2018


Patient Weight


84.9 kg


Diagnosis:  


Diagnosis


MSSA bacteremia


MSSA foot infection osteomyelitis s.p amputation of 2nd toe.


AICD in place.


Coded Allergies:  


     potassium iodide (Unverified  Allergy, Severe, Swelling, 3/21/18)


     povidone-iodine (Unverified  Allergy, Severe, Swelling, 3/21/18)


     shellfish derived (Unverified  Allergy, Severe, 3/21/18)


     sodium iodide (Unverified  Allergy, Severe, Swelling, 3/21/18)


     sodium iodide (Unverified  Allergy, Severe, Swelling, 3/21/18)





Administer Medication


Cefazolin (prefer pump. If pump total 6 gms Ancef in 24 hrs.)


2 grams IV


q 8 hours


Start Treatment:  Jun 5, 2018


Stop Treatment:  Jul 11, 2018





Additional Information


Venous access:  PICC Line


Additional Instructions





[x] Peripheral flush and dressing changes per protocol


[x] Implanted port and central :


* Implanted port: 10 ml Normal Saline followed by 5 ml Heparin 100 units/ml 

Heparin flush


   after each use and monthly to maintain.


   [] May leave port accessed during therapy.


   [] May leave peripheral site accessed for duration of therapy.





[x] If patient has SOB or respiratory distress, check oxygen saturation. If 

less than 90% or clinical signs of respiratory distress, administer oxygen at 2 

L/min. via nasal cannula and notify physician.





[x] Anaphylaxis/Reaction orders:


* Stop infusion.


* Keep IV line open with saline flush.


* Notify physician.


* Monitor vital signs every 15 minutes until symptoms resolve.


* Check Oxygen saturation; Oxygen at 2 L/min. via nasal cannula if less than 90%

 or clinical signs of respiratory distress.


* Administer diphenhydramine (Benadryl) 25 mg IV STAT, (unless patient has 

received as pre-med). May repeat once, if necessary.


* Solu-Cortef 250 mg IVP over 30-60 seconds, use 100 mg vials for each 

dissolution.


* Epinephrine (1mg/1 ml) 0.3 mg subcutaneously or IVP now with any signs of 

respiratory distress.


* Check with physician for new additional pre-med orders if patient is re-

challenged or re-treated.


[x] May remove PICC line when treatment complete, after confirming with 

Physician.


[x] If the patient is admitted to the hospital, the ED, or transferred via [facility]

, complete transfer form including medication reconciliation order sheet.





Laboratory Tests


Weekly Labs:  CBC w/diff, Creatinine, CRP, LFT's (Hepatic function test)


Additional Information


Please draw weekly labs, fax to numbers below, Call with abnormal labs or 

change in clinical condition or problems to:


Dr.Tanuja Brandon or covering ID Physician


Phone:[phone] 


Fax:[phone]





Follow up appt:


Patient to follow up with podiatry.


Follow up with PCP


Follow up with other MDs as planned.





Counseling:


Counseled about medication side effects


Counseled about PICC line care and hand hygiene.











Bev Brandon MD Jun 5, 2018 12:41

## 2018-06-05 NOTE — HHI.PR
Subjective


Remarks


Patient seen bedside with nurse present. Sanguinous drainage noted to right 

surgical dressing.





Objective





Vital Signs








  Date Time  Temp Pulse Resp B/P (MAP) Pulse Ox O2 Delivery O2 Flow Rate FiO2


 


6/5/18 14:00  71      


 


6/5/18 13:00  65      


 


6/5/18 12:47 98.2 70 18 120/66 (84) 97   


 


6/5/18 12:00  61      


 


6/5/18 11:00  68      


 


6/5/18 10:00  80      


 


6/5/18 09:00  73      


 


6/5/18 09:00     97   21


 


6/5/18 08:35     97 Room Air  


 


6/5/18 08:35 98.5 74 16 107/72 (84) 97   


 


6/5/18 08:00  70      


 


6/5/18 07:00  68      


 


6/5/18 06:00  80      


 


6/5/18 05:00  72      


 


6/5/18 04:00  68      


 


6/5/18 04:00 97.8 77 20 103/60 (74) 99   


 


6/5/18 03:00  74      


 


6/5/18 02:00  82      


 


6/5/18 01:00  78      


 


6/5/18 00:00  87      


 


6/5/18 00:00 97.8 77 20 121/73 (89) 97   


 


6/4/18 23:00  80      


 


6/4/18 22:00  82      


 


6/4/18 21:40        21


 


6/4/18 21:00  82      


 


6/4/18 20:00 98.0 83 20 101/57 (72) 99   


 


6/4/18 20:00  81      


 


6/4/18 20:00      Room Air  


 


6/4/18 18:00  68      


 


6/4/18 17:00  66      


 


6/4/18 16:55   20     


 


6/4/18 16:00  61      


 


6/4/18 16:00 97.8 65 18 128/73 (91) 98   














I/O      


 


 6/4/18 6/4/18 6/4/18 6/5/18 6/5/18 6/5/18





 07:00 15:00 23:00 07:00 15:00 23:00


 


Intake Total 470 ml  1170 ml 526 ml 50 ml 


 


Output Total 1300 ml  1550 ml 1325 ml  


 


Balance -830 ml  -380 ml -799 ml 50 ml 


 


      


 


Intake Oral 240 ml  820 ml 476 ml  


 


IV Total 230 ml  350 ml 50 ml 50 ml 


 


Output Urine Total 1300 ml  1550 ml 1325 ml  


 


# Bowel Movements 0  0   








Result Diagram:  


6/5/18 0610                                                                    

            6/5/18 0610





Imaging





Last Impressions








Chest X-Ray 6/1/18 0000 Signed





Impressions: 





 CONCLUSION: 





 Stable appearance with no acute cardiopulmonary disease. There is no evidence o





 f pneumonia.





  





 


 


Aorta w/Runoff CTA 5/28/18 0000 Signed





Impressions: 





 CONCLUSION:





 1.  Atherosclerotic changes without significant stenosis bilateral lower extrem





 ities.





  





 


 


Toe X-Ray 5/24/18 0000 Signed





Impressions: 





 CONCLUSION: 





 Questionable small erosion distal phalanx right second toe. This could be infec





 tious in nature if there is clinical evidence for soft tissue infection.





  





 





  





  





 





  





 





  





 


 


Renal Ultrasound 5/24/18 0000 Signed





Impressions: 





 CONCLUSION: 





 1.  No hydronephrosis.





 2.  Increased echogenicity most characteristic of medical renal disease.





  





 


 


Foot MRI 5/24/18 0000 Signed





Impressions: 





 CONCLUSION:





 1.  No evidence for osteomyelitis. Soft tissue swelling at the second toe.





  





 


 


Extremity Arterial Study 5/24/18 0000 Signed





Impressions: 





 CONCLUSION:





 1.  Findings consistent with left outflow disease as well as small vessel disea





 se in the left foot.





 2.  Near normal range right ankle-brachial indices and segmental pressures.





  





 








Other Results





Microbiology








 Date/Time


Source Procedure


Growth Status


 


 


 5/30/18 02:50


Blood Peripheral Aerobic Blood Culture - Final


NO GROWTH IN 5 DAYS Complete


 


 5/30/18 02:50


Blood Peripheral Anaerobic Blood Culture - Final


NO GROWTH IN 5 DAYS Complete





 5/29/18 16:24


Wound Toe Fungal Smear - Final


NO FUNGAL ELEMENTS SEEN. Resulted


 


 5/29/18 16:24


Wound Toe Fungal Culture - Preliminary


NO GROWTH IN 1 WEEK Resulted








Objective Remarks


Right foot surgical dressing intact with sanguinous strikethrough noted.  

Sanguinous strikethrough is not copious.


Medications and IVs





Current Medications








 Medications


  (Trade)  Dose


 Ordered  Sig/Ilia


 Route  Start Time


 Stop Time Status Last Admin


 


  (NS Flush)  2 ml  UNSCH  PRN


 IVF  5/24/18 10:00


    6/3/18 19:53


 


 


  (D50w (Vial) Inj)  50 ml  UNSCH  PRN


 IV PUSH  5/24/18 13:30


     


 


 


  (Glucagon Inj)  1 mg  UNSCH  PRN


 OTHER  5/24/18 13:30


     


 


 


  (NovoLOG


 SUPPLEMENTAL


 SCALE)  1  ACHS SLIDING  SCALE


 SQ  5/24/18 17:00


    6/5/18 12:00


 


 


  (Pravachol)  40 mg  DAILY


 PO  5/25/18 09:00


    6/5/18 08:13


 


 


  (Duoneb Neb)  1 ampule  Q4HR NEB  PRN


 NEB  5/24/18 13:45


    5/24/18 15:38


 


 


  (Tylenol)  650 mg  Q4H  PRN


 PO  5/24/18 14:30


    5/27/18 11:45


 


 


  (Norco  5-325 Mg)  1 tab  Q4H  PRN


 PO  5/24/18 14:30


    6/5/18 12:12


 


 


  (Reglan Inj)  5 mg  Q6H  PRN


 IV PUSH  5/25/18 02:30


    5/25/18 20:10


 


 


  (Zofran  Odt)  4 mg  Q6H  PRN


 PO  5/25/18 02:30


    5/28/18 11:50


 


 


  (Lopressor Inj)  5 mg  Q5M  PRN


 IV PUSH  5/25/18 02:45


    5/25/18 03:06


 


 


  (Tessalon)  100 mg  TID  PRN


 PO  5/26/18 03:00


    5/29/18 02:00


 


 


  (Robitussin Ac


 200-20 Mg/10 ml


 Liq)  10 ml  Q6H  PRN


 PO  5/26/18 03:00


    6/3/18 19:52


 


 


  (Coreg)  6.25 mg  Q12HR


 PO  5/26/18 11:30


    6/5/18 08:13


 


 


  (Entresto 49-51


 Mg)  1 tab  BID


 PO  5/26/18 21:00


    6/5/18 08:13


 


 


 Cefazolin Sodium/


 Dextrose  50 ml @ 


 100 mls/hr  Q8H


 IV  5/27/18 09:00


    6/5/18 08:14


 


 


  (Aspirin Chew)  81 mg  DAILY


 CHEW  5/28/18 12:10


    6/5/18 08:14


 


 


 Sodium Chloride  1,000 ml @ 


 100 mls/hr  Q10H


 IV  5/29/18 11:15


   Future Hold 5/30/18 10:17


 


 


  (Protonix)  40 mg  DAILY


 PO  5/30/18 09:00


    6/5/18 08:13


 


 


  (Levemir Inj)  15 units  Q12HR


 SQ  6/3/18 21:00


    6/5/18 08:12


 











Assessment and Plan


Assessment and Plan


57-year-old male status post right foot surgical intervention intervention for 

gangrenous toe, status post second digit amputation





Patient evaluated and examined with all questions answered 


Nursing to change dressing before DC


Patient to keep dressing clean dry and intact until follow-up in office


DC orders placed per podiatry











Jeanette Miguel DPM Jun 5, 2018 15:11

## 2018-06-06 ENCOUNTER — HOSPITAL ENCOUNTER (EMERGENCY)
Dept: HOSPITAL 17 - NEPD | Age: 58
Discharge: HOME | End: 2018-06-06
Payer: COMMERCIAL

## 2018-06-06 VITALS
TEMPERATURE: 98.2 F | OXYGEN SATURATION: 97 % | RESPIRATION RATE: 18 BRPM | SYSTOLIC BLOOD PRESSURE: 106 MMHG | DIASTOLIC BLOOD PRESSURE: 57 MMHG | HEART RATE: 97 BPM

## 2018-06-06 DIAGNOSIS — T82.594A: Primary | ICD-10-CM

## 2018-06-06 PROCEDURE — 99283 EMERGENCY DEPT VISIT LOW MDM: CPT

## 2018-06-06 NOTE — PD
HPI


Chief Complaint:  Medical Clearance


Time Seen by Provider:  19:29


Travel History


International Travel<30 days:  No


Contact w/Intl Traveler<30days:  No


Traveled to known affect area:  No





History of Present Illness


HPI


Patient is a 57-year-old male who presents the emergency room for evaluation of 

PICC line malfunction.  Patient reports that he was discharged from the 

hospital yesterday after he was treated for gangrene with osteomyelitis the 

right second toe.  Patient had a home nurse come today to start his PICC line 

as he receives Ancef 2 g IV every 8 hours which will be for 6 weeks.  Patient 

reports that patient's PICC line occluded while getting his dose of Ancef, they 

were unable to flush the port today - home RN told him to go to the ER for 

evaluation.  Patient with no other complaints at this time.





PFSH


Past Medical History


Hx Anticoagulant Therapy:  Yes (PLAVIX)


Arthritis:  Yes


Heart Rhythm Problems:  Yes


Cancer:  No


Cardiac Catheterization:  Yes


Cardiovascular Problems:  Yes (MI, BYPASS, DEFIBULATOR)


High Cholesterol:  Yes


Chest Pain:  Yes


Congestive Heart Failure:  Yes


COPD:  Yes


Coronary Artery Disease:  Yes


Diabetes:  Yes


Diminished Hearing:  No


Endocrine:  Yes


Gastrointestinal Disorders:  Yes


Genitourinary:  No


Hypertension:  Yes


Immune Disorder:  No


Musculoskeletal:  Yes


Neurologic:  No


Psychiatric:  No


Reproductive:  No


Respiratory:  Yes


Immunizations Current:  Yes


Myocardial Infarction:  Yes (3/2013)


Ulcer:  Yes





Past Surgical History


AICD:  Yes


Appendectomy:  Yes


Cardiac Surgery:  Yes (CABG X3)


Coronary Artery Bypass Graft:  Yes


Joint Replacement:  Yes (TITANIUM MEETA IN BACK)


Pacemaker:  No


Other Surgery:  Yes (CABG, APPENDECTOMY, SKIN GRAFTS TO RIGHT FOOT AS A CHILD, 

BACK SURGERY X 3)





Social History


Alcohol Use:  Yes (SOCIALLY)


Tobacco Use:  No (QUIT 3 YEARS AGO)


Substance Use:  No





Allergies-Medications


(Allergen,Severity, Reaction):  


Coded Allergies:  


     potassium iodide (Unverified  Allergy, Severe, Swelling, 6/6/18)


     povidone-iodine (Unverified  Allergy, Severe, Swelling, 6/6/18)


     shellfish derived (Unverified  Allergy, Severe, 6/6/18)


     sodium iodide (Unverified  Allergy, Severe, Swelling, 6/6/18)


     sodium iodide (Unverified  Allergy, Severe, Swelling, 6/6/18)


Reported Meds & Prescriptions





Reported Meds & Active Scripts


Active


Hydrocodone-Acetamin 5-325 mg (Hydrocodone/Acetaminophen) 5 Mg-325 Mg Tablet 1 

Tab PO Q6HR PRN


     Post surgery pain management.


Tgt Aspirin (Aspirin) 81 Mg Chw 81 Mg CHEW DAILY


Epinephrine Inj 1 Mg/Ml (1 Ml) Inj 0.3 Mg SQ ONCE PRN


     Give with any signs of respiratory distress.


Epinephrine Inj 1 Mg/Ml (1 Ml) Inj 0.3 Mg IV PUSH ONCE PRN


Solu-Cortef Inj (Hydrocortisone Sodium Succinate) 250 Mg/2 Ml Inj 250 Mg IV 

PUSH ONCE PRN


     Give over 30-60 seconds.


Cefazolin Inj (Cefazolin Sodium/Dextrose) 2 Gm/50 Ml Bagp 2 Gm IV Q8H 35 Days


Famotidine 20 Mg Tab 20 Mg PO BID


Aldactone (Spironolactone) 50 Mg Tab 50 Mg PO DAILY


Entresto (Sacubitril-Valsartan) 49-51 Mg Tab 1 Tab PO BID


Carvedilol 6.25 Mg Tab 6.25 Mg PO BID


Pravastatin 40 Mg Tab 40 Mg PO DAILY


Reported


Glucophage (Metformin HCl) 850 Mg Tab 850 Mg PO TIDPC


Glyburide 2.5 Mg Tab 2.5 Mg PO BID


     Take with meals at the same time each day








Review of Systems


General / Constitutional:  No: Fever


Eyes:  No: Visual changes


HENT:  No: Headaches


Cardiovascular:  No: Chest Pain or Discomfort


Respiratory:  No: Shortness of Breath


Gastrointestinal:  No: Abdominal Pain


Genitourinary:  No: Dysuria


Musculoskeletal:  No: Pain


Skin:  No Rash


Neurologic:  No: Weakness


Psychiatric:  No: Depression


Endocrine:  No: Polydipsia


Hematologic/Lymphatic:  No: Easy Bruising





Physical Exam


Narrative


GENERAL: Well-nourished, well-developed patient.


SKIN: Focused skin assessment warm/dry.


HEAD: Normocephalic.


EYES: No scleral icterus. No injection or drainage. 


NECK: Supple, trachea midline. No JVD or lymphadenopathy.


CARDIOVASCULAR: Regular rate and rhythm without murmurs, gallops, or rubs. 


RESPIRATORY: Breath sounds equal bilaterally. No accessory muscle use.


GASTROINTESTINAL: Abdomen soft, non-tender, nondistended. 


MUSCULOSKELETAL: No cyanosis, or edema.  PICC line in place to the right AC


BACK: Nontender without obvious deformity. No CVA tenderness.





Data


Data


Last Documented VS





Vital Signs








  Date Time  Temp Pulse Resp B/P (MAP) Pulse Ox O2 Delivery O2 Flow Rate FiO2


 


6/6/18 19:22 98.2 97 18 106/57 (73) 97   








Orders





 Orders


Sodium Chloride 0.9% Flush (Ns Flush) (6/6/18 19:45)


Heparin Central Flush (Heparin Central F (6/6/18 19:45)


Ed Discharge Order (6/6/18 20:40)








MDM


Medical Decision Making


Medical Screen Exam Complete:  Yes


Emergency Medical Condition:  Yes


Medical Record Reviewed:  Yes


Interpretation(s)





Vital Signs








  Date Time  Temp Pulse Resp B/P (MAP) Pulse Ox O2 Delivery O2 Flow Rate FiO2


 


6/6/18 19:22 98.2 97 18 106/57 (73) 97   








Differential Diagnosis


PICC line malfunction


Narrative Course


RN attempted to flush PICC line with normal saline flush, unable to flush picc 

line, heparin flush ordered





PICC line flushing after heparin flush, patient's ancef was hooked up to the 

PICC line and is functioning.  Patient was monitored for PICC line malfunction 

- no malfunction. Patient will be discharged to home with PICC line running 

with Ancef. 





Patient happy with plan of care





Diagnosis





 Primary Impression:  


 PICC line infiltration


 Qualified Codes:  T82.898A - Other specified complication of vascular 

prosthetic devices, implants and grafts, initial encounter


Patient Instructions:  General Instructions





***Additional Instructions:  


Please follow up with your primary care doctor 





Return to ER as needed


Disposition:  01 DISCHARGE HOME


Condition:  Stable











Katerin Durand DO Jun 6, 2018 20:09

## 2019-03-05 NOTE — [facility].PR
NURSING DISCHARGE NOTE    Discharged pt via ambulation to [facility]. Accompanied by spouse. Belongings at hand. IV catheter d/c'd; catheter intact. Discharge instruction and education given to pt and verbalizes understanding.  Script given and filled Subjective


Remarks


Patient reports he still having some chest discomfort this morning but it 

improved throughout the day.  He also complained of a headache and shortness of 

breath with activities.





Objective


Vitals





Vital Signs








  Date Time  Temp Pulse Resp B/P (MAP) Pulse Ox O2 Delivery O2 Flow Rate FiO2


 


3/24/18 08:57     99 Nasal Cannula 2.00 


 


3/24/18 07:00  59      


 


3/24/18 06:00  54      


 


3/24/18 05:00  57      


 


3/24/18 04:00 98.3 53 18 100/55 (70) 96   


 


3/24/18 04:00      Room Air  


 


3/24/18 04:00  53      


 


3/24/18 03:00  51      


 


3/24/18 02:00  54      


 


3/24/18 01:00  55      


 


3/24/18 00:00 98.0 52 19 99/54 (69) 97   


 


3/24/18 00:00      Room Air  


 


3/24/18 00:00  52      


 


3/23/18 23:00  51      


 


3/23/18 22:12     96   21


 


3/23/18 22:00  56      


 


3/23/18 21:00  66      


 


3/23/18 20:00  60      


 


3/23/18 20:00 97.9 60 20 111/74 (86) 97   


 


3/23/18 16:00 98.0 59 18 111/65 (80) 99   














I/O      


 


 3/23/18 3/23/18 3/23/18 3/24/18 3/24/18 3/24/18





 07:00 15:00 23:00 07:00 15:00 23:00


 


Intake Total    240 ml  


 


Output Total    1000 ml  


 


Balance    -760 ml  


 


      


 


Intake Oral    240 ml  


 


Output Urine Total    1000 ml  


 


# Bowel Movements    0  








Result Diagram:  


3/24/18 0340                                                                   

             3/24/18 0340





Objective Remarks


GENERAL: This is a well-nourished, well-developed patient, in no apparent 

distress.


CARDIOVASCULAR: Normal rate and regular rhythm without murmurs, gallops, or 

rubs. 


RESPIRATORY: Good respiratory efforts.  Diminished breath sounds at the bases, 

otherwise clear to auscultation bilaterally. 


GASTROINTESTINAL: Abdomen soft, non-tender, non-distended. Normal active bowel 

sounds


MUSCULOSKELETAL: Extremities without cyanosis, or edema.


NEURO:  Alert & Oriented x4 to person, place, time, situation.  Moves all ext x4


PSYCH: Appropriate mood and affect.





A/P


Problem List:  


(1) Hx of CABG


ICD Code:  Z95.1 - Presence of aortocoronary bypass graft


(2) Decreased cardiac ejection fraction


ICD Code:  R93.1 - Abnormal findings on diagnostic imaging of heart and 

coronary circulation


(3) Hypertension


ICD Code:  I10 - Essential (primary) hypertension


(4) Obese


ICD Code:  E66.9 - Obesity, unspecified


(5) Hyperlipidemia


ICD Code:  E78.5 - Hyperlipidemia, unspecified


(6) CAD (coronary artery disease)


ICD Code:  I25.10 - Atherosclerotic heart disease of native coronary artery 

without angina pectoris


(7) Diabetes mellitus


ICD Code:  E11.9 - Type 2 diabetes mellitus without complications


(8) Unstable angina pectoris


ICD Code:  I20.0 - Unstable angina


Status:  Acute


(9) Multi-vessel coronary artery stenosis


ICD Code:  I25.10 - Atherosclerotic heart disease of native coronary artery 

without angina pectoris


Assessment and Plan


57 year-old male with





Multivessel coronary stenosis in patient with prior history of CABG


   Status post left heart catheterization 03/22/18


   Appreciate input from cardiology who recommended transfer to [facility] for 

evaluation for heart transplant versus others


   CT surgery also recommend evaluation at a tertiary care center.


   Continue with heparin drip, Plavix, aspirin, beta blocker, statin, ACE 

inhibitor, Aldactone





Diabetes mellitus 


   continue on sliding scale coverage with Accu-Cheks before meals and at 

bedtime ;hold his Glucophage and metformin 


   Hemoglobin A1c of 9.8


   Continue Levemir 10 units at bedtime





Hypertension


   Continue home medications





COPD tobacco oxygen as needed





GERD 


   Continue  Pepcid





Chronic back pain 


   Continue  pain meds as needed





DVT and GI prophylaxis


On heparin, stool softeners as needed.


Discharge Planning


Need evaluation at a tertiary care center.  Case management aware.  Appreciate 

input from specialist.











Hitesh Bethea MD Mar 24, 2018 12:16